# Patient Record
Sex: FEMALE | Race: WHITE | Employment: OTHER | URBAN - NONMETROPOLITAN AREA
[De-identification: names, ages, dates, MRNs, and addresses within clinical notes are randomized per-mention and may not be internally consistent; named-entity substitution may affect disease eponyms.]

---

## 2019-02-11 ENCOUNTER — OFFICE VISIT (OUTPATIENT)
Dept: PSYCHIATRY | Age: 54
End: 2019-02-11
Payer: MEDICARE

## 2019-02-11 VITALS
OXYGEN SATURATION: 97 % | SYSTOLIC BLOOD PRESSURE: 145 MMHG | BODY MASS INDEX: 20.89 KG/M2 | DIASTOLIC BLOOD PRESSURE: 79 MMHG | HEART RATE: 57 BPM | WEIGHT: 130 LBS | HEIGHT: 66 IN

## 2019-02-11 DIAGNOSIS — F10.10 ALCOHOL ABUSE: ICD-10-CM

## 2019-02-11 DIAGNOSIS — F33.3 SEVERE EPISODE OF RECURRENT MAJOR DEPRESSIVE DISORDER, WITH PSYCHOTIC FEATURES (HCC): Primary | ICD-10-CM

## 2019-02-11 DIAGNOSIS — F41.0 GENERALIZED ANXIETY DISORDER WITH PANIC ATTACKS: ICD-10-CM

## 2019-02-11 DIAGNOSIS — F51.05 INSOMNIA DUE TO OTHER MENTAL DISORDER: ICD-10-CM

## 2019-02-11 DIAGNOSIS — F99 INSOMNIA DUE TO OTHER MENTAL DISORDER: ICD-10-CM

## 2019-02-11 DIAGNOSIS — F41.1 GENERALIZED ANXIETY DISORDER WITH PANIC ATTACKS: ICD-10-CM

## 2019-02-11 PROCEDURE — 99205 OFFICE O/P NEW HI 60 MIN: CPT | Performed by: NURSE PRACTITIONER

## 2019-02-11 RX ORDER — MELOXICAM 7.5 MG/1
TABLET ORAL
Refills: 1 | COMMUNITY
Start: 2019-01-06 | End: 2019-03-04

## 2019-02-11 RX ORDER — TRAMADOL HYDROCHLORIDE 50 MG/1
TABLET ORAL
Refills: 0 | COMMUNITY
Start: 2019-01-15 | End: 2019-05-21

## 2019-02-11 RX ORDER — TRAZODONE HYDROCHLORIDE 50 MG/1
50 TABLET ORAL NIGHTLY
Qty: 30 TABLET | Refills: 0 | Status: SHIPPED | OUTPATIENT
Start: 2019-02-11 | End: 2019-03-11 | Stop reason: SDUPTHER

## 2019-02-11 RX ORDER — PAROXETINE HYDROCHLORIDE 20 MG/1
TABLET, FILM COATED ORAL
Refills: 3 | COMMUNITY
Start: 2019-01-22 | End: 2019-02-11 | Stop reason: SDUPTHER

## 2019-02-11 RX ORDER — NICOTINE POLACRILEX 4 MG
GUM BUCCAL
Refills: 3 | COMMUNITY
Start: 2019-01-28 | End: 2019-03-04

## 2019-02-11 RX ORDER — PAROXETINE HYDROCHLORIDE 20 MG/1
30 TABLET, FILM COATED ORAL NIGHTLY
Qty: 45 TABLET | Refills: 0
Start: 2019-02-11 | End: 2019-03-12 | Stop reason: DRUGHIGH

## 2019-02-11 RX ORDER — METOPROLOL SUCCINATE 50 MG/1
TABLET, EXTENDED RELEASE ORAL
Refills: 11 | COMMUNITY
Start: 2019-01-22 | End: 2019-04-25 | Stop reason: ALTCHOICE

## 2019-03-04 ENCOUNTER — HOSPITAL ENCOUNTER (OUTPATIENT)
Dept: PSYCHIATRY | Age: 54
Discharge: HOME | End: 2019-03-04
Payer: MEDICARE

## 2019-03-04 VITALS
SYSTOLIC BLOOD PRESSURE: 133 MMHG | HEIGHT: 66 IN | HEART RATE: 54 BPM | WEIGHT: 132 LBS | DIASTOLIC BLOOD PRESSURE: 80 MMHG | BODY MASS INDEX: 21.21 KG/M2 | TEMPERATURE: 96.9 F | RESPIRATION RATE: 18 BRPM | OXYGEN SATURATION: 99 %

## 2019-03-04 PROCEDURE — 99202 OFFICE O/P NEW SF 15 MIN: CPT | Performed by: NURSE PRACTITIONER

## 2019-03-04 PROCEDURE — 90837 PSYTX W PT 60 MINUTES: CPT | Performed by: SOCIAL WORKER

## 2019-03-04 RX ORDER — MULTIVITAMIN/IRON/FOLIC ACID 18MG-0.4MG
TABLET ORAL DAILY
COMMUNITY

## 2019-03-04 RX ORDER — GABAPENTIN 300 MG/1
300 CAPSULE ORAL 2 TIMES DAILY
COMMUNITY
End: 2019-04-25 | Stop reason: DRUGHIGH

## 2019-03-04 RX ORDER — VARENICLINE TARTRATE 1 MG/1
1 TABLET, FILM COATED ORAL 2 TIMES DAILY
COMMUNITY
End: 2019-11-19 | Stop reason: ALTCHOICE

## 2019-03-04 SDOH — SOCIAL STABILITY: SOCIAL INSECURITY
WITHIN THE LAST YEAR, HAVE YOU BEEN KICKED, HIT, SLAPPED, OR OTHERWISE PHYSICALLY HURT BY YOUR PARTNER OR EX-PARTNER?: NO

## 2019-03-04 SDOH — SOCIAL STABILITY: SOCIAL NETWORK: HOW OFTEN DO YOU ATTENT MEETINGS OF THE CLUB OR ORGANIZATION YOU BELONG TO?: NEVER

## 2019-03-04 SDOH — SOCIAL STABILITY: SOCIAL NETWORK
DO YOU BELONG TO ANY CLUBS OR ORGANIZATIONS SUCH AS CHURCH GROUPS UNIONS, FRATERNAL OR ATHLETIC GROUPS, OR SCHOOL GROUPS?: NO

## 2019-03-04 SDOH — SOCIAL STABILITY: SOCIAL INSECURITY: WITHIN THE LAST YEAR, HAVE YOU BEEN AFRAID OF YOUR PARTNER OR EX-PARTNER?: NO

## 2019-03-04 SDOH — SOCIAL STABILITY: SOCIAL NETWORK: IN A TYPICAL WEEK, HOW MANY TIMES DO YOU TALK ON THE PHONE WITH FAMILY, FRIENDS, OR NEIGHBORS?: ONCE A WEEK

## 2019-03-04 SDOH — SOCIAL STABILITY: SOCIAL INSECURITY
WITHIN THE LAST YEAR, HAVE TO BEEN RAPED OR FORCED TO HAVE ANY KIND OF SEXUAL ACTIVITY BY YOUR PARTNER OR EX-PARTNER?: NO

## 2019-03-04 SDOH — SOCIAL STABILITY: SOCIAL INSECURITY: WITHIN THE LAST YEAR, HAVE YOU BEEN HUMILIATED OR EMOTIONALLY ABUSED IN OTHER WAYS BY YOUR PARTNER OR EX-PARTNER?: NO

## 2019-03-04 SDOH — SOCIAL STABILITY: SOCIAL NETWORK: HOW OFTEN DO YOU GET TOGETHER WITH FRIENDS OR RELATIVES?: THREE TIMES A WEEK

## 2019-03-04 SDOH — SOCIAL STABILITY: SOCIAL NETWORK: ARE YOU MARRIED, WIDOWED, DIVORCED, SEPARATED, NEVER MARRIED, OR LIVING WITH A PARTNER?: SEPARATED

## 2019-03-04 SDOH — SOCIAL STABILITY: SOCIAL NETWORK: HOW OFTEN DO YOU ATTEND CHURCH OR RELIGIOUS SERVICES?: NEVER

## 2019-03-04 ASSESSMENT — PAIN SCALES - GENERAL: PAINLEVEL_OUTOF10: 6

## 2019-03-04 ASSESSMENT — PAIN DESCRIPTION - PROGRESSION: CLINICAL_PROGRESSION: GRADUALLY IMPROVING

## 2019-03-04 ASSESSMENT — SLEEP AND FATIGUE QUESTIONNAIRES
DO YOU HAVE DIFFICULTY SLEEPING: YES
SLEEP PATTERN: DIFFICULTY FALLING ASLEEP;NIGHTMARES/TERRORS;RESTLESSNESS
AVERAGE NUMBER OF SLEEP HOURS: 5
RESTFUL SLEEP: NO
DIFFICULTY STAYING ASLEEP: YES
DO YOU USE A SLEEP AID: YES
DIFFICULTY FALLING ASLEEP: YES
DIFFICULTY ARISING: NO

## 2019-03-04 ASSESSMENT — PAIN DESCRIPTION - LOCATION: LOCATION: BACK

## 2019-03-04 ASSESSMENT — PAIN DESCRIPTION - ONSET: ONSET: ON-GOING

## 2019-03-04 ASSESSMENT — PAIN DESCRIPTION - PAIN TYPE: TYPE: CHRONIC PAIN

## 2019-03-04 ASSESSMENT — PAIN DESCRIPTION - FREQUENCY: FREQUENCY: CONTINUOUS

## 2019-03-04 ASSESSMENT — PAIN DESCRIPTION - ORIENTATION: ORIENTATION: LOWER;MID;UPPER

## 2019-03-04 ASSESSMENT — LIFESTYLE VARIABLES: HISTORY_ALCOHOL_USE: YES

## 2019-03-04 ASSESSMENT — PATIENT HEALTH QUESTIONNAIRE - PHQ9: SUM OF ALL RESPONSES TO PHQ QUESTIONS 1-9: 16

## 2019-03-05 ENCOUNTER — HOSPITAL ENCOUNTER (OUTPATIENT)
Dept: PSYCHIATRY | Age: 54
Discharge: HOME | End: 2019-03-05
Payer: MEDICARE

## 2019-03-05 PROCEDURE — G0410 GRP PSYCH PARTIAL HOSP 45-50: HCPCS | Performed by: SOCIAL WORKER

## 2019-03-06 ENCOUNTER — HOSPITAL ENCOUNTER (OUTPATIENT)
Dept: PSYCHIATRY | Age: 54
Discharge: HOME | End: 2019-03-06
Payer: MEDICARE

## 2019-03-06 PROCEDURE — 90853 GROUP PSYCHOTHERAPY: CPT | Performed by: SOCIAL WORKER

## 2019-03-06 PROCEDURE — G0410 GRP PSYCH PARTIAL HOSP 45-50: HCPCS | Performed by: SOCIAL WORKER

## 2019-03-07 ENCOUNTER — HOSPITAL ENCOUNTER (OUTPATIENT)
Dept: PSYCHIATRY | Age: 54
Discharge: HOME | End: 2019-03-07
Payer: MEDICARE

## 2019-03-07 VITALS
HEART RATE: 56 BPM | RESPIRATION RATE: 18 BRPM | OXYGEN SATURATION: 97 % | TEMPERATURE: 97.8 F | DIASTOLIC BLOOD PRESSURE: 82 MMHG | SYSTOLIC BLOOD PRESSURE: 146 MMHG

## 2019-03-07 PROCEDURE — 90853 GROUP PSYCHOTHERAPY: CPT | Performed by: SOCIAL WORKER

## 2019-03-07 PROCEDURE — G0410 GRP PSYCH PARTIAL HOSP 45-50: HCPCS | Performed by: SOCIAL WORKER

## 2019-03-08 ENCOUNTER — HOSPITAL ENCOUNTER (OUTPATIENT)
Dept: PSYCHIATRY | Age: 54
Discharge: HOME | End: 2019-03-08
Payer: MEDICARE

## 2019-03-08 PROCEDURE — G0410 GRP PSYCH PARTIAL HOSP 45-50: HCPCS | Performed by: SOCIAL WORKER

## 2019-03-08 PROCEDURE — 90853 GROUP PSYCHOTHERAPY: CPT | Performed by: SOCIAL WORKER

## 2019-03-11 ENCOUNTER — HOSPITAL ENCOUNTER (OUTPATIENT)
Dept: PSYCHIATRY | Age: 54
Discharge: HOME | End: 2019-03-11
Payer: MEDICARE

## 2019-03-11 PROCEDURE — G0410 GRP PSYCH PARTIAL HOSP 45-50: HCPCS | Performed by: SOCIAL WORKER

## 2019-03-11 RX ORDER — TRAZODONE HYDROCHLORIDE 50 MG/1
50 TABLET ORAL NIGHTLY PRN
Qty: 30 TABLET | Refills: 0 | Status: SHIPPED | OUTPATIENT
Start: 2019-03-11 | End: 2019-03-19 | Stop reason: DRUGHIGH

## 2019-03-12 ENCOUNTER — HOSPITAL ENCOUNTER (OUTPATIENT)
Dept: PSYCHIATRY | Age: 54
Discharge: HOME | End: 2019-03-12
Payer: MEDICARE

## 2019-03-12 DIAGNOSIS — F41.1 GENERALIZED ANXIETY DISORDER WITH PANIC ATTACKS: ICD-10-CM

## 2019-03-12 DIAGNOSIS — F51.05 INSOMNIA DUE TO OTHER MENTAL DISORDER: ICD-10-CM

## 2019-03-12 DIAGNOSIS — F33.3 SEVERE EPISODE OF RECURRENT MAJOR DEPRESSIVE DISORDER, WITH PSYCHOTIC FEATURES (HCC): Primary | ICD-10-CM

## 2019-03-12 DIAGNOSIS — F99 INSOMNIA DUE TO OTHER MENTAL DISORDER: ICD-10-CM

## 2019-03-12 DIAGNOSIS — F41.0 GENERALIZED ANXIETY DISORDER WITH PANIC ATTACKS: ICD-10-CM

## 2019-03-12 DIAGNOSIS — F43.10 PTSD (POST-TRAUMATIC STRESS DISORDER): ICD-10-CM

## 2019-03-12 PROCEDURE — 90853 GROUP PSYCHOTHERAPY: CPT | Performed by: SOCIAL WORKER

## 2019-03-12 PROCEDURE — 99214 OFFICE O/P EST MOD 30 MIN: CPT | Performed by: NURSE PRACTITIONER

## 2019-03-12 PROCEDURE — G0410 GRP PSYCH PARTIAL HOSP 45-50: HCPCS | Performed by: SOCIAL WORKER

## 2019-03-12 RX ORDER — PAROXETINE HYDROCHLORIDE 40 MG/1
40 TABLET, FILM COATED ORAL DAILY
Qty: 30 TABLET | Refills: 3 | Status: SHIPPED | OUTPATIENT
Start: 2019-03-12 | End: 2019-04-03 | Stop reason: SINTOL

## 2019-03-13 ENCOUNTER — HOSPITAL ENCOUNTER (OUTPATIENT)
Dept: PSYCHIATRY | Age: 54
Discharge: HOME | End: 2019-03-13
Payer: MEDICARE

## 2019-03-13 PROCEDURE — G0410 GRP PSYCH PARTIAL HOSP 45-50: HCPCS | Performed by: SOCIAL WORKER

## 2019-03-14 ENCOUNTER — HOSPITAL ENCOUNTER (OUTPATIENT)
Dept: PSYCHIATRY | Age: 54
Discharge: HOME | End: 2019-03-14
Payer: MEDICARE

## 2019-03-14 PROCEDURE — G0410 GRP PSYCH PARTIAL HOSP 45-50: HCPCS | Performed by: SOCIAL WORKER

## 2019-03-15 ENCOUNTER — HOSPITAL ENCOUNTER (OUTPATIENT)
Dept: PSYCHIATRY | Age: 54
Discharge: HOME | End: 2019-03-15
Payer: MEDICARE

## 2019-03-15 PROCEDURE — G0410 GRP PSYCH PARTIAL HOSP 45-50: HCPCS | Performed by: SOCIAL WORKER

## 2019-03-15 PROCEDURE — 90853 GROUP PSYCHOTHERAPY: CPT | Performed by: SOCIAL WORKER

## 2019-03-18 ENCOUNTER — HOSPITAL ENCOUNTER (OUTPATIENT)
Dept: PSYCHIATRY | Age: 54
Discharge: HOME | End: 2019-03-18
Payer: MEDICARE

## 2019-03-18 PROCEDURE — 90853 GROUP PSYCHOTHERAPY: CPT | Performed by: SOCIAL WORKER

## 2019-03-18 PROCEDURE — G0410 GRP PSYCH PARTIAL HOSP 45-50: HCPCS | Performed by: SOCIAL WORKER

## 2019-03-19 ENCOUNTER — HOSPITAL ENCOUNTER (OUTPATIENT)
Dept: PSYCHIATRY | Age: 54
Discharge: HOME | End: 2019-03-19
Payer: MEDICARE

## 2019-03-19 DIAGNOSIS — F43.10 PTSD (POST-TRAUMATIC STRESS DISORDER): ICD-10-CM

## 2019-03-19 DIAGNOSIS — F41.0 GENERALIZED ANXIETY DISORDER WITH PANIC ATTACKS: ICD-10-CM

## 2019-03-19 DIAGNOSIS — F33.1 MODERATE EPISODE OF RECURRENT MAJOR DEPRESSIVE DISORDER (HCC): Primary | ICD-10-CM

## 2019-03-19 DIAGNOSIS — F51.05 INSOMNIA DUE TO OTHER MENTAL DISORDER: ICD-10-CM

## 2019-03-19 DIAGNOSIS — F99 INSOMNIA DUE TO OTHER MENTAL DISORDER: ICD-10-CM

## 2019-03-19 DIAGNOSIS — F10.11 ALCOHOL USE DISORDER, MILD, IN EARLY REMISSION, ABUSE: ICD-10-CM

## 2019-03-19 DIAGNOSIS — F41.1 GENERALIZED ANXIETY DISORDER WITH PANIC ATTACKS: ICD-10-CM

## 2019-03-19 PROCEDURE — G0410 GRP PSYCH PARTIAL HOSP 45-50: HCPCS | Performed by: SOCIAL WORKER

## 2019-03-19 PROCEDURE — 99213 OFFICE O/P EST LOW 20 MIN: CPT | Performed by: NURSE PRACTITIONER

## 2019-03-19 RX ORDER — TRAZODONE HYDROCHLORIDE 50 MG/1
TABLET ORAL
Qty: 60 TABLET | Refills: 1 | Status: SHIPPED | OUTPATIENT
Start: 2019-03-19 | End: 2019-04-12

## 2019-03-19 RX ORDER — PRAZOSIN HYDROCHLORIDE 1 MG/1
1 CAPSULE ORAL NIGHTLY
Qty: 30 CAPSULE | Refills: 3 | Status: SHIPPED | OUTPATIENT
Start: 2019-03-19 | End: 2019-05-13 | Stop reason: DRUGHIGH

## 2019-03-22 ENCOUNTER — HOSPITAL ENCOUNTER (OUTPATIENT)
Dept: PSYCHIATRY | Age: 54
Setting detail: THERAPIES SERIES
Discharge: HOME | End: 2019-03-22
Payer: MEDICARE

## 2019-03-22 VITALS
OXYGEN SATURATION: 99 % | HEART RATE: 52 BPM | TEMPERATURE: 97.5 F | SYSTOLIC BLOOD PRESSURE: 104 MMHG | DIASTOLIC BLOOD PRESSURE: 68 MMHG | RESPIRATION RATE: 18 BRPM

## 2019-03-22 PROCEDURE — 90853 GROUP PSYCHOTHERAPY: CPT | Performed by: SOCIAL WORKER

## 2019-03-25 ENCOUNTER — HOSPITAL ENCOUNTER (OUTPATIENT)
Dept: PSYCHIATRY | Age: 54
Setting detail: THERAPIES SERIES
Discharge: HOME | End: 2019-03-25
Payer: MEDICARE

## 2019-03-25 PROCEDURE — 90853 GROUP PSYCHOTHERAPY: CPT | Performed by: SOCIAL WORKER

## 2019-03-27 ENCOUNTER — HOSPITAL ENCOUNTER (OUTPATIENT)
Dept: PSYCHIATRY | Age: 54
Setting detail: THERAPIES SERIES
Discharge: HOME | End: 2019-03-27
Payer: MEDICARE

## 2019-03-27 VITALS
DIASTOLIC BLOOD PRESSURE: 74 MMHG | OXYGEN SATURATION: 100 % | HEART RATE: 56 BPM | TEMPERATURE: 97.6 F | SYSTOLIC BLOOD PRESSURE: 124 MMHG | RESPIRATION RATE: 18 BRPM

## 2019-03-27 PROCEDURE — 90853 GROUP PSYCHOTHERAPY: CPT | Performed by: SOCIAL WORKER

## 2019-03-29 ENCOUNTER — HOSPITAL ENCOUNTER (OUTPATIENT)
Dept: PSYCHIATRY | Age: 54
Setting detail: THERAPIES SERIES
Discharge: HOME | End: 2019-03-29
Payer: MEDICARE

## 2019-03-29 PROCEDURE — 90853 GROUP PSYCHOTHERAPY: CPT | Performed by: SOCIAL WORKER

## 2019-04-01 ENCOUNTER — HOSPITAL ENCOUNTER (OUTPATIENT)
Dept: PSYCHIATRY | Age: 54
Setting detail: THERAPIES SERIES
Discharge: HOME | End: 2019-04-01
Payer: MEDICARE

## 2019-04-01 PROCEDURE — 90853 GROUP PSYCHOTHERAPY: CPT | Performed by: SOCIAL WORKER

## 2019-04-01 NOTE — PLAN OF CARE
Group Therapy Note     Date: 4/1/2019  Start Time: 1000  End Time:  1100  Number of Participants: 6     Type of Group: Relapse Prevention     Wellness Binder Information  Module Name:  Relapse Prevention  Session Number:  3     Patient's Goal:  Patient will identify personal strengths and discuss ways in which the patient is using them. Patient will be able to explore new ways to use personal strengths to patient's own advantage. Notes:  Patient attended group today. Patient was able to write own persona strengths and verbalize ways to use them. Patient discussed aftercare appointments and the importance of keeping appointments and continuing medications. Status After Intervention:  Improved     Participation Level:  Active Listener     Participation Quality: Appropriate        Speech:  normal        Thought Process/Content: Logical        Affective Functioning: Congruent        Mood: anxious and depressed        Level of consciousness:  Alert        Response to Learning: Able to verbalize/acknowledge new learning        Endings: None Reported     Modes of Intervention: Education and Support        Discipline Responsible: /Counselor        Signature:  KATARINA Dumas

## 2019-04-01 NOTE — PLAN OF CARE
Problem: Depressive Behavior With or Without Suicide Precautions:  Goal: Able to verbalize acceptance of life and situations over which he or she has no control  Description  Able to verbalize acceptance of life and situations over which he or she has no control  4/1/2019 1452 by KATARINA Chawla  Outcome: Ongoing  Note:   Pt received support and encouragement from peers today. Pt Goal: Pt will attend group as scheduled, identify an issue pt would like to work on regarding bettering relationships with others and/or self, pt will be participate in group discussion. Note: Pt attended group as scheduled. Pt participated by identifying an issue pt would like to work on today regarding interacting/relating with others to better relationships. Pt shared that he had a \"crappy\" weekend and that \"so far today has been just as bad. \" Pt went on to share with group that he is feeling \"low\" and \"not seeing any progress. \" Pt received support and encouragement from group. Group discussed positive and negative thinking, positive self talk, and utilizing coping skills. Pt voiced he will \"continue to try. \" Pt encouraged to catch himself from following any positive thought he has with \"but. Marilee Abbot Marilee Abbot Marilee Abbot \" and a negative thought. Pt voiced his agreement and said, \"Okay I'll give it a try. \" Pt participated in group discussion exploring issues identified by group members and pt. Status After Intervention:  Unchanged    Participation Level:  Active Listener and Interactive    Participation Quality: Appropriate, Attentive and Sharing      Speech:  normal      Thought Process/Content: Logical      Affective Functioning: Congruent      Mood: depressed      Level of consciousness:  Alert, Oriented x4 and Attentive      Response to Learning: Able to verbalize current knowledge/experience, Able to verbalize/acknowledge new learning, Able to retain information and Progressing to goal      Endings: None Reported    Modes of Intervention: Support, Socialization, Exploration and Clarifying      Discipline Responsible: /Counselor      Signature:  KATARINA Naqvi

## 2019-04-01 NOTE — PLAN OF CARE
D:  Pt here for IOP this am.  She reports that she has a moderate level of depression and anxiety. She reports poor concentration, appetite and sleep. She reports fair motivation level. She reports med compliance and feels that meds are partially effective. She denies any thoughts to harm herself or others. She denies any ETOH or drug use. A:  support provided. Pt provided opportunity to ask any med/treatment questions. Roshan SCANLON given update on pt this am.  R:  Pt with dysphoric affect. Pt reports she had several panic attacks over the week end and slept off and on all weekend.

## 2019-04-01 NOTE — PLAN OF CARE
D:  Pt here for IOP this am.  She reports that her pain level is staying around an 8 with 10 being the highest.  She has a pain mgmt doctor. She is aware that CBT may help her in managing her chronic pain issues.

## 2019-04-01 NOTE — PLAN OF CARE
Group Therapy Note     Date: 4/1/2019  Start Time: 238  End Time:  101  Number of Participants: 6     Type of Group: Psychoeducation     Wellness Binder Information  Module Name:  [address] [place] [address]     Session Number:  2     Patient's Goal: Patient will be able to identify personal symptoms of anxiety and panic attacks. Notes:  Patient attended group today. Patient participated in group discussion about symptoms of anxiety and how they affect people both negatively and positively. Status After Intervention:  Improved     Participation Level:  Active Listener     Participation Quality: Appropriate        Speech:  normal        Thought Process/Content: Logical        Affective Functioning: Congruent        Mood: depressed        Level of consciousness:  Oriented x4        Response to Learning: Able to verbalize/acknowledge new learning        Endings: None Reported     Modes of Intervention: Education and Support        Discipline Responsible: /Counselor        Signature:  KATARINA Cárdenas

## 2019-04-01 NOTE — PLAN OF CARE
Problem: Depressive Behavior With or Without Suicide Precautions:  Goal: Able to verbalize acceptance of life and situations over which he or she has no control  Description  Able to verbalize acceptance of life and situations over which he or she has no control  4/1/2019 1504 by KATARINA Leal  Outcome: Ongoing  Note:                                                                       Group Therapy Note    Date: 4/1/2019  Start Time: 1115  End Time:  1215  Number of Participants: 6    Type of Group: Psychotherapy    Pt Goal: Pt will attend group as scheduled, identify an issue pt would like to work on regarding bettering relationships with others and/or self, pt will be participate in group discussion. Note: Pt attended group as scheduled. Pt participated by identifying an issue pt would like to work on today regarding interacting/relating with others to better relationships. Pt voiced that she had a \"good\" weekend and did things she has been putting off such as; laundry, cleaning her fridge out, and doing dishes. Pt reports feeling proud of herself. Pt received positive feedback and support from group. Pt participated in group discussion exploring issues identified by group members and pt. Status After Intervention:  Improved    Participation Level:  Active Listener and Interactive    Participation Quality: Appropriate, Attentive, Sharing and Supportive      Speech:  normal      Thought Process/Content: Logical      Affective Functioning: Congruent      Mood: depressed      Level of consciousness:  Alert, Oriented x4 and Attentive      Response to Learning: Able to verbalize current knowledge/experience, Able to verbalize/acknowledge new learning, Able to retain information, Able to change behavior and Progressing to goal      Endings: None Reported    Modes of Intervention: Support, Socialization, Exploration and Clarifying      Discipline Responsible: /Counselor      Signature: Jerrod Davenport, CSW

## 2019-04-03 ENCOUNTER — HOSPITAL ENCOUNTER (OUTPATIENT)
Dept: PSYCHIATRY | Age: 54
Setting detail: THERAPIES SERIES
Discharge: HOME | End: 2019-04-03
Payer: MEDICARE

## 2019-04-03 VITALS — WEIGHT: 128 LBS | BODY MASS INDEX: 20.66 KG/M2

## 2019-04-03 PROCEDURE — 90853 GROUP PSYCHOTHERAPY: CPT | Performed by: SOCIAL WORKER

## 2019-04-03 RX ORDER — LAMOTRIGINE 25 MG/1
TABLET ORAL
Qty: 60 TABLET | Refills: 1 | Status: SHIPPED | OUTPATIENT
Start: 2019-04-03 | End: 2019-05-07 | Stop reason: DRUGHIGH

## 2019-04-03 NOTE — PLAN OF CARE
Problem: Depressive Behavior With or Without Suicide Precautions:  Goal: Able to verbalize acceptance of life and situations over which he or she has no control  Description  Able to verbalize acceptance of life and situations over which he or she has no control  4/3/2019 1317 by KATARINA Conn  Outcome: Ongoing  Note:                                                                       Group Therapy Note    Date: 4/3/2019  Start Time: 1000  End Time:  1100  Number of Participants: 6    Type of Group: Relapse Prevention      Pt's Goal: Pt to be able to practice self-flection, identify difficult moments that the pt made it through and what may have helped pt to do so. Handout: You Did. Notes: Pt attended group as scheduled. Pt showed improve knowledge regarding self-reflection by being able to identify difficult moments that the pt has endured throughout life as well as, joined in group discussion regarding what the pt felt was beneficial to making it through those difficulties. Pt was provided handout: You Did. Status After Intervention:  Unchanged    Participation Level:  Active Listener and Interactive    Participation Quality: Appropriate, Attentive and Sharing      Speech:  normal      Thought Process/Content: Logical      Affective Functioning: Congruent      Mood: depressed      Level of consciousness:  Alert, Oriented x4 and Attentive      Response to Learning: Able to verbalize current knowledge/experience, Able to verbalize/acknowledge new learning, Capable of insight and Progressing to goal      Endings: None Reported    Modes of Intervention: Education, Support, Socialization, Exploration and Clarifying      Discipline Responsible: /Counselor      Signature:  KATARINA Conn

## 2019-04-03 NOTE — PLAN OF CARE
Problem: Depressive Behavior With or Without Suicide Precautions:  Goal: STG-Knowledge of positive coping patterns  Outcome: Ongoing  Note:                                                                       Group Therapy Note    Date: 4/3/2019  Start Time: 1115  End Time:  1215  Number of Participants: 5    Type of Group: Cognitive Skills       Patient's Goal:  To improve knowledge of coping skills and be able to provide three coping strategies/skills used in daily life. Pt to participate in activity provided. Notes: Pt attended group as scheduled. Pt demonstrated improved knowledge of coping by participating in group discussion regarding importance of coping skills and different coping strategies used by peers. Pt was able to identify three examples of coping skills used such as; deep breathing, progressive muscle relaxation, journaling, and meditation. Pt was given handout of; Coping Skills, Anxiety and Mindfulness Mediation. Pt participated in group activity; Media; Ocean Breathing (deep breathing and relaxation). Status After Intervention:  Unchanged    Participation Level:  Active Listener and Interactive    Participation Quality: Appropriate, Attentive and Sharing      Speech:  normal      Thought Process/Content: Logical      Affective Functioning: Congruent      Mood: depressed      Level of consciousness:  Alert, Oriented x4 and Attentive      Response to Learning: Able to verbalize current knowledge/experience, Able to verbalize/acknowledge new learning, Able to retain information and Progressing to goal      Endings: None Reported    Modes of Intervention: Education, Socialization, Exploration, Activity and Media      Discipline Responsible: /Counselor      Signature:  KATARINA Ortega
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
## 2019-04-05 ENCOUNTER — HOSPITAL ENCOUNTER (OUTPATIENT)
Dept: PSYCHIATRY | Age: 54
Setting detail: THERAPIES SERIES
Discharge: HOME | End: 2019-04-05
Payer: MEDICARE

## 2019-04-05 VITALS — RESPIRATION RATE: 18 BRPM | DIASTOLIC BLOOD PRESSURE: 83 MMHG | SYSTOLIC BLOOD PRESSURE: 125 MMHG | HEART RATE: 57 BPM

## 2019-04-05 PROCEDURE — 90853 GROUP PSYCHOTHERAPY: CPT | Performed by: SOCIAL WORKER

## 2019-04-05 NOTE — BH NOTE
D:  Pt attended IOP this am.  She reported that she has not eaten since Tuesday night. She says she does not have an appetite and till at night and has been falling asleep before she eats. Pt reports that she is drinking fluids. Wt today 128. Pt reports issues with her eating since high school. Pt was encouraged to try to eat 5 small meals a day. Pt reports when she tries to make herself eat it will not go down. Pt reports that she is going to try drinking some Boost liquid supplement since she can drink without problem. Pt was encouraged to try the Boost-she was informed that some of it has extra protein. She was also encouraged to try ice cream as recommended by the APRN when given the above info. Pt agreeable.

## 2019-04-05 NOTE — PLAN OF CARE
Group Therapy Note    Date: 4/5/2019  Start Time: 1000  End Time:  1100  Number of Participants: 4    Type of Group: Psychoeducation    Wellness Binder Information  Module Name:  Stress  Session Number:  3    Patient's Goal: Preventing stress    Notes:  Pt attended group as scheduled. Pt participated in group discussion in learning and improving ways to manage stress/anxiety. Pt participated in reading booklet on anxiety and ways to improve anxiety. Pt participated in group activity of challenging anxiety thoughts. Status After Intervention:  Unchanged    Participation Level:  Active Listener and Interactive    Participation Quality: Appropriate, Attentive and Sharing      Speech:  normal      Thought Process/Content: Logical      Affective Functioning: Congruent      Mood: depressed      Level of consciousness:  Attentive      Response to Learning: Able to verbalize current knowledge/experience and Able to verbalize/acknowledge new learning      Endings: None Reported    Modes of Intervention: Education and Activity      Discipline Responsible: /Counselor      Signature:  Toribio Soni [facility]

## 2019-04-09 ENCOUNTER — HOSPITAL ENCOUNTER (OUTPATIENT)
Dept: PSYCHIATRY | Age: 54
Setting detail: THERAPIES SERIES
Discharge: HOME | End: 2019-04-09
Payer: MEDICARE

## 2019-04-09 PROCEDURE — 90853 GROUP PSYCHOTHERAPY: CPT | Performed by: SOCIAL WORKER

## 2019-04-09 NOTE — PLAN OF CARE
Problem: Depressive Behavior With or Without Suicide Precautions:  Goal: Ability to disclose and discuss suicidal ideas will improve  Description  Ability to disclose and discuss suicidal ideas will improve  Outcome: Completed                                                                       Group Therapy Note    Date: 4/9/2019  Start Time: 0830  End Time:  0945  Number of Participants: 4    Type of Group: Psychotherapy    Patient's Goal:  Process emotions and actions in how to relate to others or their relationship with others. Notes:  Pt attended process group as schedule. Pt participated by identified an issue to work on today regarding how they interact and relate to others. Participated in group discussion. Pt gave support and encouragement to group members. Pt discussed feeling trapped in her home when hiding from neighbors who have poor boundaries. Group discussed pt's independence and ability to assert herself. Status After Intervention:  Unchanged    Participation Level:  Active Listener and Interactive    Participation Quality: Appropriate, Attentive and Sharing      Speech:  normal      Thought Process/Content: Linear      Affective Functioning: Congruent      Mood: depressed      Level of consciousness:  Alert, Oriented x4 and Attentive      Response to Learning: Able to verbalize current knowledge/experience, Able to retain information and Progressing to goal      Endings: None Reported    Modes of Intervention: Education, Support, Socialization, Exploration and Clarifying      Discipline Responsible: /Counselor      Signature:  JESSICA Villareal

## 2019-04-09 NOTE — PROGRESS NOTES
ADMINISTRATIVE NOTE:  Reviewed and approved group notes authored by KATARINA Sen the week of April 1st- April 5th. We discussed progress of this patient and group issues and participation level.

## 2019-04-09 NOTE — PLAN OF CARE
Problem: Depressive Behavior With or Without Suicide Precautions:  Goal: STG-Knowledge of positive coping patterns  Outcome: Ongoing  Note:                                                                       Group Therapy Note    Date: 4/9/2019  Start Time: 1000  End Time:  1100  Number of Participants: 4    Type of Group: Psychoeducation    Wellness Binder Information  Module Name:  Women's Issues  Session Number:  4    Patient's Goal:  To raise awareness of how thoughts influence feelings    Notes:  Pt demonstrated improved awareness of how thoughts influence feelings by actively participating in group discussion.     Status After Intervention:  Unchanged    Participation Level: Interactive    Participation Quality: Attentive      Speech:  normal      Thought Process/Content: Logical      Affective Functioning: Congruent      Mood: anxious and depressed      Level of consciousness:  Alert and Oriented x4      Response to Learning: Able to verbalize current knowledge/experience, Able to verbalize/acknowledge new learning and Progressing to goal      Endings: None Reported    Modes of Intervention: Education      Discipline Responsible: Psychoeducational Specialist      Signature:  Emiliana Swartz

## 2019-04-09 NOTE — PLAN OF CARE
Problem: Depressive Behavior With or Without Suicide Precautions:  Goal: Absence of self-harm  Description  Absence of self-harm  Outcome: Ongoing                                                                       Group Therapy Note    Date: 4/9/2019  Start Time: 1115  End Time:  1215  Number of Participants: 4    Type of Group: Cognitive Skills    Patient's Goal: Handout: Tips for getting better sleep. Discuss what habits are preventing healthy sleep cycle and how to correct them. Notes:  Pt attended process group as schedule. Participated in group discussion. Status After Intervention:  Unchanged    Participation Level:  Active Listener and Interactive    Participation Quality: Appropriate, Attentive, Sharing and Supportive      Speech:  normal      Thought Process/Content: Logical      Affective Functioning: Congruent      Mood: anxious and depressed      Level of consciousness:  Alert, Oriented x4 and Attentive      Response to Learning: Able to verbalize current knowledge/experience, Able to retain information and Progressing to goal      Endings: None Reported    Modes of Intervention: Education, Support, Socialization, Exploration and Clarifying      Discipline Responsible: /Counselor      Signature:  Elihue Lanes, LISW

## 2019-04-10 ENCOUNTER — HOSPITAL ENCOUNTER (OUTPATIENT)
Dept: PSYCHIATRY | Age: 54
Setting detail: THERAPIES SERIES
Discharge: HOME | End: 2019-04-10
Payer: MEDICARE

## 2019-04-10 VITALS
DIASTOLIC BLOOD PRESSURE: 85 MMHG | OXYGEN SATURATION: 100 % | HEART RATE: 58 BPM | SYSTOLIC BLOOD PRESSURE: 130 MMHG | RESPIRATION RATE: 18 BRPM

## 2019-04-10 PROCEDURE — 90853 GROUP PSYCHOTHERAPY: CPT | Performed by: SOCIAL WORKER

## 2019-04-10 NOTE — PLAN OF CARE
Problem: Depressive Behavior With or Without Suicide Precautions:  Goal: Absence of self-harm  Description  Absence of self-harm  Outcome: Ongoing  Note:                                                                       Group Therapy Note    Date: 4/10/2019  Start Time: 1000  End Time:  1100  Number of Participants: 8    Type of Group: Relapse Prevention    Patient's Goal: Discuss lack of large support systems and various community support groups which can help increase our support system. Process emotions and actions in how to relate to others or their relationship with others. Notes:  Pt attended process group as schedule. Pt participated by identified an issue to work on today regarding how they interact and relate to others. Participated in group discussion. Pt gave support and encouragement to group members. Status After Intervention:  Improved    Participation Level:  Active Listener and Interactive    Participation Quality: Appropriate, Attentive, Sharing and Supportive      Speech:  normal      Thought Process/Content: Logical      Affective Functioning: Congruent      Mood: anxious and depressed      Level of consciousness:  Alert, Oriented x4 and Attentive      Response to Learning: Able to verbalize current knowledge/experience, Able to retain information and Progressing to goal      Endings: None Reported    Modes of Intervention: Education, Support, Socialization, Exploration and Clarifying      Discipline Responsible: /Counselor      Signature:  JESSICA Lyle

## 2019-04-10 NOTE — PLAN OF CARE
Problem: Depressive Behavior With or Without Suicide Precautions:  Goal: STG-Knowledge of positive coping patterns  Outcome: Ongoing  Note:                                                                       Group Therapy Note    Date: 4/10/2019  Start Time: 1115  End Time:  1215  Number of Participants: 8    Type of Group: Cognitive Skills    Patient's Goal: Discuss not holding resentments toward people who treat us the way we have taught them to treat us. Process emotions and actions in how to relate to others or their relationship with others. Notes:  Pt attended process group as schedule. Pt participated by identified an issue to work on today regarding how they interact and relate to others. Participated in group discussion. Pt gave support and encouragement to group members. Status After Intervention:  Improved    Participation Level:  Active Listener and Interactive    Participation Quality: Appropriate, Attentive, Sharing and Supportive      Speech:  normal      Thought Process/Content: Logical      Affective Functioning: Congruent      Mood: anxious and depressed      Level of consciousness:  Alert, Oriented x4 and Attentive      Response to Learning: Able to verbalize current knowledge/experience, Able to retain information and Progressing to goal      Endings: None Reported    Modes of Intervention: Education, Support, Socialization, Exploration and Clarifying      Discipline Responsible: /Counselor      Signature:  JESSICA Gale

## 2019-04-12 ENCOUNTER — HOSPITAL ENCOUNTER (OUTPATIENT)
Dept: PSYCHIATRY | Age: 54
Setting detail: THERAPIES SERIES
Discharge: HOME | End: 2019-04-12
Payer: MEDICARE

## 2019-04-12 PROCEDURE — 90853 GROUP PSYCHOTHERAPY: CPT | Performed by: SOCIAL WORKER

## 2019-04-12 RX ORDER — TRAZODONE HYDROCHLORIDE 50 MG/1
50 TABLET ORAL NIGHTLY
Qty: 90 TABLET | Refills: 0 | Status: SHIPPED | OUTPATIENT
Start: 2019-04-12 | End: 2019-05-01 | Stop reason: ALTCHOICE

## 2019-04-12 NOTE — PLAN OF CARE
Problem: Depressive Behavior With or Without Suicide Precautions:  Goal: Able to verbalize acceptance of life and situations over which he or she has no control  Description  Able to verbalize acceptance of life and situations over which he or she has no control  4/12/2019 1252 by KATARINA Rosado  Outcome: Ongoing  Note:                                                                       Group Therapy Note    Date: 4/12/2019  Start Time: 1000  End Time:  1100  Number of Participants: 7    Type of Group: Relapse Prevention    Patient's Goal:  Pt will be able to identify and define a problem, develop multiple solutions, choose one that the pt wishes to implement and discuss with group. Handouts provided: Problem solving packet. Notes:  Pt attended group as scheduled. Pt showed improved knowledge of developing problem solving skills and solutions by actively participating in group discussion. Pt able to identify and define a problem pt would like to work on, develop multiple solutions and choose one that the pt would like to implement. Pt voiced understanding of tracking progress of solution chosen by using various techniques such as; journaling, using an tabby, and/or a log. Status After Intervention:  Unchanged    Participation Level:  Active Listener and Interactive    Participation Quality: Appropriate, Attentive and Sharing      Speech:  normal      Thought Process/Content: Logical    Affective Functioning: Congruent    Mood: depressed    Level of consciousness:  Alert, Oriented x4 and Attentive      Response to Learning: Able to verbalize current knowledge/experience, Able to verbalize/acknowledge new learning and Progressing to goal      Endings: None Reported    Modes of Intervention: Support, Exploration, Clarifying and Problem-solving      Discipline Responsible: /Counselor      Signature:  KATARINA Rosado

## 2019-04-12 NOTE — PLAN OF CARE
Problem: Depressive Behavior With or Without Suicide Precautions:  Goal: STG-Knowledge of positive coping patterns  Outcome: Ongoing  Note:                                                                       Group Therapy Note    Date: 4/12/2019  Start Time: 1115  End Time:  1215  Number of Participants: 6    Type of Group: Cognitive Skills     Patient's Goal:  To improve knowledge of coping skills and be able to provide three coping strategies/skills used in daily life. Notes: Pt attended group as scheduled. Pt demonstrated improved knowledge of coping by participating in group discussion regarding importance of coping skills and different coping strategies used by peers. Pt was able to identify three examples of coping skills used such as; writing in journal, adult coloring, and exercise. Pt participated in activity provided; adult coloring. Status After Intervention:  Unchanged    Participation Level:  Active Listener and Interactive    Participation Quality: Appropriate, Attentive and Sharing      Speech:  normal      Thought Process/Content: Logical      Affective Functioning: Congruent      Mood: depressed      Level of consciousness:  Alert, Oriented x4 and Attentive      Response to Learning: Able to verbalize current knowledge/experience, Able to verbalize/acknowledge new learning and Progressing to goal      Endings: None Reported    Modes of Intervention: Support, Socialization, Exploration and Activity      Discipline Responsible: /Counselor    Signature:  KATARINA Hernandez

## 2019-04-15 ENCOUNTER — HOSPITAL ENCOUNTER (OUTPATIENT)
Dept: PSYCHIATRY | Age: 54
Setting detail: THERAPIES SERIES
Discharge: HOME | End: 2019-04-15
Payer: MEDICARE

## 2019-04-15 VITALS
SYSTOLIC BLOOD PRESSURE: 109 MMHG | RESPIRATION RATE: 18 BRPM | HEART RATE: 59 BPM | OXYGEN SATURATION: 99 % | DIASTOLIC BLOOD PRESSURE: 63 MMHG

## 2019-04-15 PROCEDURE — 90853 GROUP PSYCHOTHERAPY: CPT | Performed by: SOCIAL WORKER

## 2019-04-15 NOTE — PROGRESS NOTES
CARE PLAN UPDATE/ TREATMENT PLAN REVIEW:    LCSW met with pt to review care plan goals, developed by pt and [facility] staff. Pt verbalized understanding of care plan goals, is in agreement with goals, and did not want to modify current goals. LCSW and pt completed the signature sheet.

## 2019-04-15 NOTE — PROGRESS NOTES
ADMINISTRATIVE NOTE:  Reviewed and approved group notes authored by Shilpa barclay. We discussed progress of this patient and group issues and participation level.   Electronically signed by Forest Shipley on 4/15/2019 at 8:01 AM

## 2019-04-15 NOTE — PLAN OF CARE
Problem: Depressive Behavior With or Without Suicide Precautions:  Goal: Able to verbalize support systems  Description  Able to verbalize support systems  Outcome: Ongoing  Note:                                                                       Group Therapy Note    Date: 4/15/2019  Start Time: 1000  End Time:  1100  Number of Participants: 8    Type of Group: Relapse Prevention    Patient's Goal: Handout: Thoughts, Feelings, and Actions. Process emotions and actions in how to relate to others or their relationship with others. Notes:  Pt attended process group as schedule. Pt participated by identified an issue to work on today regarding how they interact and relate to others. Participated in group discussion. Pt gave support and encouragement to group members. Status After Intervention:  Unchanged    Participation Level:  Active Listener and Interactive    Participation Quality: Appropriate, Attentive and Sharing      Speech:  normal      Thought Process/Content: Logical  Linear      Affective Functioning: Congruent      Mood: anxious and depressed      Level of consciousness:  Alert, Oriented x4 and Attentive      Response to Learning: Able to verbalize current knowledge/experience, Able to retain information and Progressing to goal      Endings: None Reported    Modes of Intervention: Education, Support, Socialization, Exploration and Clarifying      Discipline Responsible: /Counselor      Signature:  JESSICA Quigley

## 2019-04-15 NOTE — PLAN OF CARE
Problem: Depressive Behavior With or Without Suicide Precautions:  Goal: Absence of self-harm  Description  Absence of self-harm  Outcome: Completed  Note:                                                                       Group Therapy Note    Date: 4/15/2019  Start Time: 1115  End Time:  1215  Number of Participants: 8    Type of Group: Cognitive Skills    Patient's Goal: Handout: ABC Model. Process emotions and actions in how to relate to others or their relationship with others. Notes:  Pt attended process group as schedule. Pt participated by identified an issue to work on today regarding how they interact and relate to others. Participated in group discussion. Pt gave support and encouragement to group members. Status After Intervention:  Unchanged    Participation Level:  Active Listener and Interactive    Participation Quality: Appropriate, Attentive and Sharing      Speech:  normal      Thought Process/Content: Logical  Linear      Affective Functioning: Congruent      Mood: anxious and depressed      Level of consciousness:  Alert, Oriented x4 and Attentive      Response to Learning: Able to verbalize current knowledge/experience, Able to retain information and Progressing to goal      Endings: None Reported    Modes of Intervention: Education, Support, Socialization, Exploration and Clarifying      Discipline Responsible: /Counselor      Signature:  JESSICA Brush

## 2019-04-15 NOTE — PLAN OF CARE
Problem: Depressive Behavior With or Without Suicide Precautions:  Goal: Able to verbalize acceptance of life and situations over which he or she has no control  Description  Able to verbalize acceptance of life and situations over which he or she has no control  Outcome: Ongoing  Note:                                                                       Group Therapy Note    Date: 4/15/2019  Start Time: 0830  End Time:  0945  Number of Participants: 8    Type of Group: Psychotherapy    Patient's Goal:  Process emotions and actions in how to relate to others or their relationship with others. Notes:  Pt attended process group as schedule. Pt participated by identified an issue to work on today regarding how they interact and relate to others. Participated in group discussion. Pt gave support and encouragement to group members. Pt reported she has increased pain and poor sleep. \"I feel like I have taken two steps back,\" pt was discussing her chronic pain. Status After Intervention:  Unchanged    Participation Level:  Active Listener and Interactive    Participation Quality: Appropriate, Attentive, Sharing and Supportive      Speech:  normal      Thought Process/Content: Linear      Affective Functioning: Congruent      Mood: anxious and depressed      Level of consciousness:  Alert, Oriented x4 and Attentive      Response to Learning: Able to verbalize current knowledge/experience, Able to retain information and Progressing to goal      Endings: None Reported    Modes of Intervention: Education, Support, Socialization, Exploration and Clarifying      Discipline Responsible: /Counselor      Signature:  JESSICA Mattson

## 2019-04-15 NOTE — PLAN OF CARE
Problem: Depressive Behavior With or Without Suicide Precautions:  Goal: STG-Medication therapy compliance  Description  Pt will report any med side effects to staff. Outcome: Ongoing  Note:   D:  Pt attended IOP this am.  She had increased focus on the grooming of her hair. She had cut her bangs. Pt stated that she usually has bangs and her hair colored, but has not felt like focusing on it. Pt reports that she is still spending time in bed but was able to do a little cleaning. Pt says she cannot concentrate to watch and enjoy TV. She reports fair to poor motivation level. She reports poor appetite and sleep. Weight today was 129 with clothes on to include jeans and shoes. Last week she weighed 125 when she had on tank top, shorts and sandals. Pt says she is not eating much. She is trying to drink things such as boost.  Pt does report that she is drinking fluids--discussed possible dangers of dehydration--pt verbalized understanding. Pt reported a moderate level of depression and high level of anger and anxiety. She denied any thoughts to harm herself or others. B/P was 109/63 P= 59--pt stated that her PCP take her taper off of her B/P med due to low B/p and pulse and that she took the last dose today of 1/2 pill. A:  support provided. Pt provided opportunity to ask any med/treatment questions. R:  Pt with sad affect that becomes slightly animated when she interacts with others. Pt reports med compliance. Pt can benefit from continued IOP for further stabilize her mood and anxiety and to learn effective coping skills.

## 2019-04-17 ENCOUNTER — HOSPITAL ENCOUNTER (OUTPATIENT)
Dept: PSYCHIATRY | Age: 54
Setting detail: THERAPIES SERIES
Discharge: HOME | End: 2019-04-17
Payer: MEDICARE

## 2019-04-17 PROCEDURE — 90853 GROUP PSYCHOTHERAPY: CPT | Performed by: SOCIAL WORKER

## 2019-04-17 NOTE — PLAN OF CARE
Problem: Depressive Behavior With or Without Suicide Precautions:  Goal: Able to verbalize support systems  Description  Able to verbalize support systems  Outcome: Ongoing  Note:                                                                       Group Therapy Note    Date: 4/17/2019  Start Time: 1000  End Time:  1100  Number of Participants: 7    Type of Group: Relapse Prevention    Patient's Goal: Our choices are how we take control over ourselves/life and taking responsibility for our consequences base on our decisions. Process emotions and actions in how to relate to others or their relationship with others. Notes:  Pt attended process group as schedule. Pt participated by identified an issue to work on today regarding how they interact and relate to others. Participated in group discussion. Pt gave support and encouragement to group members. Status After Intervention:  Unchanged    Participation Level:  Active Listener and Interactive    Participation Quality: Appropriate, Attentive, Sharing and Supportive      Speech:  normal      Thought Process/Content: Logical  Linear      Affective Functioning: Congruent      Mood: anxious and depressed      Level of consciousness:  Alert, Oriented x4 and Attentive      Response to Learning: Able to verbalize current knowledge/experience, Able to retain information and Progressing to goal      Endings: None Reported    Modes of Intervention: Education, Support, Socialization, Exploration and Clarifying      Discipline Responsible: /Counselor      Signature:  JESSICA Mattson

## 2019-04-17 NOTE — PLAN OF CARE
Problem: Depressive Behavior With or Without Suicide Precautions:  Goal: Able to verbalize acceptance of life and situations over which he or she has no control  Description  Able to verbalize acceptance of life and situations over which he or she has no control  Outcome: Ongoing  Note:                                                                       Group Therapy Note    Date: 4/17/2019  Start Time: 0830  End Time:  0945  Number of Participants: 8    Type of Group: Psychotherapy    Patient's Goal:  Process emotions and actions in how to relate to others or their relationship with others. Notes:  Pt attended process group as schedule. Pt participated by identified an issue to work on today regarding how they interact and relate to others. Participated in group discussion. Pt gave support and encouragement to group members. Pt reported superficial events and drama in [place]. Pt discussed fear of having surgery with no support system. Status After Intervention:  Unchanged    Participation Level:  Active Listener and Interactive    Participation Quality: Appropriate, Attentive, Sharing and Supportive      Speech:  normal      Thought Process/Content: Linear      Affective Functioning: Congruent      Mood: anxious and depressed      Level of consciousness:  Alert, Oriented x4 and Attentive      Response to Learning: Able to verbalize current knowledge/experience, Able to retain information and Progressing to goal      Endings: None Reported    Modes of Intervention: Education, Support, Socialization, Exploration and Clarifying      Discipline Responsible: /Counselor      Signature:  JESSICA Cadet

## 2019-04-18 ENCOUNTER — HOSPITAL ENCOUNTER (OUTPATIENT)
Dept: PSYCHIATRY | Age: 54
Setting detail: THERAPIES SERIES
Discharge: HOME | End: 2019-04-18
Payer: MEDICARE

## 2019-04-18 VITALS
OXYGEN SATURATION: 100 % | RESPIRATION RATE: 18 BRPM | HEART RATE: 56 BPM | DIASTOLIC BLOOD PRESSURE: 75 MMHG | SYSTOLIC BLOOD PRESSURE: 154 MMHG

## 2019-04-18 DIAGNOSIS — F99 INSOMNIA DUE TO OTHER MENTAL DISORDER: ICD-10-CM

## 2019-04-18 DIAGNOSIS — F10.11 ALCOHOL USE DISORDER, MILD, IN EARLY REMISSION, ABUSE: ICD-10-CM

## 2019-04-18 DIAGNOSIS — F33.1 MODERATE EPISODE OF RECURRENT MAJOR DEPRESSIVE DISORDER (HCC): Primary | ICD-10-CM

## 2019-04-18 DIAGNOSIS — F41.1 GENERALIZED ANXIETY DISORDER WITH PANIC ATTACKS: ICD-10-CM

## 2019-04-18 DIAGNOSIS — F41.0 GENERALIZED ANXIETY DISORDER WITH PANIC ATTACKS: ICD-10-CM

## 2019-04-18 DIAGNOSIS — F51.05 INSOMNIA DUE TO OTHER MENTAL DISORDER: ICD-10-CM

## 2019-04-18 DIAGNOSIS — F43.10 PTSD (POST-TRAUMATIC STRESS DISORDER): ICD-10-CM

## 2019-04-18 PROCEDURE — 90853 GROUP PSYCHOTHERAPY: CPT | Performed by: SOCIAL WORKER

## 2019-04-18 PROCEDURE — 99214 OFFICE O/P EST MOD 30 MIN: CPT | Performed by: NURSE PRACTITIONER

## 2019-04-18 NOTE — PROGRESS NOTES
evidence of delusions  Behavior:  Cooperative and Good eye contact  Mood: depressed  Affect: congruent with mood  Thought Content: no evidence of overt psychosis, delusional thought or suicidal /homicidal ideation or plan  Thought Process: linear, goal directed and coherent and associations appropriate  Concentration/ attention span: good  Judgement Insight:  normal and appropriate  Gait and Station:normal gait and station and normal balance   Musculoskeletal: WNL      Assesment:   1. Moderate episode of recurrent major depressive disorder ([facility] [address]    2. Generalized anxiety disorder with panic attacks    3. Insomnia due to other mental disorder    4. PTSD (post-traumatic stress disorder)    5. Alcohol use disorder, mild, in early remission, abuse            Plan:  Continue medications as prescribed. 1. The risks, benefits, side effects, indications, contraindications, and adverse effects of the medications have been discussed. Yes.  2. The pt has verbalized understanding and has capacity to give informed consent. 3. The Mika Youssefippo report has been reviewed according to [facility] regulations. 4. Supportive therapy offered. 5. Follow up: as needed  6. The patient has been advised to call with any problems. 7. Controlled substance Treatment Plan. 8. The above listed medications have been continued, modifications in meds and other orders/labs as follows: No orders of the defined types were placed in this encounter. No orders of the defined types were placed in this encounter. 9. Additional comments:    Over 50% of the total visit time was spent on counseling and/or coordination of care.     TY Bray-BC

## 2019-04-18 NOTE — PLAN OF CARE
Problem: Depressive Behavior With or Without Suicide Precautions:  Goal: Able to verbalize and/or display a decrease in depressive symptoms  Description  Able to verbalize and/or display a decrease in depressive symptoms  Outcome: Ongoing  Note:                                                                       Group Therapy Note     Date: 4/18/2019  Start Time: 1000  End Time:  1100  Number of Participants: 9     Type of Group: Relapse Prevention     Patient's Goal: Discussion: Awareness of projecting our perceptions onto others and accepting others' opinions as just opinions. Process emotions and actions in how to relate to others or their relationship with others. Notes:  Pt attended process group as schedule. Pt participated by identified an issue to work on today regarding how they interact and relate to others. Participated in group discussion. Pt gave support and encouragement to group members. Status After Intervention:  Improved     Participation Level:  Active Listener and Interactive     Participation Quality: Appropriate, Attentive and Sharing        Speech:  normal        Thought Process/Content: Linear        Affective Functioning: Congruent        Mood: anxious and depressed        Level of consciousness:  Alert, Oriented x4 and Attentive        Response to Learning: Able to verbalize current knowledge/experience, Able to retain information and Progressing to goal        Endings: None Reported     Modes of Intervention: Education, Support, Socialization, Exploration and Clarifying        Discipline Responsible: /Counselor        Signature:  JESSICA Lyle

## 2019-04-18 NOTE — PLAN OF CARE
Problem: Depressive Behavior With or Without Suicide Precautions:  Goal: Able to verbalize acceptance of life and situations over which he or she has no control  Description  Able to verbalize acceptance of life and situations over which he or she has no control  Outcome: Ongoing  Note:                                                                       Group Therapy Note    Date: 4/18/2019  Start Time: 0830  End Time:  0945  Number of Participants: 8    Type of Group: Psychotherapy    Patient's Goal:  Process emotions and actions in how to relate to others or their relationship with others. Notes:  Pt attended process group as schedule. Pt participated by identified an issue to work on today regarding how they interact and relate to others. Participated in group discussion. Pt gave support and encouragement to group members. Pt reported she was unable to sleep due to nightmares and waking up exteremly sweaty. Status After Intervention:  Unchanged    Participation Level:  Active Listener and Interactive    Participation Quality: Appropriate, Attentive, Sharing and Supportive      Speech:  normal      Thought Process/Content: Logical      Affective Functioning: Congruent      Mood: anxious and depressed      Level of consciousness:  Alert, Oriented x4 and Attentive      Response to Learning: Able to verbalize current knowledge/experience and Progressing to goal      Endings: None Reported    Modes of Intervention: Education, Support, Socialization, Exploration and Clarifying      Discipline Responsible: /Counselor      Signature:  JESSICA Nelson

## 2019-04-23 ENCOUNTER — HOSPITAL ENCOUNTER (OUTPATIENT)
Dept: PSYCHIATRY | Age: 54
Setting detail: THERAPIES SERIES
Discharge: HOME | End: 2019-04-23
Payer: MEDICARE

## 2019-04-23 VITALS
DIASTOLIC BLOOD PRESSURE: 80 MMHG | SYSTOLIC BLOOD PRESSURE: 127 MMHG | OXYGEN SATURATION: 98 % | RESPIRATION RATE: 18 BRPM | HEART RATE: 80 BPM

## 2019-04-23 PROCEDURE — 90853 GROUP PSYCHOTHERAPY: CPT | Performed by: SOCIAL WORKER

## 2019-04-23 NOTE — PLAN OF CARE
Problem: Depressive Behavior With or Without Suicide Precautions:  Goal: Able to verbalize acceptance of life and situations over which he or she has no control  Description  Able to verbalize acceptance of life and situations over which he or she has no control  Outcome: Ongoing  Note:                                                                       Group Therapy Note    Date: 4/23/2019  Start Time: 0830  End Time:  0945  Number of Participants: 7    Type of Group: Psychotherapy    Patient's Goal:  Process emotions and actions in how to relate to others or their relationship with others. Notes:  Pt attended process group as schedule. Pt participated by identified an issue to work on today regarding how they interact and relate to others. Participated in group discussion. Pt gave support and encouragement to group members. Pt reported she did not think her sleeping or eating will ever improve. Pt reported she was in increased pain today after feeling better yesterday and spring cleaning. Status After Intervention:  Unchanged    Participation Level:  Active Listener and Interactive    Participation Quality: Appropriate, Attentive, Sharing and Supportive      Speech:  normal      Thought Process/Content: Logical  Linear      Affective Functioning: Congruent      Mood: anxious and depressed      Level of consciousness:  Alert, Oriented x4 and Attentive      Response to Learning: Able to verbalize current knowledge/experience, Able to retain information and Progressing to goal      Endings: None Reported    Modes of Intervention: Education, Support, Socialization, Exploration and Clarifying      Discipline Responsible: /Counselor      Signature:  JESSICA Begum

## 2019-04-23 NOTE — PLAN OF CARE
Problem: Pain:  Goal: Control of chronic pain  Description  Control of chronic pain  Outcome: Ongoing  Note:   D:  Pt here for IOP this am.  She rates her back pain as an 8 with 10 being the highest.  \"I think I over did it yesterday cleaning at my house\". Pt plans to rest today.

## 2019-04-23 NOTE — PLAN OF CARE
Problem: Depressive Behavior With or Without Suicide Precautions:  Goal: STG-Knowledge of positive coping patterns  Outcome: Ongoing  Note:                                                                       Group Therapy Note    Date: 4/23/2019  Start Time: 1115  End Time:  1215  Number of Participants: 7    Type of Group: Cognitive Skills     Patient's Goal:  To improve knowledge of coping skills and be able to provide three coping strategies/skills used in daily life. Notes: Pt attended group as scheduled. Pt demonstrated improved knowledge of coping by participating in group discussion regarding importance of coping skills and different coping strategies used by peers. Pt was able to identify three examples of coping skills used such as; writing in journal, adult coloring, and talking with a friend. Pt participated in activity provided; adult coloring. Status After Intervention:  Unchanged    Participation Level:  Active Listener and Interactive    Participation Quality: Appropriate and Attentive      Speech:  normal    Thought Process/Content: Logical      Affective Functioning: Congruent      Mood: depressed    Level of consciousness:  Alert, Oriented x4 and Attentive    Response to Learning: Able to verbalize current knowledge/experience, Able to verbalize/acknowledge new learning and Progressing to goal    Endings: None Reported    Modes of Intervention: Socialization, Exploration and Activity    Discipline Responsible: /Counselor    Signature:  KATARINA Holliday

## 2019-04-23 NOTE — PLAN OF CARE
Problem: Depressive Behavior With or Without Suicide Precautions:  Goal: Able to verbalize acceptance of life and situations over which he or she has no control  Description  Able to verbalize acceptance of life and situations over which he or she has no control  4/23/2019 1247 by KATARINA Prabhakar  Outcome: Ongoing  Note:   Group Therapy Note    Date: 4/23/2019  Start Time: 1000  End Time:  1100  Number of Participants: 7    Type of Group: Relapse Prevention      Patient's Goal:  Pt to be able to identify strengths, ways they are using them, and exploring new ways to use strengths to the pt's advantage. Notes:  Pt attended group as scheduled. Pt showed improved knowledge of ways to use strengths to the pt's advantage, was able to identify a minimal of five strengths, and how those strengths are are helpful in the pt's; personal interests, professional, and relationships. Pt provided example of when a strength was used and how it was helpful. Pt joined group discussion, was sharing and encouraging of peers today. Status After Intervention:  Improved    Participation Level:  Active Listener and Interactive    Participation Quality: Appropriate, Attentive, Sharing and Supportive    Speech:  normal      Thought Process/Content: Logical      Affective Functioning: Congruent      Mood: depressed      Level of consciousness:  Alert, Oriented x4 and Attentive      Response to Learning: Able to verbalize current knowledge/experience, Able to verbalize/acknowledge new learning, Capable of insight and Progressing to goal      Endings: None Reported    Modes of Intervention: Education, Socialization, Exploration and Clarifying      Discipline Responsible: /Counselor  Signature:  KATARINA Prabhakar

## 2019-04-23 NOTE — PLAN OF CARE
Problem: Falls - Risk of:  Goal: Will remain free from falls  Description  Will remain free from falls  Outcome: Ongoing  Note:   D:  Pt here for IOP this am.  Gait is steady. No c/o dizziness.

## 2019-04-23 NOTE — PLAN OF CARE
Problem: Depressive Behavior With or Without Suicide Precautions:  Goal: STG-Medication therapy compliance  Description  Pt will report any med side effects to staff. Outcome: Ongoing  Note:   D:  Pt here for IOP this am.  She reports a low level of depression and moderate level of anxiety. She reports poor concentration level. She reports poor appetite and sleep. Wt 130 this am which is an increase from last week. She denies any thoughts to harm herself or others. She denies any ETOH or drug use. Pt says she sometimes feels she is making progress and has bee able to do some cleaning and laundry at home. Pt has good grooming today. A:  support provided. Pt provided opportunity to ask any med/treatment questions. R:  Pt with sad affect that does become more animated when she interacts with others.

## 2019-04-24 ENCOUNTER — HOSPITAL ENCOUNTER (OUTPATIENT)
Dept: PSYCHIATRY | Age: 54
Setting detail: THERAPIES SERIES
Discharge: HOME | End: 2019-04-24
Payer: MEDICARE

## 2019-04-24 PROCEDURE — 90853 GROUP PSYCHOTHERAPY: CPT | Performed by: SOCIAL WORKER

## 2019-04-24 NOTE — PLAN OF CARE
Problem: Depressive Behavior With or Without Suicide Precautions:  Goal: Able to verbalize support systems  Description  Able to verbalize support systems  Outcome: Ongoing  Note:                                                                       Group Therapy Note    Date: 4/24/2019  Start Time: 1115  End Time:  1215  Number of Participants: 9    Type of Group: Cognitive Skills    Patient's Goal: Placing expectations on others and how this sets up for resentments. Process emotions and actions in how to relate to others or their relationship with others. Notes:  Pt attended process group as schedule. Pt participated by identified an issue to work on today regarding how they interact and relate to others. Participated in group discussion. Pt gave support and encouragement to group members. Status After Intervention:  Unchanged    Participation Level:  Active Listener and Interactive    Participation Quality: Appropriate, Attentive, Sharing and Supportive      Speech:  normal      Thought Process/Content: Logical  Linear      Affective Functioning: Congruent      Mood: anxious and depressed      Level of consciousness:  Alert, Oriented x4 and Attentive      Response to Learning: Able to verbalize current knowledge/experience and Progressing to goal      Endings: None Reported    Modes of Intervention: Education, Support, Socialization, Exploration and Clarifying      Discipline Responsible: /Counselor      Signature:  JESSICA Laguerre

## 2019-04-24 NOTE — PROGRESS NOTES
ADMINISTRATIVE NOTE:  Reviewed and approved group notes authored by Shilpa barclay. We discussed progress of this patient and group issues and participation level.

## 2019-04-24 NOTE — PLAN OF CARE
Problem: Depressive Behavior With or Without Suicide Precautions:  Goal: Able to verbalize support systems  Description  Able to verbalize support systems  Outcome: Ongoing  Note:                                                                       Group Therapy Note    Date: 4/24/2019  Start Time: 1115  End Time:  1215  Number of Participants: 9    Type of Group: Cognitive Skills    Patient's Goal: Placing expectations on others and how this sets up for resentments. Process emotions and actions in how to relate to others or their relationship with others. Notes:  Pt attended process group as schedule. Pt participated by identified an issue to work on today regarding how they interact and relate to others. Participated in group discussion. Pt gave support and encouragement to group members. Status After Intervention:  Unchanged    Participation Level:  Active Listener and Interactive    Participation Quality: Appropriate, Attentive, Sharing and Supportive      Speech:  normal      Thought Process/Content: Logical  Linear      Affective Functioning: Congruent      Mood: anxious and depressed      Level of consciousness:  Alert, Oriented x4 and Attentive      Response to Learning: Able to verbalize current knowledge/experience and Progressing to goal      Endings: None Reported    Modes of Intervention: Education, Support, Socialization, Exploration and Clarifying      Discipline Responsible: /Counselor      Signature:  JESSICA Brush

## 2019-04-25 ENCOUNTER — FOLLOWUP TELEPHONE ENCOUNTER (OUTPATIENT)
Dept: PSYCHIATRY | Age: 54
End: 2019-04-25

## 2019-04-25 ENCOUNTER — HOSPITAL ENCOUNTER (OUTPATIENT)
Dept: PSYCHIATRY | Age: 54
Setting detail: THERAPIES SERIES
Discharge: HOME | End: 2019-04-25
Payer: MEDICARE

## 2019-04-25 PROCEDURE — 99215 OFFICE O/P EST HI 40 MIN: CPT | Performed by: PSYCHIATRY & NEUROLOGY

## 2019-04-25 PROCEDURE — 90853 GROUP PSYCHOTHERAPY: CPT | Performed by: SOCIAL WORKER

## 2019-04-25 RX ORDER — METOPROLOL SUCCINATE 50 MG/1
25 TABLET, EXTENDED RELEASE ORAL DAILY
COMMUNITY

## 2019-04-25 RX ORDER — GABAPENTIN 400 MG/1
400 CAPSULE ORAL 3 TIMES DAILY
COMMUNITY
End: 2019-05-13 | Stop reason: SDUPTHER

## 2019-04-25 NOTE — PLAN OF CARE
Problem: Depressive Behavior With or Without Suicide Precautions:  Goal: STG-Knowledge of positive coping patterns  Outcome: Ongoing  Note:                                                                       Group Therapy Note    Date: 4/25/2019  Start Time: 1115  End Time:  1215  Number of Participants: 8    Type of Group: Cognitive Skills    Patient's Goal: Handout: Healthy vs. Unhealthy Coping Strategies. Process emotions and actions in how to relate to others or their relationship with others. Notes:  Pt attended process group as schedule. Pt participated by identified an issue to work on today regarding how they interact and relate to others. Participated in group discussion. Pt gave support and encouragement to group members. Status After Intervention:  Unchanged    Participation Level:  Active Listener and Interactive    Participation Quality: Appropriate, Attentive, Sharing and Supportive      Speech:  normal      Thought Process/Content: Linear      Affective Functioning: Congruent      Mood: anxious and depressed      Level of consciousness:  Alert, Oriented x4 and Attentive      Response to Learning: Able to verbalize current knowledge/experience and Progressing to goal      Endings: None Reported    Modes of Intervention: Education, Support, Socialization, Exploration and Clarifying      Discipline Responsible: /Counselor      Signature:  Elihue Lanes, LISW
JESSICA Carreon

## 2019-04-25 NOTE — PROGRESS NOTES
4/25/2019 9:55 AM   Progress Note        Dorene Rojas 1965  Psychotherapy Time Spent: 16 min      Psychotherapy Topics: health    Chief complaint : nightmares    Subjective:  Pt is a 47year old white female with a history of panic disorder and well as mood disorder. Today the patient complains of nightmares awakening her and continued pain. She has been on beta blockers for HTN but was taken off due to low bp, and heart rate in the 50's. Blood pressure now creeping upward and heart rate in the 80's. Patient reports side effects as follows: none. Reports no suicidal ideation. Reports compliance with medications as good . Review of Systems - Negative except nightmares, chronic pain, and increasing BP and pulse  History obtained from chart review and the patient  14 point review of systems is negative  Current Meds:    Prior to Admission medications    Medication Sig Start Date End Date Taking? Authorizing Provider   traZODone (DESYREL) 50 MG tablet Take 1 tablet by mouth nightly 4/12/19   CAPO Gonzalez NP   lamoTRIgine (LAMICTAL) 25 MG tablet Take 1 tablet by mouth daily for 2 weeks, then increase to 2 tablets by mouth daily. 4/3/19   CAPO Gonzalez NP   prazosin (MINIPRESS) 1 MG capsule Take 1 capsule by mouth nightly 3/19/19   CAPO Gonzalez NP   Magnesium Oxide 250 MG TABS Take by mouth daily    Historical Provider, MD   varenicline (CHANTIX) 1 MG tablet Take 1 mg by mouth 2 times daily    Historical Provider, MD   gabapentin (NEURONTIN) 300 MG capsule Take 300 mg by mouth 2 times daily. Historical Provider, MD   metoprolol succinate (TOPROL XL) 50 MG extended release tablet TAKE 1 TABLET BY MOUTH EVERY DAY 1/22/19   Historical Provider, MD   traMADol (ULTRAM) 50 MG tablet TAKE 1 TABLET BY MOUTH EVERYDAY ASNEEDED 1/15/19   Historical Provider, [place]       [place]    [facility] [place]  Patient is  A & O x3.   Appearance:  well-appearing and seated in bed  Cognition:  Recent memory intact , remote memory intact , good fund of knowledge average intelligence level. Speech:  normal  Language: Naming: intact; Word Finding: intact  Conversation no evidence of delusions  Behavior:  Cooperative  Mood: anxious  Affect: congruent with mood  Thought Content: goal directed   Thought Process: coherent  Judgement Insight:  normal and appropriate  Gait and Station:normal gait and station   Musculoskeletal:chronic pain    Assesment: Panic disorder/ bipolar type two  Plan:  1. The risks, benefits, side effects, indications, contraindications, and adverse effects of the medications have been discussed. 2. The pt has verbalized understanding and has capacity to give informed consent. 3. The Anila Villar report has been reviewed according to [facility] regulations. 4. Supportive therapy offered. 5. Individual therapy: options discussed  6. Follow up: Continue IOP7. The patient has been advised to call with any problems. 8. Controlled substance Treatment Plan: will increase the gabapentin. 9. The above listed medications have been continued, modifications in meds and other orders/labs as follows: No orders of the defined types were placed in this encounter. No orders of the defined types were placed in this encounter.       10. Additional comments:    ·  Start 25 mg of metoprolol  · Increase gabapentin to 400mg tid              [unfilled]

## 2019-04-30 ENCOUNTER — HOSPITAL ENCOUNTER (OUTPATIENT)
Dept: PSYCHIATRY | Age: 54
Setting detail: THERAPIES SERIES
Discharge: HOME | End: 2019-04-30
Payer: MEDICARE

## 2019-04-30 VITALS
OXYGEN SATURATION: 100 % | TEMPERATURE: 97.7 F | DIASTOLIC BLOOD PRESSURE: 69 MMHG | RESPIRATION RATE: 18 BRPM | HEART RATE: 67 BPM | SYSTOLIC BLOOD PRESSURE: 120 MMHG

## 2019-04-30 PROCEDURE — 90853 GROUP PSYCHOTHERAPY: CPT | Performed by: SOCIAL WORKER

## 2019-04-30 NOTE — BH NOTE
D:  Met with pt prior to leaving IOP today. Pt denied that she would harm herself. She says she would reach out for help if became unable to keep herself safe. She was reminded that if needed she could call 911, call crisis line, go to nearest ER or [address] and/or call [address] [employer] inpt.

## 2019-04-30 NOTE — PLAN OF CARE
Problem: Depressive Behavior With or Without Suicide Precautions:  Goal: Able to verbalize acceptance of life and situations over which he or she has no control  Description  Able to verbalize acceptance of life and situations over which he or she has no control  Outcome: Ongoing  Note:                                                                       Group Therapy Note    Date: 4/30/2019  Start Time: 0830  End Time:  0945  Number of Participants: 6    Type of Group: Psychotherapy    Patient's Goal:  Process emotions and actions in how to relate to others or their relationship with others. Notes:  Pt attended process group as schedule. Pt participated by identified an issue to work on today regarding how they interact and relate to others. Participated in group discussion. Pt gave support and encouragement to group members. Pt reported she did not want to talk about her issues with the government because she becomes overwhelmed. Pt reported her dog was aggressive this morning and she is now considering getting rid of him. Status After Intervention:  Unchanged    Participation Level:  Active Listener and Interactive    Participation Quality: Appropriate, Attentive, Sharing and Supportive      Speech:  normal      Thought Process/Content: Logical  Linear      Affective Functioning: Congruent      Mood: anxious and depressed      Level of consciousness:  Alert, Oriented x4 and Attentive      Response to Learning: Able to verbalize current knowledge/experience and Progressing to goal      Endings: None Reported    Modes of Intervention: Education, Support, Socialization, Exploration and Clarifying      Discipline Responsible: /Counselor      Signature:  JESSICA Gale

## 2019-04-30 NOTE — PLAN OF CARE
Problem: Depressive Behavior With or Without Suicide Precautions:  Goal: Able to verbalize support systems  Description  Able to verbalize support systems  Outcome: Ongoing  Note:                                                                       Group Therapy Note    Date: 4/30/2019  Start Time: 1000  End Time:  1100  Number of Participants: 6    Type of Group: Relapse Prevention    Patient's Goal: Handouts: Life story: Past, Present, and Future. Process emotions and actions in how to relate to others or their relationship with others. Notes:  Pt attended process group as schedule. Pt participated by identified an issue to work on today regarding how they interact and relate to others. Participated in group discussion. Pt gave support and encouragement to group members. Status After Intervention:  Unchanged    Participation Level:  Active Listener and Interactive    Participation Quality: Appropriate, Attentive and Sharing      Speech:  normal      Thought Process/Content: Linear      Affective Functioning: Congruent      Mood: euthymic      Level of consciousness:  Alert, Oriented x4 and Attentive      Response to Learning: Able to verbalize current knowledge/experience and Progressing to goal      Endings: None Reported    Modes of Intervention: Education, Support, Socialization, Exploration and Clarifying      Discipline Responsible: /Counselor      Signature:  JESSICA Stapleton

## 2019-04-30 NOTE — PLAN OF CARE
Problem: Depressive Behavior With or Without Suicide Precautions:  Goal: STG-Knowledge of positive coping patterns  Outcome: Ongoing  Note:                                                                       Group Therapy Note    Date: 4/30/2019  Start Time: 1115  End Time:  1215  Number of Participants: 6    Type of Group: Cognitive Skills    Patient's Goal: Practice relaxation coping skills and when they are helpful to utilize. Process emotions and actions in how to relate to others or their relationship with others. Notes:  Pt attended process group as schedule. Pt participated by identified an issue to work on today regarding how they interact and relate to others. Participated in group discussion. Pt gave support and encouragement to group members. Status After Intervention:  Unchanged    Participation Level:  Active Listener and Interactive    Participation Quality: Appropriate, Attentive and Sharing      Speech:  normal      Thought Process/Content: Linear      Affective Functioning: Congruent      Mood: euthymic      Level of consciousness:  Alert, Oriented x4 and Attentive      Response to Learning: Able to verbalize current knowledge/experience and Progressing to goal      Endings: None Reported    Modes of Intervention: Education, Support, Socialization, Exploration and Clarifying      Discipline Responsible: /Counselor      Signature:  JESSICA Vieyra

## 2019-04-30 NOTE — PLAN OF CARE
Problem: Pain:  Goal: Control of chronic pain  Description  Control of chronic pain  Outcome: Ongoing  Note:   D:  Pt here for IOP this am.  She reports she has been more active and working in the yard which has aggravated her chronic pain. Pt rates her pain level a 10 today with 10 being the highest.  She also admits that she has some new stressors. A:  support provided. Pt aware that increased stress can aggravate chronic pain issues. Pt is trying to use some distraction measures to help with her pain and anxiety. Pt has medical providers to addess her pain issues.

## 2019-04-30 NOTE — PLAN OF CARE
Problem: Falls - Risk of:  Goal: Will remain free from falls  Description  Will remain free from falls  Outcome: Ongoing  Note:   D:  Pt here for IOP this am.  Her gait is steady. No c/o dizziness or drowsiness.

## 2019-04-30 NOTE — PLAN OF CARE
Problem: Depressive Behavior With or Without Suicide Precautions:  Goal: STG-Medication therapy compliance  Description  Pt will report any med side effects to staff. Outcome: Ongoing  Note:   D:  Pt here for IOP this am.  Pt reports a high level of depression, anger and anxiety. She reports poor concentration. She reports fair to poor motivation--she is able to push herself at times to do task such as working in the yard and admits she feels better after being active. She reports poor appetite --wt today 129 1/2. She reports poor sleep and says the Trazodone is not helping. She reports she is barely sleeping now for several days. She reports dreams that are planning out suicide. Pt says that she received info in the mail on Saturday that showed as of May 1, 2019 she is losing her disability income and medical coverage. Pt says she was not aware that this was going to happen. She says she thought it had to go to court first.  Pt reports that she plans to go to the social security office today to ask questions. She also plans to go to [facility] office regarding increased assist for food and explore options for medical care. Pt says she owns her home but has to pay utilities and lot rent. Pt says she knows where to go to explore assist with utilities. She was given info on [facility] to explore assist with medications. She was also given financial assist forms for her IOP treatment with directions. She was given info on other resources such as local churches to see if can get assist with lot rent and other needs until she can explore all her options for self care. \"I feel like I am back to square one\". Pt denies thoughts to harm others. A:  support  and resources provided. [place] Nevilleia CAPO given update on pt to include the above info and that the pt states that she has been having a \"nagging headache daily for the last week. R:  Pt with good grooming and eye contact.   Pt has high anxiety and depression related to the above stressors. Pt says that she does not have anyone in her life that can help her. Pt can benefit continued IOP as she now has multiple increased stressors that has caused exacerbation of her s/s.

## 2019-05-01 ENCOUNTER — HOSPITAL ENCOUNTER (OUTPATIENT)
Dept: PSYCHIATRY | Age: 54
Setting detail: THERAPIES SERIES
Discharge: HOME | End: 2019-05-01
Payer: MEDICARE

## 2019-05-01 PROCEDURE — 90853 GROUP PSYCHOTHERAPY: CPT | Performed by: SOCIAL WORKER

## 2019-05-01 RX ORDER — QUETIAPINE FUMARATE 50 MG/1
50 TABLET, FILM COATED ORAL NIGHTLY PRN
Qty: 30 TABLET | Refills: 1 | Status: SHIPPED | OUTPATIENT
Start: 2019-05-01 | End: 2019-05-21

## 2019-05-01 NOTE — PLAN OF CARE
Problem: Depressive Behavior With or Without Suicide Precautions:  Goal: STG-Medication therapy compliance  Description  Pt will report any med side effects to staff. Outcome: Ongoing  Note:   D:  Pt attended IOP this am.  She says she found out later yesterday that her insurance is good till the end of May. She reported a high level of depression and anxiety. She denied any thoughts to harm herself or others. Pt says she continues to have dreams about suicide and sleep problems. A:  support provided. Letha SCANLON given update on pt this am to include the above info. R:  Pt appears to be trying to explore her options with resources given yesterday.

## 2019-05-01 NOTE — PLAN OF CARE
Problem: Anxiety:  Goal: Level of anxiety will decrease  Description  Level of anxiety will decrease  Outcome: Ongoing  Note:                                                                       Group Therapy Note    Date: 5/1/2019  Start Time: 1115  End Time:  1215  Number of Participants: 10    Type of Group: Cognitive Skills    Patient's Goal: Discussion: Being able to validate our growth without judging ourselves on the slow pace. Process emotions and actions in how to relate to others or their relationship with others. Notes:  Pt attended process group as schedule. Pt participated by identified an issue to work on today regarding how they interact and relate to others. Participated in group discussion. Pt gave support and encouragement to group members. Status After Intervention:  Improved    Participation Level:  Active Listener and Interactive    Participation Quality: Appropriate, Attentive, Sharing and Supportive      Speech:  normal      Thought Process/Content: Logical      Affective Functioning: Congruent      Mood: depressed      Level of consciousness:  Alert, Oriented x4 and Attentive      Response to Learning: Able to verbalize current knowledge/experience, Able to retain information and Progressing to goal      Endings: None Reported    Modes of Intervention: Education, Support, Socialization, Exploration and Clarifying      Discipline Responsible: /Counselor      Signature:  JESSICA Hammer

## 2019-05-01 NOTE — PLAN OF CARE
Problem: Depressive Behavior With or Without Suicide Precautions:  Goal: Able to verbalize acceptance of life and situations over which he or she has no control  Description  Able to verbalize acceptance of life and situations over which he or she has no control  Outcome: Ongoing  Note:                                                                        Group Therapy Note     Date: 5/1/2019  Start Time: 0830  End Time:  0945  Number of Participants: 10     Type of Group: Psychotherapy     Patient's Goal:  Process emotions and actions in how to relate to others or their relationship with others. Notes:  Pt attended process group as schedule. Pt participated by identified an issue to work on today regarding how they interact and relate to others. Participated in group discussion. Pt gave support and encouragement to group members. Pt reported she was working on paperwork for the unemployment office to fight for her income. Pt was tearful when sharing and verbalized this was the only place she is able to cry. Pt reported her dog has not been aggressive and she believes he has accepted her as the Alpha. Pt reported the suicidal nightmares have continued, but she will not act on them due to her animals. Status After Intervention:  Unchanged     Participation Level:  Active Listener and Interactive     Participation Quality: Appropriate, Attentive and Sharing        Speech:  normal        Thought Process/Content: Logical  Linear        Affective Functioning: Congruent        Mood: anxious and depressed        Level of consciousness:  Alert, Oriented x4 and Attentive        Response to Learning: Able to verbalize current knowledge/experience and Progressing to goal        Endings: None Reported     Modes of Intervention: Education, Support, Socialization and Exploration        Discipline Responsible: /Counselor        Signature:  JESSICA Nicholson

## 2019-05-01 NOTE — PLAN OF CARE
Problem: Depressive Behavior With or Without Suicide Precautions:  Goal: Able to verbalize acceptance of life and situations over which he or she has no control  Description  Able to verbalize acceptance of life and situations over which he or she has no control  5/1/2019 1341 by Uriel Zuluaga  Outcome: Ongoing  Note:                                                                       Group Therapy Note    Date: 5/1/2019  Start Time: 1000  End Time:  1100  Number of Participants: 10    Type of Group: Psychoeducation    [facility] Information  Module Name:  Men's Issues  Session Number:  1    Patient's Goal:  To improve knowledge of effective stress management techniques    Notes:  Pt demonstrated improved knowledge of effective stress management techniques by actively participating in group discussion.     Status After Intervention:  Unchanged    Participation Level: Interactive    Participation Quality: Attentive      Speech:  normal      Thought Process/Content: Logical      Affective Functioning: Congruent      Mood: anxious and depressed      Level of consciousness:  Alert and Oriented x4      Response to Learning: Able to verbalize current knowledge/experience, Able to verbalize/acknowledge new learning and Progressing to goal      Endings: None Reported    Modes of Intervention: Education      Discipline Responsible: Psychoeducational Specialist      Signature:  Uriel Zuluaga

## 2019-05-02 ENCOUNTER — HOSPITAL ENCOUNTER (OUTPATIENT)
Dept: PSYCHIATRY | Age: 54
Setting detail: THERAPIES SERIES
Discharge: HOME | End: 2019-05-02
Payer: MEDICARE

## 2019-05-02 PROCEDURE — 90853 GROUP PSYCHOTHERAPY: CPT | Performed by: SOCIAL WORKER

## 2019-05-02 NOTE — PLAN OF CARE
Problem: Depressive Behavior With or Without Suicide Precautions:  Goal: Able to verbalize and/or display a decrease in depressive symptoms  Description  Able to verbalize and/or display a decrease in depressive symptoms  Outcome: Ongoing  Note:                                                                       Group Therapy Note    Date: 5/2/2019  Start Time: 1000  End Time:  1100  Number of Participants: 4    Type of Group: Relapse Prevention    Patient's Goal: Handout: Positive Experiencs. Process emotions and actions in how to relate to others or their relationship with others. Notes:  Pt attended process group as schedule. Pt participated by identified an issue to work on today regarding how they interact and relate to others. Participated in group discussion. Pt gave support and encouragement to group members. Status After Intervention:  Improved    Participation Level:  Active Listener and Interactive    Participation Quality: Appropriate, Attentive, Sharing and Supportive      Speech:  normal      Thought Process/Content: Logical      Affective Functioning: Congruent      Mood: depressed      Level of consciousness:  Alert, Oriented x4 and Attentive      Response to Learning: Able to verbalize current knowledge/experience, Able to retain information and Progressing to goal      Endings: None Reported    Modes of Intervention: Education, Support, Socialization, Exploration and Clarifying      Discipline Responsible: /Counselor      Signature:  JESSICA Vieyra

## 2019-05-02 NOTE — PLAN OF CARE
Problem: Anxiety:  Goal: Level of anxiety will decrease  Description  Level of anxiety will decrease  Outcome: Ongoing  Note:                                                                       Group Therapy Note    Date: 5/2/2019  Start Time: 9309  End Time:  3179  Number of Participants: 4    Type of Group: Cognitive Skills    Patient's Goal: Handout: My Strengths and Qualities. Process emotions and actions in how to relate to others or their relationship with others. Notes:  Pt attended process group as schedule. Pt participated by identified an issue to work on today regarding how they interact and relate to others. Participated in group discussion. Pt gave support and encouragement to group members. Status After Intervention:  Improved    Participation Level:  Active Listener and Interactive    Participation Quality: Appropriate, Attentive, Sharing and Supportive      Speech:  normal      Thought Process/Content: Logical      Affective Functioning: Congruent      Mood: depressed      Level of consciousness:  Alert, Oriented x4 and Attentive      Response to Learning: Able to verbalize current knowledge/experience, Able to retain information and Progressing to goal      Endings: None Reported    Modes of Intervention: Education, Support, Socialization, Exploration and Clarifying      Discipline Responsible: /Counselor      Signature:  JESSICA Mccarthy

## 2019-05-02 NOTE — PLAN OF CARE
Problem: Depressive Behavior With or Without Suicide Precautions:  Goal: Able to verbalize acceptance of life and situations over which he or she has no control  Description  Able to verbalize acceptance of life and situations over which he or she has no control  Outcome: Ongoing  Note:                                                                       Group Therapy Note    Date: 5/2/2019  Start Time: 0830  End Time:  0945  Number of Participants: 4    Type of Group: Psychotherapy    Patient's Goal: Process emotions and actions in how to relate to others or their relationship with others. Notes:  Pt attended process group as schedule. Pt participated by identified an issue to work on today regarding how they interact and relate to others. Participated in group discussion. Pt gave support and encouragement to group members. Pt shared about decrease in the nightmares that were her planning her suicide. Pt reported she has completed all the paperwork for the repeal of her government disability. Pt denies plan/intent and verbalized her safety plan. Pt reported she was getting new medication and she was hopeful for some improvement. Status After Intervention:  Improved    Participation Level:  Active Listener and Interactive    Participation Quality: Appropriate, Attentive, Sharing and Supportive      Speech:  normal      Thought Process/Content: Logical      Affective Functioning: Congruent      Mood: depressed      Level of consciousness:  Alert, Oriented x4 and Attentive      Response to Learning: Able to verbalize current knowledge/experience, Able to retain information and Progressing to goal      Endings: None Reported    Modes of Intervention: Education, Support, Socialization, Exploration and Clarifying      Discipline Responsible: /Counselor      Signature:  JESSICA Vieyra

## 2019-05-07 ENCOUNTER — HOSPITAL ENCOUNTER (OUTPATIENT)
Dept: PSYCHIATRY | Age: 54
Setting detail: THERAPIES SERIES
Discharge: HOME | End: 2019-05-07
Payer: MEDICARE

## 2019-05-07 VITALS
OXYGEN SATURATION: 100 % | SYSTOLIC BLOOD PRESSURE: 136 MMHG | HEART RATE: 57 BPM | DIASTOLIC BLOOD PRESSURE: 71 MMHG | RESPIRATION RATE: 18 BRPM

## 2019-05-07 DIAGNOSIS — F99 INSOMNIA DUE TO OTHER MENTAL DISORDER: ICD-10-CM

## 2019-05-07 DIAGNOSIS — F41.1 GENERALIZED ANXIETY DISORDER WITH PANIC ATTACKS: ICD-10-CM

## 2019-05-07 DIAGNOSIS — F43.10 PTSD (POST-TRAUMATIC STRESS DISORDER): ICD-10-CM

## 2019-05-07 DIAGNOSIS — F31.81 BIPOLAR 2 DISORDER, MAJOR DEPRESSIVE EPISODE (HCC): Primary | ICD-10-CM

## 2019-05-07 DIAGNOSIS — F41.0 GENERALIZED ANXIETY DISORDER WITH PANIC ATTACKS: ICD-10-CM

## 2019-05-07 DIAGNOSIS — F51.05 INSOMNIA DUE TO OTHER MENTAL DISORDER: ICD-10-CM

## 2019-05-07 PROCEDURE — 99215 OFFICE O/P EST HI 40 MIN: CPT | Performed by: NURSE PRACTITIONER

## 2019-05-07 PROCEDURE — 90853 GROUP PSYCHOTHERAPY: CPT | Performed by: SOCIAL WORKER

## 2019-05-07 RX ORDER — LAMOTRIGINE 100 MG/1
100 TABLET ORAL DAILY
Qty: 30 TABLET | Refills: 3 | Status: SHIPPED | OUTPATIENT
Start: 2019-05-07 | End: 2019-08-20 | Stop reason: SDUPTHER

## 2019-05-07 NOTE — PLAN OF CARE
Problem: Anxiety:  Goal: Level of anxiety will decrease  Description  Level of anxiety will decrease  Outcome: Ongoing  Note:                                                                       Group Therapy Note    Date: 5/7/2019  Start Time: 1115  End Time:  1215  Number of Participants: 6    Type of Group: Cognitive Skills    Patient's Goal: Handout:Stress Management Tips and Time Management Tips. Process emotions and actions in how to relate to others or their relationship with others. Notes:  Pt attended process group as schedule. Pt participated by identified an issue to work on today regarding how they interact and relate to others. Participated in group discussion. Pt gave support and encouragement to group members. Status After Intervention:  Unchanged    Participation Level:  Active Listener and Interactive    Participation Quality: Appropriate, Attentive, Sharing and Supportive      Speech:  normal      Thought Process/Content: Logical      Affective Functioning: Congruent      Mood: depressed      Level of consciousness:  Alert, Oriented x4 and Attentive      Response to Learning: Able to verbalize current knowledge/experience, Able to retain information and Progressing to goal      Endings: None Reported    Modes of Intervention: Education, Support, Socialization, Exploration and Clarifying      Discipline Responsible: /Counselor      Signature:  JESSICA Gale

## 2019-05-07 NOTE — PLAN OF CARE
Problem: Falls - Risk of:  Goal: Will remain free from falls  Description  Will remain free from falls  Outcome: Ongoing  Note:   D:  Pt here for IOP. Gait is steady.

## 2019-05-07 NOTE — PROGRESS NOTES
on Prozac)  Remeron (increased her dreams and panic attacks)  Lunesta  Librium (in 2018 for 15 days to stop drinking alcohol)       Current Substance Use:  See history    BP: 136/78  HR: 59  Wt: 128 lb    Review of Systems - 14 point review:  Negative except for: depression, anxiety, panic attacks, suicidal dreams    Constitutional: (fevers, chills, night sweats, wt loss/gain, change in appetite, fatigue, somnolence)    HEENT: (ear pain or discharge, hearing loss, ear ringing, sinus pressure, nosebleed, nasal discharge, sore throat, oral sores, tooth pain, bleeding gums, hoarse voice, neck pain)      Cardiovascular: (HTN, chest pain, elevated cholesterol/lipids, palpitations, leg swelling, leg pain with walking)    Respiratory: (cough, wheezing, snoring, SOB with activity (dyspnea), SOB while lying flat (orthopnea), awakening with severe SOB (paroxysmal nocturnal dyspnea))    Gastrointestinal: (NVD, constipation, abdominal pain, bright red stools, black tarry stools, stool incontinence)     Genitourinary:  (pelvic pain, burning or frequency of urination, urinary urgency, blood in urine incomplete bladder emptying, urinary incontinence, STD; MEN: testicular pain or swelling, erectile dysfunction; WOMEN: LMP, heavy menstrual bleeding (menorrhagia), irregular periods, postmenopausal bleeding, menstrual pain (dymenorrhea, vaginal discharge)    Musculoskeletal: (bone pain/fracture, joint pain or swelling, musle pain)    Integumentary: (rashes, acne, non-healing sores, itching, breast lumps, breast pain, nipple discharge, hair loss)    Neurologic: (HA, muscle weakness, paresthesias (numbness, coldness, crawling or prickling), memory loss, seizure, dizziness)    Psychiatric:  (anxiety, sadness, irritability/anger, insomnia, suicidality)    Endocrine: (heat or cold intolerance, excessive thirst (polydipsia), excessive hunger (polyphagia))    Immune/Allergic: (hives, seasonal or environmental allergies, HIV exposure)    Hematologic/Lymphatic: (lymph node enlargement, easy bleeding or bruising)    History obtained via chart review and patient    PCP is Sona Harrell. Jimmy Gomez DO, DO       Current Meds:    Prior to Admission medications    Medication Sig Start Date End Date Taking? Authorizing Provider   lamoTRIgine (LAMICTAL) 100 MG tablet Take 1 tablet by mouth daily 5/7/19  Yes Socorro Beverage, APRN - NP   QUEtiapine (SEROQUEL) 50 MG tablet Take 1 tablet by mouth nightly as needed (racing thoughts, sleep) 5/1/19   Socorro Beverage, APRN - NP   gabapentin (NEURONTIN) 400 MG capsule Take 400 mg by mouth 3 times daily. 4/25/19 RX called in per direction of Dr Paul Garcia to [facility] at [address] [facility] [address] no refills. Historical Provider, MD   metoprolol succinate (TOPROL XL) 50 MG extended release tablet Take 50 mg by mouth daily 4/25/19 per direction of Dr. Paul Garcia pt to take 1/2 tab daily. Historical Provider, MD   prazosin (MINIPRESS) 1 MG capsule Take 1 capsule by mouth nightly 3/19/19   Socorro Beverage, APRN - NP   Magnesium Oxide 250 MG TABS Take by mouth daily    Historical Provider, MD   varenicline (CHANTIX) 1 MG tablet Take 1 mg by mouth 2 times daily    Historical Provider, MD   traMADol (ULTRAM) 50 MG tablet TAKE 1 TABLET BY MOUTH EVERYDAY ASNEEDED 1/15/19   Historical Provider, MD         MSE:  Patient is  A & O x 4. Appearance:  street clothes appropriately dressed for season and age. Cognition:  Recent memory intact , remote memory intact , good fund of knowledge, average  intelligence level. Speech:  normal  Language: Naming: intact;  Word Finding: intact  Conversation no evidence of delusions  Behavior:  Cooperative and Good eye contact  Mood: euthymic  Affect: congruent with mood  Thought Content: no evidence of overt psychosis, delusional thought or suicidal /homicidal ideation or plan  Thought Process: linear, goal directed and coherent  Associations: logical connections  Attention Span and concentration: Impaired (can't read and comprehend)  Judgement Insight:  normal and appropriate  Gait and Station:normal gait and station   Sleep: avg 5-6 hrs broken up with panic attacks/dreams   Appetite: ok    Cognition: Can spell \"world\" backwards: Yes                    Can do serial 7's: Yes    Assessment:   1. Bipolar 2 disorder, major depressive episode ([facility] [address]    2. Generalized anxiety disorder with panic attacks    3. Insomnia due to other mental disorder    4. PTSD (post-traumatic stress disorder)        Assessments Administered:    PHQ9: 23, severe  HAM-A: 37, severe    Plan:  Increase:  Lamictal, 100mg, daily    Continue:  Gabapentin, 400mg, three times daily (take the last dose at bedtime)  Seroquel, 50mg, nightly  Prazosin, 1mg, nightly    Follow up: Return in about 1 month (around 6/4/2019). 1. The risks, benefits, side effects, indications, contraindications, and adverse effects of the medications have been discussed. Yes.  2. The pt has verbalized understanding and has capacity to give informed consent. 3. The Lisandra Hamper report has been reviewed according to [facility] regulations. 4. Supportive therapy offered. 5. Follow up: Return in about 1 month (around 6/4/2019). 6. The patient has been advised to call with any problems. 7. Controlled substance Treatment Plan: this is a maintenance dose. .  8. The above listed medications have been continued, modifications in meds and other orders/labs as follows:      Orders Placed This Encounter   Medications    lamoTRIgine (LAMICTAL) 100 MG tablet     Sig: Take 1 tablet by mouth daily     Dispense:  30 tablet     Refill:  3      No orders of the defined types were placed in this encounter. 9. Additional comments: She says that Seroquel has helped her to fall asleep faster and that it is easier to go back to sleep after a panic attack. She has only been taking 2 doses of gabapentin.  She will add the 3rd dose at bedtime to help with her panic attacks at night. Increased her Lamictal to help with depression. Will continue to follow while she is in the IOP. 10.Over 50% of the total visit time of   45  minutes was spent on counseling and/or coordination of care of  Bipolar 2 Disorder/STEFFEN with panic attacks/PTSD.                                     Psychotherapy Topics: mood/medication effectiveness       Ezequiel Eid PMHNP-BC

## 2019-05-07 NOTE — PLAN OF CARE
Problem: Depressive Behavior With or Without Suicide Precautions:  Goal: STG-Medication therapy compliance  Description  Pt will report any med side effects to staff. Outcome: Ongoing  Note:   D:  Pt here for IOP this am.  She reports a high level of depression, anxiety and anger. She reports poor level of concentration, motivation, appetite and sleep. Pt says she did find out that her insurance is good till the end of May. Pt is working on options with other resources given to her last week. She reports med compliance and feels meds are partially effective. Pt reports continues sleep problems but fewer suicidal dreams. Pt reports that she is having \"many panic attacks\". Pt reports that she is grinding her teeth. Pt feels overwhelmed by her stressor which include finding out last week that she has no disability income, will lose her insurance (she plans to apply for medicaid and see if food assistance can be increased). A:  support provided. Dahlia SCANLON plans to see pt today. R:  Pt with good grooming and eye contact. Pt with worried affect. Wt 128 lbs today--down 1 1/2 lbs from last week.

## 2019-05-07 NOTE — PROGRESS NOTES
CARE PLAN UPDATE/ TREATMENT PLAN REVIEW:    LCSW met with pt to review care plan goals, developed by pt and [facility] staff. Pt verbalized understanding of care plan goals, is in agreement with goals, and did not want to modify current goals. LCSW and pt completed the signature sheet.

## 2019-05-07 NOTE — PLAN OF CARE
Problem: Depressive Behavior With or Without Suicide Precautions:  Goal: STG-Knowledge of positive coping patterns  Outcome: Ongoing  Note:                                                                       Group Therapy Note    Date: 5/7/2019  Start Time: 1000  End Time:  1100  Number of Participants: 6    Type of Group: Relapse Prevention    Patient's Goal: Handout: Symptoms of Stress and Recognizing Stress. Process emotions and actions in how to relate to others or their relationship with others. Notes:  Pt attended process group as schedule. Pt participated by identified an issue to work on today regarding how they interact and relate to others. Participated in group discussion. Pt gave support and encouragement to group members. Status After Intervention:  Unchanged    Participation Level:  Active Listener and Interactive    Participation Quality: Appropriate, Attentive, Sharing and Supportive      Speech:  normal      Thought Process/Content: Logical      Affective Functioning: Congruent      Mood: depressed      Level of consciousness:  Alert, Oriented x4 and Attentive      Response to Learning: Able to verbalize current knowledge/experience, Able to retain information and Progressing to goal      Endings: None Reported    Modes of Intervention: Education, Support, Socialization, Exploration and Clarifying      Discipline Responsible: /Counselor      Signature:  JESSICA Tejada

## 2019-05-07 NOTE — PLAN OF CARE
Problem: Depressive Behavior With or Without Suicide Precautions:  Goal: STG-Medication therapy compliance  Description  Pt will report any med side effects to staff. 5/7/2019 1227 by Jeannette Shannon RN  Outcome: Ongoing  Note:   D:  Pt attended IOP this am.  She reports that her chronic back pain is staying at about an 8 (with 10 being the highest). Pt states that she does not feel her medical provider at [facility] is listening to her. Pt reports that sitting in group does agitate her med some \"but so does a lot of other things\".

## 2019-05-08 ENCOUNTER — HOSPITAL ENCOUNTER (OUTPATIENT)
Dept: PSYCHIATRY | Age: 54
Setting detail: THERAPIES SERIES
Discharge: HOME | End: 2019-05-08
Payer: MEDICARE

## 2019-05-08 PROCEDURE — 90853 GROUP PSYCHOTHERAPY: CPT | Performed by: SOCIAL WORKER

## 2019-05-08 NOTE — PLAN OF CARE
Problem: Anxiety:  Goal: Level of anxiety will decrease  Description  Level of anxiety will decrease  Outcome: Ongoing  Note:                                                                       Group Therapy Note    Date: 5/8/2019  Start Time: 1000  End Time:  1100  Number of Participants: 8    Type of Group: Relapse Prevention    Patient's Goal: Discussion on helping ourselves by setting boundaries with ourselves and others. Process emotions and actions in how to relate to others or their relationship with others. Notes:  Pt attended process group as schedule. Pt participated by identified an issue to work on today regarding how they interact and relate to others. Participated in group discussion. Pt gave support and encouragement to group members. Status After Intervention:  Unchanged    Participation Level:  Active Listener and Interactive    Participation Quality: Appropriate, Attentive, Sharing and Supportive      Speech:  normal      Thought Process/Content: Logical      Affective Functioning: Congruent      Mood: anxious and depressed      Level of consciousness:  Alert, Oriented x4 and Attentive      Response to Learning: Able to verbalize current knowledge/experience, Able to retain information and Progressing to goal      Endings: None Reported    Modes of Intervention: Education, Support, Socialization, Exploration and Clarifying      Discipline Responsible: /Counselor      Signature:  JESSICA Rodriguez

## 2019-05-08 NOTE — PLAN OF CARE
Problem: Depressive Behavior With or Without Suicide Precautions:  Goal: STG-Knowledge of positive coping patterns  Outcome: Ongoing  Note:   Group Therapy Note    Date: 5/8/2019  Start Time: 1115  End Time:  1215  Number of Participants: 9    Type of Group: Cognitive Skills    Pt Goal: Pt to attend group as scheduled. Pt to learn about distorted thinking such as; all or nothing, over-generalization, and labeling, be able to provide example, and join discussion. Notes: Pt attended group as scheduled. Pt showed improved knowledge in recognizing \"Stinkin' Thinkin\" (distorted thinking) by being able to identify at least 3 types such as; mental filter, all-or-nothing thinking, and jumping to conclusions. Pt able to provide examples and joined in group discussion. Status After Intervention:  Unchanged    Participation Level:  Active Listener and Interactive    Participation Quality: Appropriate, Attentive and Sharing      Speech:  normal      Thought Process/Content: Logical      Affective Functioning: Congruent    Mood: depressed      Level of consciousness:  Alert, Oriented x4 and Attentive      Response to Learning: Able to verbalize current knowledge/experience, Able to verbalize/acknowledge new learning and Progressing to goal      Endings: None Reported    Modes of Intervention: Socialization, Exploration, Clarifying and Problem-solving      Discipline Responsible: /Counselor      Signature:  KATARINA Conn

## 2019-05-09 NOTE — PROGRESS NOTES
ADMINISTRATIVE NOTE:  Reviewed and approved group notes authored by Shilpa barclay. We discussed progress of this patient and group issues and participation level.   Electronically signed by JESSICA Solorzano Se on 5/9/2019 at 8:02 AM

## 2019-05-10 ENCOUNTER — HOSPITAL ENCOUNTER (OUTPATIENT)
Dept: PSYCHIATRY | Age: 54
Setting detail: THERAPIES SERIES
Discharge: HOME | End: 2019-05-10
Payer: MEDICARE

## 2019-05-10 PROCEDURE — 90853 GROUP PSYCHOTHERAPY: CPT | Performed by: SOCIAL WORKER

## 2019-05-10 NOTE — DISCHARGE INSTR - DIET

## 2019-05-10 NOTE — PLAN OF CARE
Problem: Depressive Behavior With or Without Suicide Precautions:  Goal: Able to verbalize and/or display a decrease in depressive symptoms  Description  Able to verbalize and/or display a decrease in depressive symptoms  5/10/2019 1459 by JESSICA Buchanan  Outcome: Ongoing  Note:                                                                       Group Therapy Note    Date: 5/10/2019  Start Time: 1000  End Time:  1100  Number of Participants: 10    Type of Group: Relapse Prevention     Patient's Goal: Discussion: Taking actions to stop others from crossing our boundaries and stop taking offenses. Process emotions and actions in how to relate to others or their relationship with others. Notes:  Pt attended process group as schedule. Pt participated by identified an issue to work on today regarding how they interact and relate to others. Participated in group discussion. Pt gave support and encouragement to group members. Status After Intervention:  Improved    Participation Level:  Active Listener and Interactive    Participation Quality: Appropriate, Attentive, Sharing and Supportive      Speech:  normal      Thought Process/Content: Logical      Affective Functioning: Congruent      Mood: euthymic      Level of consciousness:  Alert, Oriented x4 and Attentive      Response to Learning: Able to verbalize current knowledge/experience, Able to retain information and Progressing to goal      Endings: None Reported    Modes of Intervention: Education, Support, Socialization, Exploration and Clarifying      Discipline Responsible: /Counselor      Signature:  JESSICA Buchanan

## 2019-05-10 NOTE — PLAN OF CARE
Problem: Depressive Behavior With or Without Suicide Precautions:  Goal: Able to verbalize acceptance of life and situations over which he or she has no control  Description  Able to verbalize acceptance of life and situations over which he or she has no control  Outcome: Ongoing  Note:                                                                       Group Therapy Note    Date: 5/10/2019  Start Time: 0830  End Time:  0945  Number of Participants: 9    Type of Group: Psychotherapy    Patient's Goal:  Process emotions and actions in how to relate to others or their relationship with others. Notes:  Pt attended process group as schedule. Pt participated by identified an issue to work on today regarding how they interact and relate to others. Participated in group discussion. Pt gave support and encouragement to group members. Pt reported concerns about discharging and \"crashing. \" LCSW highlighted pt's improvements, community support options, and follow up care options. Status After Intervention:  Unchanged    Participation Level:  Active Listener and Interactive    Participation Quality: Appropriate, Attentive, Sharing and Supportive      Speech:  normal      Thought Process/Content: Logical  Linear      Affective Functioning: Congruent      Mood: anxious and depressed      Level of consciousness:  Alert, Oriented x4 and Attentive      Response to Learning: Able to verbalize current knowledge/experience and Progressing to goal      Endings: None Reported    Modes of Intervention: Education, Support, Socialization, Exploration and Clarifying      Discipline Responsible: /Counselor      Signature:  JESSICA Pastor

## 2019-05-13 ENCOUNTER — HOSPITAL ENCOUNTER (OUTPATIENT)
Dept: PSYCHIATRY | Age: 54
Setting detail: THERAPIES SERIES
Discharge: HOME | End: 2019-05-13
Payer: MEDICARE

## 2019-05-13 VITALS
DIASTOLIC BLOOD PRESSURE: 77 MMHG | RESPIRATION RATE: 18 BRPM | HEART RATE: 69 BPM | OXYGEN SATURATION: 98 % | SYSTOLIC BLOOD PRESSURE: 144 MMHG | TEMPERATURE: 99.3 F

## 2019-05-13 DIAGNOSIS — F41.0 GENERALIZED ANXIETY DISORDER WITH PANIC ATTACKS: ICD-10-CM

## 2019-05-13 DIAGNOSIS — F51.05 INSOMNIA DUE TO OTHER MENTAL DISORDER: ICD-10-CM

## 2019-05-13 DIAGNOSIS — F99 INSOMNIA DUE TO OTHER MENTAL DISORDER: ICD-10-CM

## 2019-05-13 DIAGNOSIS — F43.10 PTSD (POST-TRAUMATIC STRESS DISORDER): ICD-10-CM

## 2019-05-13 DIAGNOSIS — F31.81 BIPOLAR 2 DISORDER, MAJOR DEPRESSIVE EPISODE (HCC): Primary | ICD-10-CM

## 2019-05-13 DIAGNOSIS — F41.1 GENERALIZED ANXIETY DISORDER WITH PANIC ATTACKS: ICD-10-CM

## 2019-05-13 PROCEDURE — 90853 GROUP PSYCHOTHERAPY: CPT | Performed by: SOCIAL WORKER

## 2019-05-13 PROCEDURE — 99213 OFFICE O/P EST LOW 20 MIN: CPT | Performed by: NURSE PRACTITIONER

## 2019-05-13 RX ORDER — PRAZOSIN HYDROCHLORIDE 2 MG/1
2 CAPSULE ORAL NIGHTLY
Qty: 30 CAPSULE | Refills: 3 | Status: SHIPPED | OUTPATIENT
Start: 2019-05-13 | End: 2019-08-05 | Stop reason: SDUPTHER

## 2019-05-13 RX ORDER — GABAPENTIN 300 MG/1
600 CAPSULE ORAL NIGHTLY
Qty: 60 CAPSULE | Refills: 0
Start: 2019-05-13 | End: 2019-07-23 | Stop reason: SDUPTHER

## 2019-05-13 NOTE — DISCHARGE SUMMARY
5/13/2019 9:54 AM   Progress Note    IN:  0945  OUT: [id number]      Dorene Rojas 1965      Chief Complaint   Patient presents with    Insomnia         Subjective:  Patient is a 47 y.o. female diagnosed with Bipolar 2 Disorder and presents today for follow-up. Last seen in clinic on 5/7/19 and prior records were reviewed. Today patient states, \"I didn't sleep well over the weekend. \" She reports that Seroquel is helping her with racing thoughts at night, that she is going to sleep and waking up better. She reports dreams but that the suicidal dreams have decreased. She says that her mood has improved with not having the suicidal dreams. Patient reports side effects as follows: none. No evidence of EPS, no cogwheeling or abnormal motor movements. Absent  suicidal ideation. Reports compliance with medications as good .      Current Substance Use:  See history    BP: 140/80, pulse 64  Wt: 133 lb    Review of Systems - 14 point review:  Negative except for: depression, anxiety, panic attacks, suicidal dreams        Constitutional: (fevers, chills, night sweats, wt loss/gain, change in appetite, fatigue, somnolence)    HEENT: (ear pain or discharge, hearing loss, ear ringing, sinus pressure, nosebleed, nasal discharge, sore throat, oral sores, tooth pain, bleeding gums, hoarse voice, neck pain)      Cardiovascular: (HTN, chest pain, elevated cholesterol/lipids, palpitations, leg swelling, leg pain with walking)    Respiratory: (cough, wheezing, snoring, SOB with activity (dyspnea), SOB while lying flat (orthopnea), awakening with severe SOB (paroxysmal nocturnal dyspnea))    Gastrointestinal: (NVD, constipation, abdominal pain, bright red stools, black tarry stools, stool incontinence)     Genitourinary:  (pelvic pain, burning or frequency of urination, urinary urgency, blood in urine incomplete bladder emptying, urinary incontinence, STD; MEN: testicular pain or swelling, erectile dysfunction; WOMEN: LMP, heavy menstrual bleeding (menorrhagia), irregular periods, postmenopausal bleeding, menstrual pain (dymenorrhea, vaginal discharge)    Musculoskeletal: (bone pain/fracture, joint pain or swelling, musle pain)    Integumentary: (rashes, acne, non-healing sores, itching, breast lumps, breast pain, nipple discharge, hair loss)    Neurologic: (HA, muscle weakness, paresthesias (numbness, coldness, crawling or prickling), memory loss, seizure, dizziness)    Psychiatric:  (anxiety, sadness, irritability/anger, insomnia, suicidality)    Endocrine: (heat or cold intolerance, excessive thirst (polydipsia), excessive hunger (polyphagia))    Immune/Allergic: (hives, seasonal or environmental allergies, HIV exposure)    Hematologic/Lymphatic: (lymph node enlargement, easy bleeding or bruising)    History obtained via chart review and patient    PCP is Lorenzo Leonardo. Juanis Srivastava DO, DO       Current Meds:    Prior to Admission medications    Medication Sig Start Date End Date Taking? Authorizing Provider   lamoTRIgine (LAMICTAL) 100 MG tablet Take 1 tablet by mouth daily 5/7/19   CAPO Cervantes NP   QUEtiapine (SEROQUEL) 50 MG tablet Take 1 tablet by mouth nightly as needed (racing thoughts, sleep) 5/1/19   CAPO Cervantes NP   gabapentin (NEURONTIN) 400 MG capsule Take 400 mg by mouth 3 times daily. 4/25/19 RX called in per direction of Dr Tristan Conway to [facility] at [address] [facility] [address] no refills. Historical Provider, MD   metoprolol succinate (TOPROL XL) 50 MG extended release tablet Take 50 mg by mouth daily 4/25/19 per direction of Dr. Tristan Conway pt to take 1/2 tab daily.     Historical Provider, MD   prazosin (MINIPRESS) 1 MG capsule Take 1 capsule by mouth nightly 3/19/19   CAPO Cervantes NP   Magnesium Oxide 250 MG TABS Take by mouth daily    Historical Provider, MD   varenicline (CHANTIX) 1 MG tablet Take 1 mg by mouth 2 times daily    Historical Provider, MD   traMADol (ULTRAM) 50 MG tablet TAKE 1 TABLET BY MOUTH EVERYDAY ASNEEDED 1/15/19   Historical Provider, MD         MSE:  Patient is  A & O x 4. Appearance:  well-appearing appropriately dressed for season and age. Cognition:  Recent memory intact , remote memory intact , good fund of knowledge, average  intelligence level. Speech:  normal  Language: Naming: intact; Word Finding: intact  Conversation no evidence of delusions  Behavior:  Cooperative and Good eye contact  Mood: euthymic  Affect: congruent with mood  Thought Content: no evidence of overt psychosis, delusional thought or suicidal /homicidal ideation or plan  Thought Process: linear, goal directed and coherent  Associations: logical connections  Attention Span and concentration: Impaired (not able to concentrate on TV)  Judgement Insight:  normal and appropriate  Gait and Station:normal gait and station   Sleep: avg 4-6 hrs of broken sleep   Appetite: up and down (she is trying not to stress over it)    Assessment:   1. [facility] 2 disorder[facility]    2. Generalized anxiety disorder with panic attacks    3. Insomnia due to other mental disorder    4. PTSD (post-traumatic stress disorder)        Assessments Administered:    PHQ9: 17, moderate severe  HAM-A: 27, severe    Plan:  Continue:  Seroquel, 50mg, nightly for anxious thoughts  Lamotrigine, 100mg, daily    Decrease  Gabapentin, 600mg, bedtime only    Increase:  Prazosin, 2mg, at bedtime for dreams/nightmares      Follow up: Return in about 8 days (around 5/21/2019). Follow up with [address] [place] [address] Avenue:  SHEREEN Diaz, 5/21/19, [address] [place] [address] for therapy    1. The risks, benefits, side effects, indications, contraindications, and adverse effects of the medications have been discussed. Yes.  2. The pt has verbalized understanding and has capacity to give informed consent. 3. The Pavan Bray report has been reviewed according to [facility] regulations. 4. Supportive therapy offered.   5. Follow up: Return in about 8 days (around 5/21/2019). 6. The patient has been advised to call with any problems. 7. Controlled substance Treatment Plan: this is a tapering dose. .  8. The above listed medications have been continued, modifications in meds and other orders/labs as follows: No orders of the defined types were placed in this encounter. No orders of the defined types were placed in this encounter. 9. Additional comments: Patient's anxiety and depression scores have improved since 5/7/19 (17 and 27 respectively). Will increase Prazosin to improved her dreams. She says that she doesn't believe that Gabapentin has helped her anxiety during the day. Will stop the daytime doses and increase the bedtime dose to 600mg to help her anxiety/panic attacks at night and to help with restless legs. Will continue her other meds as prescribed and follow up on 5/21/19. She reports that she is going to start going to the [facility] for support and will be seeing [facility], for therapy. She is considering [facility] eventually. 10.Over 50% of the total visit time of   30  minutes was spent on counseling and/or coordination of care of  Bipolar 2 Disorder/STEFFEN with panic attacks/Insomnia.                                     Psychotherapy Topics: mood/medication effectiveness       Sylvia Mills PMHNP-BC

## 2019-05-13 NOTE — PLAN OF CARE
Problem: Pain:  Goal: Control of chronic pain  Description  Control of chronic pain  Outcome: Met This Shift  Note:   Pt discharged from [facility] this am. She continues with chronic pain issues and has a pain mgmt

## 2019-05-13 NOTE — PROGRESS NOTES
Discharge Note     Pt discharging on this date from the [facility] Intensive [facility]patient Group Therapy Program. Pt denies SI, HI, and AVH at this time. Pt reports improvement in behavior and is leaving program in overall good condition. LCSW and pt discussed pt's follow up appointments and importance of attending appointments as scheduled, pt voiced understanding and agreement. Pt and LCSW also discussed pt safety plan and pt able to verbally identify: warning signs, coping strategies, places and people that help make the pt feel better/distract negative thoughts, friends/family/agencies/professionals the pt can reach out to in a crisis, and something that is important to the pt/worth living for. Pt provided the national suicide prevention hotline number (5-534.946.4992) as well as local community behavioral health [facility]) crisis number for emergencies ([phone]).

## 2019-05-13 NOTE — PLAN OF CARE
Problem: Depressive Behavior With or Without Suicide Precautions:  Goal: Able to verbalize acceptance of life and situations over which he or she has no control  Description  Able to verbalize acceptance of life and situations over which he or she has no control  Outcome: Completed  Note:                                                                       Group Therapy Note    Date: 5/13/2019  Start Time: 0830  End Time:  0945  Number of Participants: 10    Type of Group: Psychotherapy    Patient's Goal:  Process emotions and actions in how to relate to others or their relationship with others. Notes:  Pt attended process group as schedule. Pt participated by identified an issue to work on today regarding how they interact and relate to others. Participated in group discussion. Pt gave support and encouragement to group members. Pt reported she continues to be concerned about failing after discharge. Status After Intervention:  Improved    Participation Level:  Active Listener and Interactive    Participation Quality: Appropriate, Attentive and Sharing      Speech:  normal      Thought Process/Content: Logical  Linear      Affective Functioning: Congruent      Mood: anxious      Level of consciousness:  Alert, Oriented x4 and Attentive      Response to Learning: Able to verbalize current knowledge/experience, Able to retain information and Progressing to goal      Endings: None Reported    Modes of Intervention: Education, Support, Socialization, Exploration and Clarifying      Discipline Responsible: /Counselor      Signature:  Elihue Lanes, LISW

## 2019-05-13 NOTE — PLAN OF CARE
Problem: Depressive Behavior With or Without Suicide Precautions:  Goal: STG-Knowledge of positive coping patterns  Outcome: Completed  Note:                                                                       Group Therapy Note    Date: 5/13/2019  Start Time: 1000  End Time:  1100  Number of Participants: 10    Type of Group: Relapse Prevention    Patient's Goal: Discussion: Respecting other people's boundaries they set with us and considering their feelings if we disrespect their boundaries. Process emotions and actions in how to relate to others or their relationship with others. Notes:  Pt attended process group as schedule. Pt participated by identified an issue to work on today regarding how they interact and relate to others. Participated in group discussion. Pt gave support and encouragement to group members. Status After Intervention:  Improved    Participation Level:  Active Listener and Interactive    Participation Quality: Appropriate, Attentive, Sharing and Supportive      Speech:  normal      Thought Process/Content: Logical  Linear      Affective Functioning: Congruent      Mood: anxious and depressed      Level of consciousness:  Alert, Oriented x4 and Attentive      Response to Learning: Able to verbalize current knowledge/experience, Able to retain information and Progressing to goal      Endings: None Reported    Modes of Intervention: Education, Support, Socialization, Exploration and Clarifying      Discipline Responsible: /Counselor      Signature:  JESSICA Vieyra

## 2019-05-14 ENCOUNTER — FOLLOWUP TELEPHONE ENCOUNTER (OUTPATIENT)
Dept: PSYCHIATRY | Age: 54
End: 2019-05-14

## 2019-05-21 ENCOUNTER — OFFICE VISIT (OUTPATIENT)
Dept: PSYCHIATRY | Age: 54
End: 2019-05-21
Payer: MEDICARE

## 2019-05-21 VITALS
BODY MASS INDEX: 21.05 KG/M2 | DIASTOLIC BLOOD PRESSURE: 72 MMHG | OXYGEN SATURATION: 100 % | HEART RATE: 71 BPM | WEIGHT: 131 LBS | SYSTOLIC BLOOD PRESSURE: 118 MMHG | HEIGHT: 66 IN

## 2019-05-21 DIAGNOSIS — F51.05 INSOMNIA DUE TO OTHER MENTAL DISORDER: ICD-10-CM

## 2019-05-21 DIAGNOSIS — F31.81 BIPOLAR 2 DISORDER, MAJOR DEPRESSIVE EPISODE (HCC): Primary | ICD-10-CM

## 2019-05-21 DIAGNOSIS — F43.10 PTSD (POST-TRAUMATIC STRESS DISORDER): ICD-10-CM

## 2019-05-21 DIAGNOSIS — F41.0 GENERALIZED ANXIETY DISORDER WITH PANIC ATTACKS: ICD-10-CM

## 2019-05-21 DIAGNOSIS — F41.1 GENERALIZED ANXIETY DISORDER WITH PANIC ATTACKS: ICD-10-CM

## 2019-05-21 DIAGNOSIS — F99 INSOMNIA DUE TO OTHER MENTAL DISORDER: ICD-10-CM

## 2019-05-21 PROCEDURE — 99214 OFFICE O/P EST MOD 30 MIN: CPT | Performed by: NURSE PRACTITIONER

## 2019-05-21 RX ORDER — QUETIAPINE FUMARATE 50 MG/1
75 TABLET, FILM COATED ORAL NIGHTLY PRN
Qty: 45 TABLET | Refills: 3 | Status: SHIPPED | OUTPATIENT
Start: 2019-05-21 | End: 2019-08-20

## 2019-05-21 RX ORDER — DICLOFENAC SODIUM 75 MG/1
75 TABLET, DELAYED RELEASE ORAL 2 TIMES DAILY
COMMUNITY
End: 2022-05-18

## 2019-05-21 RX ORDER — DULOXETIN HYDROCHLORIDE 30 MG/1
30 CAPSULE, DELAYED RELEASE ORAL DAILY
Qty: 60 CAPSULE | Refills: 3 | Status: SHIPPED | OUTPATIENT
Start: 2019-05-21 | End: 2019-07-25 | Stop reason: SDUPTHER

## 2019-05-21 NOTE — PROGRESS NOTES
nosebleed, nasal discharge, sore throat, oral sores, tooth pain, bleeding gums, hoarse voice, neck pain)      Cardiovascular: (HTN, chest pain, elevated cholesterol/lipids, palpitations, leg swelling, leg pain with walking)    Respiratory: (cough, wheezing, snoring, SOB with activity (dyspnea), SOB while lying flat (orthopnea), awakening with severe SOB (paroxysmal nocturnal dyspnea))    Gastrointestinal: (NVD, constipation, abdominal pain, bright red stools, black tarry stools, stool incontinence)     Genitourinary:  (pelvic pain, burning or frequency of urination, urinary urgency, blood in urine incomplete bladder emptying, urinary incontinence, STD; MEN: testicular pain or swelling, erectile dysfunction; WOMEN: LMP, heavy menstrual bleeding (menorrhagia), irregular periods, postmenopausal bleeding, menstrual pain (dymenorrhea, vaginal discharge)    Musculoskeletal: (bone pain/fracture, joint pain or swelling, musle pain)    Integumentary: (rashes, acne, non-healing sores, itching, breast lumps, breast pain, nipple discharge, hair loss)    Neurologic: (HA, muscle weakness, paresthesias (numbness, coldness, crawling or prickling), memory loss, seizure, dizziness)    Psychiatric:  (anxiety, sadness, irritability/anger, insomnia, suicidality)    Endocrine: (heat or cold intolerance, excessive thirst (polydipsia), excessive hunger (polyphagia))    Immune/Allergic: (hives, seasonal or environmental allergies, HIV exposure)    Hematologic/Lymphatic: (lymph node enlargement, easy bleeding or bruising)    History obtained via chart review and patient    PCP is Andrea Verduzco. Andrew Gardner DO, DO       Current Meds:    Prior to Admission medications    Medication Sig Start Date End Date Taking?  Authorizing Provider   diclofenac (VOLTAREN) 75 MG EC tablet Take 75 mg by mouth 2 times daily   Yes Historical Provider, MD   DULoxetine (CYMBALTA) 30 MG extended release capsule Take 1 capsule by mouth daily For 14 days, then increase to 60mg (2 capsules) 5/21/19  Yes Sharon Orf, APRN - NP   QUEtiapine (SEROQUEL) 50 MG tablet Take 1.5 tablets by mouth nightly as needed (racing thoughts, sleep) 5/21/19  Yes Sharon Orf, APRN - NP   prazosin (MINIPRESS) 2 MG capsule Take 1 capsule by mouth nightly 5/13/19  Yes Sahron Orf, APRN - NP   gabapentin (NEURONTIN) 300 MG capsule Take 2 capsules by mouth nightly for 30 days. 5/13/19 6/12/19 Yes Sharon Orf, APRN - NP   lamoTRIgine (LAMICTAL) 100 MG tablet Take 1 tablet by mouth daily 5/7/19  Yes Sharon Pak, APRN - NP   metoprolol succinate (TOPROL XL) 50 MG extended release tablet Take 50 mg by mouth daily 4/25/19 per direction of Dr. Quoc Wise pt to take 1/2 tab daily. Yes Historical Provider, MD   Magnesium Oxide 250 MG TABS Take by mouth daily   Yes Historical Provider, MD   varenicline (CHANTIX) 1 MG tablet Take 1 mg by mouth 2 times daily   Yes Historical Provider, MD         MSE:  Patient is  A & O x 4. Appearance:  well-appearing appropriately dressed for season and age. Cognition:  Recent memory intact , remote memory intact , good fund of knowledge, average  intelligence level. Speech:  normal  Language: Naming: intact; Word Finding: intact  Conversation no evidence of delusions  Behavior:  Cooperative and Good eye contact  Mood: euthymic  Affect: congruent with mood  Thought Content: no evidence of overt psychosis, delusional thought or suicidal /homicidal ideation or plan  Thought Process: linear, goal directed and coherent  Associations: logical connections  Attention Span and concentration: Normal   Judgement Insight:  normal and appropriate  Gait and Station:normal gait and station   Sleep: avg 4-5 hrs   Appetite: ok      Assessment:   1. Bipolar 2 disorder, major depressive episode ([facility]    2. Generalized anxiety disorder with panic attacks    3. Insomnia due to other mental disorder    4.  PTSD (post-traumatic stress disorder)        Assessments Administered:    PHQ9: 16, mod severe  HAM-A: 22, moderate    Plan:  Increase:  Seroquel, 75mg, 1.5 tabs, nightly     Continue:  Prazosin, 2mg, nightly for nightmares  Lamotrigine, 100mg, daily     Start:  Cymbalta, 30mg (1 capsule) x 14 days, then increase to 60mg (2 capsules)    Follow up: Return in about 3 months (around 8/21/2019). 1. The risks, benefits, side effects, indications, contraindications, and adverse effects of the medications have been discussed. Yes.  2. The pt has verbalized understanding and has capacity to give informed consent. 3. The Morris Medina report has been reviewed according to [facility] regulations. 4. Supportive therapy offered. 5. Follow up: Return in about 3 months (around 8/21/2019). 6. The patient has been advised to call with any problems. 7. Controlled substance Treatment Plan: none. 8. The above listed medications have been continued, modifications in meds and other orders/labs as follows:      Orders Placed This Encounter   Medications    DULoxetine (CYMBALTA) 30 MG extended release capsule     Sig: Take 1 capsule by mouth daily For 14 days, then increase to 60mg (2 capsules)     Dispense:  60 capsule     Refill:  3    QUEtiapine (SEROQUEL) 50 MG tablet     Sig: Take 1.5 tablets by mouth nightly as needed (racing thoughts, sleep)     Dispense:  45 tablet     Refill:  3      No orders of the defined types were placed in this encounter. 9. Additional comments: Will try Cymbalta to see if her anxiety and depression can be improved. Will increase Seroquel to address sleep. Will make no other changes today. Will follow up in 3 months. 10.Over 50% of the total visit time of   30  minutes was spent on counseling and/or coordination of care of  Bipolar 2 Disorder/Insomnia/STEFFEN/PTSD.                                     Psychotherapy Topics: mood/medication effectiveness       Dale Wayne PMP-BC

## 2019-05-21 NOTE — PATIENT INSTRUCTIONS
Increase:  Seroquel, 75mg, 1.5 tabs, nightly     Continue:  Prazosin, 2mg, nightly for nightmares  Lamotrigine, 100mg, daily     Start:  Cymbalta, 30mg (1 capsule) x 14 days, then increase to 60mg (2 capsules)    Follow up: Return in about 3 months (around 8/21/2019).

## 2019-07-23 DIAGNOSIS — F41.0 GENERALIZED ANXIETY DISORDER WITH PANIC ATTACKS: ICD-10-CM

## 2019-07-23 DIAGNOSIS — F41.1 GENERALIZED ANXIETY DISORDER WITH PANIC ATTACKS: ICD-10-CM

## 2019-07-23 RX ORDER — TRAZODONE HYDROCHLORIDE 50 MG/1
50 TABLET ORAL NIGHTLY
Qty: 90 TABLET | Refills: 0 | OUTPATIENT
Start: 2019-07-23

## 2019-07-23 RX ORDER — GABAPENTIN 300 MG/1
600 CAPSULE ORAL NIGHTLY
Qty: 60 CAPSULE | Refills: 0 | Status: SHIPPED | OUTPATIENT
Start: 2019-07-23 | End: 2019-09-09 | Stop reason: SDUPTHER

## 2019-07-25 RX ORDER — DULOXETIN HYDROCHLORIDE 60 MG/1
60 CAPSULE, DELAYED RELEASE ORAL DAILY
Qty: 90 CAPSULE | Refills: 0 | Status: SHIPPED | OUTPATIENT
Start: 2019-07-25 | End: 2019-10-23 | Stop reason: SDUPTHER

## 2019-07-25 NOTE — TELEPHONE ENCOUNTER
The pharmacy requested a refill for 90 day supply   Edyta Fails. RX last filled 5/21/19 #60 of 30 mg with 3 refills. Last office visit : 5/21/2019   Next office visit : 8/20/2019     Requested Prescriptions     Pending Prescriptions Disp Refills    DULoxetine (CYMBALTA) 60 MG extended release capsule 90 capsule 0     Sig: Take 1 capsule by mouth daily            Oneyda Chambers RN      5/21/2019 9:05 AM                             Progress Note     IN:  8905  OUT: 4031        Dorene Rojas 1965                              Chief Complaint   Patient presents with    Anxiety    Depression            Subjective:  Patient is a 47 y.o. female diagnosed with Bipolar 2 Disorder, STEFFEN with panic attacks, Insomnia, PTSD and presents today for follow-up. Last seen in clinic on 5/13/19 at discharge from the [facility] and prior records were reviewed.     Today patient states, \"I'm kind of getting my better sleep in the mornings. \" I don't dream as much in the morning. She states that she overall feels back to the way she was with her depressed state. She states that she still has panic attacks with the dreams. She says that her heart is not pounding out of chest. She says that she is just having trouble wanting to face the day and she doesn't want to go back to sleep after she has one of those dreams because she doesn't want to go back to the dream.     Patient reports side effects as follows: none. No evidence of EPS, no cogwheeling or abnormal motor movements. Present suicidal ideation (one fleeting thought since last session from a dream).   Reports compliance with medications as good .      PRIOR MEDICATION USE  Paxil, 20mg (current)  Wellbutrin XL, (tried it recently to quit smoking)  Prozac (made her numb, added to her eating disorder)  Zoloft (thought she did well on this, took for a couple of years, she stopped it because when she returned to [place] the doctor put her back on Prozac)  Remeron (increased her dreams and panic attacks)  Lunesta  Librium (in 2018 for 15 days to stop drinking alcohol)           Current Substance Use:  See history     BP: 118/72  Wt. : 131 lb     Review of Systems - 14 point review:  Negative except for: depression, anxiety, panic attacks, suicidal dreams        Constitutional: (fevers, chills, night sweats, wt loss/gain, change in appetite, fatigue, somnolence)     HEENT: (ear pain or discharge, hearing loss, ear ringing, sinus pressure, nosebleed, nasal discharge, sore throat, oral sores, tooth pain, bleeding gums, hoarse voice, neck pain)      Cardiovascular: (HTN, chest pain, elevated cholesterol/lipids, palpitations, leg swelling, leg pain with walking)     Respiratory: (cough, wheezing, snoring, SOB with activity (dyspnea), SOB while lying flat (orthopnea), awakening with severe SOB (paroxysmal nocturnal dyspnea))     Gastrointestinal: (NVD, constipation, abdominal pain, bright red stools, black tarry stools, stool incontinence)     Genitourinary:  (pelvic pain, burning or frequency of urination, urinary urgency, blood in urine incomplete bladder emptying, urinary incontinence, STD; MEN: testicular pain or swelling, erectile dysfunction; WOMEN: LMP, heavy menstrual bleeding (menorrhagia), irregular periods, postmenopausal bleeding, menstrual pain (dymenorrhea, vaginal discharge)     Musculoskeletal: (bone pain/fracture, joint pain or swelling, musle pain)     Integumentary: (rashes, acne, non-healing sores, itching, breast lumps, breast pain, nipple discharge, hair loss)     Neurologic: (HA, muscle weakness, paresthesias (numbness, coldness, crawling or prickling), memory loss, seizure, dizziness)     Psychiatric:  (anxiety, sadness, irritability/anger, insomnia, suicidality)     Endocrine: (heat or cold intolerance, excessive thirst (polydipsia), excessive hunger (polyphagia))     Immune/Allergic: (hives, seasonal or environmental allergies, HIV exposure)     Hematologic/Lymphatic: (lymph

## 2019-07-26 ENCOUNTER — TELEPHONE (OUTPATIENT)
Dept: PSYCHIATRY | Age: 54
End: 2019-07-26

## 2019-08-05 RX ORDER — PRAZOSIN HYDROCHLORIDE 2 MG/1
CAPSULE ORAL
Qty: 90 CAPSULE | Refills: 1 | Status: SHIPPED | OUTPATIENT
Start: 2019-08-05 | End: 2019-08-20 | Stop reason: DRUGHIGH

## 2019-08-06 ENCOUNTER — TELEPHONE (OUTPATIENT)
Dept: PSYCHIATRY | Age: 54
End: 2019-08-06

## 2019-08-20 ENCOUNTER — OFFICE VISIT (OUTPATIENT)
Dept: PSYCHIATRY | Age: 54
End: 2019-08-20
Payer: MEDICARE

## 2019-08-20 VITALS
DIASTOLIC BLOOD PRESSURE: 73 MMHG | WEIGHT: 136 LBS | OXYGEN SATURATION: 97 % | SYSTOLIC BLOOD PRESSURE: 115 MMHG | BODY MASS INDEX: 21.95 KG/M2 | HEART RATE: 75 BPM

## 2019-08-20 DIAGNOSIS — F99 INSOMNIA DUE TO OTHER MENTAL DISORDER: ICD-10-CM

## 2019-08-20 DIAGNOSIS — F31.81 BIPOLAR 2 DISORDER, MAJOR DEPRESSIVE EPISODE (HCC): Primary | ICD-10-CM

## 2019-08-20 DIAGNOSIS — F43.10 PTSD (POST-TRAUMATIC STRESS DISORDER): ICD-10-CM

## 2019-08-20 DIAGNOSIS — F41.0 GENERALIZED ANXIETY DISORDER WITH PANIC ATTACKS: ICD-10-CM

## 2019-08-20 DIAGNOSIS — F51.05 INSOMNIA DUE TO OTHER MENTAL DISORDER: ICD-10-CM

## 2019-08-20 DIAGNOSIS — F41.1 GENERALIZED ANXIETY DISORDER WITH PANIC ATTACKS: ICD-10-CM

## 2019-08-20 PROCEDURE — 99214 OFFICE O/P EST MOD 30 MIN: CPT | Performed by: NURSE PRACTITIONER

## 2019-08-20 RX ORDER — DULOXETIN HYDROCHLORIDE 30 MG/1
30 CAPSULE, DELAYED RELEASE ORAL DAILY
Qty: 90 CAPSULE | Refills: 1 | Status: SHIPPED | OUTPATIENT
Start: 2019-08-20 | End: 2020-01-23 | Stop reason: SDUPTHER

## 2019-08-20 RX ORDER — QUETIAPINE FUMARATE 100 MG/1
150 TABLET, FILM COATED ORAL NIGHTLY PRN
Qty: 135 TABLET | Refills: 1 | Status: SHIPPED | OUTPATIENT
Start: 2019-08-20 | End: 2019-11-19

## 2019-08-20 RX ORDER — LAMOTRIGINE 100 MG/1
100 TABLET ORAL DAILY
Qty: 90 TABLET | Refills: 1 | Status: SHIPPED | OUTPATIENT
Start: 2019-08-20 | End: 2019-11-19

## 2019-08-20 RX ORDER — PRAZOSIN HYDROCHLORIDE 1 MG/1
1 CAPSULE ORAL NIGHTLY
Qty: 90 CAPSULE | Refills: 1 | Status: SHIPPED | OUTPATIENT
Start: 2019-08-20 | End: 2019-11-19 | Stop reason: DRUGHIGH

## 2019-08-20 RX ORDER — PRAZOSIN HYDROCHLORIDE 2 MG/1
2 CAPSULE ORAL NIGHTLY
Qty: 90 CAPSULE | Refills: 1 | Status: SHIPPED | OUTPATIENT
Start: 2019-08-20 | End: 2020-04-28

## 2019-08-20 NOTE — PROGRESS NOTES
8/20/2019 3:25 PM   Progress Note    IN:  1345  OUT: 1415      Dorene Rojas 1965      Chief Complaint   Patient presents with    Anxiety         Subjective:  Patient is a 47 y.o. female diagnosed with Bipolar 2 Disorder and presents today for follow-up. Last seen in clinic on 5/21/19 and prior records were reviewed. Today patient states, \"I don't think my nighttime stuff is working as good as it was. \" She reports that she has cut way back on smoking since starting Chantix but that when she stopped it she started having falls and her sleep has declined. She reports that she is stressed over the social security problem she has. She is waiting on a decision which she thinks she will have in 2 weeks. She says she is having panic attacks mostly at night, 3-4 at night. She says that her startle reflex is increased also when she goes to places like [employer]. Patient reports side effects as follows: falls (stopping Chantix), no longer experiencing this. No evidence of EPS, no cogwheeling or abnormal motor movements. Absent  suicidal ideation. Reports compliance with medications as good .      Current Substance Use:  See history    BP: /73   Pulse 75   Wt 136 lb (61.7 kg)   SpO2 97%   BMI 21.95 kg/m²       Review of Systems - 14 point review:  Negative except being treated for: depression, anxiety, panic attacks, suicidal dreams       Constitutional: (fevers, chills, night sweats, wt loss/gain, change in appetite, fatigue, somnolence)    HEENT: (ear pain or discharge, hearing loss, ear ringing, sinus pressure, nosebleed, nasal discharge, sore throat, oral sores, tooth pain, bleeding gums, hoarse voice, neck pain)      Cardiovascular: (HTN, chest pain, elevated cholesterol/lipids, palpitations, leg swelling, leg pain with walking)    Respiratory: (cough, wheezing, snoring, SOB with activity (dyspnea), SOB while lying flat (orthopnea), awakening with severe SOB (paroxysmal nocturnal

## 2019-09-09 DIAGNOSIS — F41.1 GENERALIZED ANXIETY DISORDER WITH PANIC ATTACKS: ICD-10-CM

## 2019-09-09 DIAGNOSIS — F41.0 GENERALIZED ANXIETY DISORDER WITH PANIC ATTACKS: ICD-10-CM

## 2019-09-09 RX ORDER — GABAPENTIN 300 MG/1
600 CAPSULE ORAL NIGHTLY
Qty: 60 CAPSULE | Refills: 0 | Status: SHIPPED | OUTPATIENT
Start: 2019-09-09 | End: 2019-10-04 | Stop reason: SDUPTHER

## 2019-09-09 NOTE — TELEPHONE ENCOUNTER
Trang Dc called to request a refill on her medication. Lucian Poole under media  Last office visit : 8/20/2019   Next office visit : 10/21/2019     Requested Prescriptions     Pending Prescriptions Disp Refills    gabapentin (NEURONTIN) 300 MG capsule 60 capsule 0     Sig: Take 2 capsules by mouth nightly for 30 days. Malvin Claros RN      Progress Notes     Expand All Collapse All    8/20/2019 3:25 PM                             Progress Note     IN:  7709  OUT: 1415        Dorene Bob 1965                              Chief Complaint   Patient presents with    Anxiety            Subjective:  Patient is a 47 y.o. female diagnosed with Bipolar 2 Disorder and presents today for follow-up. Last seen in clinic on 5/21/19 and prior records were reviewed.     Today patient states, \"I don't think my nighttime stuff is working as good as it was. \" She reports that she has cut way back on smoking since starting Chantix but that when she stopped it she started having falls and her sleep has declined. She reports that she is stressed over the social security problem she has. She is waiting on a decision which she thinks she will have in 2 weeks. She says she is having panic attacks mostly at night, 3-4 at night. She says that her startle reflex is increased also when she goes to places like [employer].     Patient reports side effects as follows: falls (stopping Chantix), no longer experiencing this. No evidence of EPS, no cogwheeling or abnormal motor movements. Absent  suicidal ideation.   Reports compliance with medications as good .      Current Substance Use:  See history     BP: /73   Pulse 75   Wt 136 lb (61.7 kg)   SpO2 97%   BMI 21.95 kg/m²         Review of Systems - 14 point review:  Negative except being treated for: depression, anxiety, panic attacks, suicidal dreams         Constitutional: (fevers, chills, night sweats, wt loss/gain, change in appetite, fatigue, 11/10/2015     BUN 8 11/10/2015     CREATININE 0.7 11/10/2015     GLUCOSE 118 (H) 11/10/2015     CALCIUM 9.6 11/10/2015     PROT 8.6 11/10/2015     LABALBU 5.0 11/10/2015     ALKPHOS 127 (H) 11/10/2015     AST 33 (H) 11/10/2015     ALT 22 11/10/2015     LABGLOM 94 11/10/2015            Lab Results   Component Value Date      11/10/2015     K 3.5 11/10/2015     CL 93 11/10/2015     CO2 21 11/10/2015     BUN 8 11/10/2015     CREATININE 0.7 11/10/2015     GLUCOSE 118 11/10/2015     CALCIUM 9.6 11/10/2015      No results found for: CHOL  No results found for: TRIG  No results found for: HDL  No results found for: LDLCHOLESTEROL, LDLCALC  No results found for: LABVLDL, VLDL  No results found for: CHOLHDLRATIO  No results found for: LABA1C  No results found for: EAG        Lab Results   Component Value Date     TSH 2.40 02/03/2015      Lab Results   Component Value Date     VITD25 27.6 (L) 02/03/2015         Assessments Administered:     PHQ9: 21, severe  HAM-A: 32, severe     Assessment:    1. Bipolar 2 disorder, major depressive episode ([facility]    2. PTSD (post-traumatic stress disorder)    3. Insomnia due to other mental disorder    4. Generalized anxiety disorder with panic attacks          Plan:  Increase:  Seroquel, 150mg, nightly  Prazosin, 2mg + 1mg,  Nightly  Cymbalta (Duloxetine), 60mg + 30mg, daily (you can take the 30mg of Cymbalta in the morning or with the evening meal, not too much past 5pm) It can interfere with sleep.     Continue:  Lamictal, 100mg, daily     Follow up: Return in about 2 months (around 10/20/2019).     1. The risks, benefits, side effects, indications, contraindications, and adverse effects of the medications have been discussed. Yes.  2. The pt has verbalized understanding and has capacity to give informed consent. 3. The Mehul Read report has been reviewed according to [facility] regulations. 4. Supportive therapy offered. 5. Follow up:    Return in about 2 months (around 10/20/2019).   6. The patient has been advised to call with any problems. 7. Controlled substance Treatment Plan: none. 8. The above listed medications have been continued, modifications in meds and other orders/labs as follows:                 Encounter Medications    Orders Placed This Encounter   Medications    prazosin (MINIPRESS) 2 MG capsule       Sig: Take 1 capsule by mouth nightly (Add to a 1mg capsule for a 3mg dose)       Dispense:  90 capsule       Refill:  1    prazosin (MINIPRESS) 1 MG capsule       Sig: Take 1 capsule by mouth nightly (Add 1mg to 2mg for a 3mg dose at night.)       Dispense:  90 capsule       Refill:  1    QUEtiapine (SEROQUEL) 100 MG tablet       Sig: Take 1.5 tablets by mouth nightly as needed (racing thoughts, sleep)       Dispense:  135 tablet       Refill:  1    lamoTRIgine (LAMICTAL) 100 MG tablet       Sig: Take 1 tablet by mouth daily       Dispense:  90 tablet       Refill:  1    DULoxetine (CYMBALTA) 30 MG extended release capsule       Sig: Take 1 capsule by mouth daily (Add to 60mg to make a 90mg dose)       Dispense:  90 capsule       Refill:  1                     No orders of the defined types were placed in this encounter.        9. Additional comments: Her anxiety and stress at this time surrounds her Social Security benefits. She has had the hearing and is waiting to hear the outcome. She reports that her anixety and depression are worse and that she is waking up every night again with her suicidal dreams and panic attacks. Will increase Prazosin and Seroquel to address. Will increase Cymbalta to address her depression/anxiety. Will follow up in about 2 months.        10. Over 50% of the total visit time of   30  minutes was spent on counseling and/or coordination of care of:                         1. Bipolar 2 disorder, major depressive episode ([facility]    2. PTSD (post-traumatic stress disorder)    3. Insomnia due to other mental disorder    4.  Generalized anxiety disorder with Take 1.5 tablets by mouth nightly as needed (racing thoughts, sleep) 8/20/19   Yes CAPO Rodríguez NP   lamoTRIgine (LAMICTAL) 100 MG tablet Take 1 tablet by mouth daily 8/20/19   Yes CAPO Rodríguez - NP   DULoxetine (CYMBALTA) 30 MG extended release capsule Take 1 capsule by mouth daily (Add to 60mg to make a 90mg dose) 8/20/19   Yes CAPO Rodríguez - NP   DULoxetine (CYMBALTA) 60 MG extended release capsule Take 1 capsule by mouth daily 7/25/19     CAPO Rodríguez - NP   gabapentin (NEURONTIN) 300 MG capsule Take 2 capsules by mouth nightly for 30 days. 7/23/19 8/22/19   CAPO Rodríguez NP   diclofenac (VOLTAREN) 75 MG EC tablet Take 75 mg by mouth 2 times daily       Historical Provider, MD   metoprolol succinate (TOPROL XL) 50 MG extended release tablet Take 50 mg by mouth daily 4/25/19 per direction of Dr. Federico Becker pt to take 1/2 tab daily.       Historical Provider, MD   Magnesium Oxide 250 MG TABS Take by mouth daily       Historical Provider, MD   varenicline (CHANTIX) 1 MG tablet Take 1 mg by mouth 2 times daily       Historical Provider, MD         Social History   Social History            Socioeconomic History    Marital status: Legally        Spouse name: None    Number of children: 1    Years of education: 12th grade + some college    Highest education level: None   Occupational History    None   Social Needs    Financial resource strain: None    Food insecurity:       Worry: None       Inability: None    Transportation needs:       Medical: None       Non-medical: None   Tobacco Use    Smoking status: Current Every Day Smoker    Smokeless tobacco: Never Used    Tobacco comment: 3/4/19 started Chantix. As of 8/20/19, smoking about 5 cigarettes per day.    Substance and Sexual Activity    Alcohol use: Not Currently       Comment: history of abuse, last drink was early December, 2018    Drug use: Not Currently       Types: Marijuana       Comment: last use was Encounter Medications    Orders Placed This Encounter   Medications    prazosin (MINIPRESS) 2 MG capsule       Sig: Take 1 capsule by mouth nightly (Add to a 1mg capsule for a 3mg dose)       Dispense:  90 capsule       Refill:  1    prazosin (MINIPRESS) 1 MG capsule       Sig: Take 1 capsule by mouth nightly (Add 1mg to 2mg for a 3mg dose at night.)       Dispense:  90 capsule       Refill:  1    QUEtiapine (SEROQUEL) 100 MG tablet       Sig: Take 1.5 tablets by mouth nightly as needed (racing thoughts, sleep)       Dispense:  135 tablet       Refill:  1    lamoTRIgine (LAMICTAL) 100 MG tablet       Sig: Take 1 tablet by mouth daily       Dispense:  90 tablet       Refill:  1    DULoxetine (CYMBALTA) 30 MG extended release capsule       Sig: Take 1 capsule by mouth daily (Add to 60mg to make a 90mg dose)       Dispense:  90 capsule       Refill:  1                     No orders of the defined types were placed in this encounter.        9. Additional comments: Her anxiety and stress at this time surrounds her Social Security benefits. She has had the hearing and is waiting to hear the outcome. She reports that her anixety and depression are worse and that she is waking up every night again with her suicidal dreams and panic attacks. Will increase Prazosin and Seroquel to address. Will increase Cymbalta to address her depression/anxiety. Will follow up in about 2 months.        10. Over 50% of the total visit time of   30  minutes was spent on counseling and/or coordination of care of:                         1. Bipolar 2 disorder, major depressive episode ([facility]    2. PTSD (post-traumatic stress disorder)    3. Insomnia due to other mental disorder    4.  Generalized anxiety disorder with panic attacks                        Psychotherapy Topics: mood/medication effectiveness         Zandra Fong [facility]

## 2019-09-10 ENCOUNTER — TELEPHONE (OUTPATIENT)
Dept: PSYCHIATRY | Age: 54
End: 2019-09-10

## 2019-10-04 ENCOUNTER — TELEPHONE (OUTPATIENT)
Dept: PSYCHIATRY | Age: 54
End: 2019-10-04

## 2019-10-04 DIAGNOSIS — F41.0 GENERALIZED ANXIETY DISORDER WITH PANIC ATTACKS: ICD-10-CM

## 2019-10-04 DIAGNOSIS — F41.1 GENERALIZED ANXIETY DISORDER WITH PANIC ATTACKS: ICD-10-CM

## 2019-10-04 RX ORDER — GABAPENTIN 300 MG/1
600 CAPSULE ORAL NIGHTLY
Qty: 180 CAPSULE | Refills: 2 | Status: SHIPPED | OUTPATIENT
Start: 2019-10-07 | End: 2020-02-14

## 2019-10-23 ENCOUNTER — TELEPHONE (OUTPATIENT)
Dept: PSYCHIATRY | Age: 54
End: 2019-10-23

## 2019-11-19 ENCOUNTER — OFFICE VISIT (OUTPATIENT)
Dept: PSYCHIATRY | Age: 54
End: 2019-11-19
Payer: MEDICARE

## 2019-11-19 VITALS
TEMPERATURE: 96.2 F | SYSTOLIC BLOOD PRESSURE: 90 MMHG | OXYGEN SATURATION: 95 % | WEIGHT: 150 LBS | HEIGHT: 66 IN | HEART RATE: 89 BPM | BODY MASS INDEX: 24.11 KG/M2 | DIASTOLIC BLOOD PRESSURE: 63 MMHG

## 2019-11-19 DIAGNOSIS — F41.1 GENERALIZED ANXIETY DISORDER WITH PANIC ATTACKS: ICD-10-CM

## 2019-11-19 DIAGNOSIS — F31.81 BIPOLAR 2 DISORDER, MAJOR DEPRESSIVE EPISODE (HCC): Primary | ICD-10-CM

## 2019-11-19 DIAGNOSIS — F41.0 GENERALIZED ANXIETY DISORDER WITH PANIC ATTACKS: ICD-10-CM

## 2019-11-19 DIAGNOSIS — F51.05 INSOMNIA DUE TO OTHER MENTAL DISORDER: ICD-10-CM

## 2019-11-19 DIAGNOSIS — F99 INSOMNIA DUE TO OTHER MENTAL DISORDER: ICD-10-CM

## 2019-11-19 PROCEDURE — 99215 OFFICE O/P EST HI 40 MIN: CPT | Performed by: NURSE PRACTITIONER

## 2019-11-19 RX ORDER — QUETIAPINE FUMARATE 100 MG/1
50 TABLET, FILM COATED ORAL NIGHTLY PRN
Qty: 45 TABLET | Refills: 0
Start: 2019-11-19 | End: 2020-01-03 | Stop reason: ALTCHOICE

## 2019-11-19 RX ORDER — LAMOTRIGINE 100 MG/1
150 TABLET ORAL DAILY
Qty: 90 TABLET | Refills: 1 | Status: SHIPPED | OUTPATIENT
Start: 2019-11-19 | End: 2020-04-08

## 2019-12-10 ENCOUNTER — TELEPHONE (OUTPATIENT)
Dept: PSYCHIATRY | Age: 54
End: 2019-12-10

## 2020-01-03 ENCOUNTER — OFFICE VISIT (OUTPATIENT)
Dept: PSYCHIATRY | Age: 55
End: 2020-01-03
Payer: MEDICARE

## 2020-01-03 VITALS
DIASTOLIC BLOOD PRESSURE: 72 MMHG | WEIGHT: 153 LBS | HEART RATE: 87 BPM | BODY MASS INDEX: 24.59 KG/M2 | OXYGEN SATURATION: 94 % | HEIGHT: 66 IN | SYSTOLIC BLOOD PRESSURE: 111 MMHG

## 2020-01-03 PROCEDURE — 99215 OFFICE O/P EST HI 40 MIN: CPT | Performed by: NURSE PRACTITIONER

## 2020-01-03 RX ORDER — RISPERIDONE 1 MG/1
1 TABLET, FILM COATED ORAL NIGHTLY
Qty: 30 TABLET | Refills: 3 | Status: SHIPPED | OUTPATIENT
Start: 2020-01-03 | End: 2020-04-22

## 2020-01-03 RX ORDER — TRAZODONE HYDROCHLORIDE 50 MG/1
TABLET ORAL
Qty: 60 TABLET | Refills: 1 | Status: SHIPPED | OUTPATIENT
Start: 2020-01-03 | End: 2020-01-23 | Stop reason: ALTCHOICE

## 2020-01-03 NOTE — PROGRESS NOTES
Not Currently     Types: Marijuana     Comment: last use was summer, 2017    Sexual activity: Not Currently   Lifestyle    Physical activity:     Days per week: None     Minutes per session: None    Stress: None   Relationships    Social connections:     Talks on phone: Once a week     Gets together:  Three times a week     Attends Church service: Never     Active member of club or organization: No     Attends meetings of clubs or organizations: Never     Relationship status:     Intimate partner violence:     Fear of current or ex partner: No     Emotionally abused: No     Physically abused: No     Forced sexual activity: No   Other Topics Concern    None   Social History Narrative    PRIOR MEDICATION TRIALS    Trazodone (having more suicidal dreams)    Seroquel (wt gain, 14 lb in 3 months)    Duloxetine (has not been effective and no noticeable change even with dose increases to 90mg)    Paxil, 20mg    Wellbutrin XL, (tried it recently to quit smoking)    Prozac (made her numb, added to her eating disorder)    Zoloft (thought she did well on this, took for a couple of years, she stopped it because when she returned to [place] the doctor put her back on Prozac)    Remeron (increased her dreams and panic attacks)    Lunesta    Librium (in 2018 for 15 days to stop drinking alcohol)    Risperdal-current         Psychiatric Review of Systems, 3/19/19         Mood:  Sadness, tearfulness, low appetite, low concentration, low energy, loss of interest, denies suicidality today      (Depression: sadness, tearfulness, sleep, appetite, energy, concentration, sexual function, guilt, psychomotor agitation or slowing, interest, suicidality)         Julia: She says that there have been periods of time when she could go 3 days without sleep, she does say that there have been days in a row when she has done reckless, impulsive things      (impulsivity, grandiosity, recklessness, excessive energy, decreased need for 11/10/2015    GLUCOSE 118 (H) 11/10/2015    CALCIUM 9.6 11/10/2015    PROT 8.6 11/10/2015    LABALBU 5.0 11/10/2015    ALKPHOS 127 (H) 11/10/2015    AST 33 (H) 11/10/2015    ALT 22 11/10/2015    LABGLOM 94 11/10/2015     Lab Results   Component Value Date     11/10/2015    K 3.5 11/10/2015    CL 93 11/10/2015    CO2 21 11/10/2015    BUN 8 11/10/2015    CREATININE 0.7 11/10/2015    GLUCOSE 118 11/10/2015    CALCIUM 9.6 11/10/2015      No results found for: CHOL  No results found for: TRIG  No results found for: HDL  No results found for: LDLCHOLESTEROL, LDLCALC  No results found for: LABVLDL, VLDL  No results found for: CHOLHDLRATIO  No results found for: LABA1C  No results found for: EAG  Lab Results   Component Value Date    TSH 2.40 02/03/2015     Lab Results   Component Value Date    VITD25 27.6 (L) 02/03/2015       Assessments Administered:    PHQ9: 17, moderate severe  HAM-A: 31, severe    Assessment:   1. Bipolar 2 disorder, major depressive episode ([facility]    2. Insomnia due to other mental disorder    3. Generalized anxiety disorder with panic attacks    4. PTSD (post-traumatic stress disorder)    5. Sleep apnea, unspecified type        Plan:  Continue:  Cymbalta (Duloxetine), 60mg + 30mg, daily (you can take the 60mg of Cymbalta in the morning and 30mg with the evening meal, not too much past 5pm) It can interfere with sleep. Lamictal, 100mg, take 1.5 tabs daily  Prazosin, 2mg, nightly    Discontinue:  Seroquel, 100mg, 1/2 tab (50mg), nightly    Start:  Risperdal 1 mg tablet, 1/2 tab for 3 days then increase to 1 tab at bedtime   Trazodone 50 mg, take 1-2 tabs nightly as needed for sleep     Follow up: Return in about 2 months (around 3/3/2020). 1. The risks, benefits, side effects, indications, contraindications, and adverse effects of the medications have been discussed. Yes.  2. The pt has verbalized understanding and has capacity to give informed consent.   3. The Virginia Bender report has been reviewed endorses liking the [employer]. Will work on depression and anxiety more when her sleep is better managed. Will follow up in about 2 months. 10.Over 50% of the total visit time of 40 minutes was spent on counseling and/or coordination of care of:                        1. Bipolar 2 disorder, major depressive episode ([facility]    2. Insomnia due to other mental disorder    3. Generalized anxiety disorder with panic attacks    4. PTSD (post-traumatic stress disorder)    5.  Sleep apnea, unspecified type         Psychotherapy Topics: mood/medication effectiveness health      [facility]

## 2020-01-03 NOTE — PATIENT INSTRUCTIONS
Plan:  Continue:  Cymbalta (Duloxetine), 60mg + 30mg, daily (you can take the 60mg of Cymbalta in the morning and 30mg with the evening meal, not too much past 5pm) It can interfere with sleep. Lamictal, 100mg, take 1.5 tabs daily  Prazosin, 2mg, nightly    Discontinue:  Seroquel, 100mg, 1/2 tab (50mg), nightly    Start:  Risperdal 1 mg tablet, 1/2 tab for 3 days then increase to 1 tab at bedtime   Trazodone 50 mg, take 1-2 tabs nightly as needed for sleep     Follow up: Return in about 2 months (around 3/3/2020).

## 2020-01-06 ENCOUNTER — TELEPHONE (OUTPATIENT)
Dept: PSYCHIATRY | Age: 55
End: 2020-01-06

## 2020-01-16 ENCOUNTER — TELEPHONE (OUTPATIENT)
Dept: PSYCHIATRY | Age: 55
End: 2020-01-16

## 2020-01-16 NOTE — TELEPHONE ENCOUNTER
Pt stopped by [facility] outpt today to request a form to be filled out. While her she stated that she is taking Trazodone at [facility] but has not slept in 48 hrs and has not eaten in 3 days. Pt was wondering if her next appt with the APRN could be moved up. Atul SCANLON given the above info and pt will be contacted by  staff to get next available appt for the pt.

## 2020-01-21 ENCOUNTER — TELEPHONE (OUTPATIENT)
Dept: PSYCHIATRY | Age: 55
End: 2020-01-21

## 2020-01-21 ENCOUNTER — OFFICE VISIT (OUTPATIENT)
Dept: PSYCHIATRY | Age: 55
End: 2020-01-21
Payer: MEDICARE

## 2020-01-21 VITALS
DIASTOLIC BLOOD PRESSURE: 75 MMHG | BODY MASS INDEX: 24.91 KG/M2 | HEART RATE: 90 BPM | HEIGHT: 66 IN | WEIGHT: 155 LBS | OXYGEN SATURATION: 98 % | SYSTOLIC BLOOD PRESSURE: 132 MMHG

## 2020-01-21 PROCEDURE — 99214 OFFICE O/P EST MOD 30 MIN: CPT | Performed by: NURSE PRACTITIONER

## 2020-01-21 RX ORDER — ESZOPICLONE 3 MG/1
3 TABLET, FILM COATED ORAL NIGHTLY PRN
Qty: 30 TABLET | Refills: 0 | Status: SHIPPED | OUTPATIENT
Start: 2020-01-21 | End: 2020-02-20 | Stop reason: SDUPTHER

## 2020-01-21 NOTE — PROGRESS NOTES
1/21/2020 3:34 PM   Progress Note    IN:  1150  OUT: [address] [facility] 1965      Chief Complaint   Patient presents with    Insomnia         Subjective:  Patient is a 54 y.o. female diagnosed with Bipolar 2 Disorder and presents today for follow-up. Last seen in clinic on 1/3/20 and prior records were reviewed. Today patient states, \"I feel better since getting off of Seroquel. \" Her face at the last visit was puffy and moon-shaped. Her face looks less puffy at this visit. She says that Risperdal is working well for her, she just says, \"I wish I could sleep. \" She reports that she is taking Trazodone, 100mg, at night and that it is not helping for sleep. She says that Risperdal has helped her with her mood and she is not getting as frustrated with her animals. She also reports that if she gets up in the night she is able to work and get things done at home. She also thinks that Risperdal has helped with her nightmares. Patient reports side effects as follows: none. No evidence of EPS, no cogwheeling or abnormal motor movements. Absent  suicidal ideation. Reports compliance with medications as good .      Current Substance Use:  See history    BP: /75 (Site: Right Upper Arm, Position: Sitting, Cuff Size: Medium Adult)   Pulse 90   Ht 5' 6\" (1.676 m)   Wt 155 lb (70.3 kg)   SpO2 98%   Breastfeeding No   BMI 25.02 kg/m²       Review of Systems - 14 point review:  Negative except being treated for:  depression, anxiety, panic attacks, suicidal dreams, weight gain, right fractured ankle with walking boot       Constitutional: (fevers, chills, night sweats, wt loss/gain, change in appetite, fatigue, somnolence)    HEENT: (ear pain or discharge, hearing loss, ear ringing, sinus pressure, nosebleed, nasal discharge, sore throat, oral sores, tooth pain, bleeding gums, hoarse voice, neck pain)      Cardiovascular: (HTN, chest pain, elevated cholesterol/lipids, palpitations, leg swelling, leg pain with walking)    Respiratory: (cough, wheezing, snoring, SOB with activity (dyspnea), SOB while lying flat (orthopnea), awakening with severe SOB (paroxysmal nocturnal dyspnea))    Gastrointestinal: (NVD, constipation, abdominal pain, bright red stools, black tarry stools, stool incontinence)     Genitourinary:  (pelvic pain, burning or frequency of urination, urinary urgency, blood in urine incomplete bladder emptying, urinary incontinence, STD; MEN: testicular pain or swelling, erectile dysfunction; WOMEN: LMP, heavy menstrual bleeding (menorrhagia), irregular periods, postmenopausal bleeding, menstrual pain (dymenorrhea, vaginal discharge)    Musculoskeletal: (bone pain/fracture, joint pain or swelling, musle pain)    Integumentary: (rashes, acne, non-healing sores, itching, breast lumps, breast pain, nipple discharge, hair loss)    Neurologic: (HA, muscle weakness, paresthesias (numbness, coldness, crawling or prickling), memory loss, seizure, dizziness)    Psychiatric:  (anxiety, sadness, irritability/anger, insomnia, suicidality)    Endocrine: (heat or cold intolerance, excessive thirst (polydipsia), excessive hunger (polyphagia))    Immune/Allergic: (hives, seasonal or environmental allergies, HIV exposure)    Hematologic/Lymphatic: (lymph node enlargement, easy bleeding or bruising)    History obtained via chart review and patient    PCP is Jonathon Sapp. Nathaly Dodson DO, DO       Current Meds:    Prior to Admission medications    Medication Sig Start Date End Date Taking? Authorizing Provider   eszopiclone (ESZOPICLONE) 3 MG TABS Take 1 tablet by mouth nightly as needed (insomnia) for up to 30 days.  1/21/20 2/20/20 Yes CAPO Lua NP   risperiDONE (RISPERDAL) 1 MG tablet Take 1 tablet by mouth nightly Take 1/2 tab for 3 days then increase to 1 tab at bedtime 1/3/20   CAPO Lua NP   traZODone (DESYREL) 50 MG tablet Take 1-2 tabs at bedtime as needed for sleep 1/3/20   CAPO Lua NP lamoTRIgine (LAMICTAL) 100 MG tablet Take 1.5 tablets by mouth daily 11/19/19   Celia CAPO Zhou NP   DULoxetine (CYMBALTA) 60 MG extended release capsule TAKE 1 CAPSULE BY MOUTH EVERY DAY 10/23/19   Scarlet Sheth MD   gabapentin (NEURONTIN) 300 MG capsule Take 2 capsules by mouth nightly for 90 days. 10/7/19 1/5/20  Scarlet Sheth MD   prazosin (MINIPRESS) 2 MG capsule Take 1 capsule by mouth nightly (Add to a 1mg capsule for a 3mg dose) 8/20/19   CAPO Sarmiento NP   DULoxetine (CYMBALTA) 30 MG extended release capsule Take 1 capsule by mouth daily (Add to 60mg to make a 90mg dose) 8/20/19   Celia Winnie, APRN - NP   diclofenac (VOLTAREN) 75 MG EC tablet Take 75 mg by mouth 2 times daily    Historical Provider, MD   metoprolol succinate (TOPROL XL) 50 MG extended release tablet Take 50 mg by mouth daily 4/25/19 per direction of Dr. Shelli Martins pt to take 1/2 tab daily.     Historical Provider, MD   Magnesium Oxide 250 MG TABS Take by mouth daily    Historical Provider, MD     Social History     Socioeconomic History    Marital status: Legally      Spouse name: None    Number of children: 1    Years of education: 12th grade + some college    Highest education level: None   Occupational History    None   Social Needs    Financial resource strain: None    Food insecurity:     Worry: None     Inability: None    Transportation needs:     Medical: None     Non-medical: None   Tobacco Use    Smoking status: Current Every Day Smoker     Packs/day: 1.00    Smokeless tobacco: Never Used    Tobacco comment: 1/21/20 smoking a full pack again   Substance and Sexual Activity    Alcohol use: Not Currently     Comment: history of abuse, last drink was early December, 2018    Drug use: Not Currently     Types: Marijuana     Comment: last use was summer, 2017    Sexual activity: Not Currently   Lifestyle    Physical activity:     Days per week: None     Minutes per session: None    Stress: None Relationships    Social connections:     Talks on phone: Once a week     Gets together:  Three times a week     Attends [employer] service: Never     Active member of club or organization: No     Attends meetings of clubs or organizations: Never     Relationship status:     Intimate partner violence:     Fear of current or ex partner: No     Emotionally abused: No     Physically abused: No     Forced sexual activity: No   Other Topics Concern    None   Social History Narrative    PRIOR MEDICATION TRIALS    Trazodone (having more suicidal dreams), at 100mg, not working for sleep    Seroquel (wt gain, 14 lb in 3 months, enuresis, indigestion, abnormal blood work, increased blood sugar)    Duloxetine (has not been effective and no noticeable change even with dose increases to 90mg)    Paxil, 20mg    Wellbutrin XL, (tried it recently to quit smoking)    Prozac (made her numb, added to her eating disorder)    Zoloft (thought she did well on this, took for a couple of years, she stopped it because when she returned to [facility] the doctor put her back on Prozac)    Remeron (increased her dreams and panic attacks)    Shanae Fontana (worked)    Librium (in 2018 for 15 days to stop drinking alcohol)    Risperdal-current         Psychiatric Review of Systems, 3/19/19         Mood:  Sadness, tearfulness, low appetite, low concentration, low energy, loss of interest, denies suicidality today      (Depression: sadness, tearfulness, sleep, appetite, energy, concentration, sexual function, guilt, psychomotor agitation or slowing, interest, suicidality)         Julia: She says that there have been periods of time when she could go 3 days without sleep, she does say that there have been days in a row when she has done reckless, impulsive things      (impulsivity, grandiosity, recklessness, excessive energy, decreased need for sleep, increased spending beyond means, hyperverbal, grandiose, racing thoughts, hypersexuality)      other orders/labs as follows:      Orders Placed This Encounter   Medications    eszopiclone (ESZOPICLONE) 3 MG TABS     Sig: Take 1 tablet by mouth nightly as needed (insomnia) for up to 30 days. Dispense:  30 tablet     Refill:  0      No orders of the defined types were placed in this encounter. 9. Additional comments: She is responding better to Risperdal than to Seroquel. Her face is not as puffy this visit. She says that she feels better on Risperdal and this is reflected in her PHQ9 and HAMA scores which are decreased from last visit (17 and 31, respectively). Will discontinue Trazodone and will start Lunesta for sleep. She reports that she has only been getting 2-3 hrs of sleep at night, even with taking 200mg Trazodone. Completed a portion of her disability papers. She will take it to her PCP for completion of the rest. Will follow up in about 2 months. 10.Over 50% of the total visit time of   25  minutes was spent on counseling and/or coordination of care of:                        1. Bipolar 2 disorder, major depressive episode ([facility] [address]    2. Generalized anxiety disorder with panic attacks    3. Insomnia due to other mental disorder    4. PTSD (post-traumatic stress disorder)    5.  Sleep apnea, unspecified type                      Psychotherapy Topics: mood/medication effectiveness       Joselin Berry PMDORIP-BC

## 2020-01-21 NOTE — PATIENT INSTRUCTIONS
Plan:  Continue:  Cymbalta (Duloxetine), 60mg + 30mg, daily (you can take the 60mg of Cymbalta in the morning and 30mg with the evening meal, not too much past 5pm) It can interfere with sleep. Lamictal, 100mg, take 1.5 tabs daily  Prazosin, 2mg, nightly  Risperdal 1 mg tablet, 1/2 tab for 3 days then increase to 1 tab at bedtime     Stop: Trazodone    Start:  Eszopiclone (Lunesta), 3mg, nightly for sleep    Follow up: Return in about 2 months (around 3/21/2020). Patient Education        eszopiclone  Pronunciation:  anna valero  Brand:  Lunesta  What is the most important information I should know about eszopiclone? Some people using this medicine have engaged in activity while not fully awake and later had no memory of it. If this happens to you, stop taking eszopiclone and call your doctor right away. Serious injury or death could occur if you walk or drive while you are not fully awake. What is eszopiclone? Eszopiclone is a sedative that is used to treat insomnia. This medicine causes relaxation to help you fall asleep and stay asleep. Eszopiclone may also be used for purposes not listed in this medication guide. What should I discuss with my healthcare provider before taking eszopiclone? You should not use eszopiclone if you are allergic to it, or if you have ever taken sleep medicine and engaged in activity you later don't remember. Eszopiclone is not approved for use by anyone younger than 25years old. Tell your doctor if you have ever had:  · liver disease;  · a breathing disorder;  · depression, mental illness, or suicidal thoughts; or  · drug or alcohol addiction. Tell your doctor if you are pregnant or breast-feeding. The sedative effects of eszopiclone may be stronger in older adults. Accidental falls are common in elderly patients who take sedatives. Use caution to avoid falling or accidental injury while you are taking eszopiclone. How should I take eszopiclone?   Follow all directions eszopiclone. Avoid drinking alcohol. Dangerous side effects or death could occur. Avoid taking eszopiclone within 1 hour after eating a high-fat or heavy meal. This will make it harder for your body to absorb the medication. Eszopiclone can cause side effects that may impair your thinking or reactions. You may still feel sleepy the morning after taking this medicine. Until you know how this medicine will affect you during waking hours, be careful if you drive, operate machinery,  an airplane, or do anything that requires you to be awake and alert. What are the possible side effects of eszopiclone? Eszopiclone may cause a severe allergic reaction. Stop taking eszopiclone and get emergency medical help if you have signs of an allergic reaction: hives; nausea, vomiting; difficulty breathing; swelling of your face, lips, tongue, or throat. Some people using this medicine have engaged in activity while not fully awake and later had no memory of it. This may include walking, driving, or making phone calls. If this happens to you, stop taking eszopiclone and call your doctor right away. Serious injury or death could occur if you walk or drive while you are not fully awake. Call your doctor at once if you have:  · anxiety, depression, aggression, agitation;  · memory problems, unusual thoughts or behavior;  · thoughts of hurting yourself; or  · confusion, hallucinations (hearing or seeing things). Common side effects may include:  · day-time drowsiness, dizziness, \"hangover\" feeling;  · headache, anxiety;  · dry mouth;  · unusual or unpleasant taste in your mouth;  · rash; or  · cold or flu symptoms such as fever, body aches, sore throat, cough, runny or stuffy nose. This is not a complete list of side effects and others may occur. Call your doctor for medical advice about side effects. You may report side effects to FDA at 0-578-LYO-2038. What other drugs will affect eszopiclone?   Using eszopiclone with other drugs that make you drowsy or slow your breathing can cause dangerous side effects or death. Ask your doctor before using opioid medication, other sleep medicine, a muscle relaxer, or medicine for anxiety or seizures. Other drugs may affect eszopiclone, including prescription and over-the-counter medicines, vitamins, and herbal products. Tell your doctor about all your current medicines and any medicine you start or stop using. Where can I get more information? Your pharmacist can provide more information about eszopiclone. Remember, keep this and all other medicines out of the reach of children, never share your medicines with others, and use this medication only for the indication prescribed. Every effort has been made to ensure that the information provided by [address] [place], [place] [address] is accurate, up-to-date, and complete, but no guarantee is made to that effect. Drug information contained herein may be time sensitive. [facility] information has been compiled for use by healthcare practitioners and consumers in the [place] and therefore [facility] does not warrant that uses outside of the [place] are appropriate, unless specifically indicated otherwise. [facility] drug information does not endorse drugs, diagnose patients or recommend therapy. [facility] drug information is an informational resource designed to assist licensed healthcare practitioners in caring for their patients and/or to serve consumers viewing this service as a supplement to, and not a substitute for, the expertise, skill, knowledge and judgment of healthcare practitioners. The absence of a warning for a given drug or drug combination in no way should be construed to indicate that the drug or drug combination is safe, effective or appropriate for any given patient. [facility] does not assume any responsibility for any aspect of healthcare administered with the aid of information [facility] provides.  The information contained herein is

## 2020-01-22 ENCOUNTER — TELEPHONE (OUTPATIENT)
Dept: PSYCHIATRY | Age: 55
End: 2020-01-22

## 2020-01-22 NOTE — TELEPHONE ENCOUNTER
Malik Jack called to request a refill on her medication. Verified with pharmacy that needs below refill. Last office visit : 1/21/2020 with Javi SCANLON  Next office visit : 3/20/2020 with Javi SCANLON    Requested Prescriptions     Pending Prescriptions Disp Refills    DULoxetine (CYMBALTA) 30 MG extended release capsule 90 capsule 1     Sig: Take 1 capsule by mouth daily (Add to 60mg to make a 90mg dose)            Mere Chambers, RNOffice Visit     1/21/2020  [address] [facility]   Marian Son, APRN - NP   Nurse Practitioner Psychiatric/Mental Health   Bipolar 2 disorder, major depressive episode [facility]) +4 more   Dx   Insomnia   Reason for Visit    Progress Notes              Scan on 1/21/2020 12:26 PM by KATARINA Shah: PHQ-9 and HAM-AScan on 1/21/2020 12:26 PM by KATARINA Shah: PHQ-9 and HAM-A    Progress Notes     Expand All Collapse All    1/21/2020 3:34 PM                             Progress Note     IN:  1150  OUT: 1215        Dorene Rojas 1965                          Chief Complaint   Patient presents with    Insomnia            Subjective:  Patient is a 54 y.o. female diagnosed with Bipolar 2 Disorder and presents today for follow-up. Last seen in clinic on 1/3/20 and prior records were reviewed.     Today patient states, \"I feel better since getting off of Seroquel. \" Her face at the last visit was puffy and moon-shaped. Her face looks less puffy at this visit. She says that Risperdal is working well for her, she just says, \"I wish I could sleep. \" She reports that she is taking Trazodone, 100mg, at night and that it is not helping for sleep. She says that Risperdal has helped her with her mood and she is not getting as frustrated with her animals. She also reports that if she gets up in the night she is able to work and get things done at home. She also thinks that Risperdal has helped with her nightmares.     Patient reports side effects as follows: none. No evidence of EPS, no cogwheeling or abnormal motor movements.     Absent  suicidal ideation.   Reports compliance with medications as good .      Current Substance Use:  See history     BP: /75 (Site: Right Upper Arm, Position: Sitting, Cuff Size: Medium Adult)   Pulse 90   Ht 5' 6\" (1.676 m)   Wt 155 lb (70.3 kg)   SpO2 98%   Breastfeeding No   BMI 25.02 kg/m²         Review of Systems - 14 point review:  Negative except being treated for:  depression, anxiety, panic attacks, suicidal dreams, weight gain, right fractured ankle with walking boot         Constitutional: (fevers, chills, night sweats, wt loss/gain, change in appetite, fatigue, somnolence)     HEENT: (ear pain or discharge, hearing loss, ear ringing, sinus pressure, nosebleed, nasal discharge, sore throat, oral sores, tooth pain, bleeding gums, hoarse voice, neck pain)      Cardiovascular: (HTN, chest pain, elevated cholesterol/lipids, palpitations, leg swelling, leg pain with walking)     Respiratory: (cough, wheezing, snoring, SOB with activity (dyspnea), SOB while lying flat (orthopnea), awakening with severe SOB (paroxysmal nocturnal dyspnea))     Gastrointestinal: (NVD, constipation, abdominal pain, bright red stools, black tarry stools, stool incontinence)     Genitourinary:  (pelvic pain, burning or frequency of urination, urinary urgency, blood in urine incomplete bladder emptying, urinary incontinence, STD; MEN: testicular pain or swelling, erectile dysfunction; WOMEN: LMP, heavy menstrual bleeding (menorrhagia), irregular periods, postmenopausal bleeding, menstrual pain (dymenorrhea, vaginal discharge)     Musculoskeletal: (bone pain/fracture, joint pain or swelling, musle pain)     Integumentary: (rashes, acne, non-healing sores, itching, breast lumps, breast pain, nipple discharge, hair loss)     Neurologic: (HA, muscle weakness, paresthesias (numbness, coldness, crawling or prickling), memory loss, seizure, dizziness)     Psychiatric:  (anxiety, sadness, irritability/anger, insomnia, suicidality)     Endocrine: (heat or cold intolerance, excessive thirst (polydipsia), excessive hunger (polyphagia))     Immune/Allergic: (hives, seasonal or environmental allergies, HIV exposure)     Hematologic/Lymphatic: (lymph node enlargement, easy bleeding or bruising)     History obtained via chart review and patient     PCP is Christiano Crowder. Tiff Diego DO, DO         Current Meds:     Home Medications           Prior to Admission medications    Medication Sig Start Date End Date Taking? Authorizing Provider   eszopiclone (ESZOPICLONE) 3 MG TABS Take 1 tablet by mouth nightly as needed (insomnia) for up to 30 days. 1/21/20 2/20/20 Yes Nader Pack, APRN - NP   risperiDONE (RISPERDAL) 1 MG tablet Take 1 tablet by mouth nightly Take 1/2 tab for 3 days then increase to 1 tab at bedtime 1/3/20     Nader Elliott, APRN - NP   traZODone (DESYREL) 50 MG tablet Take 1-2 tabs at bedtime as needed for sleep 1/3/20     Nader Elliott, APRN - NP   lamoTRIgine (LAMICTAL) 100 MG tablet Take 1.5 tablets by mouth daily 11/19/19     Nader Elliott, APRN - NP   DULoxetine (CYMBALTA) 60 MG extended release capsule TAKE 1 CAPSULE BY MOUTH EVERY DAY 10/23/19     Sheba Rodríguez MD   gabapentin (NEURONTIN) 300 MG capsule Take 2 capsules by mouth nightly for 90 days.  10/7/19 1/5/20   Sheba Rodríguez MD   prazosin (MINIPRESS) 2 MG capsule Take 1 capsule by mouth nightly (Add to a 1mg capsule for a 3mg dose) 8/20/19     Nader Elliott, APRN - NP   DULoxetine (CYMBALTA) 30 MG extended release capsule Take 1 capsule by mouth daily (Add to 60mg to make a 90mg dose) 8/20/19     Nader Elliott, APRN - NP   diclofenac (VOLTAREN) 75 MG EC tablet Take 75 mg by mouth 2 times daily       Historical Provider, MD   metoprolol succinate (TOPROL XL) 50 MG extended release tablet Take 50 mg by mouth daily 4/25/19 per direction of Dr. Rojelio Everett pt to take 1/2 tab daily.       Historical Provider, MD   Magnesium Oxide 250 MG TABS Take by mouth daily       Historical Provider, MD         Social History   Social History            Socioeconomic History    Marital status: Legally        Spouse name: None    Number of children: 1    Years of education: 12th grade + some college    Highest education level: None   Occupational History    None   Social Needs    Financial resource strain: None    Food insecurity:       Worry: None       Inability: None    Transportation needs:       Medical: None       Non-medical: None   Tobacco Use    Smoking status: Current Every Day Smoker       Packs/day: 1.00    Smokeless tobacco: Never Used    Tobacco comment: 1/21/20 smoking a full pack again   Substance and Sexual Activity    Alcohol use: Not Currently       Comment: history of abuse, last drink was early December, 2018    Drug use: Not Currently       Types: Marijuana       Comment: last use was summer, 2017    Sexual activity: Not Currently   Lifestyle    Physical activity:       Days per week: None       Minutes per session: None    Stress: None   Relationships    Social connections:       Talks on phone: Once a week       Gets together: Three times a week       Attends Hinduism service: Never       Active member of club or organization: No       Attends meetings of clubs or organizations: Never       Relationship status:     Intimate partner violence:       Fear of current or ex partner: No       Emotionally abused: No       Physically abused:  No       Forced sexual activity: No   Other Topics Concern    None   Social History Narrative     PRIOR MEDICATION TRIALS     Trazodone (having more suicidal dreams), at 100mg, not working for sleep     Seroquel (wt gain, 14 lb in 3 months, enuresis, indigestion, abnormal blood work, increased blood sugar)     Duloxetine (has not been effective and no noticeable change even with dose increases to 90mg)     Paxil, 20mg     Wellbutrin XL, (tried it recently to quit smoking)     Prozac (made her numb, added to her eating disorder)     Zoloft (thought she did well on this, took for a couple of years, she stopped it because when she returned to [place] the doctor put her back on Prozac)     Remeron (increased her dreams and panic attacks)     Shanae Fontana (worked)     Librium (in 2018 for 15 days to stop drinking alcohol)     Risperdal-current            Psychiatric Review of Systems, 3/19/19           Mood:  Sadness, tearfulness, low appetite, low concentration, low energy, loss of interest, denies suicidality today       (Depression: sadness, tearfulness, sleep, appetite, energy, concentration, sexual function, guilt, psychomotor agitation or slowing, interest, suicidality)           Julia: She says that there have been periods of time when she could go 3 days without sleep, she does say that there have been days in a row when she has done reckless, impulsive things       (impulsivity, grandiosity, recklessness, excessive energy, decreased need for sleep, increased spending beyond means, hyperverbal, grandiose, racing thoughts, hypersexuality)           Other: anger from being tired all the time       (Irritability, lability, anger)           Anxiety:  She says that she worries every night about sleeping and panic attacks. She says that she worries about her neighbors. She says that they always want something from her. She worries about running into them. She says that this causes panic attacks. She says that there is a lot of drug use in her neighborhood and she is fearful of her neighbors.       (Generalized anxiety: where, when, who, how long, how frequent)           Panic Disorder symptoms: She says that she is having panic attacks at night, 1 at night (this has improved from 3 weeks ago before she started Trazodone when she was having 2-3 at night).  She says that she wakes up with dreams of her family and with her anxiety about the trailer intact; Word Finding: intact  Conversation no evidence of delusions  Behavior:  Cooperative and Good eye contact  Mood: euthymic  Affect: congruent with mood  Thought Content: negative delusions, negative hallucinations, negative obsessions,  negativehomicidal and negative suicidal   Thought Process: linear, goal directed and coherent (having trouble keeping thoughts together, probably due to lack of sleep)  Associations: logical connections  Attention Span and concentration: Impaired (probably due to lack of sleep)  Judgement Insight:  normal and appropriate  Gait and Station:normal gait and station   Sleep: avg 2 hrs    Appetite: ok     Cognition: Can spell \"world\" backwards: Yes                    Can do serial 7's: Yes           Lab Results   Component Value Date      (L) 11/10/2015     K 3.5 11/10/2015     CL 93 (L) 11/10/2015     CO2 21 (L) 11/10/2015     BUN 8 11/10/2015     CREATININE 0.7 11/10/2015     GLUCOSE 118 (H) 11/10/2015     CALCIUM 9.6 11/10/2015     PROT 8.6 11/10/2015     LABALBU 5.0 11/10/2015     ALKPHOS 127 (H) 11/10/2015     AST 33 (H) 11/10/2015     ALT 22 11/10/2015     LABGLOM 94 11/10/2015            Lab Results   Component Value Date      11/10/2015     K 3.5 11/10/2015     CL 93 11/10/2015     CO2 21 11/10/2015     BUN 8 11/10/2015     CREATININE 0.7 11/10/2015     GLUCOSE 118 11/10/2015     CALCIUM 9.6 11/10/2015      No results found for: CHOL  No results found for: TRIG  No results found for: HDL  No results found for: LDLCHOLESTEROL, LDLCALC  No results found for: LABVLDL, VLDL  No results found for: CHOLHDLRATIO  No results found for: LABA1C  No results found for: EAG        Lab Results   Component Value Date     TSH 2.40 02/03/2015            Lab Results   Component Value Date     VITD25 27.6 (L) 02/03/2015         Assessments Administered:     PHQ9: 10, moderate  HAM-A: 15, mild     Assessment:    1. Bipolar 2 disorder, major depressive episode ([facility] [address]    2.  Generalized anxiety disorder with panic attacks    3. Insomnia due to other mental disorder    4. PTSD (post-traumatic stress disorder)    5. Sleep apnea, unspecified type          Plan:  Continue:  Cymbalta (Duloxetine), 60mg + 30mg, daily (you can take the 60mg of Cymbalta in the morning and 30mg with the evening meal, not too much past 5pm) It can interfere with sleep. Lamictal, 100mg, take 1.5 tabs daily  Prazosin, 2mg, nightly  Risperdal 1 mg tablet, 1/2 tab for 3 days then increase to 1 tab at bedtime      Stop: Trazodone     Start:  Eszopiclone (Lunesta), 3mg, nightly for sleep     Follow up: Return in about 2 months (around 3/21/2020).     1. The risks, benefits, side effects, indications, contraindications, and adverse effects of the medications have been discussed. Yes.  2. The pt has verbalized understanding and has capacity to give informed consent. 3. The Jason Senior report has been reviewed according to [facility] regulations. 4. Supportive therapy offered. 5. Follow up:    Return in about 2 months (around 3/21/2020). 6. The patient has been advised to call with any problems. 7. Controlled substance Treatment Plan: new medication for sleep (eszopiclone). 8. The above listed medications have been continued, modifications in meds and other orders/labs as follows:                 Encounter Medications         Orders Placed This Encounter   Medications    eszopiclone (ESZOPICLONE) 3 MG TABS       Sig: Take 1 tablet by mouth nightly as needed (insomnia) for up to 30 days.       Dispense:  30 tablet       Refill:  0                     No orders of the defined types were placed in this encounter.        9. Additional comments: She is responding better to Risperdal than to Seroquel. Her face is not as puffy this visit. She says that she feels better on Risperdal and this is reflected in her PHQ9 and HAMA scores which are decreased from last visit (17 and 31, respectively).  Will discontinue Trazodone and will start [facility]

## 2020-01-23 RX ORDER — DULOXETIN HYDROCHLORIDE 30 MG/1
30 CAPSULE, DELAYED RELEASE ORAL DAILY
Qty: 90 CAPSULE | Refills: 1 | Status: SHIPPED | OUTPATIENT
Start: 2020-01-23 | End: 2020-05-04

## 2020-01-24 ENCOUNTER — TELEPHONE (OUTPATIENT)
Dept: PSYCHIATRY | Age: 55
End: 2020-01-24

## 2020-01-27 DIAGNOSIS — Z79.899 ENCOUNTER FOR LONG-TERM (CURRENT) USE OF OTHER MEDICATIONS: ICD-10-CM

## 2020-01-27 LAB
AMPHETAMINE SCREEN, URINE: POSITIVE
BARBITURATE SCREEN URINE: NEGATIVE
BENZODIAZEPINE SCREEN, URINE: NEGATIVE
CANNABINOID SCREEN URINE: NEGATIVE
COCAINE METABOLITE SCREEN URINE: NEGATIVE
Lab: ABNORMAL
OPIATE SCREEN URINE: NEGATIVE

## 2020-01-28 ENCOUNTER — TELEPHONE (OUTPATIENT)
Dept: PSYCHIATRY | Age: 55
End: 2020-01-28

## 2020-02-06 ENCOUNTER — TELEPHONE (OUTPATIENT)
Dept: UROLOGY | Age: 55
End: 2020-02-06

## 2020-02-06 NOTE — TELEPHONE ENCOUNTER
Called pt to schedule referral appt with Joe Torres for Adult Onset bed wetting. Left message. If she calls back please schedule her w/ Urology.

## 2020-02-14 ENCOUNTER — OFFICE VISIT (OUTPATIENT)
Dept: UROLOGY | Age: 55
End: 2020-02-14
Payer: MEDICARE

## 2020-02-14 VITALS — HEIGHT: 66 IN | TEMPERATURE: 97.6 F | WEIGHT: 154 LBS | BODY MASS INDEX: 24.75 KG/M2

## 2020-02-14 LAB
BILIRUBIN, POC: 1
BLOOD URINE, POC: NORMAL
CLARITY, POC: CLEAR
COLOR, POC: YELLOW
GLUCOSE URINE, POC: 0
KETONES, POC: NORMAL
LEUKOCYTE EST, POC: NORMAL
NITRITE, POC: NORMAL
PH, POC: 5
PROTEIN, POC: 30
SPECIFIC GRAVITY, POC: 1.03
UROBILINOGEN, POC: 1

## 2020-02-14 PROCEDURE — 99213 OFFICE O/P EST LOW 20 MIN: CPT | Performed by: NURSE PRACTITIONER

## 2020-02-14 PROCEDURE — 81003 URINALYSIS AUTO W/O SCOPE: CPT | Performed by: NURSE PRACTITIONER

## 2020-02-14 PROCEDURE — 51798 US URINE CAPACITY MEASURE: CPT | Performed by: NURSE PRACTITIONER

## 2020-02-14 RX ORDER — GABAPENTIN 300 MG/1
CAPSULE ORAL
COMMUNITY
Start: 2020-01-27 | End: 2020-05-06 | Stop reason: SDUPTHER

## 2020-02-14 RX ORDER — TRAMADOL HYDROCHLORIDE 50 MG/1
TABLET ORAL
COMMUNITY
Start: 2020-02-04 | End: 2020-07-06

## 2020-02-14 RX ORDER — TRAZODONE HYDROCHLORIDE 50 MG/1
TABLET ORAL
COMMUNITY
Start: 2020-02-03 | End: 2020-03-23 | Stop reason: ALTCHOICE

## 2020-02-14 NOTE — PROGRESS NOTES
Karina Contreras is a 54 y.o. female who presents today   Chief Complaint   Patient presents with    New Patient     I am here today for bed wetting. I am having trouble urinating during the daytime and cannot control my urine at night            HPI:       Karina Contreras presents for evaluation of enuresis. She states he only urinates when she wakes up, before she goes to bed, and at times she will wet the bed around three times per week. She was placed on seroquel before this started but has since stopped. She has been off of seroquel around one month now and she continues to wet the bed around 3 times per week. She does have a weak stream at night. She states that she drinks herbal non caffeine tea, flavored water and regular water and milk during the day.       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 tobacco: Never Used    Tobacco comment: 1/21/20 smoking a full pack again   Substance Use Topics    Alcohol use: Not Currently     Comment: history of abuse, last drink was early December, 2018      Current Outpatient Medications   Medication Sig Dispense Refill    gabapentin (NEURONTIN) 300 MG capsule TAKE 2 CAPSULES BY MOUTH NIGHTLY      traZODone (DESYREL) 50 MG tablet TAKE 1-2 TABS AT BEDTIME AS NEEDED FOR SLEEP      traMADol (ULTRAM) 50 MG tablet TAKE 1 TABLET BY MOUTH TWICE A DAY AS NEEDED      DULoxetine (CYMBALTA) 30 MG extended release capsule Take 1 capsule by mouth daily (Add to 60mg to make a 90mg dose) 90 capsule 1    eszopiclone (ESZOPICLONE) 3 MG TABS Take 1 tablet by mouth nightly as needed (insomnia) for up to 30 days. 30 tablet 0    risperiDONE (RISPERDAL) 1 MG tablet Take 1 tablet by mouth nightly Take 1/2 tab for 3 days then increase to 1 tab at bedtime 30 tablet 3    lamoTRIgine (LAMICTAL) 100 MG tablet Take 1.5 tablets by mouth daily 90 tablet 1    DULoxetine (CYMBALTA) 60 MG extended release capsule TAKE 1 CAPSULE BY MOUTH EVERY DAY 90 capsule 3    prazosin (MINIPRESS) 2 MG capsule Take 1 capsule by mouth nightly (Add to a 1mg capsule for a 3mg dose) 90 capsule 1    diclofenac (VOLTAREN) 75 MG EC tablet Take 75 mg by mouth 2 times daily      metoprolol succinate (TOPROL XL) 50 MG extended release tablet Take 50 mg by mouth daily 4/25/19 per direction of Dr. Ike Willoughby pt to take 1/2 tab daily.  Magnesium Oxide 250 MG TABS Take by mouth daily       No current facility-administered medications for this visit. Allergies   Allergen Reactions    Seroquel [Quetiapine Fumarate]      Pt reports caused her B/P to drop, faint and increase her cholesterol. Subjective:      Review of Systems   Constitutional: Negative for chills and fever. Genitourinary: Positive for enuresis. Negative for difficulty urinating, frequency and urgency. All other systems reviewed and are negative. Objective:     Physical Exam  Vitals signs reviewed. Constitutional:       General: She is not in acute distress. Appearance: She is well-developed. HENT:      Head: Normocephalic and atraumatic. Eyes:      Conjunctiva/sclera: Conjunctivae normal.      Pupils: Pupils are equal, round, and reactive to light. Neck:      Musculoskeletal: Normal range of motion and neck supple.    Cardiovascular:      Rate and dragon/transcription disclaimer: Much of this encounter note is electronic transcription/translation of spoken language to printed tach. Electronic translation of spoken language may be erroneous, or at times, nonsensical words or phrases may be inadvertently transcribed.  Although, I have reviewed the note for such errors, some may still exist.

## 2020-02-17 NOTE — TELEPHONE ENCOUNTER
Baron Gonzalez called to request a refill on her medication. Darby Thomas under media-on other controlled meds  PLEASE NOTE ABNORMAL UDS OBTAINED ON 1/27/2020. Last office visit : 1/21/2020 with Emma SCANLON  Next office visit : 3/20/2020 with Emma SCANLON    Requested Prescriptions     Pending Prescriptions Disp Refills    eszopiclone (ESZOPICLONE) 3 MG TABS 30 tablet 0     Sig: Take 1 tablet by mouth nightly as needed (insomnia) for up to 30 days. Ryan Jamil RN      Office Visit     1/21/2020  [address] [facility]   CAPO Falcon - NP   Nurse Practitioner Psychiatric/Mental Health   Bipolar 2 disorder, major depressive episode [facility]) +4 more   Dx   Insomnia   Reason for Visit    Progress Notes              Scan on 1/21/2020 12:26 PM by LINDEN WallW: PHQ-9 and HAM-AScan on 1/21/2020 12:26 PM by Rajeev Saucedo LCSW: PHQ-9 and HAM-A    Progress Notes     Expand All Collapse All    1/21/2020 3:34 PM                             Progress Note     IN:  1150  OUT: 1215        Dorene Rojas 1965                              Chief Complaint   Patient presents with    Insomnia            Subjective:  Patient is a 54 y.o. female diagnosed with Bipolar 2 Disorder and presents today for follow-up. Last seen in clinic on 1/3/20 and prior records were reviewed.     Today patient states, \"I feel better since getting off of Seroquel. \" Her face at the last visit was puffy and moon-shaped. Her face looks less puffy at this visit. She says that Risperdal is working well for her, she just says, \"I wish I could sleep. \" She reports that she is taking Trazodone, 100mg, at night and that it is not helping for sleep. She says that Risperdal has helped her with her mood and she is not getting as frustrated with her animals. She also reports that if she gets up in the night she is able to work and get things done at home.  She also thinks that Risperdal has helped with her nightmares.     Patient reports side effects as follows: none. No evidence of EPS, no cogwheeling or abnormal motor movements.     Absent  suicidal ideation.   Reports compliance with medications as good .      Current Substance Use:  See history     BP: /75 (Site: Right Upper Arm, Position: Sitting, Cuff Size: Medium Adult)   Pulse 90   Ht 5' 6\" (1.676 m)   Wt 155 lb (70.3 kg)   SpO2 98%   Breastfeeding No   BMI 25.02 kg/m²         Review of Systems - 14 point review:  Negative except being treated for:  depression, anxiety, panic attacks, suicidal dreams, weight gain, right fractured ankle with walking boot         Constitutional: (fevers, chills, night sweats, wt loss/gain, change in appetite, fatigue, somnolence)     HEENT: (ear pain or discharge, hearing loss, ear ringing, sinus pressure, nosebleed, nasal discharge, sore throat, oral sores, tooth pain, bleeding gums, hoarse voice, neck pain)      Cardiovascular: (HTN, chest pain, elevated cholesterol/lipids, palpitations, leg swelling, leg pain with walking)     Respiratory: (cough, wheezing, snoring, SOB with activity (dyspnea), SOB while lying flat (orthopnea), awakening with severe SOB (paroxysmal nocturnal dyspnea))     Gastrointestinal: (NVD, constipation, abdominal pain, bright red stools, black tarry stools, stool incontinence)     Genitourinary:  (pelvic pain, burning or frequency of urination, urinary urgency, blood in urine incomplete bladder emptying, urinary incontinence, STD; MEN: testicular pain or swelling, erectile dysfunction; WOMEN: LMP, heavy menstrual bleeding (menorrhagia), irregular periods, postmenopausal bleeding, menstrual pain (dymenorrhea, vaginal discharge)     Musculoskeletal: (bone pain/fracture, joint pain or swelling, musle pain)     Integumentary: (rashes, acne, non-healing sores, itching, breast lumps, breast pain, nipple discharge, hair loss)     Neurologic: (HA, muscle weakness, paresthesias (numbness, coldness, crawling or prickling), memory loss, seizure, dizziness)     Psychiatric:  (anxiety, sadness, irritability/anger, insomnia, suicidality)     Endocrine: (heat or cold intolerance, excessive thirst (polydipsia), excessive hunger (polyphagia))     Immune/Allergic: (hives, seasonal or environmental allergies, HIV exposure)     Hematologic/Lymphatic: (lymph node enlargement, easy bleeding or bruising)     History obtained via chart review and patient     PCP is Reese Glass. Coleman Decker, , DO         Current Meds:     Home Medications           Prior to Admission medications    Medication Sig Start Date End Date Taking? Authorizing Provider   eszopiclone (ESZOPICLONE) 3 MG TABS Take 1 tablet by mouth nightly as needed (insomnia) for up to 30 days. 1/21/20 2/20/20 Yes CAPO Farrar NP   risperiDONE (RISPERDAL) 1 MG tablet Take 1 tablet by mouth nightly Take 1/2 tab for 3 days then increase to 1 tab at bedtime 1/3/20     CAPO Farrar NP   traZODone (DESYREL) 50 MG tablet Take 1-2 tabs at bedtime as needed for sleep 1/3/20     CAPO Farrar NP   lamoTRIgine (LAMICTAL) 100 MG tablet Take 1.5 tablets by mouth daily 11/19/19     CAPO Farrar NP   DULoxetine (CYMBALTA) 60 MG extended release capsule TAKE 1 CAPSULE BY MOUTH EVERY DAY 10/23/19     Sary Gauthier MD   gabapentin (NEURONTIN) 300 MG capsule Take 2 capsules by mouth nightly for 90 days.  10/7/19 1/5/20   Sary Gauthier MD   prazosin (MINIPRESS) 2 MG capsule Take 1 capsule by mouth nightly (Add to a 1mg capsule for a 3mg dose) 8/20/19     CAPO Farrar NP   DULoxetine (CYMBALTA) 30 MG extended release capsule Take 1 capsule by mouth daily (Add to 60mg to make a 90mg dose) 8/20/19     [place] NP   diclofenac (VOLTAREN) 75 MG EC tablet Take 75 mg by mouth 2 times daily       Historical Provider, MD   metoprolol succinate (TOPROL XL) 50 MG extended release tablet Take 50 mg by mouth daily 4/25/19 per direction of Dr. Nataliia Martines pt to take 1/2 tab daily.       Historical Provider, MD   Magnesium Oxide 250 MG TABS Take by mouth daily       Historical Provider, MD         Social History   Social History            Socioeconomic History    Marital status: Legally        Spouse name: None    Number of children: 1    Years of education: 12th grade + some college    Highest education level: None   Occupational History    None   Social Needs    Financial resource strain: None    Food insecurity:       Worry: None       Inability: None    Transportation needs:       Medical: None       Non-medical: None   Tobacco Use    Smoking status: Current Every Day Smoker       Packs/day: 1.00    Smokeless tobacco: Never Used    Tobacco comment: 1/21/20 smoking a full pack again   Substance and Sexual Activity    Alcohol use: Not Currently       Comment: history of abuse, last drink was early December, 2018    Drug use: Not Currently       Types: Marijuana       Comment: last use was summer, 2017    Sexual activity: Not Currently   Lifestyle    Physical activity:       Days per week: None       Minutes per session: None    Stress: None   Relationships    Social connections:       Talks on phone: Once a week       Gets together: Three times a week       Attends Christianity service: Never       Active member of club or organization: No       Attends meetings of clubs or organizations: Never       Relationship status:     Intimate partner violence:       Fear of current or ex partner: No       Emotionally abused: No       Physically abused:  No       Forced sexual activity: No   Other Topics Concern    None   Social History Narrative     PRIOR MEDICATION TRIALS     Trazodone (having more suicidal dreams), at 100mg, not working for sleep     Seroquel (wt gain, 14 lb in 3 months, enuresis, indigestion, abnormal blood work, increased blood sugar)     Duloxetine (has not been effective and no noticeable change even with dose increases to 90mg)     Paxil, 20mg     Wellbutrin XL, (tried it recently to quit smoking)     Prozac (made her numb, added to her eating disorder)     Zoloft (thought she did well on this, took for a couple of years, she stopped it because when she returned to [place] the doctor put her back on Prozac)     Remeron (increased her dreams and panic attacks)     Lunesta (worked)     Librium (in 2018 for 15 days to stop drinking alcohol)     Risperdal-current            Psychiatric Review of Systems, 3/19/19           Mood:  Sadness, tearfulness, low appetite, low concentration, low energy, loss of interest, denies suicidality today       (Depression: sadness, tearfulness, sleep, appetite, energy, concentration, sexual function, guilt, psychomotor agitation or slowing, interest, suicidality)           Julia: She says that there have been periods of time when she could go 3 days without sleep, she does say that there have been days in a row when she has done reckless, impulsive things       (impulsivity, grandiosity, recklessness, excessive energy, decreased need for sleep, increased spending beyond means, hyperverbal, grandiose, racing thoughts, hypersexuality)           Other: anger from being tired all the time       (Irritability, lability, anger)           Anxiety:  She says that she worries every night about sleeping and panic attacks. She says that she worries about her neighbors. She says that they always want something from her. She worries about running into them. She says that this causes panic attacks. She says that there is a lot of drug use in her neighborhood and she is fearful of her neighbors.       (Generalized anxiety: where, when, who, how long, how frequent)           Panic Disorder symptoms: She says that she is having panic attacks at night, 1 at night (this has improved from 3 weeks ago before she started Trazodone when she was having 2-3 at night).  She says that she wakes up with dreams of her family and with her anxiety episode ([address]    2. Generalized anxiety disorder with panic attacks    3. Insomnia due to other mental disorder    4. PTSD (post-traumatic stress disorder)    5. Sleep apnea, unspecified type          Plan:  Continue:  Cymbalta (Duloxetine), 60mg + 30mg, daily (you can take the 60mg of Cymbalta in the morning and 30mg with the evening meal, not too much past 5pm) It can interfere with sleep. Lamictal, 100mg, take 1.5 tabs daily  Prazosin, 2mg, nightly  Risperdal 1 mg tablet, 1/2 tab for 3 days then increase to 1 tab at bedtime      Stop: Trazodone     Start:  Eszopiclone (Lunesta), 3mg, nightly for sleep     Follow up: Return in about 2 months (around 3/21/2020).     1. The risks, benefits, side effects, indications, contraindications, and adverse effects of the medications have been discussed. Yes.  2. The pt has verbalized understanding and has capacity to give informed consent. 3. The Philip Sparks report has been reviewed according to [facility] regulations. 4. Supportive therapy offered. 5. Follow up:    Return in about 2 months (around 3/21/2020). 6. The patient has been advised to call with any problems. 7. Controlled substance Treatment Plan: new medication for sleep (eszopiclone). 8. The above listed medications have been continued, modifications in meds and other orders/labs as follows:                 Encounter Medications         Orders Placed This Encounter   Medications    eszopiclone (ESZOPICLONE) 3 MG TABS       Sig: Take 1 tablet by mouth nightly as needed (insomnia) for up to 30 days.       Dispense:  30 tablet       Refill:  0                     No orders of the defined types were placed in this encounter.        9. Additional comments: She is responding better to Risperdal than to Seroquel. Her face is not as puffy this visit. She says that she feels better on Risperdal and this is reflected in her PHQ9 and HAMA scores which are decreased from last visit (17 and 31, respectively).  Will discontinue Trazodone and will start Lunesta for sleep. She reports that she has only been getting 2-3 hrs of sleep at night, even with taking 200mg Trazodone. Completed a portion of her disability papers. She will take it to her PCP for completion of the rest. Will follow up in about 2 months.     10. Over 50% of the total visit time of   25  minutes was spent on counseling and/or coordination of care of:                         1. Bipolar 2 disorder, major depressive episode ([facility] [address]    2. Generalized anxiety disorder with panic attacks    3. Insomnia due to other mental disorder    4. PTSD (post-traumatic stress disorder)    5.  Sleep apnea, unspecified type                        Psychotherapy Topics: mood/medication effectiveness         Celia Zhou PMHNP-BC

## 2020-02-19 DIAGNOSIS — Z79.899 ENCOUNTER FOR LONG-TERM (CURRENT) USE OF OTHER MEDICATIONS: ICD-10-CM

## 2020-02-20 ENCOUNTER — TELEPHONE (OUTPATIENT)
Dept: PSYCHIATRY | Age: 55
End: 2020-02-20

## 2020-02-20 RX ORDER — ESZOPICLONE 3 MG/1
3 TABLET, FILM COATED ORAL NIGHTLY PRN
Qty: 30 TABLET | Refills: 0 | Status: SHIPPED | OUTPATIENT
Start: 2020-02-20 | End: 2020-03-21

## 2020-02-20 NOTE — TELEPHONE ENCOUNTER
RX called as directed by Milady SCANLON to  at [facility] at [phone]. Eszopiclone (Lunesta) 3 mg tab--take 1 tab by mouth nightly as needed for insomnia for up to 30 days. #30 no refills. Pt made aware.

## 2020-02-25 DIAGNOSIS — R32 URINARY INCONTINENCE, UNSPECIFIED TYPE: ICD-10-CM

## 2020-02-25 LAB
ANION GAP SERPL CALCULATED.3IONS-SCNC: 17 MMOL/L (ref 7–19)
BUN BLDV-MCNC: 26 MG/DL (ref 6–20)
CALCIUM SERPL-MCNC: 9.8 MG/DL (ref 8.6–10)
CHLORIDE BLD-SCNC: 98 MMOL/L (ref 98–111)
CO2: 20 MMOL/L (ref 22–29)
CREAT SERPL-MCNC: 1 MG/DL (ref 0.5–0.9)
GFR NON-AFRICAN AMERICAN: 58
GLUCOSE BLD-MCNC: 94 MG/DL (ref 74–109)
POTASSIUM SERPL-SCNC: 4.5 MMOL/L (ref 3.5–5)
SODIUM BLD-SCNC: 135 MMOL/L (ref 136–145)

## 2020-03-03 ENCOUNTER — OFFICE VISIT (OUTPATIENT)
Dept: UROLOGY | Age: 55
End: 2020-03-03
Payer: MEDICARE

## 2020-03-03 VITALS
HEART RATE: 92 BPM | SYSTOLIC BLOOD PRESSURE: 118 MMHG | WEIGHT: 153 LBS | TEMPERATURE: 97.3 F | HEIGHT: 66 IN | BODY MASS INDEX: 24.59 KG/M2 | DIASTOLIC BLOOD PRESSURE: 69 MMHG

## 2020-03-03 LAB
BILIRUBIN, POC: NORMAL
BLOOD URINE, POC: NORMAL
CLARITY, POC: CLEAR
COLOR, POC: YELLOW
GLUCOSE URINE, POC: NORMAL
KETONES, POC: 15
LEUKOCYTE EST, POC: NORMAL
NITRITE, POC: NORMAL
PH, POC: 5
PROTEIN, POC: NORMAL
SPECIFIC GRAVITY, POC: 1.03
UROBILINOGEN, POC: 1

## 2020-03-03 PROCEDURE — 99213 OFFICE O/P EST LOW 20 MIN: CPT | Performed by: NURSE PRACTITIONER

## 2020-03-03 PROCEDURE — 81003 URINALYSIS AUTO W/O SCOPE: CPT | Performed by: NURSE PRACTITIONER

## 2020-03-03 NOTE — PROGRESS NOTES
Alejandrina Muñoz is a 54 y.o. female who presents today   Chief Complaint   Patient presents with    Follow-up     I am here today for my 2 week follow up with labs            HPI:       Alejandrina Muñoz presents for follow-up of enuresis. Patient explained that she only urinates when she wakes up and before she goes to bed at last appointment. She will then with the bed multiple times at night to least 3 times per week. Patient states she does have a weak stream at night. At last visit she also admitted to drinking herbal noncaffeine tea flavored water and regular water and milk during the day. Due to her lack of urination I did have her complete a basic metabolic panel prior to appointment today. Was also asked to increase water intake to around 1-1/2 to 2 L a day and avoid drinking 2 to 3 hours before bedtime. She was asked to complete a log of when she urinates at night and when she has incontinence. Metabolic panel does show creatinine of 1 with a GFR 58 and BUN of 26. She states that she has both eating and drinking disorders.   Sodium today 135, one-point below this labs standard normal.  States her urine is highly concentrated during the day and she puts out over a liter at night    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

## 2020-03-10 DIAGNOSIS — N39.44 NOCTURNAL ENURESIS: ICD-10-CM

## 2020-03-10 LAB
ANION GAP SERPL CALCULATED.3IONS-SCNC: 21 MMOL/L (ref 7–19)
BUN BLDV-MCNC: 19 MG/DL (ref 6–20)
CALCIUM SERPL-MCNC: 9.1 MG/DL (ref 8.6–10)
CHLORIDE BLD-SCNC: 89 MMOL/L (ref 98–111)
CO2: 18 MMOL/L (ref 22–29)
CREAT SERPL-MCNC: 0.9 MG/DL (ref 0.5–0.9)
GFR NON-AFRICAN AMERICAN: >60
GLUCOSE BLD-MCNC: 107 MG/DL (ref 74–109)
POTASSIUM SERPL-SCNC: 4.7 MMOL/L (ref 3.5–5)
SODIUM BLD-SCNC: 128 MMOL/L (ref 136–145)

## 2020-03-11 ENCOUNTER — OFFICE VISIT (OUTPATIENT)
Dept: UROLOGY | Age: 55
End: 2020-03-11
Payer: MEDICARE

## 2020-03-11 LAB
APPEARANCE FLUID: CLEAR
BILIRUBIN, POC: NORMAL
BLOOD URINE, POC: NORMAL
CLARITY, POC: CLEAR
COLOR, POC: YELLOW
GLUCOSE URINE, POC: NORMAL
KETONES, POC: NORMAL
LEUKOCYTE EST, POC: NORMAL
NITRITE, POC: NORMAL
PH, POC: 6
PROTEIN, POC: NORMAL
SPECIFIC GRAVITY, POC: 1.02
UROBILINOGEN, POC: 0.2

## 2020-03-11 PROCEDURE — 81002 URINALYSIS NONAUTO W/O SCOPE: CPT | Performed by: NURSE PRACTITIONER

## 2020-03-11 PROCEDURE — 99213 OFFICE O/P EST LOW 20 MIN: CPT | Performed by: NURSE PRACTITIONER

## 2020-03-11 RX ORDER — SPIRONOLACTONE 25 MG/1
TABLET ORAL DAILY
COMMUNITY
Start: 2020-03-05

## 2020-03-11 RX ORDER — OXYBUTYNIN CHLORIDE 5 MG/1
5 TABLET ORAL NIGHTLY
Qty: 30 TABLET | Refills: 3 | Status: SHIPPED | OUTPATIENT
Start: 2020-03-11 | End: 2020-06-08

## 2020-03-11 RX ORDER — METRONIDAZOLE 500 MG/1
TABLET ORAL
COMMUNITY
Start: 2020-03-05 | End: 2020-07-06

## 2020-03-11 NOTE — PROGRESS NOTES
Beba Woodard is a 54 y.o. female who presents today   Chief Complaint   Patient presents with    Follow-up     1 week follow up  nocturnal enuresis labs done            HPI:       Beba Woodard presents for follow-up of nocturnal enuresis. Previously patient has explained that she only urinates upon waking up before she goes to bed and she will with the bed multiple times at night at least 3 nights a week. Patient states she does have a weak stream.  She also explained that she drinks herbal non-caffeinated tea, flavored water, regular water, and milk throughout the day. At her last appointment she was placed on Nocdurna for nocturnal enuresis, she states this medication has worked very well for her. Labs were completed prior to patient beginning this medication and showed a sodium level of 134, today sodium level is 128.     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 tobacco: Never Used    Tobacco comment: 1/21/20 smoking a full pack again   Substance Use Topics    Alcohol use: Not Currently     Comment: history of abuse, last drink was early December, 2018      Current Outpatient Medications   Medication Sig Dispense Refill    spironolactone (ALDACTONE) 25 MG tablet       metroNIDAZOLE (FLAGYL) 500 MG tablet       oxybutynin (DITROPAN) 5 MG tablet Take 1 tablet by mouth nightly 30 tablet 3    eszopiclone (ESZOPICLONE) 3 MG TABS Take 1 tablet by mouth nightly as needed (insomnia) for up to 30 days. 30 tablet 0    gabapentin (NEURONTIN) 300 MG capsule TAKE 2 CAPSULES BY MOUTH NIGHTLY      traZODone (DESYREL) 50 MG tablet TAKE 1-2 TABS AT BEDTIME AS NEEDED FOR SLEEP      traMADol (ULTRAM) 50 MG tablet TAKE 1 TABLET BY MOUTH TWICE A DAY AS NEEDED      DULoxetine (CYMBALTA) 30 MG extended release capsule Take 1 capsule by mouth daily (Add to 60mg to make a 90mg dose) 90 capsule 1    risperiDONE (RISPERDAL) 1 MG tablet Take 1 tablet by mouth nightly Take 1/2 tab for 3 days then increase to 1 tab at bedtime 30 tablet 3    lamoTRIgine (LAMICTAL) 100 MG tablet Take 1.5 tablets by mouth daily 90 tablet 1    DULoxetine (CYMBALTA) 60 MG extended release capsule TAKE 1 CAPSULE BY MOUTH EVERY DAY 90 capsule 3    prazosin (MINIPRESS) 2 MG capsule Take 1 capsule by mouth nightly (Add to a 1mg capsule for a 3mg dose) 90 capsule 1    diclofenac (VOLTAREN) 75 MG EC tablet Take 75 mg by mouth 2 times daily      metoprolol succinate (TOPROL XL) 50 MG extended release tablet Take 50 mg by mouth daily 4/25/19 per direction of Dr. Irasema Hermosillo pt to take 1/2 tab daily.  Magnesium Oxide 250 MG TABS Take by mouth daily       No current facility-administered medications for this visit. Allergies   Allergen Reactions    Seroquel [Quetiapine Fumarate]      Pt reports caused her B/P to drop, faint and increase her cholesterol. Subjective:      Review of Systems   Constitutional: Negative for chills and fever. Genitourinary: Positive for frequency (nighttime). Negative for difficulty urinating, dysuria, hematuria, pelvic pain and urgency. All other systems reviewed and are negative. Objective:     Physical Exam  Vitals signs reviewed. Constitutional:       General: She is not in acute distress.      Appearance: She is 3/11/2020 at 3:52 PM     EMR dragon/transcription disclaimer: Much of this encounter note is electronic transcription/translation of spoken language to printed tach. Electronic translation of spoken language may be erroneous, or at times, nonsensical words or phrases may be inadvertently transcribed.  Although, I have reviewed the note for such errors, some may still exist.

## 2020-03-23 RX ORDER — ESZOPICLONE 3 MG/1
3 TABLET, FILM COATED ORAL NIGHTLY
COMMUNITY
End: 2020-03-23 | Stop reason: SDUPTHER

## 2020-03-23 RX ORDER — ESZOPICLONE 3 MG/1
3 TABLET, FILM COATED ORAL NIGHTLY
Qty: 30 TABLET | Refills: 0 | Status: SHIPPED | OUTPATIENT
Start: 2020-03-23 | End: 2020-04-22 | Stop reason: SDUPTHER

## 2020-03-23 NOTE — TELEPHONE ENCOUNTER
thinks that Risperdal has helped with her nightmares.     Patient reports side effects as follows: none. No evidence of EPS, no cogwheeling or abnormal motor movements.     Absent  suicidal ideation.   Reports compliance with medications as good .      Current Substance Use:  See history     BP: /75 (Site: Right Upper Arm, Position: Sitting, Cuff Size: Medium Adult)   Pulse 90   Ht 5' 6\" (1.676 m)   Wt 155 lb (70.3 kg)   SpO2 98%   Breastfeeding No   BMI 25.02 kg/m²         Review of Systems - 14 point review:  Negative except being treated for:  depression, anxiety, panic attacks, suicidal dreams, weight gain, right fractured ankle with walking boot         Constitutional: (fevers, chills, night sweats, wt loss/gain, change in appetite, fatigue, somnolence)     HEENT: (ear pain or discharge, hearing loss, ear ringing, sinus pressure, nosebleed, nasal discharge, sore throat, oral sores, tooth pain, bleeding gums, hoarse voice, neck pain)      Cardiovascular: (HTN, chest pain, elevated cholesterol/lipids, palpitations, leg swelling, leg pain with walking)     Respiratory: (cough, wheezing, snoring, SOB with activity (dyspnea), SOB while lying flat (orthopnea), awakening with severe SOB (paroxysmal nocturnal dyspnea))     Gastrointestinal: (NVD, constipation, abdominal pain, bright red stools, black tarry stools, stool incontinence)     Genitourinary:  (pelvic pain, burning or frequency of urination, urinary urgency, blood in urine incomplete bladder emptying, urinary incontinence, STD; MEN: testicular pain or swelling, erectile dysfunction; WOMEN: LMP, heavy menstrual bleeding (menorrhagia), irregular periods, postmenopausal bleeding, menstrual pain (dymenorrhea, vaginal discharge)     Musculoskeletal: (bone pain/fracture, joint pain or swelling, musle pain)     Integumentary: (rashes, acne, non-healing sores, itching, breast lumps, breast pain, nipple discharge, hair loss)     Neurologic: (HA, muscle by mouth daily 4/25/19 per direction of Dr. Cuba Louise pt to take 1/2 tab daily.       Historical Provider, MD   Magnesium Oxide 250 MG TABS Take by mouth daily       Historical Provider, MD         Social History   Social History            Socioeconomic History    Marital status: Legally        Spouse name: None    Number of children: 1    Years of education: 12th grade + some college    Highest education level: None   Occupational History    None   Social Needs    Financial resource strain: None    Food insecurity:       Worry: None       Inability: None    Transportation needs:       Medical: None       Non-medical: None   Tobacco Use    Smoking status: Current Every Day Smoker       Packs/day: 1.00    Smokeless tobacco: Never Used    Tobacco comment: 1/21/20 smoking a full pack again   Substance and Sexual Activity    Alcohol use: Not Currently       Comment: history of abuse, last drink was early December, 2018    Drug use: Not Currently       Types: Marijuana       Comment: last use was summer, 2017    Sexual activity: Not Currently   Lifestyle    Physical activity:       Days per week: None       Minutes per session: None    Stress: None   Relationships    Social connections:       Talks on phone: Once a week       Gets together: Three times a week       Attends Christianity service: Never       Active member of club or organization: No       Attends meetings of clubs or organizations: Never       Relationship status:     Intimate partner violence:       Fear of current or ex partner: No       Emotionally abused: No       Physically abused:  No       Forced sexual activity: No   Other Topics Concern    None   Social History Narrative     PRIOR MEDICATION TRIALS     Trazodone (having more suicidal dreams), at 100mg, not working for sleep     Seroquel (wt gain, 14 lb in 3 months, enuresis, indigestion, abnormal blood work, increased blood sugar)     Duloxetine (has not been effective and no noticeable change even with dose increases to 90mg)     Paxil, 20mg     Wellbutrin XL, (tried it recently to quit smoking)     Prozac (made her numb, added to her eating disorder)     Zoloft (thought she did well on this, took for a couple of years, she stopped it because when she returned to [place] the doctor put her back on Prozac)     Remeron (increased her dreams and panic attacks)     Lunesta (worked)     Librium (in 2018 for 15 days to stop drinking alcohol)     Risperdal-current            Psychiatric Review of Systems, 3/19/19           Mood:  Sadness, tearfulness, low appetite, low concentration, low energy, loss of interest, denies suicidality today       (Depression: sadness, tearfulness, sleep, appetite, energy, concentration, sexual function, guilt, psychomotor agitation or slowing, interest, suicidality)           Julia: She says that there have been periods of time when she could go 3 days without sleep, she does say that there have been days in a row when she has done reckless, impulsive things       (impulsivity, grandiosity, recklessness, excessive energy, decreased need for sleep, increased spending beyond means, hyperverbal, grandiose, racing thoughts, hypersexuality)           Other: anger from being tired all the time       (Irritability, lability, anger)           Anxiety:  She says that she worries every night about sleeping and panic attacks. She says that she worries about her neighbors. She says that they always want something from her. She worries about running into them. She says that this causes panic attacks. She says that there is a lot of drug use in her neighborhood and she is fearful of her neighbors.       (Generalized anxiety: where, when, who, how long, how frequent)           Panic Disorder symptoms: She says that she is having panic attacks at night, 1 at night (this has improved from 3 weeks ago before she started Trazodone when she was having 2-3 at night).  She says that she wakes up with dreams of her family and with her anxiety about [place] trailer park.       (Palpitations, racing heart beat, sweating, sense of impending doom, fear of recurrence, shortness of breath)           OCD symptoms:  She gets flustered if things are not in a routine, is ritualized about the way she dresses, and the way she laces her shoes       (checking, cleaning, organizing, rituals, hang-ups, obsessive thoughts, counting, rational vs. Irrational beliefs)           PTSD symptoms:  Increased startle response, wakes up with dreams at night, she also says she has flashbacks during the day.       (nightmares, flashbacks, startle respoinse, avoidance)           Social anxiety symptoms:  Negative           Simple phobias:   Heights, claustrophobia, snakes       (heights, planes, spiders, etc.)           Psychosis: She reports hearing and seeing things that aren't there, sees animals out her window that really aren't there. She says this happens almost every day. She says that she has never seen things when she wasn't depressed.       (hallucinations, auditory, visual, tactile, olfactory)           Paranoia: Feels that people are watching her most of the time. She thinks this feeling is both irrational and rational.           Delusions:  Negative       (TV, radio, thought broadcasting, mind control, referential thinking)           Patient's perception: Negative       (Spiritual or cultural context of symptoms, reality testing)           ADHD symptoms: Negative           Eating Disorder symptoms:  Positive, lives almost entirely on whole milk, sometimes drinking ensure, she says that in the last month she has only eaten 3 meals, has occasionally eaten a bagel with a plain piece of bread.       (binging, purging, excessive exercising)            MSE:  Patient is  A & O x 4. Appearance:  street clothes appropriately dressed for season and age.   Cognition:  Recent memory intact , remote memory intact , good fund of knowledge, average  intelligence level. Speech:  normal  Language: Naming: intact; Word Finding: intact  Conversation no evidence of delusions  Behavior:  Cooperative and Good eye contact  Mood: euthymic  Affect: congruent with mood  Thought Content: negative delusions, negative hallucinations, negative obsessions,  negativehomicidal and negative suicidal   Thought Process: linear, goal directed and coherent (having trouble keeping thoughts together, probably due to lack of sleep)  Associations: logical connections  Attention Span and concentration: Impaired (probably due to lack of sleep)  Judgement Insight:  normal and appropriate  Gait and Station:normal gait and station   Sleep: avg 2 hrs    Appetite: ok     Cognition: Can spell \"world\" backwards: Yes                    Can do serial 7's:  Yes           Lab Results   Component Value Date      (L) 11/10/2015     K 3.5 11/10/2015     CL 93 (L) 11/10/2015     CO2 21 (L) 11/10/2015     BUN 8 11/10/2015     CREATININE 0.7 11/10/2015     GLUCOSE 118 (H) 11/10/2015     CALCIUM 9.6 11/10/2015     PROT 8.6 11/10/2015     LABALBU 5.0 11/10/2015     ALKPHOS 127 (H) 11/10/2015     AST 33 (H) 11/10/2015     ALT 22 11/10/2015     LABGLOM 94 11/10/2015      Lab Results   Component Value Date      11/10/2015     K 3.5 11/10/2015     CL 93 11/10/2015     CO2 21 11/10/2015     BUN 8 11/10/2015     CREATININE 0.7 11/10/2015     GLUCOSE 118 11/10/2015     CALCIUM 9.6 11/10/2015      No results found for: CHOL  No results found for: TRIG  No results found for: HDL  No results found for: LDLCHOLESTEROL, LDLCALC  No results found for: LABVLDL, VLDL  No results found for: CHOLHDLRATIO  No results found for: LABA1C  No results found for: EAG        Lab Results   Component Value Date     TSH 2.40 02/03/2015            Lab Results   Component Value Date     VITD25 27.6 (L) 02/03/2015         Assessments Administered:     PHQ9: 10, moderate  HAM-A: 15, mild     Assessment:    1. Bipolar 2 disorder, major depressive episode ([address]    2. Generalized anxiety disorder with panic attacks    3. Insomnia due to other mental disorder    4. PTSD (post-traumatic stress disorder)    5. Sleep apnea, unspecified type          Plan:  Continue:  Cymbalta (Duloxetine), 60mg + 30mg, daily (you can take the 60mg of Cymbalta in the morning and 30mg with the evening meal, not too much past 5pm) It can interfere with sleep. Lamictal, 100mg, take 1.5 tabs daily  Prazosin, 2mg, nightly  Risperdal 1 mg tablet, 1/2 tab for 3 days then increase to 1 tab at bedtime      Stop: Trazodone     Start:  Eszopiclone (Lunesta), 3mg, nightly for sleep     Follow up: Return in about 2 months (around 3/21/2020).     1. The risks, benefits, side effects, indications, contraindications, and adverse effects of the medications have been discussed. Yes.  2. The pt has verbalized understanding and has capacity to give informed consent. 3. The Devin Siddiqui report has been reviewed according to [facility] regulations. 4. Supportive therapy offered. 5. Follow up:    Return in about 2 months (around 3/21/2020). 6. The patient has been advised to call with any problems. 7. Controlled substance Treatment Plan: new medication for sleep (eszopiclone). 8. The above listed medications have been continued, modifications in meds and other orders/labs as follows:                 Encounter Medications         Orders Placed This Encounter   Medications    eszopiclone (ESZOPICLONE) 3 MG TABS       Sig: Take 1 tablet by mouth nightly as needed (insomnia) for up to 30 days.       Dispense:  30 tablet       Refill:  0                     No orders of the defined types were placed in this encounter.        9. Additional comments: She is responding better to Risperdal than to Seroquel. Her face is not as puffy this visit.  She says that she feels better on Risperdal and this is reflected in her PHQ9 and HAMA scores which are decreased

## 2020-04-07 NOTE — TELEPHONE ENCOUNTER
doom, fear of recurrence, shortness of breath)           OCD symptoms:  She gets flustered if things are not in a routine, is ritualized about the way she dresses, and the way she laces her shoes       (checking, cleaning, organizing, rituals, hang-ups, obsessive thoughts, counting, rational vs. Irrational beliefs)           PTSD symptoms:  Increased startle response, wakes up with dreams at night, she also says she has flashbacks during the day.       (nightmares, flashbacks, startle respoinse, avoidance)           Social anxiety symptoms:  Negative           Simple phobias:   Heights, claustrophobia, snakes       (heights, planes, spiders, etc.)           Psychosis: She reports hearing and seeing things that aren't there, sees animals out her window that really aren't there. She says this happens almost every day. She says that she has never seen things when she wasn't depressed.       (hallucinations, auditory, visual, tactile, olfactory)           Paranoia: Feels that people are watching her most of the time. She thinks this feeling is both irrational and rational.           Delusions:  Negative       (TV, radio, thought broadcasting, mind control, referential thinking)           Patient's perception: Negative       (Spiritual or cultural context of symptoms, reality testing)           ADHD symptoms: Negative           Eating Disorder symptoms:  Positive, lives almost entirely on whole milk, sometimes drinking ensure, she says that in the last month she has only eaten 3 meals, has occasionally eaten a bagel with a plain piece of bread.       (binging, purging, excessive exercising)            MSE:  Patient is  A & O x 4. Appearance:  street clothes appropriately dressed for season and age. Cognition:  Recent memory intact , remote memory intact , good fund of knowledge, average  intelligence level. Speech:  normal  Language: Naming: intact;  Word Finding: intact  Conversation no evidence of

## 2020-04-08 RX ORDER — LAMOTRIGINE 100 MG/1
TABLET ORAL
Qty: 90 TABLET | Refills: 1 | Status: SHIPPED | OUTPATIENT
Start: 2020-04-08 | End: 2020-04-10

## 2020-04-10 ENCOUNTER — VIRTUAL VISIT (OUTPATIENT)
Dept: PSYCHIATRY | Age: 55
End: 2020-04-10
Payer: MEDICARE

## 2020-04-10 PROCEDURE — 99442 PR PHYS/QHP TELEPHONE EVALUATION 11-20 MIN: CPT | Performed by: NURSE PRACTITIONER

## 2020-04-10 RX ORDER — TRAZODONE HYDROCHLORIDE 50 MG/1
50 TABLET ORAL NIGHTLY
Qty: 90 TABLET | Refills: 1
Start: 2020-04-10 | End: 2020-07-28 | Stop reason: SDUPTHER

## 2020-04-10 RX ORDER — LAMOTRIGINE 150 MG/1
150 TABLET ORAL DAILY
Qty: 90 TABLET | Refills: 1 | Status: SHIPPED | OUTPATIENT
Start: 2020-04-10 | End: 2020-09-22 | Stop reason: SDUPTHER

## 2020-04-10 NOTE — PROGRESS NOTES
Magnesium Oxide 250 MG TABS Take by mouth daily    Historical Provider, MD     Social History     Socioeconomic History    Marital status: Legally      Spouse name: None    Number of children: 1    Years of education: 12th grade + some college    Highest education level: None   Occupational History    None   Social Needs    Financial resource strain: None    Food insecurity     Worry: None     Inability: None    Transportation needs     Medical: None     Non-medical: None   Tobacco Use    Smoking status: Current Every Day Smoker     Packs/day: 1.00    Smokeless tobacco: Never Used    Tobacco comment: 1/21/20 smoking a full pack again   Substance and Sexual Activity    Alcohol use: Not Currently     Comment: history of abuse, last drink was early December, 2018    Drug use: Not Currently     Types: Marijuana     Comment: last use was summer, 2017    Sexual activity: Not Currently   Lifestyle    Physical activity     Days per week: None     Minutes per session: None    Stress: None   Relationships    Social connections     Talks on phone: Once a week     Gets together:  Three times a week     Attends Latter day service: Never     Active member of club or organization: No     Attends meetings of clubs or organizations: Never     Relationship status:     Intimate partner violence     Fear of current or ex partner: No     Emotionally abused: No     Physically abused: No     Forced sexual activity: No   Other Topics Concern    None   Social History Narrative    PRIOR MEDICATION TRIALS    Trazodone (having more suicidal dreams), at 100mg, not working for sleep    Seroquel (wt gain, 14 lb in 3 months, enuresis, indigestion, abnormal blood work, increased blood sugar)    Duloxetine (has not been effective and no noticeable change even with dose increases to 90mg)    Paxil, 20mg    Wellbutrin XL, (tried it recently to quit smoking)    Prozac (made her numb, added to her eating disorder) Zoloft (thought she did well on this, took for a couple of years, she stopped it because when she returned to [place] the doctor put her back on Prozac)    Remeron (increased her dreams and panic attacks)    Kasandra Sheppard (worked)    Librium (in 2018 for 15 days to stop drinking alcohol)    Risperdal-current         Psychiatric Review of Systems, 3/19/19         Mood:  Sadness, tearfulness, low appetite, low concentration, low energy, loss of interest, denies suicidality today      (Depression: sadness, tearfulness, sleep, appetite, energy, concentration, sexual function, guilt, psychomotor agitation or slowing, interest, suicidality)         Julia: She says that there have been periods of time when she could go 3 days without sleep, she does say that there have been days in a row when she has done reckless, impulsive things      (impulsivity, grandiosity, recklessness, excessive energy, decreased need for sleep, increased spending beyond means, hyperverbal, grandiose, racing thoughts, hypersexuality)         Other: anger from being tired all the time      (Irritability, lability, anger)         Anxiety:  She says that she worries every night about sleeping and panic attacks. She says that she worries about her neighbors. She says that they always want something from her. She worries about running into them. She says that this causes panic attacks. She says that there is a lot of drug use in her neighborhood and she is fearful of her neighbors.      (Generalized anxiety: where, when, who, how long, how frequent)         Panic Disorder symptoms: She says that she is having panic attacks at night, 1 at night (this has improved from 3 weeks ago before she started Trazodone when she was having 2-3 at night).  She says that she wakes up with dreams of her family and with her anxiety about [place] trailer park.      (Palpitations, racing heart beat, sweating, sense of impending doom, fear of recurrence, shortness of breath)         OCD symptoms:  She gets flustered if things are not in a routine, is ritualized about the way she dresses, and the way she laces her shoes      (checking, cleaning, organizing, rituals, hang-ups, obsessive thoughts, counting, rational vs. Irrational beliefs)         PTSD symptoms:  Increased startle response, wakes up with dreams at night, she also says she has flashbacks during the day.      (nightmares, flashbacks, startle respoinse, avoidance)         Social anxiety symptoms:  Negative         Simple phobias:   Heights, claustrophobia, snakes      (heights, planes, spiders, etc.)         Psychosis: She reports hearing and seeing things that aren't there, sees animals out her window that really aren't there. She says this happens almost every day. She says that she has never seen things when she wasn't depressed.      (hallucinations, auditory, visual, tactile, olfactory)         Paranoia: Feels that people are watching her most of the time. She thinks this feeling is both irrational and rational.         Delusions:  Negative      (TV, radio, thought broadcasting, mind control, referential thinking)         Patient's perception: Negative      (Spiritual or cultural context of symptoms, reality testing)         ADHD symptoms: Negative         Eating Disorder symptoms:  Positive, lives almost entirely on whole milk, sometimes drinking ensure, she says that in the last month she has only eaten 3 meals, has occasionally eaten a bagel with a plain piece of bread.      (binging, purging, excessive exercising)       MSE:  Patient is  A & O x 4. Appearance:  SAVANAH. Cognition:  Recent memory intact , remote memory intact , good fund of knowledge, average  intelligence level. Speech:  normal  Language: Naming: intact;  Word Finding: intact  Conversation no evidence of delusions  Behavior:  Cooperative  Mood:  euthymic  Affect: congruent with mood  Thought Content: negative delusions, negative hallucinations, negative

## 2020-04-22 RX ORDER — RISPERIDONE 1 MG/1
1 TABLET, FILM COATED ORAL NIGHTLY
Qty: 30 TABLET | Refills: 3 | Status: SHIPPED | OUTPATIENT
Start: 2020-04-22 | End: 2020-07-06 | Stop reason: ALTCHOICE

## 2020-04-22 RX ORDER — ESZOPICLONE 3 MG/1
3 TABLET, FILM COATED ORAL NIGHTLY
Qty: 30 TABLET | Refills: 0 | Status: SHIPPED | OUTPATIENT
Start: 2020-04-22 | End: 2020-05-22

## 2020-04-22 NOTE — TELEPHONE ENCOUNTER
hypersexuality)           Other: anger from being tired all the time       (Irritability, lability, anger)           Anxiety:  She says that she worries every night about sleeping and panic attacks. She says that she worries about her neighbors. She says that they always want something from her. She worries about running into them. She says that this causes panic attacks. She says that there is a lot of drug use in her neighborhood and she is fearful of her neighbors.       (Generalized anxiety: where, when, who, how long, how frequent)           Panic Disorder symptoms: She says that she is having panic attacks at night, 1 at night (this has improved from 3 weeks ago before she started Trazodone when she was having 2-3 at night). She says that she wakes up with dreams of her family and with her anxiety about [place].       (Palpitations, racing heart beat, sweating, sense of impending doom, fear of recurrence, shortness of breath)           OCD symptoms:  She gets flustered if things are not in a routine, is ritualized about the way she dresses, and the way she laces her shoes       (checking, cleaning, organizing, rituals, hang-ups, obsessive thoughts, counting, rational vs. Irrational beliefs)           PTSD symptoms:  Increased startle response, wakes up with dreams at night, she also says she has flashbacks during the day.       (nightmares, flashbacks, startle respoinse, avoidance)           Social anxiety symptoms:  Negative           Simple phobias:   Heights, claustrophobia, snakes       (heights, planes, spiders, etc.)           Psychosis: She reports hearing and seeing things that aren't there, sees animals out her window that really aren't there. She says this happens almost every day. She says that she has never seen things when she wasn't depressed.       (hallucinations, auditory, visual, tactile, olfactory)           Paranoia: Feels that people are watching her most of the time.  She thinks this feeling is both irrational and rational.           Delusions:  Negative       (TV, radio, thought broadcasting, mind control, referential thinking)           Patient's perception: Negative       (Spiritual or cultural context of symptoms, reality testing)           ADHD symptoms: Negative           Eating Disorder symptoms:  Positive, lives almost entirely on whole milk, sometimes drinking ensure, she says that in the last month she has only eaten 3 meals, has occasionally eaten a bagel with a plain piece of bread.       (binging, purging, excessive exercising)            MSE:  Patient is  A & O x 4. Appearance:  SAVANAH. Cognition:  Recent memory intact , remote memory intact , good fund of knowledge, average  intelligence level. Speech:  normal  Language: Naming: intact;  Word Finding: intact  Conversation no evidence of delusions  Behavior:  Cooperative  Mood:  euthymic  Affect: congruent with mood  Thought Content: negative delusions, negative hallucinations, negative obsessions,  negativehomicidal and negative suicidal   Thought Process: linear, goal directed and coherent (having trouble keeping thoughts together, probably due to lack of sleep)  Associations: logical connections  Attention Span and concentration: Impaired (probably due to lack of sleep)  Judgement Insight:  normal and appropriate  Gait and Station: SAVANAH   Sleep: avg 7-8 hrs    Appetite: ok              Lab Results   Component Value Date      (L) 03/10/2020     K 4.7 03/10/2020     CL 89 (L) 03/10/2020     CO2 18 (L) 03/10/2020     BUN 19 03/10/2020     CREATININE 0.9 03/10/2020     GLUCOSE 107 03/10/2020     CALCIUM 9.1 03/10/2020     PROT 8.6 11/10/2015     LABALBU 5.0 11/10/2015     ALKPHOS 127 (H) 11/10/2015     AST 33 (H) 11/10/2015     ALT 22 11/10/2015     LABGLOM >60 03/10/2020            Lab Results   Component Value Date      03/10/2020     K 4.7 03/10/2020     CL 89 03/10/2020     CO2 18 03/10/2020     BUN 19 03/10/2020   CREATININE 0.9 03/10/2020     GLUCOSE 107 03/10/2020     CALCIUM 9.1 03/10/2020      No results found for: CHOL  No results found for: TRIG  No results found for: HDL  No results found for: LDLCHOLESTEROL, LDLCALC  No results found for: LABVLDL, VLDL  No results found for: CHOLHDLRATIO  No results found for: LABA1C  No results found for: EAG        Lab Results   Component Value Date     TSH 2.40 02/03/2015            Lab Results   Component Value Date     VITD25 27.6 (L) 02/03/2015         Assessments Administered: none        Assessment:    1. Bipolar 2 disorder ([facility] [address]    2. Generalized anxiety disorder with panic attacks    3. Insomnia due to other mental disorder          Plan:  Continue:  Cymbalta (Duloxetine), 60mg + 30mg, daily (you can take the 60mg of Cymbalta in the morning and 30mg with the evening meal, not too much past 5pm) It can interfere with sleep. Lamictal, 100mg, take 1.5 tabs daily  Prazosin, 2mg, nightly  Risperdal 1 mg tablet, 1 tab at bedtime   Eszopiclone (Lunesta), 3mg, nightly for sleep     Start:  Trazodone, 50mg, nightly     Continue therapy with Jm Villafuerte for therapy     Follow up: Return in about 3 months (around 7/10/2020).     1. The risks, benefits, side effects, indications, contraindications, and adverse effects of the medications have been discussed. Yes.  2. The pt has verbalized understanding and has capacity to give informed consent. 3. The [place] report has been reviewed according to [facility] regulations. 4. Supportive therapy offered. 5. Follow up:    Return in about 3 months (around 7/10/2020). 6. The patient has been advised to call with any problems. 7. Controlled substance Treatment Plan: new medication for sleep (eszopiclone).   8. The above listed medications have been continued, modifications in meds and other orders/labs as follows:                 Encounter Medications         Orders Placed This Encounter   Medications    lamoTRIgine (LAMICTAL) 150 MG tablet     Sig: Take 1 tablet by mouth daily       Dispense:  90 tablet       Refill:  1       Fill as her insurance allows.  traZODone (DESYREL) 50 MG tablet       Sig: Take 1 tablet by mouth nightly       Dispense:  90 tablet       Refill:  1                     No orders of the defined types were placed in this encounter.        9. Additional comments: She reports that she has lost about 10 lb. She is stable on her current medications. She likes this medication regimen and doesn't want to change anything. She is very happy that she is getting 7-8 hrs of sleep. Will follow up in about 3 months.     10. Over 50% of the total visit time of   20  minutes was spent on counseling and/or coordination of care of:                         1. Bipolar 2 disorder ([facility] [address]    2. Generalized anxiety disorder with panic attacks    3.  Insomnia due to other mental disorder                        Psychotherapy Topics: mood/medication effectiveness         Delia Caldera PMHNP-BC

## 2020-04-22 NOTE — TELEPHONE ENCOUNTER
patient states, \"Good, actually. I think we finally got my medications right. \" She says that it is nice to have her medications right. She reports that she is getting 8 hrs of sleep at night and this is such an improvement. She reports that she has had 2 anxiety attacks since last visit and 1 fall in her home at night when she got up to go to the bathroom. She reports that her PCP has added Ditropan to help her with enuresis and that this has also helped with her sleep, not having to worry about being enuretic.     Patient reports side effects as follows: none. No evidence of EPS, no cogwheeling or abnormal motor movements.     Absent  suicidal ideation.   Reports compliance with medications as good .      Current Substance Use:  See history     BP: There were no vitals taken for this visit.        Review of Systems - 14 point review:  Negative except being treated for:  depression, anxiety, panic attacks, suicidal dreams, weight gain, right fractured ankle with walking boot, enuresis, increased liver enzymes (being treated with spirolactone)        Constitutional: (fevers, chills, night sweats, wt loss/gain, change in appetite, fatigue, somnolence)     HEENT: (ear pain or discharge, hearing loss, ear ringing, sinus pressure, nosebleed, nasal discharge, sore throat, oral sores, tooth pain, bleeding gums, hoarse voice, neck pain)      Cardiovascular: (HTN, chest pain, elevated cholesterol/lipids, palpitations, leg swelling, leg pain with walking)     Respiratory: (cough, wheezing, snoring, SOB with activity (dyspnea), SOB while lying flat (orthopnea), awakening with severe SOB (paroxysmal nocturnal dyspnea))     Gastrointestinal: (NVD, constipation, abdominal pain, bright red stools, black tarry stools, stool incontinence)     Genitourinary:  (pelvic pain, burning or frequency of urination, urinary urgency, blood in urine incomplete bladder emptying, urinary incontinence, STD; MEN: testicular pain or swelling, erectile dysfunction; WOMEN: LMP, heavy menstrual bleeding (menorrhagia), irregular periods, postmenopausal bleeding, menstrual pain (dymenorrhea, vaginal discharge)     Musculoskeletal: (bone pain/fracture, joint pain or swelling, musle pain)     Integumentary: (rashes, acne, non-healing sores, itching, breast lumps, breast pain, nipple discharge, hair loss)     Neurologic: (HA, muscle weakness, paresthesias (numbness, coldness, crawling or prickling), memory loss, seizure, dizziness)     Psychiatric:  (anxiety, sadness, irritability/anger, insomnia, suicidality)     Endocrine: (heat or cold intolerance, excessive thirst (polydipsia), excessive hunger (polyphagia))     Immune/Allergic: (hives, seasonal or environmental allergies, HIV exposure)     Hematologic/Lymphatic: (lymph node enlargement, easy bleeding or bruising)     History obtained via chart review and patient     PCP is Sheyla Mckay. Sissy Boast, DO, DO         Current Meds:     Home Medications           Prior to Admission medications    Medication Sig Start Date End Date Taking? Authorizing Provider   lamoTRIgine (LAMICTAL) 150 MG tablet Take 1 tablet by mouth daily 4/10/20   Yes CAPO Estrada NP   traZODone (DESYREL) 50 MG tablet Take 1 tablet by mouth nightly 4/10/20   Yes CAPO Estrada NP   eszopiclone (LUNESTA) 3 MG TABS Take 1 tablet by mouth nightly for 30 days.  3/23/20 4/22/20   CAPO Estrada NP   spironolactone (ALDACTONE) 25 MG tablet   3/5/20     Historical Provider, MD   metroNIDAZOLE (FLAGYL) 500 MG tablet   3/5/20     Historical Provider, MD   oxybutynin (DITROPAN) 5 MG tablet Take 1 tablet by mouth nightly 3/11/20     CAPO Helton   gabapentin (NEURONTIN) 300 MG capsule TAKE 2 CAPSULES BY MOUTH NIGHTLY 1/27/20     Historical Provider, MD   traMADol (ULTRAM) 50 MG tablet TAKE 1 TABLET BY MOUTH TWICE A DAY AS NEEDED 2/4/20     Historical Provider, MD   DULoxetine (CYMBALTA) 30 MG extended release capsule Take 1 a week       Gets together: Three times a week       Attends [employer] service: Never       Active member of club or organization: No       Attends meetings of clubs or organizations: Never       Relationship status:     Intimate partner violence       Fear of current or ex partner: No       Emotionally abused: No       Physically abused:  No       Forced sexual activity: No   Other Topics Concern    None   Social History Narrative     PRIOR MEDICATION TRIALS     Trazodone (having more suicidal dreams), at 100mg, not working for sleep     Seroquel (wt gain, 14 lb in 3 months, enuresis, indigestion, abnormal blood work, increased blood sugar)     Duloxetine (has not been effective and no noticeable change even with dose increases to 90mg)     Paxil, 20mg     Wellbutrin XL, (tried it recently to quit smoking)     Prozac (made her numb, added to her eating disorder)     Zoloft (thought she did well on this, took for a couple of years, she stopped it because when she returned to [place] the doctor put her back on Prozac)     Remeron (increased her dreams and panic attacks)     Lunesta (worked)     Librium (in 2018 for 15 days to stop drinking alcohol)     Risperdal-current            Psychiatric Review of Systems, 3/19/19           Mood:  Sadness, tearfulness, low appetite, low concentration, low energy, loss of interest, denies suicidality today       (Depression: sadness, tearfulness, sleep, appetite, energy, concentration, sexual function, guilt, psychomotor agitation or slowing, interest, suicidality)           Julia: She says that there have been periods of time when she could go 3 days without sleep, she does say that there have been days in a row when she has done reckless, impulsive things       (impulsivity, grandiosity, recklessness, excessive energy, decreased need for sleep, increased spending beyond means, hyperverbal, grandiose, racing thoughts, hypersexuality)           Other: anger from being tired Dispense:  90 tablet       Refill:  1       Fill as her insurance allows.  traZODone (DESYREL) 50 MG tablet       Sig: Take 1 tablet by mouth nightly       Dispense:  90 tablet       Refill:  1                     No orders of the defined types were placed in this encounter.        9. Additional comments: She reports that she has lost about 10 lb. She is stable on her current medications. She likes this medication regimen and doesn't want to change anything. She is very happy that she is getting 7-8 hrs of sleep. Will follow up in about 3 months.     10. Over 50% of the total visit time of   20  minutes was spent on counseling and/or coordination of care of:                         1. Bipolar 2 disorder ([facility] [address]    2. Generalized anxiety disorder with panic attacks    3.  Insomnia due to other mental disorder                        Psychotherapy Topics: mood/medication effectiveness         Kristy Diaz PMHNP-BC

## 2020-04-27 NOTE — TELEPHONE ENCOUNTER
Pharmacy requesting refill for patient  Radha Fuentes     Last office visit : 1/21/2020   Next office visit : 7/10/2020     Requested Prescriptions     Pending Prescriptions Disp Refills    prazosin (MINIPRESS) 2 MG capsule [Pharmacy Med Name: PRAZOSIN 2 MG CAPSULE] 90 capsule 1     Sig: TAKE 1 CAPSULE BY MOUTH NIGHTLY (ADD TO A 1MG CAPSULE FOR A 3MG DOSE)            Valentino Spears  Virtual Visit     4/10/2020  [address] [facility]   Emigdio Green, CAPO - NP   Nurse Practitioner Psychiatric/Mental Health   Bipolar 2 disorder [facility]) +2 more   Dx   Follow-up , Anxiety , Depression , Insomnia   Reason for Visit    Progress Notes     Expand All Collapse All  []Expand All by Damon Juwan is a 54 y.o. female evaluated via telephone on 4/10/2020.       Consent:  She and/or health care decision maker is aware that that she may receive a bill for this telephone service, depending on her insurance coverage, and has provided verbal consent to proceed: Yes        Documentation:  I communicated with the patient and/or health care decision maker about depression/anxiety. Details of this discussion including any medical advice provided: see below        I affirm this is a Patient Initiated Episode with an Established Patient who has not had a related appointment within my department in the past 7 days or scheduled within the next 24 hours.     Total Time: minutes: 11-20 minutes     Note: not billable if this call serves to triage the patient into an appointment for the relevant concern        Emigdio Green      4/10/2020 2:25 PM                             Progress Note     IN:  [address] [facility]  OUT: [address] [facility] 1965                              Chief Complaint   Patient presents with    Follow-up    Anxiety    Depression    Insomnia            Subjective:  Patient is a 54 y.o. female diagnosed with Bipolar 2 Disorder and presents today for follow-up.   Last seen in clinic on 1/21/20 and prior testicular pain or swelling, erectile dysfunction; WOMEN: LMP, heavy menstrual bleeding (menorrhagia), irregular periods, postmenopausal bleeding, menstrual pain (dymenorrhea, vaginal discharge)     Musculoskeletal: (bone pain/fracture, joint pain or swelling, musle pain)     Integumentary: (rashes, acne, non-healing sores, itching, breast lumps, breast pain, nipple discharge, hair loss)     Neurologic: (HA, muscle weakness, paresthesias (numbness, coldness, crawling or prickling), memory loss, seizure, dizziness)     Psychiatric:  (anxiety, sadness, irritability/anger, insomnia, suicidality)     Endocrine: (heat or cold intolerance, excessive thirst (polydipsia), excessive hunger (polyphagia))     Immune/Allergic: (hives, seasonal or environmental allergies, HIV exposure)     Hematologic/Lymphatic: (lymph node enlargement, easy bleeding or bruising)     History obtained via chart review and patient     PCP is Meli Sorensen. Jv Hurley DO, DO         Current Meds:     Home Medications           Prior to Admission medications    Medication Sig Start Date End Date Taking? Authorizing Provider   lamoTRIgine (LAMICTAL) 150 MG tablet Take 1 tablet by mouth daily 4/10/20   Yes CAPO Lacey NP   traZODone (DESYREL) 50 MG tablet Take 1 tablet by mouth nightly 4/10/20   Yes CAPO Lacey NP   eszopiclone (LUNESTA) 3 MG TABS Take 1 tablet by mouth nightly for 30 days.  3/23/20 4/22/20   CAPO Lacey NP   spironolactone (ALDACTONE) 25 MG tablet   3/5/20     Historical Provider, MD   metroNIDAZOLE (FLAGYL) 500 MG tablet   3/5/20     Historical Provider, MD   oxybutynin (DITROPAN) 5 MG tablet Take 1 tablet by mouth nightly 3/11/20     CAPO Bender   gabapentin (NEURONTIN) 300 MG capsule TAKE 2 CAPSULES BY MOUTH NIGHTLY 1/27/20     Historical Provider, MD   traMADol (ULTRAM) 50 MG tablet TAKE 1 TABLET BY MOUTH TWICE A DAY AS NEEDED 2/4/20     Historical Provider, MD   DULoxetine (CYMBALTA) 30 MG extended release capsule Take 1 capsule by mouth daily (Add to 60mg to make a 90mg dose) 1/23/20     Gabe Shoulders, APRN - NP   risperiDONE (RISPERDAL) 1 MG tablet Take 1 tablet by mouth nightly Take 1/2 tab for 3 days then increase to 1 tab at bedtime 1/3/20     Gabe Shoulders, APRN - NP   DULoxetine (CYMBALTA) 60 MG extended release capsule TAKE 1 CAPSULE BY MOUTH EVERY DAY 10/23/19     Benjamin David MD   prazosin (MINIPRESS) 2 MG capsule Take 1 capsule by mouth nightly (Add to a 1mg capsule for a 3mg dose) 8/20/19     Gabe Shoulders, APRN - NP   diclofenac (VOLTAREN) 75 MG EC tablet Take 75 mg by mouth 2 times daily       Historical Provider, MD   metoprolol succinate (TOPROL XL) 50 MG extended release tablet Take 50 mg by mouth daily 4/25/19 per direction of Dr. Corinna Pedroza pt to take 1/2 tab daily.       Historical Provider, MD   Magnesium Oxide 250 MG TABS Take by mouth daily       Historical Provider, MD         Social History   Social History            Socioeconomic History    Marital status: Legally        Spouse name: None    Number of children: 1    Years of education: 12th grade + some college    Highest education level: None   Occupational History    None   Social Needs    Financial resource strain: None    Food insecurity       Worry: None       Inability: None    Transportation needs       Medical: None       Non-medical: None   Tobacco Use    Smoking status: Current Every Day Smoker       Packs/day: 1.00    Smokeless tobacco: Never Used    Tobacco comment: 1/21/20 smoking a full pack again   Substance and Sexual Activity    Alcohol use: Not Currently       Comment: history of abuse, last drink was early December, 2018    Drug use: Not Currently       Types: Marijuana       Comment: last use was summer, 2017    Sexual activity: Not Currently   Lifestyle    Physical activity       Days per week: None       Minutes per session: None    Stress: None   Relationships    Social 0.9 03/10/2020     GLUCOSE 107 03/10/2020     CALCIUM 9.1 03/10/2020      No results found for: CHOL  No results found for: TRIG  No results found for: HDL  No results found for: LDLCHOLESTEROL, LDLCALC  No results found for: LABVLDL, VLDL  No results found for: CHOLHDLRATIO  No results found for: LABA1C  No results found for: EAG        Lab Results   Component Value Date     TSH 2.40 02/03/2015            Lab Results   Component Value Date     VITD25 27.6 (L) 02/03/2015         Assessments Administered: none        Assessment:    1. Bipolar 2 disorder ([facility] [address]    2. Generalized anxiety disorder with panic attacks    3. Insomnia due to other mental disorder          Plan:  Continue:  Cymbalta (Duloxetine), 60mg + 30mg, daily (you can take the 60mg of Cymbalta in the morning and 30mg with the evening meal, not too much past 5pm) It can interfere with sleep. Lamictal, 100mg, take 1.5 tabs daily  Prazosin, 2mg, nightly  Risperdal 1 mg tablet, 1 tab at bedtime   Eszopiclone (Lunesta), 3mg, nightly for sleep     Start:  Trazodone, 50mg, nightly     Continue therapy with Anita Exon for therapy     Follow up: Return in about 3 months (around 7/10/2020).     1. The risks, benefits, side effects, indications, contraindications, and adverse effects of the medications have been discussed. Yes.  2. The pt has verbalized understanding and has capacity to give informed consent. 3. The [place] report has been reviewed according to [facility] regulations. 4. Supportive therapy offered. 5. Follow up:    Return in about 3 months (around 7/10/2020). 6. The patient has been advised to call with any problems. 7. Controlled substance Treatment Plan: new medication for sleep (eszopiclone).   8. The above listed medications have been continued, modifications in meds and other orders/labs as follows:                 Encounter Medications         Orders Placed This Encounter   Medications    lamoTRIgine (LAMICTAL) 150 MG tablet       Sig: Take 1 tablet by mouth daily       Dispense:  90 tablet       Refill:  1       Fill as her insurance allows.  traZODone (DESYREL) 50 MG tablet       Sig: Take 1 tablet by mouth nightly       Dispense:  90 tablet       Refill:  1                     No orders of the defined types were placed in this encounter.        9. Additional comments: She reports that she has lost about 10 lb. She is stable on her current medications. She likes this medication regimen and doesn't want to change anything. She is very happy that she is getting 7-8 hrs of sleep. Will follow up in about 3 months.     10. Over 50% of the total visit time of   20  minutes was spent on counseling and/or coordination of care of:                         1. Bipolar 2 disorder ([facility] [address]    2. Generalized anxiety disorder with panic attacks    3.  Insomnia due to other mental disorder                        Psychotherapy Topics: mood/medication effectiveness         Radha Mishra PMHNP-BC

## 2020-04-28 RX ORDER — PRAZOSIN HYDROCHLORIDE 2 MG/1
2 CAPSULE ORAL NIGHTLY
Qty: 90 CAPSULE | Refills: 1 | Status: SHIPPED | OUTPATIENT
Start: 2020-04-28 | End: 2020-07-06 | Stop reason: ALTCHOICE

## 2020-05-04 RX ORDER — DULOXETIN HYDROCHLORIDE 30 MG/1
30 CAPSULE, DELAYED RELEASE ORAL
Qty: 90 CAPSULE | Refills: 1 | Status: SHIPPED | OUTPATIENT
Start: 2020-05-04 | End: 2020-09-22 | Stop reason: SDUPTHER

## 2020-05-04 RX ORDER — DULOXETIN HYDROCHLORIDE 60 MG/1
60 CAPSULE, DELAYED RELEASE ORAL DAILY
Qty: 90 CAPSULE | Refills: 1 | Status: SHIPPED | OUTPATIENT
Start: 2020-05-04 | End: 2020-09-22 | Stop reason: SDUPTHER

## 2020-05-06 RX ORDER — GABAPENTIN 300 MG/1
CAPSULE ORAL
Qty: 60 CAPSULE | Refills: 0 | Status: SHIPPED | OUTPATIENT
Start: 2020-05-06 | End: 2020-06-02 | Stop reason: SDUPTHER

## 2020-05-06 NOTE — TELEPHONE ENCOUNTER
boot, enuresis, increased liver enzymes (being treated with spirolactone)        Constitutional: (fevers, chills, night sweats, wt loss/gain, change in appetite, fatigue, somnolence)     HEENT: (ear pain or discharge, hearing loss, ear ringing, sinus pressure, nosebleed, nasal discharge, sore throat, oral sores, tooth pain, bleeding gums, hoarse voice, neck pain)      Cardiovascular: (HTN, chest pain, elevated cholesterol/lipids, palpitations, leg swelling, leg pain with walking)     Respiratory: (cough, wheezing, snoring, SOB with activity (dyspnea), SOB while lying flat (orthopnea), awakening with severe SOB (paroxysmal nocturnal dyspnea))     Gastrointestinal: (NVD, constipation, abdominal pain, bright red stools, black tarry stools, stool incontinence)     Genitourinary:  (pelvic pain, burning or frequency of urination, urinary urgency, blood in urine incomplete bladder emptying, urinary incontinence, STD; MEN: testicular pain or swelling, erectile dysfunction; WOMEN: LMP, heavy menstrual bleeding (menorrhagia), irregular periods, postmenopausal bleeding, menstrual pain (dymenorrhea, vaginal discharge)     Musculoskeletal: (bone pain/fracture, joint pain or swelling, musle pain)     Integumentary: (rashes, acne, non-healing sores, itching, breast lumps, breast pain, nipple discharge, hair loss)     Neurologic: (HA, muscle weakness, paresthesias (numbness, coldness, crawling or prickling), memory loss, seizure, dizziness)     Psychiatric:  (anxiety, sadness, irritability/anger, insomnia, suicidality)     Endocrine: (heat or cold intolerance, excessive thirst (polydipsia), excessive hunger (polyphagia))     Immune/Allergic: (hives, seasonal or environmental allergies, HIV exposure)     Hematologic/Lymphatic: (lymph node enlargement, easy bleeding or bruising)     History obtained via chart review and patient     PCP is Demetrio Corey.  Tenisha Woodall DO,          Current Meds:     Home Medications           Prior to Legally        Spouse name: None    Number of children: 1    Years of education: 12th grade + some college    Highest education level: None   Occupational History    None   Social Needs    Financial resource strain: None    Food insecurity       Worry: None       Inability: None    Transportation needs       Medical: None       Non-medical: None   Tobacco Use    Smoking status: Current Every Day Smoker       Packs/day: 1.00    Smokeless tobacco: Never Used    Tobacco comment: 1/21/20 smoking a full pack again   Substance and Sexual Activity    Alcohol use: Not Currently       Comment: history of abuse, last drink was early December, 2018    Drug use: Not Currently       Types: Marijuana       Comment: last use was summer, 2017    Sexual activity: Not Currently   Lifestyle    Physical activity       Days per week: None       Minutes per session: None    Stress: None   Relationships    Social connections       Talks on phone: Once a week       Gets together: Three times a week       Attends Pentecostalism service: Never       Active member of club or organization: No       Attends meetings of clubs or organizations: Never       Relationship status:     Intimate partner violence       Fear of current or ex partner: No       Emotionally abused: No       Physically abused:  No       Forced sexual activity: No   Other Topics Concern    None   Social History Narrative     PRIOR MEDICATION TRIALS     Trazodone (having more suicidal dreams), at 100mg, not working for sleep     Seroquel (wt gain, 14 lb in 3 months, enuresis, indigestion, abnormal blood work, increased blood sugar)     Duloxetine (has not been effective and no noticeable change even with dose increases to 90mg)     Paxil, 20mg     Wellbutrin XL, (tried it recently to quit smoking)     Prozac (made her numb, added to her eating disorder)     Zoloft (thought she did well on this, took for a couple of years, she stopped it because sleep     Start:  Trazodone, 50mg, nightly     Continue therapy with Raydell Constable for therapy     Follow up: Return in about 3 months (around 7/10/2020).     1. The risks, benefits, side effects, indications, contraindications, and adverse effects of the medications have been discussed. Yes.  2. The pt has verbalized understanding and has capacity to give informed consent. 3. The [facility] report has been reviewed according to [facility] regulations. 4. Supportive therapy offered. 5. Follow up:    Return in about 3 months (around 7/10/2020). 6. The patient has been advised to call with any problems. 7. Controlled substance Treatment Plan: new medication for sleep (eszopiclone). 8. The above listed medications have been continued, modifications in meds and other orders/labs as follows:                 Encounter Medications         Orders Placed This Encounter   Medications    lamoTRIgine (LAMICTAL) 150 MG tablet       Sig: Take 1 tablet by mouth daily       Dispense:  90 tablet       Refill:  1       Fill as her insurance allows.  traZODone (DESYREL) 50 MG tablet       Sig: Take 1 tablet by mouth nightly       Dispense:  90 tablet       Refill:  1                     No orders of the defined types were placed in this encounter.        9. Additional comments: She reports that she has lost about 10 lb. She is stable on her current medications. She likes this medication regimen and doesn't want to change anything. She is very happy that she is getting 7-8 hrs of sleep. Will follow up in about 3 months.     10. Over 50% of the total visit time of   20  minutes was spent on counseling and/or coordination of care of:                         1. Bipolar 2 disorder ([facility] [address]    2. Generalized anxiety disorder with panic attacks    3.  Insomnia due to other mental disorder                        Psychotherapy Topics: mood/medication effectiveness         David Ruff PMHNP-BC

## 2020-05-07 ENCOUNTER — TELEPHONE (OUTPATIENT)
Dept: PSYCHIATRY | Age: 55
End: 2020-05-07

## 2020-05-26 RX ORDER — ESZOPICLONE 3 MG/1
3 TABLET, FILM COATED ORAL NIGHTLY
Qty: 30 TABLET | Refills: 0 | Status: SHIPPED | OUTPATIENT
Start: 2020-05-26 | End: 2020-06-29 | Stop reason: SDUPTHER

## 2020-05-26 RX ORDER — ESZOPICLONE 3 MG/1
3 TABLET, FILM COATED ORAL NIGHTLY
COMMUNITY
End: 2020-05-26 | Stop reason: SDUPTHER

## 2020-05-26 NOTE — TELEPHONE ENCOUNTER
Admission medications    Medication Sig Start Date End Date Taking? Authorizing Provider   lamoTRIgine (LAMICTAL) 150 MG tablet Take 1 tablet by mouth daily 4/10/20   Yes CAPO Goldman NP   traZODone (DESYREL) 50 MG tablet Take 1 tablet by mouth nightly 4/10/20   Yes CAPO Goldman NP   eszopiclone (LUNESTA) 3 MG TABS Take 1 tablet by mouth nightly for 30 days.  3/23/20 4/22/20   CAPO Goldman NP   spironolactone (ALDACTONE) 25 MG tablet   3/5/20     Historical Provider, MD   metroNIDAZOLE (FLAGYL) 500 MG tablet   3/5/20     Historical Provider, MD   oxybutynin (DITROPAN) 5 MG tablet Take 1 tablet by mouth nightly 3/11/20     CAPO Bowling   gabapentin (NEURONTIN) 300 MG capsule TAKE 2 CAPSULES BY MOUTH NIGHTLY 1/27/20     Historical Provider, MD   traMADol (ULTRAM) 50 MG tablet TAKE 1 TABLET BY MOUTH TWICE A DAY AS NEEDED 2/4/20     Historical Provider, MD   DULoxetine (CYMBALTA) 30 MG extended release capsule Take 1 capsule by mouth daily (Add to 60mg to make a 90mg dose) 1/23/20     CAPO Goldman NP   risperiDONE (RISPERDAL) 1 MG tablet Take 1 tablet by mouth nightly Take 1/2 tab for 3 days then increase to 1 tab at bedtime 1/3/20     CAPO Goldman NP   DULoxetine (CYMBALTA) 60 MG extended release capsule TAKE 1 CAPSULE BY MOUTH EVERY DAY 10/23/19     Monica Smith MD   prazosin (MINIPRESS) 2 MG capsule Take 1 capsule by mouth nightly (Add to a 1mg capsule for a 3mg dose) 8/20/19     CAPO Goldman NP   diclofenac (VOLTAREN) 75 MG EC tablet Take 75 mg by mouth 2 times daily       Historical Provider, MD   metoprolol succinate (TOPROL XL) 50 MG extended release tablet Take 50 mg by mouth daily 4/25/19 per direction of Dr. Fransisca Richards pt to take 1/2 tab daily.       Historical Provider, MD   Magnesium Oxide 250 MG TABS Take by mouth daily       Historical Provider, MD         Social History   Social History            Socioeconomic History    Marital status: Legally        Spouse name: None    Number of children: 1    Years of education: 12th grade + some college    Highest education level: None   Occupational History    None   Social Needs    Financial resource strain: None    Food insecurity       Worry: None       Inability: None    Transportation needs       Medical: None       Non-medical: None   Tobacco Use    Smoking status: Current Every Day Smoker       Packs/day: 1.00    Smokeless tobacco: Never Used    Tobacco comment: 1/21/20 smoking a full pack again   Substance and Sexual Activity    Alcohol use: Not Currently       Comment: history of abuse, last drink was early December, 2018    Drug use: Not Currently       Types: Marijuana       Comment: last use was summer, 2017    Sexual activity: Not Currently   Lifestyle    Physical activity       Days per week: None       Minutes per session: None    Stress: None   Relationships    Social connections       Talks on phone: Once a week       Gets together: Three times a week       Attends Faith service: Never       Active member of club or organization: No       Attends meetings of clubs or organizations: Never       Relationship status:     Intimate partner violence       Fear of current or ex partner: No       Emotionally abused: No       Physically abused:  No       Forced sexual activity: No   Other Topics Concern    None   Social History Narrative     PRIOR MEDICATION TRIALS     Trazodone (having more suicidal dreams), at 100mg, not working for sleep     Seroquel (wt gain, 14 lb in 3 months, enuresis, indigestion, abnormal blood work, increased blood sugar)     Duloxetine (has not been effective and no noticeable change even with dose increases to 90mg)     Paxil, 20mg     Wellbutrin XL, (tried it recently to quit smoking)     Prozac (made her numb, added to her eating disorder)     Zoloft (thought she did well on this, took for a couple of years, she stopped it because when she returned to [place] the doctor put her back on Prozac)     Remeron (increased her dreams and panic attacks)     Lunesta (worked)     Librium (in 2018 for 15 days to stop drinking alcohol)     Risperdal-current            Psychiatric Review of Systems, 3/19/19           Mood:  Sadness, tearfulness, low appetite, low concentration, low energy, loss of interest, denies suicidality today       (Depression: sadness, tearfulness, sleep, appetite, energy, concentration, sexual function, guilt, psychomotor agitation or slowing, interest, suicidality)           Julia: She says that there have been periods of time when she could go 3 days without sleep, she does say that there have been days in a row when she has done reckless, impulsive things       (impulsivity, grandiosity, recklessness, excessive energy, decreased need for sleep, increased spending beyond means, hyperverbal, grandiose, racing thoughts, hypersexuality)           Other: anger from being tired all the time       (Irritability, lability, anger)           Anxiety:  She says that she worries every night about sleeping and panic attacks. She says that she worries about her neighbors. She says that they always want something from her. She worries about running into them. She says that this causes panic attacks. She says that there is a lot of drug use in her neighborhood and she is fearful of her neighbors.       (Generalized anxiety: where, when, who, how long, how frequent)           Panic Disorder symptoms: She says that she is having panic attacks at night, 1 at night (this has improved from 3 weeks ago before she started Trazodone when she was having 2-3 at night).  She says that she wakes up with dreams of her family and with her anxiety about [place] park.       (Palpitations, racing heart beat, sweating, sense of impending doom, fear of recurrence, shortness of breath)           OCD symptoms:  She gets flustered if things are not in a routine, is ritualized about the way she dresses, and the way she laces her shoes       (checking, cleaning, organizing, rituals, hang-ups, obsessive thoughts, counting, rational vs. Irrational beliefs)           PTSD symptoms:  Increased startle response, wakes up with dreams at night, she also says she has flashbacks during the day.       (nightmares, flashbacks, startle respoinse, avoidance)           Social anxiety symptoms:  Negative           Simple phobias:   Heights, claustrophobia, snakes       (heights, planes, spiders, etc.)           Psychosis: She reports hearing and seeing things that aren't there, sees animals out her window that really aren't there. She says this happens almost every day. She says that she has never seen things when she wasn't depressed.       (hallucinations, auditory, visual, tactile, olfactory)           Paranoia: Feels that people are watching her most of the time. She thinks this feeling is both irrational and rational.           Delusions:  Negative       (TV, radio, thought broadcasting, mind control, referential thinking)           Patient's perception: Negative       (Spiritual or cultural context of symptoms, reality testing)           ADHD symptoms: Negative           Eating Disorder symptoms:  Positive, lives almost entirely on whole milk, sometimes drinking ensure, she says that in the last month she has only eaten 3 meals, has occasionally eaten a bagel with a plain piece of bread.       (binging, purging, excessive exercising)            MSE:  Patient is  A & O x 4. Appearance:  SAVANAH. Cognition:  Recent memory intact , remote memory intact , good fund of knowledge, average  intelligence level. Speech:  normal  Language: Naming: intact;  Word Finding: intact  Conversation no evidence of delusions  Behavior:  Cooperative  Mood:  euthymic  Affect: congruent with mood  Thought Content: negative delusions, negative hallucinations, negative obsessions,  negativehomicidal and negative suicidal   Thought Process: linear, goal directed and coherent (having trouble keeping thoughts together, probably due to lack of sleep)  Associations: logical connections  Attention Span and concentration: Impaired (probably due to lack of sleep)  Judgement Insight:  normal and appropriate  Gait and Station: SAVANAH   Sleep: avg 7-8 hrs    Appetite: ok              Lab Results   Component Value Date      (L) 03/10/2020     K 4.7 03/10/2020     CL 89 (L) 03/10/2020     CO2 18 (L) 03/10/2020     BUN 19 03/10/2020     CREATININE 0.9 03/10/2020     GLUCOSE 107 03/10/2020     CALCIUM 9.1 03/10/2020     PROT 8.6 11/10/2015     LABALBU 5.0 11/10/2015     ALKPHOS 127 (H) 11/10/2015     AST 33 (H) 11/10/2015     ALT 22 11/10/2015     LABGLOM >60 03/10/2020            Lab Results   Component Value Date      03/10/2020     K 4.7 03/10/2020     CL 89 03/10/2020     CO2 18 03/10/2020     BUN 19 03/10/2020     CREATININE 0.9 03/10/2020     GLUCOSE 107 03/10/2020     CALCIUM 9.1 03/10/2020      No results found for: CHOL  No results found for: TRIG  No results found for: HDL  No results found for: LDLCHOLESTEROL, LDLCALC  No results found for: LABVLDL, VLDL  No results found for: CHOLHDLRATIO  No results found for: LABA1C  No results found for: EAG        Lab Results   Component Value Date     TSH 2.40 02/03/2015            Lab Results   Component Value Date     VITD25 27.6 (L) 02/03/2015         Assessments Administered: none        Assessment:    1. [facility] ([facility]    2. Generalized anxiety disorder with panic attacks    3. Insomnia due to other mental disorder          Plan:  Continue:  Cymbalta (Duloxetine), 60mg + 30mg, daily (you can take the 60mg of Cymbalta in the morning and 30mg with the evening meal, not too much past 5pm) It can interfere with sleep.   Lamictal, 100mg, take 1.5 tabs daily  Prazosin, 2mg, nightly  Risperdal 1 mg tablet, 1 tab at bedtime   Eszopiclone (Lunesta), 3mg, nightly for

## 2020-05-27 ENCOUNTER — TELEPHONE (OUTPATIENT)
Dept: PSYCHIATRY | Age: 55
End: 2020-05-27

## 2020-06-02 RX ORDER — GABAPENTIN 300 MG/1
CAPSULE ORAL
Qty: 60 CAPSULE | Refills: 0 | Status: SHIPPED | OUTPATIENT
Start: 2020-06-02 | End: 2020-07-06 | Stop reason: SDUPTHER

## 2020-06-02 NOTE — TELEPHONE ENCOUNTER
Start Date End Date Taking? Authorizing Provider   lamoTRIgine (LAMICTAL) 150 MG tablet Take 1 tablet by mouth daily 4/10/20   Yes CAPO Silver NP   traZODone (DESYREL) 50 MG tablet Take 1 tablet by mouth nightly 4/10/20   Yes CAPO Silver NP   eszopiclone (LUNESTA) 3 MG TABS Take 1 tablet by mouth nightly for 30 days.  3/23/20 4/22/20   CAPO Silver NP   spironolactone (ALDACTONE) 25 MG tablet   3/5/20     Historical Provider, MD   metroNIDAZOLE (FLAGYL) 500 MG tablet   3/5/20     Historical Provider, MD   oxybutynin (DITROPAN) 5 MG tablet Take 1 tablet by mouth nightly 3/11/20     CAPO Lozano   gabapentin (NEURONTIN) 300 MG capsule TAKE 2 CAPSULES BY MOUTH NIGHTLY 1/27/20     Historical Provider, MD   traMADol (ULTRAM) 50 MG tablet TAKE 1 TABLET BY MOUTH TWICE A DAY AS NEEDED 2/4/20     Historical Provider, MD   DULoxetine (CYMBALTA) 30 MG extended release capsule Take 1 capsule by mouth daily (Add to 60mg to make a 90mg dose) 1/23/20     CAPO Silver NP   risperiDONE (RISPERDAL) 1 MG tablet Take 1 tablet by mouth nightly Take 1/2 tab for 3 days then increase to 1 tab at bedtime 1/3/20     CAPO Silver NP   DULoxetine (CYMBALTA) 60 MG extended release capsule TAKE 1 CAPSULE BY MOUTH EVERY DAY 10/23/19     Angela Russ MD   prazosin (MINIPRESS) 2 MG capsule Take 1 capsule by mouth nightly (Add to a 1mg capsule for a 3mg dose) 8/20/19     CAPO Silver NP   diclofenac (VOLTAREN) 75 MG EC tablet Take 75 mg by mouth 2 times daily       Historical Provider, MD   metoprolol succinate (TOPROL XL) 50 MG extended release tablet Take 50 mg by mouth daily 4/25/19 per direction of Dr. Jasmeet Friend pt to take 1/2 tab daily.       Historical Provider, MD   Magnesium Oxide 250 MG TABS Take by mouth daily       Historical Provider, MD         Social History   Social History            Socioeconomic History    Marital status: Legally        Spouse name: None    Number of children: 1    Years of education: 12th grade + some college    Highest education level: None   Occupational History    None   Social Needs    Financial resource strain: None    Food insecurity       Worry: None       Inability: None    Transportation needs       Medical: None       Non-medical: None   Tobacco Use    Smoking status: Current Every Day Smoker       Packs/day: 1.00    Smokeless tobacco: Never Used    Tobacco comment: 1/21/20 smoking a full pack again   Substance and Sexual Activity    Alcohol use: Not Currently       Comment: history of abuse, last drink was early December, 2018    Drug use: Not Currently       Types: Marijuana       Comment: last use was summer, 2017    Sexual activity: Not Currently   Lifestyle    Physical activity       Days per week: None       Minutes per session: None    Stress: None   Relationships    Social connections       Talks on phone: Once a week       Gets together: Three times a week       Attends Mandaen service: Never       Active member of club or organization: No       Attends meetings of clubs or organizations: Never       Relationship status:     Intimate partner violence       Fear of current or ex partner: No       Emotionally abused: No       Physically abused:  No       Forced sexual activity: No   Other Topics Concern    None   Social History Narrative     PRIOR MEDICATION TRIALS     Trazodone (having more suicidal dreams), at 100mg, not working for sleep     Seroquel (wt gain, 14 lb in 3 months, enuresis, indigestion, abnormal blood work, increased blood sugar)     Duloxetine (has not been effective and no noticeable change even with dose increases to 90mg)     Paxil, 20mg     Wellbutrin XL, (tried it recently to quit smoking)     Prozac (made her numb, added to her eating disorder)     Zoloft (thought she did well on this, took for a couple of years, she stopped it because when she returned to [place] the doctor put her back she laces her shoes       (checking, cleaning, organizing, rituals, hang-ups, obsessive thoughts, counting, rational vs. Irrational beliefs)           PTSD symptoms:  Increased startle response, wakes up with dreams at night, she also says she has flashbacks during the day.       (nightmares, flashbacks, startle respoinse, avoidance)           Social anxiety symptoms:  Negative           Simple phobias:   Heights, claustrophobia, snakes       (heights, planes, spiders, etc.)           Psychosis: She reports hearing and seeing things that aren't there, sees animals out her window that really aren't there. She says this happens almost every day. She says that she has never seen things when she wasn't depressed.       (hallucinations, auditory, visual, tactile, olfactory)           Paranoia: Feels that people are watching her most of the time. She thinks this feeling is both irrational and rational.           Delusions:  Negative       (TV, radio, thought broadcasting, mind control, referential thinking)           Patient's perception: Negative       (Spiritual or cultural context of symptoms, reality testing)           ADHD symptoms: Negative           Eating Disorder symptoms:  Positive, lives almost entirely on whole milk, sometimes drinking ensure, she says that in the last month she has only eaten 3 meals, has occasionally eaten a bagel with a plain piece of bread.       (binging, purging, excessive exercising)            MSE:  Patient is  A & O x 4. Appearance:  SAVANAH. Cognition:  Recent memory intact , remote memory intact , good fund of knowledge, average  intelligence level. Speech:  normal  Language: Naming: intact;  Word Finding: intact  Conversation no evidence of delusions  Behavior:  Cooperative  Mood:  euthymic  Affect: congruent with mood  Thought Content: negative delusions, negative hallucinations, negative obsessions,  negativehomicidal and negative suicidal   Thought Process: linear, goal directed therapy with Fer Blake for therapy     Follow up: Return in about 3 months (around 7/10/2020).     1. The risks, benefits, side effects, indications, contraindications, and adverse effects of the medications have been discussed. Yes.  2. The pt has verbalized understanding and has capacity to give informed consent. 3. The [facility] report has been reviewed according to [facility] regulations. 4. Supportive therapy offered. 5. Follow up:    Return in about 3 months (around 7/10/2020). 6. The patient has been advised to call with any problems. 7. Controlled substance Treatment Plan: new medication for sleep (eszopiclone). 8. The above listed medications have been continued, modifications in meds and other orders/labs as follows:                 Encounter Medications         Orders Placed This Encounter   Medications    lamoTRIgine (LAMICTAL) 150 MG tablet       Sig: Take 1 tablet by mouth daily       Dispense:  90 tablet       Refill:  1       Fill as her insurance allows.  traZODone (DESYREL) 50 MG tablet       Sig: Take 1 tablet by mouth nightly       Dispense:  90 tablet       Refill:  1                     No orders of the defined types were placed in this encounter.        9. Additional comments: She reports that she has lost about 10 lb. She is stable on her current medications. She likes this medication regimen and doesn't want to change anything. She is very happy that she is getting 7-8 hrs of sleep. Will follow up in about 3 months.     10. Over 50% of the total visit time of   20  minutes was spent on counseling and/or coordination of care of:                         1. Bipolar 2 disorder ([facility] [address]    2. Generalized anxiety disorder with panic attacks    3.  Insomnia due to other mental disorder                        Psychotherapy Topics: mood/medication effectiveness         Roberto Hardin PMHNP-BC

## 2020-06-03 ENCOUNTER — TELEPHONE (OUTPATIENT)
Dept: PSYCHIATRY | Age: 55
End: 2020-06-03

## 2020-06-03 NOTE — TELEPHONE ENCOUNTER
VM left to inform pt that a RX refill (Gabapentin) has been sent to her pharmacy per [address]. She was encouraged to call back if has any questions.

## 2020-06-05 ENCOUNTER — TELEPHONE (OUTPATIENT)
Dept: UROLOGY | Age: 55
End: 2020-06-05

## 2020-06-05 NOTE — TELEPHONE ENCOUNTER
Called pt to see if they were having any problems for their upcoming appointment on 6/10, if not having any problems will r/s appt for a couple of months out, if pt prefers to keep appt, we will see the patient on 6/10.

## 2020-06-10 ENCOUNTER — TELEPHONE (OUTPATIENT)
Dept: PSYCHIATRY | Age: 55
End: 2020-06-10

## 2020-06-29 RX ORDER — ESZOPICLONE 3 MG/1
3 TABLET, FILM COATED ORAL NIGHTLY
Qty: 30 TABLET | Refills: 0 | Status: SHIPPED | OUTPATIENT
Start: 2020-06-29 | End: 2020-07-28 | Stop reason: SDUPTHER

## 2020-06-29 NOTE — TELEPHONE ENCOUNTER
patient states, \"Good, actually. I think we finally got my medications right. \" She says that it is nice to have her medications right. She reports that she is getting 8 hrs of sleep at night and this is such an improvement. She reports that she has had 2 anxiety attacks since last visit and 1 fall in her home at night when she got up to go to the bathroom. She reports that her PCP has added Ditropan to help her with enuresis and that this has also helped with her sleep, not having to worry about being enuretic.     Patient reports side effects as follows: none. No evidence of EPS, no cogwheeling or abnormal motor movements.     Absent  suicidal ideation.   Reports compliance with medications as good .      Current Substance Use:  See history     BP: There were no vitals taken for this visit.        Review of Systems - 14 point review:  Negative except being treated for:  depression, anxiety, panic attacks, suicidal dreams, weight gain, right fractured ankle with walking boot, enuresis, increased liver enzymes (being treated with spirolactone)        Constitutional: (fevers, chills, night sweats, wt loss/gain, change in appetite, fatigue, somnolence)     HEENT: (ear pain or discharge, hearing loss, ear ringing, sinus pressure, nosebleed, nasal discharge, sore throat, oral sores, tooth pain, bleeding gums, hoarse voice, neck pain)      Cardiovascular: (HTN, chest pain, elevated cholesterol/lipids, palpitations, leg swelling, leg pain with walking)     Respiratory: (cough, wheezing, snoring, SOB with activity (dyspnea), SOB while lying flat (orthopnea), awakening with severe SOB (paroxysmal nocturnal dyspnea))     Gastrointestinal: (NVD, constipation, abdominal pain, bright red stools, black tarry stools, stool incontinence)     Genitourinary:  (pelvic pain, burning or frequency of urination, urinary urgency, blood in urine incomplete bladder emptying, urinary incontinence, STD; MEN: testicular pain or swelling, a week       Gets together: Three times a week       Attends Sikhism service: Never       Active member of club or organization: No       Attends meetings of clubs or organizations: Never       Relationship status:     Intimate partner violence       Fear of current or ex partner: No       Emotionally abused: No       Physically abused:  No       Forced sexual activity: No   Other Topics Concern    None   Social History Narrative     PRIOR MEDICATION TRIALS     Trazodone (having more suicidal dreams), at 100mg, not working for sleep     Seroquel (wt gain, 14 lb in 3 months, enuresis, indigestion, abnormal blood work, increased blood sugar)     Duloxetine (has not been effective and no noticeable change even with dose increases to 90mg)     Paxil, 20mg     Wellbutrin XL, (tried it recently to quit smoking)     Prozac (made her numb, added to her eating disorder)     Zoloft (thought she did well on this, took for a couple of years, she stopped it because when she returned to [place] the doctor put her back on Prozac)     Remeron (increased her dreams and panic attacks)     Lunesta (worked)     Librium (in 2018 for 15 days to stop drinking alcohol)     Risperdal-current            Psychiatric Review of Systems, 3/19/19           Mood:  Sadness, tearfulness, low appetite, low concentration, low energy, loss of interest, denies suicidality today       (Depression: sadness, tearfulness, sleep, appetite, energy, concentration, sexual function, guilt, psychomotor agitation or slowing, interest, suicidality)           Julia: She says that there have been periods of time when she could go 3 days without sleep, she does say that there have been days in a row when she has done reckless, impulsive things       (impulsivity, grandiosity, recklessness, excessive energy, decreased need for sleep, increased spending beyond means, hyperverbal, grandiose, racing thoughts, hypersexuality)           Other: anger from being tired all the time       (Irritability, lability, anger)           Anxiety:  She says that she worries every night about sleeping and panic attacks. She says that she worries about her neighbors. She says that they always want something from her. She worries about running into them. She says that this causes panic attacks. She says that there is a lot of drug use in her neighborhood and she is fearful of her neighbors.       (Generalized anxiety: where, when, who, how long, how frequent)           Panic Disorder symptoms: She says that she is having panic attacks at night, 1 at night (this has improved from 3 weeks ago before she started Trazodone when she was having 2-3 at night). She says that she wakes up with dreams of her family and with her anxiety about [place] trailer park.       (Palpitations, racing heart beat, sweating, sense of impending doom, fear of recurrence, shortness of breath)           OCD symptoms:  She gets flustered if things are not in a routine, is ritualized about the way she dresses, and the way she laces her shoes       (checking, cleaning, organizing, rituals, hang-ups, obsessive thoughts, counting, rational vs. Irrational beliefs)           PTSD symptoms:  Increased startle response, wakes up with dreams at night, she also says she has flashbacks during the day.       (nightmares, flashbacks, startle respoinse, avoidance)           Social anxiety symptoms:  Negative           Simple phobias:   Heights, claustrophobia, snakes       (heights, planes, spiders, etc.)           Psychosis: She reports hearing and seeing things that aren't there, sees animals out her window that really aren't there. She says this happens almost every day. She says that she has never seen things when she wasn't depressed.       (hallucinations, auditory, visual, tactile, olfactory)           Paranoia: Feels that people are watching her most of the time.  She thinks this feeling is both irrational and rational.           Delusions:  Negative       (TV, radio, thought broadcasting, mind control, referential thinking)           Patient's perception: Negative       (Spiritual or cultural context of symptoms, reality testing)           ADHD symptoms: Negative           Eating Disorder symptoms:  Positive, lives almost entirely on whole milk, sometimes drinking ensure, she says that in the last month she has only eaten 3 meals, has occasionally eaten a bagel with a plain piece of bread.       (binging, purging, excessive exercising)            MSE:  Patient is  A & O x 4. Appearance:  SAVANAH. Cognition:  Recent memory intact , remote memory intact , good fund of knowledge, average  intelligence level. Speech:  normal  Language: Naming: intact;  Word Finding: intact  Conversation no evidence of delusions  Behavior:  Cooperative  Mood:  euthymic  Affect: congruent with mood  Thought Content: negative delusions, negative hallucinations, negative obsessions,  negativehomicidal and negative suicidal   Thought Process: linear, goal directed and coherent (having trouble keeping thoughts together, probably due to lack of sleep)  Associations: logical connections  Attention Span and concentration: Impaired (probably due to lack of sleep)  Judgement Insight:  normal and appropriate  Gait and Station: [facility]   Sleep: avg 7-8 hrs    Appetite: ok        Lab Results   Component Value Date      (L) 03/10/2020     K 4.7 03/10/2020     CL 89 (L) 03/10/2020     CO2 18 (L) 03/10/2020     BUN 19 03/10/2020     CREATININE 0.9 03/10/2020     GLUCOSE 107 03/10/2020     CALCIUM 9.1 03/10/2020     PROT 8.6 11/10/2015     LABALBU 5.0 11/10/2015     ALKPHOS 127 (H) 11/10/2015     AST 33 (H) 11/10/2015     ALT 22 11/10/2015     LABGLOM >60 03/10/2020            Lab Results   Component Value Date      03/10/2020     K 4.7 03/10/2020     CL 89 03/10/2020     CO2 18 03/10/2020     BUN 19 03/10/2020     CREATININE 0.9 03/10/2020     GLUCOSE 107 03/10/2020     tablet by mouth daily       Dispense:  90 tablet       Refill:  1       Fill as her insurance allows.  traZODone (DESYREL) 50 MG tablet       Sig: Take 1 tablet by mouth nightly       Dispense:  90 tablet       Refill:  1                     No orders of the defined types were placed in this encounter.        9. Additional comments: She reports that she has lost about 10 lb. She is stable on her current medications. She likes this medication regimen and doesn't want to change anything. She is very happy that she is getting 7-8 hrs of sleep. Will follow up in about 3 months.     10. Over 50% of the total visit time of   20  minutes was spent on counseling and/or coordination of care of:                         1. Bipolar 2 disorder ([facility] [address]    2. Generalized anxiety disorder with panic attacks    3. Insomnia due to other mental disorder                        Psychotherapy Topics: mood/medication effectiveness         Pretty Beckman PMHNP-BC      Instructions         Return in about 3 months (around 7/10/2020). Plan:  Continue:  Cymbalta (Duloxetine), 60mg + 30mg, daily (you can take the 60mg of Cymbalta in the morning and 30mg with the evening meal, not too much past 5pm) It can interfere with sleep. Lamictal, 100mg, take 1.5 tabs daily  Prazosin, 2mg, nightly  Risperdal 1 mg tablet, 1 tab at bedtime   Eszopiclone (Lunesta), 3mg, nightly for sleep     Start:  Trazodone, 50mg, nightly     Continue therapy with Rashad Presley for therapy     Follow up: Return in about 3 months (around 7/10/2020). After Visit Summary (Printed 4/13/2020)   Additional Documentation     Encounter Info:    Billing Info,    Allergies,    Detailed Report,    History,    Medications,    Questionnaires       Chart Review Routing History     No routing history on file.    Encounter Status     Closed by CAPO Jim NP on 4/10/20 at 14:26   Orders Placed      None   Medication Changes         traZODone HCl 50 mg Oral NIGHTLY

## 2020-07-06 ENCOUNTER — VIRTUAL VISIT (OUTPATIENT)
Dept: PSYCHIATRY | Age: 55
End: 2020-07-06
Payer: MEDICARE

## 2020-07-06 PROCEDURE — 99442 PR PHYS/QHP TELEPHONE EVALUATION 11-20 MIN: CPT | Performed by: NURSE PRACTITIONER

## 2020-07-06 RX ORDER — GABAPENTIN 300 MG/1
CAPSULE ORAL
Qty: 60 CAPSULE | Refills: 0 | Status: SHIPPED | OUTPATIENT
Start: 2020-07-06 | End: 2020-08-20 | Stop reason: SDUPTHER

## 2020-07-06 NOTE — PROGRESS NOTES
chills, night sweats, wt loss/gain, change in appetite, fatigue, somnolence)    HEENT: (ear pain or discharge, hearing loss, ear ringing, sinus pressure, nosebleed, nasal discharge, sore throat, oral sores, tooth pain, bleeding gums, hoarse voice, neck pain)      Cardiovascular: (HTN, chest pain, elevated cholesterol/lipids, palpitations, leg swelling, leg pain with walking)    Respiratory: (cough, wheezing, snoring, SOB with activity (dyspnea), SOB while lying flat (orthopnea), awakening with severe SOB (paroxysmal nocturnal dyspnea))    Gastrointestinal: (NVD, constipation, abdominal pain, bright red stools, black tarry stools, stool incontinence)     Genitourinary:  (pelvic pain, burning or frequency of urination, urinary urgency, blood in urine incomplete bladder emptying, urinary incontinence, STD; MEN: testicular pain or swelling, erectile dysfunction; WOMEN: LMP, heavy menstrual bleeding (menorrhagia), irregular periods, postmenopausal bleeding, menstrual pain (dymenorrhea, vaginal discharge)    Musculoskeletal: (bone pain/fracture, joint pain or swelling, musle pain)    Integumentary: (rashes, acne, non-healing sores, itching, breast lumps, breast pain, nipple discharge, hair loss)    Neurologic: (HA, muscle weakness, paresthesias (numbness, coldness, crawling or prickling), memory loss, seizure, dizziness)    Psychiatric:  (anxiety, sadness, irritability/anger, insomnia, suicidality)    Endocrine: (heat or cold intolerance, excessive thirst (polydipsia), excessive hunger (polyphagia))    Immune/Allergic: (hives, seasonal or environmental allergies, HIV exposure)    Hematologic/Lymphatic: (lymph node enlargement, easy bleeding or bruising)    History obtained via chart review and patient    PCP is Chichi Ruby Fly, DO, DO       Current Meds:    Prior to Admission medications    Medication Sig Start Date End Date Taking?  Authorizing Provider   gabapentin (NEURONTIN) 300 MG capsule TAKE 2 CAPSULES BY MOUTH NIGHTLY 7/6/20 8/5/20 Yes CAPO Basurto NP   eszopiclone (LUNESTA) 3 MG TABS Take 1 tablet by mouth nightly for 30 days. (for sleep) 6/29/20 7/29/20  CAPO Basurto NP   oxybutynin (DITROPAN) 5 MG tablet TAKE 1 TABLET BY MOUTH EVERY DAY AT NIGHT 6/8/20   Dawn LilianeCAPO   DULoxetine (CYMBALTA) 30 MG extended release capsule Take 1 capsule by mouth Daily with supper 5/4/20   CAPO Basurto - NP   DULoxetine (CYMBALTA) 60 MG extended release capsule Take 1 capsule by mouth daily 5/4/20   CAPO Basurto - NP   lamoTRIgine (LAMICTAL) 150 MG tablet Take 1 tablet by mouth daily 4/10/20   CAPO Basurto NP   traZODone (DESYREL) 50 MG tablet Take 1 tablet by mouth nightly 4/10/20   CAPO Basurto NP   spironolactone (ALDACTONE) 25 MG tablet  3/5/20   Historical Provider, MD   diclofenac (VOLTAREN) 75 MG EC tablet Take 75 mg by mouth 2 times daily    Historical Provider, MD   metoprolol succinate (TOPROL XL) 50 MG extended release tablet Take 50 mg by mouth daily 4/25/19 per direction of Dr. Christofer Beck pt to take 1/2 tab daily.     Historical Provider, MD   Magnesium Oxide 250 MG TABS Take by mouth daily    Historical Provider, MD     Social History     Socioeconomic History    Marital status: Legally      Spouse name: None    Number of children: 1    Years of education: 12th grade + some college    Highest education level: None   Occupational History    None   Social Needs    Financial resource strain: None    Food insecurity     Worry: None     Inability: None    Transportation needs     Medical: None     Non-medical: None   Tobacco Use    Smoking status: Current Every Day Smoker     Packs/day: 0.75    Smokeless tobacco: Never Used   Substance and Sexual Activity    Alcohol use: Not Currently     Comment: history of abuse, last drink was early December, 2018    Drug use: Not Currently     Types: Marijuana     Comment: last use was summer, 2017    Sexual decreased need for sleep, increased spending beyond means, hyperverbal, grandiose, racing thoughts, hypersexuality)         Other: anger from being tired all the time      (Irritability, lability, anger)         Anxiety:  She says that she worries every night about sleeping and panic attacks. She says that she worries about her neighbors. She says that they always want something from her. She worries about running into them. She says that this causes panic attacks. She says that there is a lot of drug use in her neighborhood and she is fearful of her neighbors.      (Generalized anxiety: where, when, who, how long, how frequent)         Panic Disorder symptoms: She says that she is having panic attacks at night, 1 at night (this has improved from 3 weeks ago before she started Trazodone when she was having 2-3 at night). She says that she wakes up with dreams of her family and with her anxiety about [place] trailer park.      (Palpitations, racing heart beat, sweating, sense of impending doom, fear of recurrence, shortness of breath)         OCD symptoms:  She gets flustered if things are not in a routine, is ritualized about the way she dresses, and the way she laces her shoes      (checking, cleaning, organizing, rituals, hang-ups, obsessive thoughts, counting, rational vs. Irrational beliefs)         PTSD symptoms:  Increased startle response, wakes up with dreams at night, she also says she has flashbacks during the day.      (nightmares, flashbacks, startle respoinse, avoidance)         Social anxiety symptoms:  Negative         Simple phobias:   Heights, claustrophobia, snakes      (heights, planes, spiders, etc.)         Psychosis: She reports hearing and seeing things that aren't there, sees animals out her window that really aren't there. She says this happens almost every day.  She says that she has never seen things when she wasn't depressed.      (hallucinations, auditory, visual, tactile, olfactory)      Paranoia: Feels that people are watching her most of the time. She thinks this feeling is both irrational and rational.         Delusions:  Negative      (TV, radio, thought broadcasting, mind control, referential thinking)         Patient's perception: Negative      (Spiritual or cultural context of symptoms, reality testing)         ADHD symptoms: Negative         Eating Disorder symptoms:  Positive, lives almost entirely on whole milk, sometimes drinking ensure, she says that in the last month she has only eaten 3 meals, has occasionally eaten a bagel with a plain piece of bread.      (binging, purging, excessive exercising)       MSE:  Patient is  A & O x 4. Appearance:  SAVANAH. Cognition:  Recent memory intact , remote memory intact , good fund of knowledge, average  intelligence level. Speech:  normal  Language: Naming: intact; Word Finding: intact  Conversation no evidence of delusions  Behavior:  Cooperative  Mood:  \"good\"  Affect: SAVANAH  Thought Content: negative delusions, negative hallucinations, negative obsessions,  negativehomicidal and negative suicidal   Thought Process: linear, goal directed and coherent (having trouble keeping thoughts together, probably due to lack of sleep)  Associations: logical connections  Attention Span and concentration: Impaired (probably due to lack of sleep)  Judgement Insight:  normal and appropriate  Gait and Station: SAVANAH   Sleep: avg 7-8 hrs    Appetite: ok    COGNITION SCREEN:    Can spell the word, \"world,\" backwards: Yes  Can do serial 7's:  Yes      Lab Results   Component Value Date     (L) 03/10/2020    K 4.7 03/10/2020    CL 89 (L) 03/10/2020    CO2 18 (L) 03/10/2020    BUN 19 03/10/2020    CREATININE 0.9 03/10/2020    GLUCOSE 107 03/10/2020    CALCIUM 9.1 03/10/2020    PROT 8.6 11/10/2015    LABALBU 5.0 11/10/2015    ALKPHOS 127 (H) 11/10/2015    AST 33 (H) 11/10/2015    ALT 22 11/10/2015    LABGLOM >60 03/10/2020     Lab Results   Component Value Date  03/10/2020    K 4.7 03/10/2020    CL 89 03/10/2020    CO2 18 03/10/2020    BUN 19 03/10/2020    CREATININE 0.9 03/10/2020    GLUCOSE 107 03/10/2020    CALCIUM 9.1 03/10/2020      No results found for: CHOL  No results found for: TRIG  No results found for: HDL  No results found for: LDLCHOLESTEROL, LDLCALC  No results found for: LABVLDL, VLDL  No results found for: CHOLHDLRATIO  No results found for: LABA1C  No results found for: EAG  Lab Results   Component Value Date    TSH 2.40 02/03/2015     Lab Results   Component Value Date    VITD25 27.6 (L) 02/03/2015       Assessments Administered:  PHQ9: 2, normal  GAD7: 4, normal    Assessment:   1. Bipolar 2 disorder ([facility] [address]    2. Generalized anxiety disorder    3. Insomnia due to other mental disorder    4. PTSD (post-traumatic stress disorder)        Plan:  Continue:  Cymbalta (Duloxetine), 60mg + 30mg, daily (you can take the 60mg of Cymbalta in the morning and 30mg with the evening meal, not too much past 5pm) It can interfere with sleep. Lamictal, 150mg,  daily  Eszopiclone (Lunesta), 3mg, nightly for sleep  Gabapentin, 300mg, take 2 capsules nightly (restless legs)  Trazodone, 50mg, nightly    Stop: Prazosin    Taper and stop:  Risperdal, 1mg, take 1/2 tab for 4 nights, then stop    Continue therapy with Mark Hodges for therapy    Follow up: Return in about 3 months (around 10/6/2020). 1. The risks, benefits, side effects, indications, contraindications, and adverse effects of the medications have been discussed. Yes.  2. The pt has verbalized understanding and has capacity to give informed consent. 3. The Jama Pierson report has been reviewed according to [facility] regulations. 4. Supportive therapy offered. 5. Follow up: Return in about 3 months (around 10/6/2020). 6. The patient has been advised to call with any problems. 7. Controlled substance Treatment Plan: new medication for sleep (eszopiclone).   8. The above listed medications have been continued, modifications in meds and other orders/labs as follows:      Orders Placed This Encounter   Medications    gabapentin (NEURONTIN) 300 MG capsule     Sig: TAKE 2 CAPSULES BY MOUTH NIGHTLY     Dispense:  60 capsule     Refill:  0      No orders of the defined types were placed in this encounter. 9. Additional comments: She is stable on her current medications. She reports that when she doesn't take Alean Miser that her dreams return. She doesn't think Prazosin helps. Will stop Prazosin. She also reports that she has been having problems losing weight. This may be due to Risperdal. Will taper and stop. If she has an increase in anxiety may consider restarting it. She was educated to this. She reports that she needs both Trazodone and Eszopiclone to sleep, but she is very appreciative of being able to sleep reporting that this has been a lifelong issue. She continues to see Troy Chacon for therapy. Will not make any other medication changes today and will follow up in about 3 months. 10.Over 50% of the total visit time of  20  minutes was spent on counseling and/or coordination of care of:                        1. Bipolar 2 disorder ([facility] [address]    2. Generalized anxiety disorder    3. Insomnia due to other mental disorder    4.  PTSD (post-traumatic stress disorder)                      Psychotherapy Topics: mood/medication effectiveness       Luane Libman [facility]

## 2020-07-06 NOTE — PATIENT INSTRUCTIONS
Plan:  Continue:  Cymbalta (Duloxetine), 60mg + 30mg, daily (you can take the 60mg of Cymbalta in the morning and 30mg with the evening meal, not too much past 5pm) It can interfere with sleep. Lamictal, 100mg, take 1.5 tabs daily  Eszopiclone (Lunesta), 3mg, nightly for sleep  Gabapentin, 300mg, take 2 capsules nightly  Trazodone, 50mg, nightly    Stop: Prazosin    Taper and stop:  Risperdal, 1mg, take 1/2 tab for 4 nights, then stop    Continue therapy with Norval Olszewski for therapy    Follow up: Return in about 3 months (around 10/6/2020). Call the office if you need to be seen sooner.

## 2020-07-28 ENCOUNTER — OFFICE VISIT (OUTPATIENT)
Dept: UROLOGY | Age: 55
End: 2020-07-28
Payer: MEDICARE

## 2020-07-28 VITALS — BODY MASS INDEX: 23.14 KG/M2 | TEMPERATURE: 96.4 F | HEIGHT: 66 IN | WEIGHT: 144 LBS

## 2020-07-28 LAB
BACTERIA URINE, POC: 0
BILIRUBIN URINE: 0 MG/DL
BLOOD, URINE: NEGATIVE
CASTS URINE, POC: 0
CLARITY: CLEAR
COLOR: YELLOW
CRYSTALS URINE, POC: 0
EPI CELLS URINE, POC: 0
GLUCOSE URINE: ABNORMAL
KETONES, URINE: POSITIVE
LEUKOCYTE EST, POC: ABNORMAL
NITRITE, URINE: NEGATIVE
PH UA: 5.5 (ref 4.5–8)
PROTEIN UA: NEGATIVE
RBC URINE, POC: 0
SPECIFIC GRAVITY UA: 1.01 (ref 1–1.03)
UROBILINOGEN, URINE: NORMAL
WBC URINE, POC: 0
YEAST URINE, POC: 0

## 2020-07-28 PROCEDURE — 51798 US URINE CAPACITY MEASURE: CPT | Performed by: NURSE PRACTITIONER

## 2020-07-28 PROCEDURE — 81001 URINALYSIS AUTO W/SCOPE: CPT | Performed by: NURSE PRACTITIONER

## 2020-07-28 PROCEDURE — 99213 OFFICE O/P EST LOW 20 MIN: CPT | Performed by: NURSE PRACTITIONER

## 2020-07-28 RX ORDER — TRAZODONE HYDROCHLORIDE 50 MG/1
50 TABLET ORAL NIGHTLY
Qty: 90 TABLET | Refills: 0 | Status: SHIPPED | OUTPATIENT
Start: 2020-07-28 | End: 2020-09-22 | Stop reason: ALTCHOICE

## 2020-07-28 RX ORDER — ESZOPICLONE 3 MG/1
3 TABLET, FILM COATED ORAL NIGHTLY
Qty: 30 TABLET | Refills: 0 | Status: SHIPPED | OUTPATIENT
Start: 2020-07-28 | End: 2020-08-27 | Stop reason: SDUPTHER

## 2020-07-28 RX ORDER — OXYBUTYNIN CHLORIDE 5 MG/1
TABLET ORAL
Qty: 90 TABLET | Refills: 3 | Status: SHIPPED | OUTPATIENT
Start: 2020-07-28 | End: 2021-09-07 | Stop reason: SDUPTHER

## 2020-07-28 NOTE — TELEPHONE ENCOUNTER
Zahra Owen called to request a refill on her medication. Brock Coy under media-pt on other controlled med. Last office visit : 7/6/2020 with Frances SCANLON  Next office visit : 9/22/2020 with Frances Anaya CAPO    Requested Prescriptions     Pending Prescriptions Disp Refills    eszopiclone (LUNESTA) 3 MG TABS 30 tablet 0     Sig: Take 1 tablet by mouth nightly for 30 days. (for sleep)    traZODone (DESYREL) 50 MG tablet 90 tablet 0     Sig: Take 1 tablet by mouth nightly            Barron Jiang RN      7/6/2020 2:44 PM                               Progress Note     IN:  1415  OUT: 1435        Dorene Rojas 1965                           Chief Complaint   Patient presents with    Follow-up    Anxiety    Depression    Insomnia           Subjective:  Patient is a 54 y.o. female diagnosed with Bipolar 2 Disorder and presents today for follow-up. Last seen in clinic on 4/10/20 and prior records were reviewed.     Today patient states, \"I've been doing well. \" She reports that they have diagnosed her with a fractured L ankle.     Patient reports side effects as follows: not being able to lose weight (Risperdal?). No evidence of EPS, no cogwheeling or abnormal motor movements.     Absent  suicidal ideation.   Reports compliance with medications as good .      Current Substance Use:  See history     BP: There were no vitals taken for this visit.        Review of Systems - 14 point review:  Negative except being treated for:  depression, anxiety, panic attacks, suicidal dreams, weight gain, right fractured ankle with walking boot, enuresis, increased liver enzymes (being treated with spirolactone), fractured L ankle        Constitutional: (fevers, chills, night sweats, wt loss/gain, change in appetite, fatigue, somnolence)     HEENT: (ear pain or discharge, hearing loss, ear ringing, sinus pressure, nosebleed, nasal discharge, sore throat, oral sores, tooth pain, bleeding gums, hoarse voice, neck pain) Cardiovascular: (HTN, chest pain, elevated cholesterol/lipids, palpitations, leg swelling, leg pain with walking)     Respiratory: (cough, wheezing, snoring, SOB with activity (dyspnea), SOB while lying flat (orthopnea), awakening with severe SOB (paroxysmal nocturnal dyspnea))     Gastrointestinal: (NVD, constipation, abdominal pain, bright red stools, black tarry stools, stool incontinence)     Genitourinary:  (pelvic pain, burning or frequency of urination, urinary urgency, blood in urine incomplete bladder emptying, urinary incontinence, STD; MEN: testicular pain or swelling, erectile dysfunction; WOMEN: LMP, heavy menstrual bleeding (menorrhagia), irregular periods, postmenopausal bleeding, menstrual pain (dymenorrhea, vaginal discharge)     Musculoskeletal: (bone pain/fracture, joint pain or swelling, musle pain)     Integumentary: (rashes, acne, non-healing sores, itching, breast lumps, breast pain, nipple discharge, hair loss)     Neurologic: (HA, muscle weakness, paresthesias (numbness, coldness, crawling or prickling), memory loss, seizure, dizziness)     Psychiatric:  (anxiety, sadness, irritability/anger, insomnia, suicidality)     Endocrine: (heat or cold intolerance, excessive thirst (polydipsia), excessive hunger (polyphagia))     Immune/Allergic: (hives, seasonal or environmental allergies, HIV exposure)     Hematologic/Lymphatic: (lymph node enlargement, easy bleeding or bruising)     History obtained via chart review and patient     PCP is Francis Jane. Gloria Rich DO, DO         Current Meds:     Home Medications           Prior to Admission medications    Medication Sig Start Date End Date Taking?  Authorizing Provider   gabapentin (NEURONTIN) 300 MG capsule TAKE 2 CAPSULES BY MOUTH NIGHTLY 7/6/20 8/5/20 Yes CAPO Olmedo NP   eszopiclone (LUNESTA) 3 MG TABS Take 1 tablet by mouth nightly for 30 days. (for sleep) 6/29/20 7/29/20   CAPO Olmedo NP   oxybutynin (DITROPAN) 5 MG tablet TAKE 1 TABLET BY MOUTH EVERY DAY AT NIGHT 6/8/20     Leeanne Riderbles, APRN   DULoxetine (CYMBALTA) 30 MG extended release capsule Take 1 capsule by mouth Daily with supper 5/4/20     Roselind Orn, APRN - NP   DULoxetine (CYMBALTA) 60 MG extended release capsule Take 1 capsule by mouth daily 5/4/20     Roselind Orn, APRN - NP   lamoTRIgine (LAMICTAL) 150 MG tablet Take 1 tablet by mouth daily 4/10/20     Roselind Orn, APRN - NP   traZODone (DESYREL) 50 MG tablet Take 1 tablet by mouth nightly 4/10/20     Roselind Orn, APRN - NP   spironolactone (ALDACTONE) 25 MG tablet   3/5/20     Historical Provider, MD   diclofenac (VOLTAREN) 75 MG EC tablet Take 75 mg by mouth 2 times daily       Historical Provider, MD   metoprolol succinate (TOPROL XL) 50 MG extended release tablet Take 50 mg by mouth daily 4/25/19 per direction of Dr. Mary Pitt pt to take 1/2 tab daily.       Historical Provider, MD   Magnesium Oxide 250 MG TABS Take by mouth daily       Historical Provider, MD        Social History   Social History            Socioeconomic History    Marital status: Legally        Spouse name: None    Number of children: 1    Years of education: 12th grade + some college    Highest education level: None   Occupational History    None   Social Needs    Financial resource strain: None    Food insecurity       Worry: None       Inability: None    Transportation needs       Medical: None       Non-medical: None   Tobacco Use    Smoking status: Current Every Day Smoker       Packs/day: 0.75    Smokeless tobacco: Never Used   Substance and Sexual Activity    Alcohol use: Not Currently       Comment: history of abuse, last drink was early December, 2018    Drug use: Not Currently       Types: Marijuana       Comment: last use was summer, 2017    Sexual activity: Not Currently   Lifestyle    Physical activity       Days per week: None       Minutes per session: None    Stress: None   Relationships    Social connections       Talks on phone: Once a week       Gets together: Three times a week       Attends Yazdanism service: Never       Active member of club or organization: No       Attends meetings of clubs or organizations: Never       Relationship status:     Intimate partner violence       Fear of current or ex partner: No       Emotionally abused: No       Physically abused:  No       Forced sexual activity: No   Other Topics Concern    None   Social History Narrative     PRIOR MEDICATION TRIALS     Trazodone (having more suicidal dreams), at 100mg, not working for sleep     Seroquel (wt gain, 14 lb in 3 months, enuresis, indigestion, abnormal blood work, increased blood sugar)     Duloxetine (has not been effective and no noticeable change even with dose increases to 90mg)     Paxil, 20mg     Wellbutrin XL, (tried it recently to quit smoking)     Prozac (made her numb, added to her eating disorder)     Zoloft (thought she did well on this, took for a couple of years, she stopped it because when she returned to [place] the doctor put her back on Prozac)     Remeron (increased her dreams and panic attacks)     Lunesta (worked)     Librium (in 2018 for 15 days to stop drinking alcohol)     Risperdal-current            Psychiatric Review of Systems, 3/19/19           Mood:  Sadness, tearfulness, low appetite, low concentration, low energy, loss of interest, denies suicidality today       (Depression: sadness, tearfulness, sleep, appetite, energy, concentration, sexual function, guilt, psychomotor agitation or slowing, interest, suicidality)           Julia: She says that there have been periods of time when she could go 3 days without sleep, she does say that there have been days in a row when she has done reckless, impulsive things       (impulsivity, grandiosity, recklessness, excessive energy, decreased need for sleep, increased spending beyond means, hyperverbal, grandiose, racing thoughts, hypersexuality)         Other: anger from being tired all the time       (Irritability, lability, anger)           Anxiety:  She says that she worries every night about sleeping and panic attacks. She says that she worries about her neighbors. She says that they always want something from her. She worries about running into them. She says that this causes panic attacks. She says that there is a lot of drug use in her neighborhood and she is fearful of her neighbors.       (Generalized anxiety: where, when, who, how long, how frequent)           Panic Disorder symptoms: She says that she is having panic attacks at night, 1 at night (this has improved from 3 weeks ago before she started Trazodone when she was having 2-3 at night). She says that she wakes up with dreams of her family and with her anxiety about [place] trailer park.       (Palpitations, racing heart beat, sweating, sense of impending doom, fear of recurrence, shortness of breath)           OCD symptoms:  She gets flustered if things are not in a routine, is ritualized about the way she dresses, and the way she laces her shoes       (checking, cleaning, organizing, rituals, hang-ups, obsessive thoughts, counting, rational vs. Irrational beliefs)           PTSD symptoms:  Increased startle response, wakes up with dreams at night, she also says she has flashbacks during the day.       (nightmares, flashbacks, startle respoinse, avoidance)           Social anxiety symptoms:  Negative           Simple phobias:   Heights, claustrophobia, snakes       (heights, planes, spiders, etc.)           Psychosis: She reports hearing and seeing things that aren't there, sees animals out her window that really aren't there. She says this happens almost every day. She says that she has never seen things when she wasn't depressed.       (hallucinations, auditory, visual, tactile, olfactory)           Paranoia: Feels that people are watching her most of the time.  She thinks this feeling is both irrational and rational.           Delusions:  Negative       (TV, radio, thought broadcasting, mind control, referential thinking)           Patient's perception: Negative       (Spiritual or cultural context of symptoms, reality testing)           ADHD symptoms: Negative           Eating Disorder symptoms:  Positive, lives almost entirely on whole milk, sometimes drinking ensure, she says that in the last month she has only eaten 3 meals, has occasionally eaten a bagel with a plain piece of bread.       (binging, purging, excessive exercising)           MSE:  Patient is  A & O x 4. Appearance:  SAVANAH. Cognition:  Recent memory intact , remote memory intact , good fund of knowledge, average  intelligence level. Speech:  normal  Language: Naming: intact; Word Finding: intact  Conversation no evidence of delusions  Behavior:  Cooperative  Mood:  \"good\"  Affect: SAVANAH  Thought Content: negative delusions, negative hallucinations, negative obsessions,  negativehomicidal and negative suicidal   Thought Process: linear, goal directed and coherent (having trouble keeping thoughts together, probably due to lack of sleep)  Associations: logical connections  Attention Span and concentration: Impaired (probably due to lack of sleep)  Judgement Insight:  normal and appropriate  Gait and Station: SAVANAH   Sleep: avg 7-8 hrs    Appetite: ok     COGNITION SCREEN:     Can spell the word, \"world,\" backwards: Yes  Can do serial 7's:  Yes              Lab Results   Component Value Date      (L) 03/10/2020     K 4.7 03/10/2020     CL 89 (L) 03/10/2020     CO2 18 (L) 03/10/2020     BUN 19 03/10/2020     CREATININE 0.9 03/10/2020     GLUCOSE 107 03/10/2020     CALCIUM 9.1 03/10/2020     PROT 8.6 11/10/2015     LABALBU 5.0 11/10/2015     ALKPHOS 127 (H) 11/10/2015     AST 33 (H) 11/10/2015     ALT 22 11/10/2015     LABGLOM >60 03/10/2020           Lab Results   Component Value Date      03/10/2020     K 4.7 03/10/2020     CL

## 2020-07-28 NOTE — PROGRESS NOTES
DULoxetine (CYMBALTA) 30 MG extended release capsule Take 1 capsule by mouth Daily with supper 90 capsule 1    DULoxetine (CYMBALTA) 60 MG extended release capsule Take 1 capsule by mouth daily 90 capsule 1    lamoTRIgine (LAMICTAL) 150 MG tablet Take 1 tablet by mouth daily 90 tablet 1    spironolactone (ALDACTONE) 25 MG tablet       diclofenac (VOLTAREN) 75 MG EC tablet Take 75 mg by mouth 2 times daily      metoprolol succinate (TOPROL XL) 50 MG extended release tablet Take 50 mg by mouth daily 4/25/19 per direction of Dr. Christofer Beck pt to take 1/2 tab daily.  Magnesium Oxide 250 MG TABS Take by mouth daily      eszopiclone (LUNESTA) 3 MG TABS Take 1 tablet by mouth nightly for 30 days. (for sleep) 30 tablet 0    traZODone (DESYREL) 50 MG tablet Take 1 tablet by mouth nightly 90 tablet 0     No current facility-administered medications for this visit. Allergies   Allergen Reactions    Seroquel [Quetiapine Fumarate]      Pt reports caused her B/P to drop, faint and increase her cholesterol. Subjective:      Review of Systems   Constitutional: Negative for chills and fever. Genitourinary: Negative for difficulty urinating, dysuria, frequency, hematuria and urgency. All other systems reviewed and are negative. Objective:     Physical Exam  Vitals signs reviewed. Constitutional:       General: She is not in acute distress. Appearance: She is well-developed. HENT:      Head: Normocephalic and atraumatic. Eyes:      Conjunctiva/sclera: Conjunctivae normal.      Pupils: Pupils are equal, round, and reactive to light. Neck:      Musculoskeletal: Normal range of motion and neck supple. Cardiovascular:      Rate and Rhythm: Normal rate. Pulmonary:      Effort: Pulmonary effort is normal. No respiratory distress. Abdominal:      Palpations: Abdomen is soft. Musculoskeletal: Normal range of motion. Skin:     General: Skin is warm and dry.    Neurological:      Mental Status: She is alert and oriented to person, place, and time. Psychiatric:         Behavior: Behavior normal.         Thought Content: Thought content normal.         Judgment: Judgment normal.       Temp 96.4 °F (35.8 °C) (Temporal)   Ht 5' 6\" (1.676 m)   Wt 144 lb (65.3 kg)   BMI 23.24 kg/m²       DATA:  Results for orders placed or performed in visit on 07/28/20   POCT Urinalysis Dipstick w/ Micro (Auto)   Result Value Ref Range    Color, UA Yellow     Clarity, UA Clear Clear    Glucose, Ur neg     Bilirubin Urine 0 mg/dL    Ketones, Urine Positive     Specific Gravity, UA 1.015 1.005 - 1.030    Blood, Urine Negative     pH, UA 5.5 4.5 - 8.0    Protein, UA Negative Negative    Nitrite, Urine Negative     Leukocytes, UA small     Urobilinogen, Urine Normal     rbc urine, poc 0     wbc urine, poc 0     bacteria urine, poc 0     yeast urine, poc 0     casts urine, poc 0     epi cells urine, poc 0     crystals urine, poc 0          Assessment/ Plan       Diagnosis Orders   1. Nocturnal enuresis  POCT Urinalysis Dipstick w/ Micro (Auto)    AZ Measure, post-void residual, US, non-imaging     Patient is doing well with short acting Ditropan 5 mg at night. She will continue this and follow-up in 1 year. Discussed use, benefit, and side effects of prescribed medications. All patient questions answered. Pt voiced understanding. Patient agreed with treatment plan. Electronically signed by CAPO Sanchez on 7/31/2020 at 6:29 PM     EMR dragon/transcription disclaimer: Much of this encounter note is electronic transcription/translation of spoken language to printed tach. Electronic translation of spoken language may be erroneous, or at times, nonsensical words or phrases may be inadvertently transcribed.  Although, I have reviewed the note for such errors, some may still exist.

## 2020-07-29 ENCOUNTER — TELEPHONE (OUTPATIENT)
Dept: PSYCHIATRY | Age: 55
End: 2020-07-29

## 2020-07-29 NOTE — TELEPHONE ENCOUNTER
Vm left to inform pt that the 2 RX refills she requested yesterday (Lunesta and Trazodone) have been sent to her pharmacy per [facility].

## 2020-08-20 RX ORDER — GABAPENTIN 300 MG/1
CAPSULE ORAL
Qty: 60 CAPSULE | Refills: 0 | Status: SHIPPED | OUTPATIENT
Start: 2020-08-20 | End: 2020-09-17 | Stop reason: SDUPTHER

## 2020-08-20 NOTE — TELEPHONE ENCOUNTER
diagnosed with Bipolar 2 Disorder and presents today for follow-up. Last seen in clinic on 4/10/20 and prior records were reviewed.     Today patient states, \"I've been doing well. \" She reports that they have diagnosed her with a fractured L ankle.     Patient reports side effects as follows: not being able to lose weight (Risperdal?). No evidence of EPS, no cogwheeling or abnormal motor movements.     Absent  suicidal ideation.   Reports compliance with medications as good .      Current Substance Use:  See history     BP: There were no vitals taken for this visit.        Review of Systems - 14 point review:  Negative except being treated for:  depression, anxiety, panic attacks, suicidal dreams, weight gain, right fractured ankle with walking boot, enuresis, increased liver enzymes (being treated with spirolactone), fractured L ankle        Constitutional: (fevers, chills, night sweats, wt loss/gain, change in appetite, fatigue, somnolence)     HEENT: (ear pain or discharge, hearing loss, ear ringing, sinus pressure, nosebleed, nasal discharge, sore throat, oral sores, tooth pain, bleeding gums, hoarse voice, neck pain)      Cardiovascular: (HTN, chest pain, elevated cholesterol/lipids, palpitations, leg swelling, leg pain with walking)     Respiratory: (cough, wheezing, snoring, SOB with activity (dyspnea), SOB while lying flat (orthopnea), awakening with severe SOB (paroxysmal nocturnal dyspnea))     Gastrointestinal: (NVD, constipation, abdominal pain, bright red stools, black tarry stools, stool incontinence)     Genitourinary:  (pelvic pain, burning or frequency of urination, urinary urgency, blood in urine incomplete bladder emptying, urinary incontinence, STD; MEN: testicular pain or swelling, erectile dysfunction; WOMEN: LMP, heavy menstrual bleeding (menorrhagia), irregular periods, postmenopausal bleeding, menstrual pain (dymenorrhea, vaginal discharge)     Musculoskeletal: (bone pain/fracture, joint pain or swelling, musle pain)     Integumentary: (rashes, acne, non-healing sores, itching, breast lumps, breast pain, nipple discharge, hair loss)     Neurologic: (HA, muscle weakness, paresthesias (numbness, coldness, crawling or prickling), memory loss, seizure, dizziness)     Psychiatric:  (anxiety, sadness, irritability/anger, insomnia, suicidality)     Endocrine: (heat or cold intolerance, excessive thirst (polydipsia), excessive hunger (polyphagia))     Immune/Allergic: (hives, seasonal or environmental allergies, HIV exposure)     Hematologic/Lymphatic: (lymph node enlargement, easy bleeding or bruising)     History obtained via chart review and patient     PCP is Dorene Fuentes. Kinga Walters DO, DO         Current Meds:     Home Medications           Prior to Admission medications    Medication Sig Start Date End Date Taking?  Authorizing Provider   gabapentin (NEURONTIN) 300 MG capsule TAKE 2 CAPSULES BY MOUTH NIGHTLY 7/6/20 8/5/20 Yes Maria Camacho, APRN - NP   eszopiclone (LUNESTA) 3 MG TABS Take 1 tablet by mouth nightly for 30 days. (for sleep) 6/29/20 7/29/20   Maria Camacho, APRN - NP   oxybutynin (DITROPAN) 5 MG tablet TAKE 1 TABLET BY MOUTH EVERY DAY AT NIGHT 6/8/20     CAPO Ko   DULoxetine (CYMBALTA) 30 MG extended release capsule Take 1 capsule by mouth Daily with supper 5/4/20     Maria Camacho, APRN - NP   DULoxetine (CYMBALTA) 60 MG extended release capsule Take 1 capsule by mouth daily 5/4/20     Maria Camacho, APRN - NP   lamoTRIgine (LAMICTAL) 150 MG tablet Take 1 tablet by mouth daily 4/10/20     Maria Camacho, APRN - NP   traZODone (DESYREL) 50 MG tablet Take 1 tablet by mouth nightly 4/10/20     Maria Camacho, APRN - NP   spironolactone (ALDACTONE) 25 MG tablet   3/5/20     Historical Provider, MD   diclofenac (VOLTAREN) 75 MG EC tablet Take 75 mg by mouth 2 times daily       Historical Provider, MD   metoprolol succinate (TOPROL XL) 50 MG extended release tablet Take 50 mg by mouth daily 4/25/19 per direction of Dr. Kristan Antonio pt to take 1/2 tab daily.       Historical Provider, MD   Magnesium Oxide 250 MG TABS Take by mouth daily       Historical Provider, MD        Social History   Social History            Socioeconomic History    Marital status: Legally        Spouse name: None    Number of children: 1    Years of education: 12th grade + some college    Highest education level: None   Occupational History    None   Social Needs    Financial resource strain: None    Food insecurity       Worry: None       Inability: None    Transportation needs       Medical: None       Non-medical: None   Tobacco Use    Smoking status: Current Every Day Smoker       Packs/day: 0.75    Smokeless tobacco: Never Used   Substance and Sexual Activity    Alcohol use: Not Currently       Comment: history of abuse, last drink was early December, 2018    Drug use: Not Currently       Types: Marijuana       Comment: last use was summer, 2017    Sexual activity: Not Currently   Lifestyle    Physical activity       Days per week: None       Minutes per session: None    Stress: None   Relationships    Social connections       Talks on phone: Once a week       Gets together: Three times a week       Attends Bahai service: Never       Active member of club or organization: No       Attends meetings of clubs or organizations: Never       Relationship status:     Intimate partner violence       Fear of current or ex partner: No       Emotionally abused: No       Physically abused:  No       Forced sexual activity: No   Other Topics Concern    None   Social History Narrative     PRIOR MEDICATION TRIALS     Trazodone (having more suicidal dreams), at 100mg, not working for sleep     Seroquel (wt gain, 14 lb in 3 months, enuresis, indigestion, abnormal blood work, increased blood sugar)     Duloxetine (has not been effective and no noticeable change even with dose increases to 90mg)     Paxil, 20mg     Wellbutrin XL, (tried it recently to quit smoking)     Prozac (made her numb, added to her eating disorder)     Zoloft (thought she did well on this, took for a couple of years, she stopped it because when she returned to [place] the doctor put her back on Prozac)     Remeron (increased her dreams and panic attacks)     Lunesta (worked)     Librium (in 2018 for 15 days to stop drinking alcohol)     Risperdal-current            Psychiatric Review of Systems, 3/19/19           Mood:  Sadness, tearfulness, low appetite, low concentration, low energy, loss of interest, denies suicidality today       (Depression: sadness, tearfulness, sleep, appetite, energy, concentration, sexual function, guilt, psychomotor agitation or slowing, interest, suicidality)           Julia: She says that there have been periods of time when she could go 3 days without sleep, she does say that there have been days in a row when she has done reckless, impulsive things       (impulsivity, grandiosity, recklessness, excessive energy, decreased need for sleep, increased spending beyond means, hyperverbal, grandiose, racing thoughts, hypersexuality)           Other: anger from being tired all the time       (Irritability, lability, anger)           Anxiety:  She says that she worries every night about sleeping and panic attacks. She says that she worries about her neighbors. She says that they always want something from her. She worries about running into them. She says that this causes panic attacks. She says that there is a lot of drug use in her neighborhood and she is fearful of her neighbors.       (Generalized anxiety: where, when, who, how long, how frequent)           Panic Disorder symptoms: She says that she is having panic attacks at night, 1 at night (this has improved from 3 weeks ago before she started Trazodone when she was having 2-3 at night).  She says that she wakes up with dreams of her family and with her anxiety about [place].       (Palpitations, racing heart beat, sweating, sense of impending doom, fear of recurrence, shortness of breath)           OCD symptoms:  She gets flustered if things are not in a routine, is ritualized about the way she dresses, and the way she laces her shoes       (checking, cleaning, organizing, rituals, hang-ups, obsessive thoughts, counting, rational vs. Irrational beliefs)           PTSD symptoms:  Increased startle response, wakes up with dreams at night, she also says she has flashbacks during the day.       (nightmares, flashbacks, startle respoinse, avoidance)           Social anxiety symptoms:  Negative           Simple phobias:   Heights, claustrophobia, snakes       (heights, planes, spiders, etc.)           Psychosis: She reports hearing and seeing things that aren't there, sees animals out her window that really aren't there. She says this happens almost every day. She says that she has never seen things when she wasn't depressed.       (hallucinations, auditory, visual, tactile, olfactory)           Paranoia: Feels that people are watching her most of the time. She thinks this feeling is both irrational and rational.           Delusions:  Negative       (TV, radio, thought broadcasting, mind control, referential thinking)           Patient's perception: Negative       (Spiritual or cultural context of symptoms, reality testing)           ADHD symptoms: Negative           Eating Disorder symptoms:  Positive, lives almost entirely on whole milk, sometimes drinking ensure, she says that in the last month she has only eaten 3 meals, has occasionally eaten a bagel with a plain piece of bread.       (binging, purging, excessive exercising)           MSE:  Patient is  A & O x 4. Appearance:  SAVANAH. Cognition:  Recent memory intact , remote memory intact , good fund of knowledge, average  intelligence level. Speech:  normal  Language: Naming: intact;  Word Finding: intact Conversation no evidence of delusions  Behavior:  Cooperative  Mood:  \"good\"  Affect: SAVANAH  Thought Content: negative delusions, negative hallucinations, negative obsessions,  negativehomicidal and negative suicidal   Thought Process: linear, goal directed and coherent (having trouble keeping thoughts together, probably due to lack of sleep)  Associations: logical connections  Attention Span and concentration: Impaired (probably due to lack of sleep)  Judgement Insight:  normal and appropriate  Gait and Station: SAVANAH   Sleep: avg 7-8 hrs    Appetite: ok     COGNITION SCREEN:     Can spell the word, \"world,\" backwards: Yes  Can do serial 7's: Yes              Lab Results   Component Value Date      (L) 03/10/2020     K 4.7 03/10/2020     CL 89 (L) 03/10/2020     CO2 18 (L) 03/10/2020     BUN 19 03/10/2020     CREATININE 0.9 03/10/2020     GLUCOSE 107 03/10/2020     CALCIUM 9.1 03/10/2020     PROT 8.6 11/10/2015     LABALBU 5.0 11/10/2015     ALKPHOS 127 (H) 11/10/2015     AST 33 (H) 11/10/2015     ALT 22 11/10/2015     LABGLOM >60 03/10/2020           Lab Results   Component Value Date      03/10/2020     K 4.7 03/10/2020     CL 89 03/10/2020     CO2 18 03/10/2020     BUN 19 03/10/2020     CREATININE 0.9 03/10/2020     GLUCOSE 107 03/10/2020     CALCIUM 9.1 03/10/2020      No results found for: CHOL  No results found for: TRIG  No results found for: HDL  No results found for: LDLCHOLESTEROL, LDLCALC  No results found for: LABVLDL, VLDL  No results found for: CHOLHDLRATIO  No results found for: LABA1C  No results found for: EAG        Lab Results   Component Value Date     TSH 2.40 02/03/2015           Lab Results   Component Value Date     VITD25 27.6 (L) 02/03/2015        Assessments Administered:  PHQ9: 2, normal  GAD7: 4, normal     Assessment:    1. Bipolar 2 disorder ([facility] [address]    2. Generalized anxiety disorder    3. Insomnia due to other mental disorder    4.  PTSD (post-traumatic stress disorder)    Plan:  Continue:  Cymbalta (Duloxetine), 60mg + 30mg, daily (you can take the 60mg of Cymbalta in the morning and 30mg with the evening meal, not too much past 5pm) It can interfere with sleep. Lamictal, 150mg,  daily  Eszopiclone (Lunesta), 3mg, nightly for sleep  Gabapentin, 300mg, take 2 capsules nightly  Trazodone, 50mg, nightly     Stop: Prazosin     Taper and stop:  Risperdal, 1mg, take 1/2 tab for 4 nights, then stop     Continue therapy with [facility] for therapy     Follow up: Return in about 3 months (around 10/6/2020).     1. The risks, benefits, side effects, indications, contraindications, and adverse effects of the medications have been discussed. Yes.  2. The pt has verbalized understanding and has capacity to give informed consent. 3. The Luba Landis report has been reviewed according to [facility] regulations. 4. Supportive therapy offered. 5. Follow up:    Return in about 3 months (around 10/6/2020). 6. The patient has been advised to call with any problems. 7. Controlled substance Treatment Plan: new medication for sleep (eszopiclone). 8. The above listed medications have been continued, modifications in meds and other orders/labs as follows:                 Encounter Medications         Orders Placed This Encounter   Medications    gabapentin (NEURONTIN) 300 MG capsule       Sig: TAKE 2 CAPSULES BY MOUTH NIGHTLY       Dispense:  60 capsule       Refill:  0                    No orders of the defined types were placed in this encounter.       9. Additional comments: She is stable on her current medications. She reports that when she doesn't take Tona Vaishali that her dreams return. She doesn't think Prazosin helps. Will stop Prazosin. She also reports that she has been having problems losing weight. This may be due to Risperdal. Will taper and stop. If she has an increase in anxiety may consider restarting it. She was educated to this.  She reports that she needs both Trazodone and Eszopiclone to sleep, but she is very appreciative of being able to sleep reporting that this has been a lifelong issue. She continues to see Mickie Marshall for therapy. Will not make any other medication changes today and will follow up in about 3 months.     10. Over 50% of the total visit time of  20  minutes was spent on counseling and/or coordination of care of:                         1. Bipolar 2 disorder ([facility]    2. Generalized anxiety disorder    3. Insomnia due to other mental disorder    4. PTSD (post-traumatic stress disorder)                       Psychotherapy Topics: mood/medication effectiveness         Mamie Montenegro PMHNP-BC      Instructions         Return in about 3 months (around 10/6/2020).    Plan:  Continue:  Cymbalta (Duloxetine), 60mg + 30mg, daily (you can take the 60mg of Cymbalta in the morning and 30mg with the evening meal, not too much past 5pm) It can interfere with sleep. Lamictal, 100mg, take 1.5 tabs daily  Eszopiclone (Lunesta), 3mg, nightly for sleep  Gabapentin, 300mg, take 2 capsules nightly  Trazodone, 50mg, nightly     Stop: Prazosin     Taper and stop:  Risperdal, 1mg, take 1/2 tab for 4 nights, then stop     Continue therapy with Jada Ricks for therapy     Follow up: Return in about 3 months (around 10/6/2020).    Call the office if you need to be seen sooner. After Visit Summary (Printed 7/9/2020)   Additional Documentation     Encounter Info:     Billing Info,    Allergies,    Detailed Report,    History,    Medications,    Questionnaires       Media     Scan on 7/6/2020 3:11 PM by Lincoln Hogan: [id number] [id number] Scan on 7/6/2020 3:11 PM by Lincoln [place]: [id number] [id number]     Chart Review Routing History     No routing history on file.    Encounter Status     Closed by CAPO Doss NP on 7/6/20 at 14:45    Orders Placed      None   Medication Changes           (Therapy completed)         (Therapy completed)      Medication List     Visit Diagnoses         Bipolar 2 disorder ([facility] Generalized anxiety disorder      Insomnia due to other mental disorder      PTSD (post-traumatic stress disorder)      Problem List

## 2020-08-27 ENCOUNTER — TELEPHONE (OUTPATIENT)
Dept: PSYCHIATRY | Age: 55
End: 2020-08-27

## 2020-08-27 RX ORDER — ESZOPICLONE 3 MG/1
3 TABLET, FILM COATED ORAL NIGHTLY
Qty: 30 TABLET | Refills: 0 | Status: SHIPPED | OUTPATIENT
Start: 2020-08-27 | End: 2020-09-22 | Stop reason: SDUPTHER

## 2020-08-27 NOTE — TELEPHONE ENCOUNTER
Left VM informing pt that the RX refill  she requested Dysartlita Downs) had been sent to her pharmacy per [facility].

## 2020-08-27 NOTE — TELEPHONE ENCOUNTER
swelling, leg pain with walking)     Respiratory: (cough, wheezing, snoring, SOB with activity (dyspnea), SOB while lying flat (orthopnea), awakening with severe SOB (paroxysmal nocturnal dyspnea))     Gastrointestinal: (NVD, constipation, abdominal pain, bright red stools, black tarry stools, stool incontinence)     Genitourinary:  (pelvic pain, burning or frequency of urination, urinary urgency, blood in urine incomplete bladder emptying, urinary incontinence, STD; MEN: testicular pain or swelling, erectile dysfunction; WOMEN: LMP, heavy menstrual bleeding (menorrhagia), irregular periods, postmenopausal bleeding, menstrual pain (dymenorrhea, vaginal discharge)     Musculoskeletal: (bone pain/fracture, joint pain or swelling, musle pain)     Integumentary: (rashes, acne, non-healing sores, itching, breast lumps, breast pain, nipple discharge, hair loss)     Neurologic: (HA, muscle weakness, paresthesias (numbness, coldness, crawling or prickling), memory loss, seizure, dizziness)     Psychiatric:  (anxiety, sadness, irritability/anger, insomnia, suicidality)     Endocrine: (heat or cold intolerance, excessive thirst (polydipsia), excessive hunger (polyphagia))     Immune/Allergic: (hives, seasonal or environmental allergies, HIV exposure)     Hematologic/Lymphatic: (lymph node enlargement, easy bleeding or bruising)     History obtained via chart review and patient     PCP is Kanwal Sellers. Darby Callejas DO, DO         Current Meds:     Home Medications           Prior to Admission medications    Medication Sig Start Date End Date Taking?  Authorizing Provider   gabapentin (NEURONTIN) 300 MG capsule TAKE 2 CAPSULES BY MOUTH NIGHTLY 7/6/20 8/5/20 Yes Art Large, APRN - NP   eszopiclone (LUNESTA) 3 MG TABS Take 1 tablet by mouth nightly for 30 days. (for sleep) 6/29/20 7/29/20   Art Large, APRN - NP   oxybutynin (DITROPAN) 5 MG tablet TAKE 1 TABLET BY MOUTH EVERY DAY AT NIGHT 6/8/20     CAPO Curran DULoxetine (CYMBALTA) 30 MG extended release capsule Take 1 capsule by mouth Daily with supper 5/4/20 Onetha Cardinal, APRN - NP   DULoxetine (CYMBALTA) 60 MG extended release capsule Take 1 capsule by mouth daily 5/4/20 Onetha Cardinal, APRN - NP   lamoTRIgine (LAMICTAL) 150 MG tablet Take 1 tablet by mouth daily 4/10/20     Onetha Cardinal, APRN - NP   traZODone (DESYREL) 50 MG tablet Take 1 tablet by mouth nightly 4/10/20     Onetha Cardinal, APRN - NP   spironolactone (ALDACTONE) 25 MG tablet   3/5/20     Historical Provider, MD   diclofenac (VOLTAREN) 75 MG EC tablet Take 75 mg by mouth 2 times daily       Historical Provider, MD   metoprolol succinate (TOPROL XL) 50 MG extended release tablet Take 50 mg by mouth daily 4/25/19 per direction of Dr. Mikayla Blackman pt to take 1/2 tab daily.       Historical Provider, MD   Magnesium Oxide 250 MG TABS Take by mouth daily       Historical Provider, MD        Social History   Social History            Socioeconomic History    Marital status: Legally        Spouse name: None    Number of children: 1    Years of education: 12th grade + some college    Highest education level: None   Occupational History    None   Social Needs    Financial resource strain: None    Food insecurity       Worry: None       Inability: None    Transportation needs       Medical: None       Non-medical: None   Tobacco Use    Smoking status: Current Every Day Smoker       Packs/day: 0.75    Smokeless tobacco: Never Used   Substance and Sexual Activity    Alcohol use: Not Currently       Comment: history of abuse, last drink was early December, 2018    Drug use: Not Currently       Types: Marijuana       Comment: last use was summer, 2017    Sexual activity: Not Currently   Lifestyle    Physical activity       Days per week: None       Minutes per session: None    Stress: None   Relationships    Social connections       Talks on phone: Once a week       Gets together:  Three times a week       Attends Rastafari service: Never       Active member of club or organization: No       Attends meetings of clubs or organizations: Never       Relationship status:     Intimate partner violence       Fear of current or ex partner: No       Emotionally abused: No       Physically abused:  No       Forced sexual activity: No   Other Topics Concern    None   Social History Narrative     PRIOR MEDICATION TRIALS     Trazodone (having more suicidal dreams), at 100mg, not working for sleep     Seroquel (wt gain, 14 lb in 3 months, enuresis, indigestion, abnormal blood work, increased blood sugar)     Duloxetine (has not been effective and no noticeable change even with dose increases to 90mg)     Paxil, 20mg     Wellbutrin XL, (tried it recently to quit smoking)     Prozac (made her numb, added to her eating disorder)     Zoloft (thought she did well on this, took for a couple of years, she stopped it because when she returned to [place] the doctor put her back on Prozac)     Remeron (increased her dreams and panic attacks)     Lunesta (worked)     Librium (in 2018 for 15 days to stop drinking alcohol)     Risperdal-current            Psychiatric Review of Systems, 3/19/19           Mood:  Sadness, tearfulness, low appetite, low concentration, low energy, loss of interest, denies suicidality today       (Depression: sadness, tearfulness, sleep, appetite, energy, concentration, sexual function, guilt, psychomotor agitation or slowing, interest, suicidality)           Julia: She says that there have been periods of time when she could go 3 days without sleep, she does say that there have been days in a row when she has done reckless, impulsive things       (impulsivity, grandiosity, recklessness, excessive energy, decreased need for sleep, increased spending beyond means, hyperverbal, grandiose, racing thoughts, hypersexuality)           Other: anger from being tired all the time       (Irritability, lability, anger)           Anxiety:  She says that she worries every night about sleeping and panic attacks. She says that she worries about her neighbors. She says that they always want something from her. She worries about running into them. She says that this causes panic attacks. She says that there is a lot of drug use in her neighborhood and she is fearful of her neighbors.       (Generalized anxiety: where, when, who, how long, how frequent)           Panic Disorder symptoms: She says that she is having panic attacks at night, 1 at night (this has improved from 3 weeks ago before she started Trazodone when she was having 2-3 at night). She says that she wakes up with dreams of her family and with her anxiety about [place] trailer park.       (Palpitations, racing heart beat, sweating, sense of impending doom, fear of recurrence, shortness of breath)           OCD symptoms:  She gets flustered if things are not in a routine, is ritualized about the way she dresses, and the way she laces her shoes       (checking, cleaning, organizing, rituals, hang-ups, obsessive thoughts, counting, rational vs. Irrational beliefs)           PTSD symptoms:  Increased startle response, wakes up with dreams at night, she also says she has flashbacks during the day.       (nightmares, flashbacks, startle respoinse, avoidance)           Social anxiety symptoms:  Negative           Simple phobias:   Heights, claustrophobia, snakes       (heights, planes, spiders, etc.)           Psychosis: She reports hearing and seeing things that aren't there, sees animals out her window that really aren't there. She says this happens almost every day. She says that she has never seen things when she wasn't depressed.       (hallucinations, auditory, visual, tactile, olfactory)           Paranoia: Feels that people are watching her most of the time.  She thinks this feeling is both irrational and rational.           Delusions:  Negative       (TV, radio, thought broadcasting, mind control, referential thinking)           Patient's perception: Negative       (Spiritual or cultural context of symptoms, reality testing)           ADHD symptoms: Negative           Eating Disorder symptoms:  Positive, lives almost entirely on whole milk, sometimes drinking ensure, she says that in the last month she has only eaten 3 meals, has occasionally eaten a bagel with a plain piece of bread.       (binging, purging, excessive exercising)           MSE:  Patient is  A & O x 4. Appearance:  SAVANAH. Cognition:  Recent memory intact , remote memory intact , good fund of knowledge, average  intelligence level. Speech:  normal  Language: Naming: intact; Word Finding: intact  Conversation no evidence of delusions  Behavior:  Cooperative  Mood:  \"good\"  Affect: SAVANAH  Thought Content: negative delusions, negative hallucinations, negative obsessions,  negativehomicidal and negative suicidal   Thought Process: linear, goal directed and coherent (having trouble keeping thoughts together, probably due to lack of sleep)  Associations: logical connections  Attention Span and concentration: Impaired (probably due to lack of sleep)  Judgement Insight:  normal and appropriate  Gait and Station: SAVANAH   Sleep: avg 7-8 hrs    Appetite: ok     COGNITION SCREEN:     Can spell the word, \"world,\" backwards: Yes  Can do serial 7's:  Yes              Lab Results   Component Value Date      (L) 03/10/2020     K 4.7 03/10/2020     CL 89 (L) 03/10/2020     CO2 18 (L) 03/10/2020     BUN 19 03/10/2020     CREATININE 0.9 03/10/2020     GLUCOSE 107 03/10/2020     CALCIUM 9.1 03/10/2020     PROT 8.6 11/10/2015     LABALBU 5.0 11/10/2015     ALKPHOS 127 (H) 11/10/2015     AST 33 (H) 11/10/2015     ALT 22 11/10/2015     LABGLOM >60 03/10/2020           Lab Results   Component Value Date      03/10/2020     K 4.7 03/10/2020     CL 89 03/10/2020     CO2 18 03/10/2020     BUN 19 03/10/2020     CREATININE 0.9 03/10/2020     GLUCOSE 107 03/10/2020     CALCIUM 9.1 03/10/2020      No results found for: CHOL  No results found for: TRIG  No results found for: HDL  No results found for: LDLCHOLESTEROL, LDLCALC  No results found for: LABVLDL, VLDL  No results found for: CHOLHDLRATIO  No results found for: LABA1C  No results found for: EAG        Lab Results   Component Value Date     TSH 2.40 02/03/2015           Lab Results   Component Value Date     VITD25 27.6 (L) 02/03/2015        Assessments Administered:  PHQ9: 2, normal  GAD7: 4, normal     Assessment:    1. Bipolar 2 disorder ([address]    2. Generalized anxiety disorder    3. Insomnia due to other mental disorder    4. PTSD (post-traumatic stress disorder)         Plan:  Continue:  Cymbalta (Duloxetine), 60mg + 30mg, daily (you can take the 60mg of Cymbalta in the morning and 30mg with the evening meal, not too much past 5pm) It can interfere with sleep. Lamictal, 150mg,  daily  Eszopiclone (Lunesta), 3mg, nightly for sleep  Gabapentin, 300mg, take 2 capsules nightly  Trazodone, 50mg, nightly     Stop: Prazosin     Taper and stop:  Risperdal, 1mg, take 1/2 tab for 4 nights, then stop     Continue therapy with Dolphus Lower for therapy     Follow up: Return in about 3 months (around 10/6/2020).     1. The risks, benefits, side effects, indications, contraindications, and adverse effects of the medications have been discussed. Yes.  2. The pt has verbalized understanding and has capacity to give informed consent. 3. The Nolan Klinefelter report has been reviewed according to [facility] regulations. 4. Supportive therapy offered. 5. Follow up:    Return in about 3 months (around 10/6/2020). 6. The patient has been advised to call with any problems. 7. Controlled substance Treatment Plan: new medication for sleep (eszopiclone).   8. The above listed medications have been continued, modifications in meds and other orders/labs as follows:                 Encounter Medications

## 2020-09-16 NOTE — TELEPHONE ENCOUNTER
Melquiades Villafuerte called to request a refill on her medication. Luba Landis under media-pt on other controlled med. Last office visit : 7/6/2020 with Morro SCANLON  Next office visit : 9/22/2020 with Morro SCANLON    Requested Prescriptions     Pending Prescriptions Disp Refills    gabapentin (NEURONTIN) 300 MG capsule 60 capsule 0     Sig: TAKE 2 CAPSULES BY MOUTH NIGHTLY            Skip Linares RN      7/6/2020 2:44 PM                               Progress Note     IN:  2547  OUT: 1435        Dorene Rojas 1965                           Chief Complaint   Patient presents with    Follow-up    Anxiety    Depression    Insomnia           Subjective:  Patient is a 54 y.o. female diagnosed with Bipolar 2 Disorder and presents today for follow-up. Last seen in clinic on 4/10/20 and prior records were reviewed.     Today patient states, \"I've been doing well. \" She reports that they have diagnosed her with a fractured L ankle.     Patient reports side effects as follows: not being able to lose weight (Risperdal?). No evidence of EPS, no cogwheeling or abnormal motor movements.     Absent  suicidal ideation.   Reports compliance with medications as good .      Current Substance Use:  See history     BP: There were no vitals taken for this visit.        Review of Systems - 14 point review:  Negative except being treated for:  depression, anxiety, panic attacks, suicidal dreams, weight gain, right fractured ankle with walking boot, enuresis, increased liver enzymes (being treated with spirolactone), fractured L ankle        Constitutional: (fevers, chills, night sweats, wt loss/gain, change in appetite, fatigue, somnolence)     HEENT: (ear pain or discharge, hearing loss, ear ringing, sinus pressure, nosebleed, nasal discharge, sore throat, oral sores, tooth pain, bleeding gums, hoarse voice, neck pain)      Cardiovascular: (HTN, chest pain, elevated cholesterol/lipids, palpitations, leg swelling, leg pain with walking)     Respiratory: (cough, wheezing, snoring, SOB with activity (dyspnea), SOB while lying flat (orthopnea), awakening with severe SOB (paroxysmal nocturnal dyspnea))     Gastrointestinal: (NVD, constipation, abdominal pain, bright red stools, black tarry stools, stool incontinence)     Genitourinary:  (pelvic pain, burning or frequency of urination, urinary urgency, blood in urine incomplete bladder emptying, urinary incontinence, STD; MEN: testicular pain or swelling, erectile dysfunction; WOMEN: LMP, heavy menstrual bleeding (menorrhagia), irregular periods, postmenopausal bleeding, menstrual pain (dymenorrhea, vaginal discharge)     Musculoskeletal: (bone pain/fracture, joint pain or swelling, musle pain)     Integumentary: (rashes, acne, non-healing sores, itching, breast lumps, breast pain, nipple discharge, hair loss)     Neurologic: (HA, muscle weakness, paresthesias (numbness, coldness, crawling or prickling), memory loss, seizure, dizziness)     Psychiatric:  (anxiety, sadness, irritability/anger, insomnia, suicidality)     Endocrine: (heat or cold intolerance, excessive thirst (polydipsia), excessive hunger (polyphagia))     Immune/Allergic: (hives, seasonal or environmental allergies, HIV exposure)     Hematologic/Lymphatic: (lymph node enlargement, easy bleeding or bruising)     History obtained via chart review and patient     PCP is José Luis Morel. Darrius Garrison DO, DO         Current Meds:     Home Medications           Prior to Admission medications    Medication Sig Start Date End Date Taking?  Authorizing Provider   gabapentin (NEURONTIN) 300 MG capsule TAKE 2 CAPSULES BY MOUTH NIGHTLY 7/6/20 8/5/20 Yes CAPO Aguila NP   eszopiclone (LUNESTA) 3 MG TABS Take 1 tablet by mouth nightly for 30 days. (for sleep) 6/29/20 7/29/20   CAPO Aguila NP   oxybutynin (DITROPAN) 5 MG tablet TAKE 1 TABLET BY MOUTH EVERY DAY AT NIGHT 6/8/20     CAPO Flynn   DULoxetine (CYMBALTA) 30 MG extended release capsule Take 1 capsule by mouth Daily with supper 5/4/20     Felecia Barefoot, APRN - NP   DULoxetine (CYMBALTA) 60 MG extended release capsule Take 1 capsule by mouth daily 5/4/20     Felecia Barefoot, APRN - NP   lamoTRIgine (LAMICTAL) 150 MG tablet Take 1 tablet by mouth daily 4/10/20     Felecia Barefoot, APRN - NP   traZODone (DESYREL) 50 MG tablet Take 1 tablet by mouth nightly 4/10/20     Felecia Barefoot, APRN - NP   spironolactone (ALDACTONE) 25 MG tablet   3/5/20     Historical Provider, MD   diclofenac (VOLTAREN) 75 MG EC tablet Take 75 mg by mouth 2 times daily       Historical Provider, MD   metoprolol succinate (TOPROL XL) 50 MG extended release tablet Take 50 mg by mouth daily 4/25/19 per direction of Dr. Piña Minus pt to take 1/2 tab daily.       Historical Provider, MD   Magnesium Oxide 250 MG TABS Take by mouth daily       Historical Provider, MD        Social History   Social History            Socioeconomic History    Marital status: Legally        Spouse name: None    Number of children: 1    Years of education: 12th grade + some college    Highest education level: None   Occupational History    None   Social Needs    Financial resource strain: None    Food insecurity       Worry: None       Inability: None    Transportation needs       Medical: None       Non-medical: None   Tobacco Use    Smoking status: Current Every Day Smoker       Packs/day: 0.75    Smokeless tobacco: Never Used   Substance and Sexual Activity    Alcohol use: Not Currently       Comment: history of abuse, last drink was early December, 2018    Drug use: Not Currently       Types: Marijuana       Comment: last use was summer, 2017    Sexual activity: Not Currently   Lifestyle    Physical activity       Days per week: None       Minutes per session: None    Stress: None   Relationships    Social connections       Talks on phone: Once a week       Gets together:  Three times a week       Attends Anxiety:  She says that she worries every night about sleeping and panic attacks. She says that she worries about her neighbors. She says that they always want something from her. She worries about running into them. She says that this causes panic attacks. She says that there is a lot of drug use in her neighborhood and she is fearful of her neighbors.       (Generalized anxiety: where, when, who, how long, how frequent)           Panic Disorder symptoms: She says that she is having panic attacks at night, 1 at night (this has improved from 3 weeks ago before she started Trazodone when she was having 2-3 at night). She says that she wakes up with dreams of her family and with her anxiety about [place] trailer park.       (Palpitations, racing heart beat, sweating, sense of impending doom, fear of recurrence, shortness of breath)           OCD symptoms:  She gets flustered if things are not in a routine, is ritualized about the way she dresses, and the way she laces her shoes       (checking, cleaning, organizing, rituals, hang-ups, obsessive thoughts, counting, rational vs. Irrational beliefs)           PTSD symptoms:  Increased startle response, wakes up with dreams at night, she also says she has flashbacks during the day.       (nightmares, flashbacks, startle respoinse, avoidance)           Social anxiety symptoms:  Negative           Simple phobias:   Heights, claustrophobia, snakes       (heights, planes, spiders, etc.)           Psychosis: She reports hearing and seeing things that aren't there, sees animals out her window that really aren't there. She says this happens almost every day. She says that she has never seen things when she wasn't depressed.       (hallucinations, auditory, visual, tactile, olfactory)           Paranoia: Feels that people are watching her most of the time.  She thinks this feeling is both irrational and rational.           Delusions:  Negative       (TV, radio, thought broadcasting, mind control, referential thinking)           Patient's perception: Negative       (Spiritual or cultural context of symptoms, reality testing)           ADHD symptoms: Negative           Eating Disorder symptoms:  Positive, lives almost entirely on whole milk, sometimes drinking ensure, she says that in the last month she has only eaten 3 meals, has occasionally eaten a bagel with a plain piece of bread.       (binging, purging, excessive exercising)           MSE:  Patient is  A & O x 4. Appearance:  SAVANAH. Cognition:  Recent memory intact , remote memory intact , good fund of knowledge, average  intelligence level. Speech:  normal  Language: Naming: intact; Word Finding: intact  Conversation no evidence of delusions  Behavior:  Cooperative  Mood:  \"good\"  Affect: SAVANAH  Thought Content: negative delusions, negative hallucinations, negative obsessions,  negativehomicidal and negative suicidal   Thought Process: linear, goal directed and coherent (having trouble keeping thoughts together, probably due to lack of sleep)  Associations: logical connections  Attention Span and concentration: Impaired (probably due to lack of sleep)  Judgement Insight:  normal and appropriate  Gait and Station: [facility]   Sleep: avg 7-8 hrs    Appetite: ok     COGNITION SCREEN:     Can spell the word, \"world,\" backwards: Yes  Can do serial 7's:  Yes              Lab Results   Component Value Date      (L) 03/10/2020     K 4.7 03/10/2020     CL 89 (L) 03/10/2020     CO2 18 (L) 03/10/2020     BUN 19 03/10/2020     CREATININE 0.9 03/10/2020     GLUCOSE 107 03/10/2020     CALCIUM 9.1 03/10/2020     PROT 8.6 11/10/2015     LABALBU 5.0 11/10/2015     ALKPHOS 127 (H) 11/10/2015     AST 33 (H) 11/10/2015     ALT 22 11/10/2015     LABGLOM >60 03/10/2020           Lab Results   Component Value Date      03/10/2020     K 4.7 03/10/2020     CL 89 03/10/2020     CO2 18 03/10/2020     BUN 19 03/10/2020     CREATININE 0.9 03/10/2020     GLUCOSE 107 03/10/2020     CALCIUM 9.1 03/10/2020      No results found for: CHOL  No results found for: TRIG  No results found for: HDL  No results found for: LDLCHOLESTEROL, LDLCALC  No results found for: LABVLDL, VLDL  No results found for: CHOLHDLRATIO  No results found for: LABA1C  No results found for: EAG        Lab Results   Component Value Date     TSH 2.40 02/03/2015           Lab Results   Component Value Date     VITD25 27.6 (L) 02/03/2015        Assessments Administered:  PHQ9: 2, normal  GAD7: 4, normal     Assessment:    1. Bipolar 2 disorder ([facility] [address]    2. Generalized anxiety disorder    3. Insomnia due to other mental disorder    4. PTSD (post-traumatic stress disorder)         Plan:  Continue:  Cymbalta (Duloxetine), 60mg + 30mg, daily (you can take the 60mg of Cymbalta in the morning and 30mg with the evening meal, not too much past 5pm) It can interfere with sleep. Lamictal, 150mg,  daily  Eszopiclone (Lunesta), 3mg, nightly for sleep  Gabapentin, 300mg, take 2 capsules nightly  Trazodone, 50mg, nightly     Stop: Prazosin     Taper and stop:  Risperdal, 1mg, take 1/2 tab for 4 nights, then stop     Continue therapy with Aren Plascencia for therapy     Follow up: Return in about 3 months (around 10/6/2020).     1. The risks, benefits, side effects, indications, contraindications, and adverse effects of the medications have been discussed. Yes.  2. The pt has verbalized understanding and has capacity to give informed consent. 3. The G[facility]rez Denisaman report has been reviewed according to [facility] regulations. 4. Supportive therapy offered. 5. Follow up:    Return in about 3 months (around 10/6/2020). 6. The patient has been advised to call with any problems. 7. Controlled substance Treatment Plan: new medication for sleep (eszopiclone).   8. The above listed medications have been continued, modifications in meds and other orders/labs as follows:                 Encounter Medications         Orders Placed This Encounter Medications    gabapentin (NEURONTIN) 300 MG capsule       Sig: TAKE 2 CAPSULES BY MOUTH NIGHTLY       Dispense:  60 capsule       Refill:  0                    No orders of the defined types were placed in this encounter.       9. Additional comments: She is stable on her current medications. She reports that when she doesn't take Radha Roxy that her dreams return. She doesn't think Prazosin helps. Will stop Prazosin. She also reports that she has been having problems losing weight. This may be due to Risperdal. Will taper and stop. If she has an increase in anxiety may consider restarting it. She was educated to this. She reports that she needs both Trazodone and Eszopiclone to sleep, but she is very appreciative of being able to sleep reporting that this has been a lifelong issue. She continues to see Alysha Brooks for therapy. Will not make any other medication changes today and will follow up in about 3 months.     10. Over 50% of the total visit time of  20  minutes was spent on counseling and/or coordination of care of:                         1. Bipolar 2 disorder ([facility] [address]    2. Generalized anxiety disorder    3. Insomnia due to other mental disorder    4.  PTSD (post-traumatic stress disorder)                       Psychotherapy Topics: mood/medication effectiveness         Blayne Bermeo PMHNP-BC

## 2020-09-16 NOTE — TELEPHONE ENCOUNTER
9/16/20 [address] the patient to inquire about the indication for Gabapentin. This provider took the prescribing over for this medication in January, 2020. The dose is appropriate for restless legs. LVM.     SHEREEN Noe

## 2020-09-17 RX ORDER — GABAPENTIN 300 MG/1
CAPSULE ORAL
Qty: 60 CAPSULE | Refills: 0 | Status: SHIPPED | OUTPATIENT
Start: 2020-09-17 | End: 2020-10-20 | Stop reason: SDUPTHER

## 2020-09-18 ENCOUNTER — TELEPHONE (OUTPATIENT)
Dept: PSYCHIATRY | Age: 55
End: 2020-09-18

## 2020-09-18 NOTE — TELEPHONE ENCOUNTER
VM left to inform pt that RX she requested (Neurotin) has been sent to her pharmacy per [address].

## 2020-09-22 ENCOUNTER — VIRTUAL VISIT (OUTPATIENT)
Dept: PSYCHIATRY | Age: 55
End: 2020-09-22
Payer: MEDICARE

## 2020-09-22 PROCEDURE — 99442 PR PHYS/QHP TELEPHONE EVALUATION 11-20 MIN: CPT | Performed by: NURSE PRACTITIONER

## 2020-09-22 RX ORDER — ESZOPICLONE 3 MG/1
3 TABLET, FILM COATED ORAL NIGHTLY
Qty: 30 TABLET | Refills: 0 | Status: SHIPPED | OUTPATIENT
Start: 2020-09-22 | End: 2020-10-22

## 2020-09-22 RX ORDER — LAMOTRIGINE 150 MG/1
150 TABLET ORAL DAILY
Qty: 90 TABLET | Refills: 1 | Status: SHIPPED | OUTPATIENT
Start: 2020-09-22 | End: 2021-01-27 | Stop reason: SDUPTHER

## 2020-09-22 RX ORDER — DULOXETIN HYDROCHLORIDE 60 MG/1
60 CAPSULE, DELAYED RELEASE ORAL DAILY
Qty: 90 CAPSULE | Refills: 1 | Status: SHIPPED | OUTPATIENT
Start: 2020-09-22 | End: 2021-01-27 | Stop reason: SDUPTHER

## 2020-09-22 RX ORDER — DOXEPIN HYDROCHLORIDE 10 MG/1
CAPSULE ORAL
Qty: 60 CAPSULE | Refills: 3 | Status: SHIPPED | OUTPATIENT
Start: 2020-09-22 | End: 2021-01-13

## 2020-09-22 RX ORDER — DULOXETIN HYDROCHLORIDE 30 MG/1
30 CAPSULE, DELAYED RELEASE ORAL
Qty: 90 CAPSULE | Refills: 1 | Status: SHIPPED | OUTPATIENT
Start: 2020-09-22 | End: 2021-01-27 | Stop reason: DRUGHIGH

## 2020-09-22 NOTE — PROGRESS NOTES
Anaya Sanon is a 54 y.o. female evaluated via telephone on 9/22/2020. Consent:  She and/or health care decision maker is aware that that she may receive a bill for this telephone service, depending on her insurance coverage, and has provided verbal consent to proceed: Yes      Documentation:  I communicated with the patient and/or health care decision maker about depression/anxiety/insomnia. Details of this discussion including any medical advice provided: see below      I affirm this is a Patient Initiated Episode with an Established Patient who has not had a related appointment within my department in the past 7 days or scheduled within the next 24 hours. Total Time: minutes: 11-20 minutes    Note: not billable if this call serves to triage the patient into an appointment for the relevant concern      Cesario Melendez     9/22/2020 7:31 PM   Progress Note    IN:  0945  OUT: [address] 1965      Chief Complaint   Patient presents with    Follow-up    Anxiety    Depression    Insomnia         Subjective:  Patient is a 54 y.o. female diagnosed with Bipolar 2 Disorder and presents today for follow-up. Last seen in clinic on 7/6/20 and prior records were reviewed. Today patient states, \"Oh, good. \" She reports that since she stopped Risperidone she is not sleeping as well. She doesn't want to restart it because she has lost weight and feels better physically. She reports that she has lost 10 lbs. Patient reports side effects as follows: none. No evidence of EPS, no cogwheeling or abnormal motor movements. Absent  suicidal ideation. Reports compliance with medications as good . Current Substance Use:  See history    BP: There were no vitals taken for this visit.       Review of Systems - 14 point review:  Negative except being treated for:  depression, anxiety, panic attacks, suicidal dreams, weight gain, right fractured ankle with walking boot, enuresis, increased liver enzymes (being treated with spirolactone)      Constitutional: (fevers, chills, night sweats, wt loss/gain, change in appetite, fatigue, somnolence)    HEENT: (ear pain or discharge, hearing loss, ear ringing, sinus pressure, nosebleed, nasal discharge, sore throat, oral sores, tooth pain, bleeding gums, hoarse voice, neck pain)      Cardiovascular: (HTN, chest pain, elevated cholesterol/lipids, palpitations, leg swelling, leg pain with walking)    Respiratory: (cough, wheezing, snoring, SOB with activity (dyspnea), SOB while lying flat (orthopnea), awakening with severe SOB (paroxysmal nocturnal dyspnea))    Gastrointestinal: (NVD, constipation, abdominal pain, bright red stools, black tarry stools, stool incontinence)     Genitourinary:  (pelvic pain, burning or frequency of urination, urinary urgency, blood in urine incomplete bladder emptying, urinary incontinence, STD; MEN: testicular pain or swelling, erectile dysfunction; WOMEN: LMP, heavy menstrual bleeding (menorrhagia), irregular periods, postmenopausal bleeding, menstrual pain (dymenorrhea, vaginal discharge)    Musculoskeletal: (bone pain/fracture, joint pain or swelling, musle pain)    Integumentary: (rashes, acne, non-healing sores, itching, breast lumps, breast pain, nipple discharge, hair loss)    Neurologic: (HA, muscle weakness, paresthesias (numbness, coldness, crawling or prickling), memory loss, seizure, dizziness)    Psychiatric:  (anxiety, sadness, irritability/anger, insomnia, suicidality)    Endocrine: (heat or cold intolerance, excessive thirst (polydipsia), excessive hunger (polyphagia))    Immune/Allergic: (hives, seasonal or environmental allergies, HIV exposure)    Hematologic/Lymphatic: (lymph node enlargement, easy bleeding or bruising)    History obtained via chart review and patient    PCP is Tito Giles, DO, DO       Current Meds:    Prior to Admission medications    Medication Sig Start Date End Date Taking?  Authorizing December, 2018    Drug use: Not Currently     Types: Marijuana     Comment: last use was summer, 2017    Sexual activity: Not Currently   Lifestyle    Physical activity     Days per week: None     Minutes per session: None    Stress: None   Relationships    Social connections     Talks on phone: Once a week     Gets together:  Three times a week     Attends Taoist service: Never     Active member of club or organization: No     Attends meetings of clubs or organizations: Never     Relationship status:     Intimate partner violence     Fear of current or ex partner: No     Emotionally abused: No     Physically abused: No     Forced sexual activity: No   Other Topics Concern    None   Social History Narrative    PRIOR MEDICATION TRIALS    Trazodone (having more suicidal dreams), at 100mg, not working for sleep    Seroquel (wt gain, 14 lb in 3 months, enuresis, indigestion, abnormal blood work, increased blood sugar)    Duloxetine (has not been effective and no noticeable change even with dose increases to 90mg)    Paxil, 20mg    Wellbutrin XL, (tried it recently to quit smoking)    Prozac (made her numb, added to her eating disorder)    Zoloft (thought she did well on this, took for a couple of years, she stopped it because when she returned to [facility] the doctor put her back on Prozac)    Remeron (increased her dreams and panic attacks)    Marcus Mckenzie (worked)    Librium (in 2018 for 15 days to stop drinking alcohol)    Risperdal-stopped on 7/6/20 due to weight gain    Doxepin, started on 9/22/20 for sleep    Prazosin, ineffective for dreams/nightmares        Psychiatric Review of Systems, 3/19/19         Mood:  Sadness, tearfulness, low appetite, low concentration, low energy, loss of interest, denies suicidality today      (Depression: sadness, tearfulness, sleep, appetite, energy, concentration, sexual function, guilt, psychomotor agitation or slowing, interest, suicidality)         Julia: She says that there have been periods of time when she could go 3 days without sleep, she does say that there have been days in a row when she has done reckless, impulsive things      (impulsivity, grandiosity, recklessness, excessive energy, decreased need for sleep, increased spending beyond means, hyperverbal, grandiose, racing thoughts, hypersexuality)         Other: anger from being tired all the time      (Irritability, lability, anger)         Anxiety:  She says that she worries every night about sleeping and panic attacks. She says that she worries about her neighbors. She says that they always want something from her. She worries about running into them. She says that this causes panic attacks. She says that there is a lot of drug use in her neighborhood and she is fearful of her neighbors.      (Generalized anxiety: where, when, who, how long, how frequent)         Panic Disorder symptoms: She says that she is having panic attacks at night, 1 at night (this has improved from 3 weeks ago before she started Trazodone when she was having 2-3 at night).  She says that she wakes up with dreams of her family and with her anxiety about [place].      (Palpitations, racing heart beat, sweating, sense of impending doom, fear of recurrence, shortness of breath)         OCD symptoms:  She gets flustered if things are not in a routine, is ritualized about the way she dresses, and the way she laces her shoes      (checking, cleaning, organizing, rituals, hang-ups, obsessive thoughts, counting, rational vs. Irrational beliefs)         PTSD symptoms:  Increased startle response, wakes up with dreams at night, she also says she has flashbacks during the day.      (nightmares, flashbacks, startle respoinse, avoidance)         Social anxiety symptoms:  Negative         Simple phobias:   Heights, claustrophobia, snakes      (heights, planes, spiders, etc.)         Psychosis: She reports hearing and seeing things that aren't there, sees animals out her window that really aren't there. She says this happens almost every day. She says that she has never seen things when she wasn't depressed.      (hallucinations, auditory, visual, tactile, olfactory)         Paranoia: Feels that people are watching her most of the time. She thinks this feeling is both irrational and rational.         Delusions:  Negative      (TV, radio, thought broadcasting, mind control, referential thinking)         Patient's perception: Negative      (Spiritual or cultural context of symptoms, reality testing)         ADHD symptoms: Negative         Eating Disorder symptoms:  Positive, lives almost entirely on whole milk, sometimes drinking ensure, she says that in the last month she has only eaten 3 meals, has occasionally eaten a bagel with a plain piece of bread.      (binging, purging, excessive exercising)       MSE:  Patient is  A & O x 4. Appearance:  SAVANAH. Cognition:  Recent memory intact , remote memory intact , good fund of knowledge, average  intelligence level. Speech:  normal  Language: Naming: intact; Word Finding: intact  Conversation no evidence of delusions  Behavior:  Cooperative  Mood: \"melancholy\" (she thinks this is from not sleeping well)  Affect: SAVANAH  Thought Content: negative delusions, negative hallucinations, negative obsessions,  negativehomicidal and negative suicidal   Thought Process: linear, goal directed and coherent (having trouble keeping thoughts together, probably due to lack of sleep)  Associations: logical connections  Attention Span and concentration: Impaired (probably due to lack of sleep)  Judgement Insight:  normal and appropriate  Gait and Station: SAVANAH   Sleep: avg 2-3 hrs, wakes up, then up for an hour, then back to sleep, but the rest of the sleep is restless, maybe only 2 additional hrs    Appetite: ok    COGNITION SCREEN:    Can spell the word, \"world,\" backwards: Yes  Can do serial 7's:  Yes      Lab Results   Component Value Date     (L) 03/10/2020    K 4.7 03/10/2020    CL 89 (L) 03/10/2020    CO2 18 (L) 03/10/2020    BUN 19 03/10/2020    CREATININE 0.9 03/10/2020    GLUCOSE 107 03/10/2020    CALCIUM 9.1 03/10/2020    PROT 8.6 11/10/2015    LABALBU 5.0 11/10/2015    ALKPHOS 127 (H) 11/10/2015    AST 33 (H) 11/10/2015    ALT 22 11/10/2015    LABGLOM >60 03/10/2020     Lab Results   Component Value Date     03/10/2020    K 4.7 03/10/2020    CL 89 03/10/2020    CO2 18 03/10/2020    BUN 19 03/10/2020    CREATININE 0.9 03/10/2020    GLUCOSE 107 03/10/2020    CALCIUM 9.1 03/10/2020      No results found for: CHOL  No results found for: TRIG  No results found for: HDL  No results found for: LDLCHOLESTEROL, LDLCALC  No results found for: LABVLDL, VLDL  No results found for: CHOLHDLRATIO  No results found for: LABA1C  No results found for: EAG  Lab Results   Component Value Date    TSH 2.40 02/03/2015     Lab Results   Component Value Date    VITD25 27.6 (L) 02/03/2015       Assessments Administered:  PHQ9: 7, mild  GAD7: 6, mild    Assessment:   1. Bipolar 2 disorder ([facility] [address]    2. Generalized anxiety disorder    3. Insomnia due to other mental disorder    4. PTSD (post-traumatic stress disorder)        Plan:  Continue:  Cymbalta (Duloxetine), 60mg + 30mg, daily (you can take the 60mg of Cymbalta in the morning and 30mg with the evening meal, not too much past 5pm) It can interfere with sleep. Lamictal, 150mg,  daily  Eszopiclone (Lunesta), 3mg, nightly for sleep  Gabapentin, 300mg, take 2 capsules nightly (restless legs)    Start: Doxepin, 10mg, take 1-2 capsules as needed for sleep    Stop: Trazodone (due to having more dreams)    Continue therapy with Alexis Monique for therapy    Follow up: Return in about 3 months (around 12/22/2020). 1. The risks, benefits, side effects, indications, contraindications, and adverse effects of the medications have been discussed.  Yes.  2. The pt has verbalized understanding and has capacity to give informed consent. 3. The Elvis Aragon report has been reviewed according to [facility] regulations. 4. Supportive therapy offered. 5. Follow up: Return in about 3 months (around 12/22/2020). 6. The patient has been advised to call with any problems. 7. Controlled substance Treatment Plan: new medication for sleep (eszopiclone). 8. The above listed medications have been continued, modifications in meds and other orders/labs as follows:      Orders Placed This Encounter   Medications    DULoxetine (CYMBALTA) 30 MG extended release capsule     Sig: Take 1 capsule by mouth Daily with supper     Dispense:  90 capsule     Refill:  1    DULoxetine (CYMBALTA) 60 MG extended release capsule     Sig: Take 1 capsule by mouth daily     Dispense:  90 capsule     Refill:  1    eszopiclone (LUNESTA) 3 MG TABS     Sig: Take 1 tablet by mouth nightly for 30 days. (for sleep)     Dispense:  30 tablet     Refill:  0    lamoTRIgine (LAMICTAL) 150 MG tablet     Sig: Take 1 tablet by mouth daily     Dispense:  90 tablet     Refill:  1     Fill as her insurance allows.  doxepin (SINEQUAN) 10 MG capsule     Sig: Take 1-2 capsules by mouth nightly as needed for sleep. Dispense:  60 capsule     Refill:  3      No orders of the defined types were placed in this encounter. 9. Additional comments: She reports that her dreams have worsened. She also reports that since stopping Risperdal she is not sleeping as well, but doesn't want to restart it because she has lost 10 lbs since stopping it. Will try Doxepin for sleep. Otherwise she is doing well on her medications. Will make no other changes. She continues to see Josef Sanchez for therapy. Will follow up in about 3 months. Patient was informed that this provider will be out of the office during the month of October and the first couple of weeks of November but that there will be coverage in the case of issues that need to be addressed.  The patient verbalized understanding. 10.Over 50% of the total visit time of  15  minutes was spent on counseling and/or coordination of care of:                        1. Bipolar 2 disorder ([facility] [address]    2. Generalized anxiety disorder    3. Insomnia due to other mental disorder    4.  PTSD (post-traumatic stress disorder)                      Psychotherapy Topics: mood/medication effectiveness       Madelin Cabrales PMHNP-BC

## 2020-09-23 NOTE — PATIENT INSTRUCTIONS
Plan:  Continue:  Cymbalta (Duloxetine), 60mg + 30mg, daily (you can take the 60mg of Cymbalta in the morning and 30mg with the evening meal, not too much past 5pm) It can interfere with sleep. Lamictal, 150mg,  daily  Eszopiclone (Lunesta), 3mg, nightly for sleep  Gabapentin, 300mg, take 2 capsules nightly (restless legs)    Start: Doxepin, 10mg, take 1-2 capsules as needed for sleep    Stop: Trazodone (due to having more dreams)    Continue therapy with [facility] Jerald for therapy    Follow up: Return in about 3 months (around 12/22/2020). Patient Education        doxepin (Sinequan)  Pronunciation:  DOX e pin  What is the most important information I should know about doxepin (Sinequan)? You should not take doxepin if you have glaucoma or problems with urination. Do not use if you are allergic to doxepin or to similar antidepressants. Do not use this medicine if you have used an MAO inhibitor in the past 14 days, such as isocarboxazid, linezolid, methylene blue injection, phenelzine, rasagiline, selegiline, or tranylcypromine. Some young people have thoughts about suicide when first taking an antidepressant. Stay alert to changes in your mood or symptoms. Report any new or worsening symptoms to your doctor. Do not give this medicine to anyone under 25years old without the advice of a doctor. Doxepin is not approved for use in children. What is doxepin (Sinequan)? This medication guide provides information about the use of doxepin (Sinequan or other generic names) to treat depression or anxiety. Starlene [place] is another brand of doxepin that is not covered in this medication guide. Doxepin is a tricyclic antidepressant that affects chemicals in the brain that may be unbalanced. Doxepin (Sinequan or other generic name) is used to treat symptoms of depression and/or anxiety associated with alcoholism, psychiatric conditions, or manic-depressive conditions.   Doxepin may also be used for purposes not listed in this medication guide. What should I discuss with my healthcare provider before taking doxepin (Sinequan)? You should not use doxepin if you are allergic to it, or if you have:  · glaucoma;  · urination problems; or  · an allergy to similar antidepressants such as amitriptyline, amoxapine, clomipramine, desipramine, imipramine, nortriptyline, protriptyline, or trimipramine. Do not use doxepin if you have used an MAO inhibitor in the past 14 days. A dangerous drug interaction could occur. MAO inhibitors include isocarboxazid, linezolid, methylene blue injection, phenelzine, rasagiline, selegiline, tranylcypromine, and others. To make sure doxepin is safe for you, tell your doctor if you have:  · sleep apnea (breathing stops during sleep);  · diabetes (doxepin may raise or lower blood sugar); or  · bipolar disorder (manic-depression). Some young people have thoughts about suicide when first taking an antidepressant. Your doctor will need to check your progress at regular visits while you are using doxepin. Your family or other caregivers should also be alert to changes in your mood or symptoms. It is not known whether this medicine will harm an unborn baby. Tell your doctor if you are pregnant or plan to become pregnant. It is not known whether doxepin passes into breast milk or if it could harm a nursing baby. Tell your doctor if you are breast-feeding a baby. Do not give this medicine to anyone under 25years old without the advice of a doctor. Doxepin is not approved for use in children. How should I take doxepin (Sinequan)? Follow all directions on your prescription label. Your doctor may occasionally change your dose to make sure you get the best results. Do not take this medicine in larger or smaller amounts or for longer than recommended. Measure doxepin oral concentrate (liquid)  with the special dose-measuring dropper provided. Do not use a regular table spoon.  If you do not have a dose-measuring dropper, ask your pharmacist for one. Empty the measured dose from the medicine dropper into a small glass (4 ounces) of water, milk, orange juice, grapefruit juice, tomato juice, prune juice, or pineapple juice. Do not use grape juice or a carbonated soft drink to mix doxepin oral concentrate. Stir the mixture and drink all of it right away. Do not save it for later use. Do not stop using doxepin suddenly, or you could have unpleasant withdrawal symptoms. Ask your doctor how to avoid withdrawal symptoms when you stop using doxepin. It may take up to 3 weeks before your symptoms improve. Keep using the medication as directed and tell your doctor if your symptoms do not improve. Store at room temperature away from moisture, heat, and light. What happens if I miss a dose? Take the missed dose as soon as you remember. Skip the missed dose if it is almost time for your next scheduled dose. Do not  take extra medicine to make up the missed dose. What happens if I overdose? Seek emergency medical attention or call the Poison Help line at 1-876.623.2545. An overdose of doxepin can be fatal.  What should I avoid while taking doxepin (Sinequan)? Do not drink alcohol. Doxepin can increase the effects of alcohol, which could be dangerous. Doxepin may impair your thinking or reactions. Be careful if you drive or do anything that requires you to be alert. What are the possible side effects of doxepin (Sinequan)? Get emergency medical help if you have signs of an allergic reaction: hives; difficulty breathing; swelling of your face, lips, tongue, or throat. Report any new or worsening symptoms to your doctor, such as: mood or behavior changes, anxiety, panic attacks, trouble sleeping, or if you feel impulsive, irritable, agitated, hostile, aggressive, restless, hyperactive (mentally or physically), more depressed, or have thoughts about suicide or hurting yourself.   Call your doctor at once if you have:  · blurred vision, tunnel vision, eye pain or swelling, seeing halos around lights;  · a light-headed feeling, like you might pass out;  · skin rash, bruising, severe tingling, numbness, pain, muscle weakness;  · tremors, restless muscle movements in your eyes, tongue, jaw, or neck;  · confusion, hallucinations, unusual thoughts, seizure (convulsions); or  · painful or difficult urination, urinating less than usual.  Common side effects may include:  · drowsiness;  · vision changes;  · nausea, vomiting, diarrhea, indigestion, loss of appetite;  · dry mouth, mouth sores, taste problems;  · breast swelling (in men or women); or  · decreased or increased sex drive. This is not a complete list of side effects and others may occur. Call your doctor for medical advice about side effects. You may report side effects to FDA at 2-146-FDA-2543. What other drugs will affect doxepin (Sinequan)? Taking doxepin with other drugs that make you sleepy or slow your breathing can increase these effects. Ask your doctor before taking doxepin with a sleeping pill, narcotic pain medicine, muscle relaxer, or medicine for anxiety, depression, or seizures. Before taking doxepin, tell your doctor if you have used an \"SSRI\" antidepressant in the past 5 weeks, such as citalopram, escitalopram, fluoxetine, fluvoxamine, paroxetine, or sertraline. You must wait at least 5 weeks after stopping fluoxetine (Prozac) before you can take doxepin. Other drugs may interact with doxepin, including prescription and over-the-counter medicines, vitamins, and herbal products. Not all possible interactions are listed in this medication guide. Tell each of your health care providers about all medicines you use now and any medicine you start or stop using. Where can I get more information? Your pharmacist can provide more information about doxepin (Sinequan).   Remember, keep this and all other medicines out of the reach of children, never share your medicines with others, and use this medication only for the indication prescribed. Every effort has been made to ensure that the information provided by [address] is accurate, up-to-date, and complete, but no guarantee is made to that effect. Drug information contained herein may be time sensitive. [facility] information has been compiled for use by healthcare practitioners and consumers in the [place] and therefore [facility] does not warrant that uses outside of the [place] are appropriate, unless specifically indicated otherwise. [facility]'s drug information does not endorse drugs, diagnose patients or recommend therapy. [facility]'s drug information is an informational resource designed to assist licensed healthcare practitioners in caring for their patients and/or to serve consumers viewing this service as a supplement to, and not a substitute for, the expertise, skill, knowledge and judgment of healthcare practitioners. The absence of a warning for a given drug or drug combination in no way should be construed to indicate that the drug or drug combination is safe, effective or appropriate for any given patient. [facility] does not assume any responsibility for any aspect of healthcare administered with the aid of information [facility] provides. The information contained herein is not intended to cover all possible uses, directions, precautions, warnings, drug interactions, allergic reactions, or adverse effects. If you have questions about the drugs you are taking, check with your doctor, nurse or pharmacist.  Copyright 9312-5638 [address]: 12.01. Revision date: 7/28/2016. Care instructions adapted under license by [facility] ([facility]). If you have questions about a medical condition or this instruction, always ask your healthcare professional. [place] [address] any warranty or liability for your use of this information.

## 2020-10-15 RX ORDER — GABAPENTIN 300 MG/1
CAPSULE ORAL
Qty: 60 CAPSULE | Refills: 0 | Status: CANCELLED | OUTPATIENT
Start: 2020-10-15 | End: 2020-11-13

## 2020-10-19 NOTE — TELEPHONE ENCOUNTER
George Hinkle called to request refill on her medication        Carmine Ford scanned into media    Last office visit: 9/22/2020  Next office visit: 1/8/2021    Requested Prescriptions     Pending Prescriptions Disp Refills    gabapentin (NEURONTIN) 300 MG capsule 60 capsule 0     Sig: TAKE 2 CAPSULES BY MOUTH NIGHTLY          Virtual Visit     9/22/2020  [address] [facility]   Wali Gutierrez, APRN - NP   Nurse Practitioner Psychiatric/Mental Health   Bipolar 2 disorder [facility]) +3 more   Dx   Follow-up , Anxiety , Depression , Insomnia   Reason for Visit    Progress Notes     Expand All Collapse All  []Expand All by Bubba Mathewhussain is a 54 y.o. female evaluated via telephone on 9/22/2020.       Consent:  She and/or health care decision maker is aware that that she may receive a bill for this telephone service, depending on her insurance coverage, and has provided verbal consent to proceed: Yes        Documentation:  I communicated with the patient and/or health care decision maker about depression/anxiety/insomnia. Details of this discussion including any medical advice provided: see below        I affirm this is a Patient Initiated Episode with an Established Patient who has not had a related appointment within my department in the past 7 days or scheduled within the next 24 hours.     Total Time: minutes: 11-20 minutes     Note: not billable if this call serves to triage the patient into an appointment for the relevant concern        Wali Gutierrze      9/22/2020 7:31 PM                             Progress Note     IN:  0945  OUT: 1000        Dorene Rojas 1965                           Chief Complaint   Patient presents with    Follow-up    Anxiety    Depression    Insomnia            Subjective:  Patient is a 54 y.o. female diagnosed with Bipolar 2 Disorder and presents today for follow-up.   Last seen in clinic on 7/6/20 and prior records were reviewed.     Today patient states, \"Oh, good.\" She reports that since she stopped Risperidone she is not sleeping as well. She doesn't want to restart it because she has lost weight and feels better physically. She reports that she has lost 10 lbs.     Patient reports side effects as follows: none. No evidence of EPS, no cogwheeling or abnormal motor movements.     Absent  suicidal ideation.   Reports compliance with medications as good .      Current Substance Use:  See history     BP: There were no vitals taken for this visit.        Review of Systems - 14 point review:  Negative except being treated for:  depression, anxiety, panic attacks, suicidal dreams, weight gain, right fractured ankle with walking boot, enuresis, increased liver enzymes (being treated with spirolactone)        Constitutional: (fevers, chills, night sweats, wt loss/gain, change in appetite, fatigue, somnolence)     HEENT: (ear pain or discharge, hearing loss, ear ringing, sinus pressure, nosebleed, nasal discharge, sore throat, oral sores, tooth pain, bleeding gums, hoarse voice, neck pain)      Cardiovascular: (HTN, chest pain, elevated cholesterol/lipids, palpitations, leg swelling, leg pain with walking)     Respiratory: (cough, wheezing, snoring, SOB with activity (dyspnea), SOB while lying flat (orthopnea), awakening with severe SOB (paroxysmal nocturnal dyspnea))     Gastrointestinal: (NVD, constipation, abdominal pain, bright red stools, black tarry stools, stool incontinence)     Genitourinary:  (pelvic pain, burning or frequency of urination, urinary urgency, blood in urine incomplete bladder emptying, urinary incontinence, STD; MEN: testicular pain or swelling, erectile dysfunction; WOMEN: LMP, heavy menstrual bleeding (menorrhagia), irregular periods, postmenopausal bleeding, menstrual pain (dymenorrhea, vaginal discharge)     Musculoskeletal: (bone pain/fracture, joint pain or swelling, musle pain)     Integumentary: (rashes, acne, non-healing sores, itching, breast tab daily.       Historical Provider, MD   Magnesium Oxide 250 MG TABS Take by mouth daily       Historical Provider, MD        Social History   Social History            Socioeconomic History    Marital status: Legally        Spouse name: None    Number of children: 1    Years of education: 12th grade + some college    Highest education level: None   Occupational History    None   Social Needs    Financial resource strain: None    Food insecurity       Worry: None       Inability: None    Transportation needs       Medical: None       Non-medical: None   Tobacco Use    Smoking status: Current Every Day Smoker       Packs/day: 0.75    Smokeless tobacco: Never Used   Substance and Sexual Activity    Alcohol use: Not Currently       Comment: history of abuse, last drink was early December, 2018    Drug use: Not Currently       Types: Marijuana       Comment: last use was summer, 2017    Sexual activity: Not Currently   Lifestyle    Physical activity       Days per week: None       Minutes per session: None    Stress: None   Relationships    Social connections       Talks on phone: Once a week       Gets together: Three times a week       Attends Muslim service: Never       Active member of club or organization: No       Attends meetings of clubs or organizations: Never       Relationship status:     Intimate partner violence       Fear of current or ex partner: No       Emotionally abused: No       Physically abused:  No       Forced sexual activity: No   Other Topics Concern    None   Social History Narrative     PRIOR MEDICATION TRIALS     Trazodone (having more suicidal dreams), at 100mg, not working for sleep     Seroquel (wt gain, 14 lb in 3 months, enuresis, indigestion, abnormal blood work, increased blood sugar)     Duloxetine (has not been effective and no noticeable change even with dose increases to 90mg)     Paxil, 20mg     Wellbutrin XL, (tried it recently to quit wakes up with dreams of her family and with her anxiety about [place].       (Palpitations, racing heart beat, sweating, sense of impending doom, fear of recurrence, shortness of breath)           OCD symptoms:  She gets flustered if things are not in a routine, is ritualized about the way she dresses, and the way she laces her shoes       (checking, cleaning, organizing, rituals, hang-ups, obsessive thoughts, counting, rational vs. Irrational beliefs)           PTSD symptoms:  Increased startle response, wakes up with dreams at night, she also says she has flashbacks during the day.       (nightmares, flashbacks, startle respoinse, avoidance)           Social anxiety symptoms:  Negative           Simple phobias:   Heights, claustrophobia, snakes       (heights, planes, spiders, etc.)           Psychosis: She reports hearing and seeing things that aren't there, sees animals out her window that really aren't there. She says this happens almost every day. She says that she has never seen things when she wasn't depressed.       (hallucinations, auditory, visual, tactile, olfactory)           Paranoia: Feels that people are watching her most of the time. She thinks this feeling is both irrational and rational.           Delusions:  Negative       (TV, radio, thought broadcasting, mind control, referential thinking)           Patient's perception: Negative       (Spiritual or cultural context of symptoms, reality testing)           ADHD symptoms: Negative           Eating Disorder symptoms:  Positive, lives almost entirely on whole milk, sometimes drinking ensure, she says that in the last month she has only eaten 3 meals, has occasionally eaten a bagel with a plain piece of bread.       (binging, purging, excessive exercising)           MSE:  Patient is  A & O x 4. Appearance:  SAVANAH. Cognition:  Recent memory intact , remote memory intact , good fund of knowledge, average  intelligence level.    Speech: normal  Language: Naming: intact; Word Finding: intact  Conversation no evidence of delusions  Behavior:  Cooperative  Mood: \"melancholy\" (she thinks this is from not sleeping well)  Affect: SAVANAH  Thought Content: negative delusions, negative hallucinations, negative obsessions,  negativehomicidal and negative suicidal   Thought Process: linear, goal directed and coherent (having trouble keeping thoughts together, probably due to lack of sleep)  Associations: logical connections  Attention Span and concentration: Impaired (probably due to lack of sleep)  Judgement Insight:  normal and appropriate  Gait and Station: SAVANAH   Sleep: avg 2-3 hrs, wakes up, then up for an hour, then back to sleep, but the rest of the sleep is restless, maybe only 2 additional hrs    Appetite: ok     COGNITION SCREEN:     Can spell the word, \"world,\" backwards: Yes  Can do serial 7's:  Yes              Lab Results   Component Value Date      (L) 03/10/2020     K 4.7 03/10/2020     CL 89 (L) 03/10/2020     CO2 18 (L) 03/10/2020     BUN 19 03/10/2020     CREATININE 0.9 03/10/2020     GLUCOSE 107 03/10/2020     CALCIUM 9.1 03/10/2020     PROT 8.6 11/10/2015     LABALBU 5.0 11/10/2015     ALKPHOS 127 (H) 11/10/2015     AST 33 (H) 11/10/2015     ALT 22 11/10/2015     LABGLOM >60 03/10/2020            Lab Results   Component Value Date      03/10/2020     K 4.7 03/10/2020     CL 89 03/10/2020     CO2 18 03/10/2020     BUN 19 03/10/2020     CREATININE 0.9 03/10/2020     GLUCOSE 107 03/10/2020     CALCIUM 9.1 03/10/2020      No results found for: CHOL  No results found for: TRIG  No results found for: HDL  No results found for: LDLCHOLESTEROL, LDLCALC  No results found for: LABVLDL, VLDL  No results found for: CHOLHDLRATIO  No results found for: LABA1C  No results found for: EAG        Lab Results   Component Value Date     TSH 2.40 02/03/2015            Lab Results   Component Value Date     VITD25 27.6 (L) 02/03/2015         Assessments Administered:  PHQ9: 7, mild  GAD7: 6, mild     Assessment:    1. Bipolar 2 disorder ([facility] [address]    2. Generalized anxiety disorder    3. Insomnia due to other mental disorder    4. PTSD (post-traumatic stress disorder)          Plan:  Continue:  Cymbalta (Duloxetine), 60mg + 30mg, daily (you can take the 60mg of Cymbalta in the morning and 30mg with the evening meal, not too much past 5pm) It can interfere with sleep. Lamictal, 150mg,  daily  Eszopiclone (Lunesta), 3mg, nightly for sleep  Gabapentin, 300mg, take 2 capsules nightly (restless legs)     Start: Doxepin, 10mg, take 1-2 capsules as needed for sleep     Stop: Trazodone (due to having more dreams)     Continue therapy with Concepción Cortez for therapy     Follow up: Return in about 3 months (around 12/22/2020).     1. The risks, benefits, side effects, indications, contraindications, and adverse effects of the medications have been discussed. Yes.  2. The pt has verbalized understanding and has capacity to give informed consent. 3. The Diony Hernandez report has been reviewed according to [facility] regulations. 4. Supportive therapy offered. 5. Follow up:    Return in about 3 months (around 12/22/2020). 6. The patient has been advised to call with any problems. 7. Controlled substance Treatment Plan: new medication for sleep (eszopiclone).   8. The above listed medications have been continued, modifications in meds and other orders/labs as follows:                 Encounter Medications         Orders Placed This Encounter   Medications    DULoxetine (CYMBALTA) 30 MG extended release capsule       Sig: Take 1 capsule by mouth Daily with supper       Dispense:  90 capsule       Refill:  1    DULoxetine (CYMBALTA) 60 MG extended release capsule       Sig: Take 1 capsule by mouth daily       Dispense:  90 capsule       Refill:  1    eszopiclone (LUNESTA) 3 MG TABS       Sig: Take 1 tablet by mouth nightly for 30 days. (for sleep)       Dispense:  30 tablet       Refill:  0    lamoTRIgine (LAMICTAL) 150 MG tablet       Sig: Take 1 tablet by mouth daily       Dispense:  90 tablet       Refill:  1       Fill as her insurance allows.  doxepin (SINEQUAN) 10 MG capsule       Sig: Take 1-2 capsules by mouth nightly as needed for sleep.       Dispense:  60 capsule       Refill:  3                    No orders of the defined types were placed in this encounter.        9. Additional comments: She reports that her dreams have worsened. She also reports that since stopping Risperdal she is not sleeping as well, but doesn't want to restart it because she has lost 10 lbs since stopping it. Will try Doxepin for sleep. Otherwise she is doing well on her medications. Will make no other changes. She continues to see Ray Appiah for therapy. Will follow up in about 3 months. Patient was informed that this provider will be out of the office during the month of October and the first couple of weeks of November but that there will be coverage in the case of issues that need to be addressed. The patient verbalized understanding.     10. Over 50% of the total visit time of  15  minutes was spent on counseling and/or coordination of care of:                         1. Bipolar 2 disorder ([facility] [address]    2. Generalized anxiety disorder    3. Insomnia due to other mental disorder    4. PTSD (post-traumatic stress disorder)                        Psychotherapy Topics: mood/medication effectiveness         Benjamin Columba [facility]      Instructions         Return in about 3 months (around 12/22/2020). Plan:  Continue:  Cymbalta (Duloxetine), 60mg + 30mg, daily (you can take the 60mg of Cymbalta in the morning and 30mg with the evening meal, not too much past 5pm) It can interfere with sleep.   Lamictal, 150mg,  daily  Eszopiclone (Lunesta), 3mg, nightly for sleep  Gabapentin, 300mg, take 2 capsules nightly (restless legs)     Start: Doxepin, 10mg, take 1-2 capsules as needed for sleep     Stop: Trazodone (due to having more dreams)     Continue therapy with Irmashefali Correia for therapy     Follow up: Return in about 3 months (around 12/22/2020).    Patient Education        doxepin (Sinequan)  Pronunciation:  CHIN jones  What is the most important information I should know about doxepin (Sinequan)? You should not take doxepin if you have glaucoma or problems with urination. Do not use if you are allergic to doxepin or to similar antidepressants. Do not use this medicine if you have used an MAO inhibitor in the past 14 days, such as isocarboxazid, linezolid, methylene blue injection, phenelzine, rasagiline, selegiline, or tranylcypromine. Some young people have thoughts about suicide when first taking an antidepressant. Stay alert to changes in your mood or symptoms. Report any new or worsening symptoms to your doctor. Do not give this medicine to anyone under 25years old without the advice of a doctor. Doxepin is not approved for use in children. What is doxepin (Sinequan)? This medication guide provides information about the use of doxepin (Sinequan or other generic names) to treat depression or anxiety. Kendall Cocks is another brand of doxepin that is not covered in this medication guide. Doxepin is a tricyclic antidepressant that affects chemicals in the brain that may be unbalanced. Doxepin (Sinequan or other generic name) is used to treat symptoms of depression and/or anxiety associated with alcoholism, psychiatric conditions, or manic-depressive conditions. Doxepin may also be used for purposes not listed in this medication guide. What should I discuss with my healthcare provider before taking doxepin (Sinequan)? You should not use doxepin if you are allergic to it, or if you have:  · glaucoma;  · urination problems; or  · an allergy to similar antidepressants such as amitriptyline, amoxapine, clomipramine, desipramine, imipramine, nortriptyline, protriptyline, or trimipramine.   Do not use doxepin if you have used an MAO inhibitor in the past 14 days. A dangerous drug interaction could occur. MAO inhibitors include isocarboxazid, linezolid, methylene blue injection, phenelzine, rasagiline, selegiline, tranylcypromine, and others. To make sure doxepin is safe for you, tell your doctor if you have:  · sleep apnea (breathing stops during sleep);  · diabetes (doxepin may raise or lower blood sugar); or  · bipolar disorder (manic-depression). Some young people have thoughts about suicide when first taking an antidepressant. Your doctor will need to check your progress at regular visits while you are using doxepin. Your family or other caregivers should also be alert to changes in your mood or symptoms. It is not known whether this medicine will harm an unborn baby. Tell your doctor if you are pregnant or plan to become pregnant. It is not known whether doxepin passes into breast milk or if it could harm a nursing baby. Tell your doctor if you are breast-feeding a baby. Do not give this medicine to anyone under 25years old without the advice of a doctor. Doxepin is not approved for use in children. How should I take doxepin (Sinequan)? Follow all directions on your prescription label. Your doctor may occasionally change your dose to make sure you get the best results. Do not take this medicine in larger or smaller amounts or for longer than recommended. Measure doxepin oral concentrate (liquid)  with the special dose-measuring dropper provided. Do not use a regular table spoon. If you do not have a dose-measuring dropper, ask your pharmacist for one. Empty the measured dose from the medicine dropper into a small glass (4 ounces) of water, milk, orange juice, grapefruit juice, tomato juice, prune juice, or pineapple juice. Do not use grape juice or a carbonated soft drink to mix doxepin oral concentrate. Stir the mixture and drink all of it right away. Do not save it for later use.   Do not stop using doxepin suddenly, or you could have unpleasant withdrawal symptoms. Ask your doctor how to avoid withdrawal symptoms when you stop using doxepin. It may take up to 3 weeks before your symptoms improve. Keep using the medication as directed and tell your doctor if your symptoms do not improve. Store at room temperature away from moisture, heat, and light. What happens if I miss a dose? Take the missed dose as soon as you remember. Skip the missed dose if it is almost time for your next scheduled dose. Do not  take extra medicine to make up the missed dose. What happens if I overdose? Seek emergency medical attention or call the Poison Help line at 1-933.275.9654. An overdose of doxepin can be fatal.  What should I avoid while taking doxepin (Sinequan)? Do not drink alcohol. Doxepin can increase the effects of alcohol, which could be dangerous. Doxepin may impair your thinking or reactions. Be careful if you drive or do anything that requires you to be alert. What are the possible side effects of doxepin (Sinequan)? Get emergency medical help if you have signs of an allergic reaction: hives; difficulty breathing; swelling of your face, lips, tongue, or throat. Report any new or worsening symptoms to your doctor, such as: mood or behavior changes, anxiety, panic attacks, trouble sleeping, or if you feel impulsive, irritable, agitated, hostile, aggressive, restless, hyperactive (mentally or physically), more depressed, or have thoughts about suicide or hurting yourself.   Call your doctor at once if you have:  · blurred vision, tunnel vision, eye pain or swelling, seeing halos around lights;  · a light-headed feeling, like you might pass out;  · skin rash, bruising, severe tingling, numbness, pain, muscle weakness;  · tremors, restless muscle movements in your eyes, tongue, jaw, or neck;  · confusion, hallucinations, unusual thoughts, seizure (convulsions); or  · painful or difficult urination, urinating less than usual.  Common side effects may include:  · drowsiness;  · vision changes;  · nausea, vomiting, diarrhea, indigestion, loss of appetite;  · dry mouth, mouth sores, taste problems;  · breast swelling (in men or women); or  · decreased or increased sex drive. This is not a complete list of side effects and others may occur. Call your doctor for medical advice about side effects. You may report side effects to FDA at 6-551-MAX-4063. What other drugs will affect doxepin (Sinequan)? Taking doxepin with other drugs that make you sleepy or slow your breathing can increase these effects. Ask your doctor before taking doxepin with a sleeping pill, narcotic pain medicine, muscle relaxer, or medicine for anxiety, depression, or seizures. Before taking doxepin, tell your doctor if you have used an \"SSRI\" antidepressant in the past 5 weeks, such as citalopram, escitalopram, fluoxetine, fluvoxamine, paroxetine, or sertraline. You must wait at least 5 weeks after stopping fluoxetine (Prozac) before you can take doxepin. Other drugs may interact with doxepin, including prescription and over-the-counter medicines, vitamins, and herbal products. Not all possible interactions are listed in this medication guide. Tell each of your health care providers about all medicines you use now and any medicine you start or stop using. Where can I get more information? Your pharmacist can provide more information about doxepin (Sinequan). Remember, keep this and all other medicines out of the reach of children, never share your medicines with others, and use this medication only for the indication prescribed. Every effort has been made to ensure that the information provided by [address] is accurate, up-to-date, and complete, but no guarantee is made to that effect. Drug information contained herein may be time sensitive.  [employer] information has been compiled for use by healthcare practitioners and consumers in the [place] and therefore [employer] does not warrant that uses outside of the [facility] are appropriate, unless specifically indicated otherwise. [facility]'s drug information does not endorse drugs, diagnose patients or recommend therapy. [facility] drug information is an informational resource designed to assist licensed healthcare practitioners in caring for their patients and/or to serve consumers viewing this service as a supplement to, and not a substitute for, the expertise, skill, knowledge and judgment of healthcare practitioners. The absence of a warning for a given drug or drug combination in no way should be construed to indicate that the drug or drug combination is safe, effective or appropriate for any given patient. [facility] does not assume any responsibility for any aspect of healthcare administered with the aid of information [facility] provides. The information contained herein is not intended to cover all possible uses, directions, precautions, warnings, drug interactions, allergic reactions, or adverse effects. If you have questions about the drugs you are taking, check with your doctor, nurse or pharmacist.  Copyright 3950-3441 [address]: 12.01. Revision date: 7/28/2016. Care instructions adapted under license by [facility] ([facility]). If you have questions about a medical condition or this instruction, always ask your healthcare professional. [place] [address] any warranty or liability for your use of this information.              After Visit Summary (Printed 9/23/2020)   Additional Documentation     Encounter Info:     Billing Info,    Allergies,    Detailed Report,    History,    Medications,    Questionnaires       Chart Review Routing History     No routing history on file. Encounter Status     Closed by CAPO Cason NP on 9/22/20 at 19:32    Orders Placed      None   Medication Changes         Doxepin HCl 10 MG Take 1-2 capsules by mouth nightly as needed for sleep. (Therapy completed)      Medication List     Visit Diagnoses         Bipolar 2 disorder ([facility] [address]      Generalized anxiety disorder      Insomnia due to other mental disorder      PTSD (post-traumatic stress disorder)      Problem List

## 2020-10-20 RX ORDER — GABAPENTIN 300 MG/1
CAPSULE ORAL
Qty: 60 CAPSULE | Refills: 0 | Status: SHIPPED | OUTPATIENT
Start: 2020-10-20 | End: 2020-11-23

## 2020-10-26 RX ORDER — ESZOPICLONE 3 MG/1
3 TABLET, FILM COATED ORAL NIGHTLY
COMMUNITY
End: 2020-10-26 | Stop reason: SDUPTHER

## 2020-10-26 RX ORDER — ESZOPICLONE 3 MG/1
3 TABLET, FILM COATED ORAL NIGHTLY PRN
Qty: 30 TABLET | Refills: 0 | Status: SHIPPED | OUTPATIENT
Start: 2020-10-26 | End: 2020-11-24 | Stop reason: SDUPTHER

## 2020-10-26 NOTE — TELEPHONE ENCOUNTER
Jey Mendez called to request a refill on her medication. Tra under [facility] [place]. Med had fallen off current med list--added back on as listed in [facility] last office not. Last office visit : 9/22/2020 with Arelis SCANLON  Next office visit : 1/8/2021 with Arelis SCANLON    Requested Prescriptions     Pending Prescriptions Disp Refills    eszopiclone (ESZOPICLONE) 3 MG TABS 30 tablet 1     Sig: Take 1 tablet by mouth nightly as needed (for sleep) for up to 30 days. Sara Garcia RN        9/22/2020 7:31 PM                             Progress Note     IN:  0945  OUT: 1000        Dorene Rojas 1965                           Chief Complaint   Patient presents with    Follow-up    Anxiety    Depression    Insomnia            Subjective:  Patient is a 54 y.o. female diagnosed with Bipolar 2 Disorder and presents today for follow-up. Last seen in clinic on 7/6/20 and prior records were reviewed.     Today patient states, \"Oh, good. \" She reports that since she stopped Risperidone she is not sleeping as well. She doesn't want to restart it because she has lost weight and feels better physically. She reports that she has lost 10 lbs.     Patient reports side effects as follows: none. No evidence of EPS, no cogwheeling or abnormal motor movements.     Absent  suicidal ideation.   Reports compliance with medications as good .      Current Substance Use:  See history     BP: There were no vitals taken for this visit.        Review of Systems - 14 point review:  Negative except being treated for:  depression, anxiety, panic attacks, suicidal dreams, weight gain, right fractured ankle with walking boot, enuresis, increased liver enzymes (being treated with spirolactone)        Constitutional: (fevers, chills, night sweats, wt loss/gain, change in appetite, fatigue, somnolence)     HEENT: (ear pain or discharge, hearing loss, ear ringing, sinus pressure, nosebleed, nasal discharge, sore throat, oral sores, tooth pain, bleeding gums, hoarse voice, neck pain)      Cardiovascular: (HTN, chest pain, elevated cholesterol/lipids, palpitations, leg swelling, leg pain with walking)     Respiratory: (cough, wheezing, snoring, SOB with activity (dyspnea), SOB while lying flat (orthopnea), awakening with severe SOB (paroxysmal nocturnal dyspnea))     Gastrointestinal: (NVD, constipation, abdominal pain, bright red stools, black tarry stools, stool incontinence)     Genitourinary:  (pelvic pain, burning or frequency of urination, urinary urgency, blood in urine incomplete bladder emptying, urinary incontinence, STD; MEN: testicular pain or swelling, erectile dysfunction; WOMEN: LMP, heavy menstrual bleeding (menorrhagia), irregular periods, postmenopausal bleeding, menstrual pain (dymenorrhea, vaginal discharge)     Musculoskeletal: (bone pain/fracture, joint pain or swelling, musle pain)     Integumentary: (rashes, acne, non-healing sores, itching, breast lumps, breast pain, nipple discharge, hair loss)     Neurologic: (HA, muscle weakness, paresthesias (numbness, coldness, crawling or prickling), memory loss, seizure, dizziness)     Psychiatric:  (anxiety, sadness, irritability/anger, insomnia, suicidality)     Endocrine: (heat or cold intolerance, excessive thirst (polydipsia), excessive hunger (polyphagia))     Immune/Allergic: (hives, seasonal or environmental allergies, HIV exposure)     Hematologic/Lymphatic: (lymph node enlargement, easy bleeding or bruising)     History obtained via chart review and patient     PCP is Yulisa Hartmann. Edgardo Hairston DO, DO         Current Meds:     Home Medications           Prior to Admission medications    Medication Sig Start Date End Date Taking?  Authorizing Provider   DULoxetine (CYMBALTA) 30 MG extended release capsule Take 1 capsule by mouth Daily with supper 9/22/20   Yes CAPO Ovalle NP   DULoxetine (CYMBALTA) 60 MG extended release capsule Take 1 capsule by mouth daily 9/22/20   Yes CAPO Reyes NP   eszopiclone (LUNESTA) 3 MG TABS Take 1 tablet by mouth nightly for 30 days. (for sleep) 9/22/20 10/22/20 Yes CAPO Reyes NP   lamoTRIgine (LAMICTAL) 150 MG tablet Take 1 tablet by mouth daily 9/22/20   Yes CAPO Reyes NP   doxepin (SINEQUAN) 10 MG capsule Take 1-2 capsules by mouth nightly as needed for sleep. 9/22/20   Yes CAPO eRyes NP   gabapentin (NEURONTIN) 300 MG capsule TAKE 2 CAPSULES BY MOUTH NIGHTLY 9/17/20 10/16/20   CAPO Reyes NP   oxybutynin (DITROPAN) 5 MG tablet TAKE 1 TABLET BY MOUTH EVERY DAY AT NIGHT 7/28/20     CAPO Rush   spironolactone (ALDACTONE) 25 MG tablet   3/5/20     Historical Provider, MD   diclofenac (VOLTAREN) 75 MG EC tablet Take 75 mg by mouth 2 times daily       Historical Provider, MD   metoprolol succinate (TOPROL XL) 50 MG extended release tablet Take 50 mg by mouth daily 4/25/19 per direction of Dr. Clarita Tran pt to take 1/2 tab daily.       Historical Provider, MD   Magnesium Oxide 250 MG TABS Take by mouth daily       Historical Provider, MD        Social History   Social History            Socioeconomic History    Marital status: Legally        Spouse name: None    Number of children: 1    Years of education: 12th grade + some college    Highest education level: None   Occupational History    None   Social Needs    Financial resource strain: None    Food insecurity       Worry: None       Inability: None    Transportation needs       Medical: None       Non-medical: None   Tobacco Use    Smoking status: Current Every Day Smoker       Packs/day: 0.75    Smokeless tobacco: Never Used   Substance and Sexual Activity    Alcohol use: Not Currently       Comment: history of abuse, last drink was early December, 2018    Drug use: Not Currently       Types: Marijuana       Comment: last use was summer, 2017    Sexual activity: Not Currently   Lifestyle    Physical activity       Days per week: None       Minutes per session: None    Stress: None   Relationships    Social connections       Talks on phone: Once a week       Gets together: Three times a week       Attends Scientologist service: Never       Active member of club or organization: No       Attends meetings of clubs or organizations: Never       Relationship status:     Intimate partner violence       Fear of current or ex partner: No       Emotionally abused: No       Physically abused:  No       Forced sexual activity: No   Other Topics Concern    None   Social History Narrative     PRIOR MEDICATION TRIALS     Trazodone (having more suicidal dreams), at 100mg, not working for sleep     Seroquel (wt gain, 14 lb in 3 months, enuresis, indigestion, abnormal blood work, increased blood sugar)     Duloxetine (has not been effective and no noticeable change even with dose increases to 90mg)     Paxil, 20mg     Wellbutrin XL, (tried it recently to quit smoking)     Prozac (made her numb, added to her eating disorder)     Zoloft (thought she did well on this, took for a couple of years, she stopped it because when she returned to [facility] the doctor put her back on Prozac)     Remeron (increased her dreams and panic attacks)     Lunesta (worked)     Librium (in 2018 for 15 days to stop drinking alcohol)     Risperdal-stopped on 7/6/20 due to weight gain     Doxepin, started on 9/22/20 for sleep     Prazosin, ineffective for dreams/nightmares           Psychiatric Review of Systems, 3/19/19           Mood:  Sadness, tearfulness, low appetite, low concentration, low energy, loss of interest, denies suicidality today       (Depression: sadness, tearfulness, sleep, appetite, energy, concentration, sexual function, guilt, psychomotor agitation or slowing, interest, suicidality)           Julia: She says that there have been periods of time when she could go 3 days without sleep, she does say that there have been days in a row when she has done reckless, impulsive things       (impulsivity, grandiosity, recklessness, excessive energy, decreased need for sleep, increased spending beyond means, hyperverbal, grandiose, racing thoughts, hypersexuality)           Other: anger from being tired all the time       (Irritability, lability, anger)           Anxiety:  She says that she worries every night about sleeping and panic attacks. She says that she worries about her neighbors. She says that they always want something from her. She worries about running into them. She says that this causes panic attacks. She says that there is a lot of drug use in her neighborhood and she is fearful of her neighbors.       (Generalized anxiety: where, when, who, how long, how frequent)           Panic Disorder symptoms: She says that she is having panic attacks at night, 1 at night (this has improved from 3 weeks ago before she started Trazodone when she was having 2-3 at night). She says that she wakes up with dreams of her family and with her anxiety about [place].       (Palpitations, racing heart beat, sweating, sense of impending doom, fear of recurrence, shortness of breath)           OCD symptoms:  She gets flustered if things are not in a routine, is ritualized about the way she dresses, and the way she laces her shoes       (checking, cleaning, organizing, rituals, hang-ups, obsessive thoughts, counting, rational vs. Irrational beliefs)           PTSD symptoms:  Increased startle response, wakes up with dreams at night, she also says she has flashbacks during the day.       (nightmares, flashbacks, startle respoinse, avoidance)           Social anxiety symptoms:  Negative           Simple phobias:   Heights, claustrophobia, snakes       (heights, planes, spiders, etc.)           Psychosis: She reports hearing and seeing things that aren't there, sees animals out her window that really aren't there. She says this happens almost every day. She says that she has never seen things when she wasn't depressed.       (hallucinations, auditory, visual, tactile, olfactory)           Paranoia: Feels that people are watching her most of the time. She thinks this feeling is both irrational and rational.           Delusions:  Negative       (TV, radio, thought broadcasting, mind control, referential thinking)           Patient's perception: Negative       (Spiritual or cultural context of symptoms, reality testing)           ADHD symptoms: Negative           Eating Disorder symptoms:  Positive, lives almost entirely on whole milk, sometimes drinking ensure, she says that in the last month she has only eaten 3 meals, has occasionally eaten a bagel with a plain piece of bread.       (binging, purging, excessive exercising)           MSE:  Patient is  A & O x 4. Appearance:  SAVANAH. Cognition:  Recent memory intact , remote memory intact , good fund of knowledge, average  intelligence level. Speech:  normal  Language: Naming: intact; Word Finding: intact  Conversation no evidence of delusions  Behavior:  Cooperative  Mood: \"melancholy\" (she thinks this is from not sleeping well)  Affect: SAVANAH  Thought Content: negative delusions, negative hallucinations, negative obsessions,  negativehomicidal and negative suicidal   Thought Process: linear, goal directed and coherent (having trouble keeping thoughts together, probably due to lack of sleep)  Associations: logical connections  Attention Span and concentration: Impaired (probably due to lack of sleep)  Judgement Insight:  normal and appropriate  Gait and Station: SAVANAH   Sleep: avg 2-3 hrs, wakes up, then up for an hour, then back to sleep, but the rest of the sleep is restless, maybe only 2 additional hrs    Appetite: ok     COGNITION SCREEN:     Can spell the word, \"world,\" backwards: Yes  Can do serial 7's:  Yes              Lab Results   Component Value Date      (L) 03/10/2020     K 4.7 03/10/2020     CL 89 (L) 03/10/2020     CO2 18 (L) 03/10/2020     BUN 19 03/10/2020     CREATININE 0.9 03/10/2020     GLUCOSE 107 03/10/2020     CALCIUM 9.1 03/10/2020     PROT 8.6 11/10/2015     LABALBU 5.0 11/10/2015     ALKPHOS 127 (H) 11/10/2015     AST 33 (H) 11/10/2015     ALT 22 11/10/2015     LABGLOM >60 03/10/2020            Lab Results   Component Value Date      03/10/2020     K 4.7 03/10/2020     CL 89 03/10/2020     CO2 18 03/10/2020     BUN 19 03/10/2020     CREATININE 0.9 03/10/2020     GLUCOSE 107 03/10/2020     CALCIUM 9.1 03/10/2020      No results found for: CHOL  No results found for: TRIG  No results found for: HDL  No results found for: LDLCHOLESTEROL, LDLCALC  No results found for: LABVLDL, VLDL  No results found for: CHOLHDLRATIO  No results found for: LABA1C  No results found for: EAG        Lab Results   Component Value Date     TSH 2.40 02/03/2015            Lab Results   Component Value Date     VITD25 27.6 (L) 02/03/2015         Assessments Administered:  PHQ9: 7, mild  GAD7: 6, mild     Assessment:    1. Bipolar 2 disorder ([facility] [address]    2. Generalized anxiety disorder    3. Insomnia due to other mental disorder    4. PTSD (post-traumatic stress disorder)          Plan:  Continue:  Cymbalta (Duloxetine), 60mg + 30mg, daily (you can take the 60mg of Cymbalta in the morning and 30mg with the evening meal, not too much past 5pm) It can interfere with sleep. Lamictal, 150mg,  daily  Eszopiclone (Lunesta), 3mg, nightly for sleep  Gabapentin, 300mg, take 2 capsules nightly (restless legs)     Start: Doxepin, 10mg, take 1-2 capsules as needed for sleep     Stop: Trazodone (due to having more dreams)     Continue therapy with Hai Hobbs for therapy     Follow up: Return in about 3 months (around 12/22/2020).     1. The risks, benefits, side effects, indications, contraindications, and adverse effects of the medications have been discussed.  Yes.  2. The pt has verbalized understanding and has capacity to give informed consent. 3. The [facility] report has been reviewed according to [facility] regulations. 4. Supportive therapy offered. 5. Follow up:    Return in about 3 months (around 12/22/2020). 6. The patient has been advised to call with any problems. 7. Controlled substance Treatment Plan: new medication for sleep (eszopiclone). 8. The above listed medications have been continued, modifications in meds and other orders/labs as follows:                 Encounter Medications         Orders Placed This Encounter   Medications    DULoxetine (CYMBALTA) 30 MG extended release capsule       Sig: Take 1 capsule by mouth Daily with supper       Dispense:  90 capsule       Refill:  1    DULoxetine (CYMBALTA) 60 MG extended release capsule       Sig: Take 1 capsule by mouth daily       Dispense:  90 capsule       Refill:  1    eszopiclone (LUNESTA) 3 MG TABS       Sig: Take 1 tablet by mouth nightly for 30 days. (for sleep)       Dispense:  30 tablet       Refill:  0    lamoTRIgine (LAMICTAL) 150 MG tablet       Sig: Take 1 tablet by mouth daily       Dispense:  90 tablet       Refill:  1       Fill as her insurance allows.  doxepin (SINEQUAN) 10 MG capsule       Sig: Take 1-2 capsules by mouth nightly as needed for sleep.       Dispense:  60 capsule       Refill:  3                    No orders of the defined types were placed in this encounter.        9. Additional comments: She reports that her dreams have worsened. She also reports that since stopping Risperdal she is not sleeping as well, but doesn't want to restart it because she has lost 10 lbs since stopping it. Will try Doxepin for sleep. Otherwise she is doing well on her medications. Will make no other changes. She continues to see Francisco Connor for therapy. Will follow up in about 3 months.  Patient was informed that this provider will be out of the office during the month of October and the first couple of weeks of November but that there will be coverage in the case of issues that need to be addressed. The patient verbalized understanding.     10. Over 50% of the total visit time of  15  minutes was spent on counseling and/or coordination of care of:                         1. Bipolar 2 disorder ([facility] [address]    2. Generalized anxiety disorder    3. Insomnia due to other mental disorder    4.  PTSD (post-traumatic stress disorder)                        Psychotherapy Topics: mood/medication effectiveness         Radha Thompson [facility]

## 2020-10-27 ENCOUNTER — TELEPHONE (OUTPATIENT)
Dept: PSYCHIATRY | Age: 55
End: 2020-10-27

## 2020-11-20 NOTE — TELEPHONE ENCOUNTER
Pharmacy sent med refill request below. Xiomy Joy under media-on other controlled med. Last office visit : 9/22/2020 with Jerome SCANLON  Next office visit : 1/8/2021 with Jerome SCANLON    Requested Prescriptions     Pending Prescriptions Disp Refills    gabapentin (NEURONTIN) 300 MG capsule [Pharmacy Med Name: GABAPENTIN 300 MG CAPSULE] 60 capsule 0     Sig: TAKE 2 CAPSULES BY MOUTH EVERY DAY AT Marsha Shelton RN        9/22/2020 7:31 PM                             Progress Note     IN:  0945  OUT: 1000        Dorene Rojas 1965                           Chief Complaint   Patient presents with    Follow-up    Anxiety    Depression    Insomnia            Subjective:  Patient is a 54 y.o. female diagnosed with Bipolar 2 Disorder and presents today for follow-up. Last seen in clinic on 7/6/20 and prior records were reviewed.     Today patient states, \"Oh, good. \" She reports that since she stopped Risperidone she is not sleeping as well. She doesn't want to restart it because she has lost weight and feels better physically. She reports that she has lost 10 lbs.     Patient reports side effects as follows: none. No evidence of EPS, no cogwheeling or abnormal motor movements.     Absent  suicidal ideation.   Reports compliance with medications as good .      Current Substance Use:  See history     BP: There were no vitals taken for this visit.        Review of Systems - 14 point review:  Negative except being treated for:  depression, anxiety, panic attacks, suicidal dreams, weight gain, right fractured ankle with walking boot, enuresis, increased liver enzymes (being treated with spirolactone)        Constitutional: (fevers, chills, night sweats, wt loss/gain, change in appetite, fatigue, somnolence)     HEENT: (ear pain or discharge, hearing loss, ear ringing, sinus pressure, nosebleed, nasal discharge, sore throat, oral sores, tooth pain, bleeding gums, hoarse voice, neck pain) Cardiovascular: (HTN, chest pain, elevated cholesterol/lipids, palpitations, leg swelling, leg pain with walking)     Respiratory: (cough, wheezing, snoring, SOB with activity (dyspnea), SOB while lying flat (orthopnea), awakening with severe SOB (paroxysmal nocturnal dyspnea))     Gastrointestinal: (NVD, constipation, abdominal pain, bright red stools, black tarry stools, stool incontinence)     Genitourinary:  (pelvic pain, burning or frequency of urination, urinary urgency, blood in urine incomplete bladder emptying, urinary incontinence, STD; MEN: testicular pain or swelling, erectile dysfunction; WOMEN: LMP, heavy menstrual bleeding (menorrhagia), irregular periods, postmenopausal bleeding, menstrual pain (dymenorrhea, vaginal discharge)     Musculoskeletal: (bone pain/fracture, joint pain or swelling, musle pain)     Integumentary: (rashes, acne, non-healing sores, itching, breast lumps, breast pain, nipple discharge, hair loss)     Neurologic: (HA, muscle weakness, paresthesias (numbness, coldness, crawling or prickling), memory loss, seizure, dizziness)     Psychiatric:  (anxiety, sadness, irritability/anger, insomnia, suicidality)     Endocrine: (heat or cold intolerance, excessive thirst (polydipsia), excessive hunger (polyphagia))     Immune/Allergic: (hives, seasonal or environmental allergies, HIV exposure)     Hematologic/Lymphatic: (lymph node enlargement, easy bleeding or bruising)     History obtained via chart review and patient     PCP is Jose Luis Mathew. Terri Pham DO, DO         Current Meds:     Home Medications           Prior to Admission medications    Medication Sig Start Date End Date Taking?  Authorizing Provider   DULoxetine (CYMBALTA) 30 MG extended release capsule Take 1 capsule by mouth Daily with supper 9/22/20   Yes CAPO Levi NP   DULoxetine (CYMBALTA) 60 MG extended release capsule Take 1 capsule by mouth daily 9/22/20   Yes CAPO Levi NP   eszopiclone Vandana Benedr) 3 MG TABS Take 1 tablet by mouth nightly for 30 days. (for sleep) 9/22/20 10/22/20 Yes CAPO Abreu NP   lamoTRIgine (LAMICTAL) 150 MG tablet Take 1 tablet by mouth daily 9/22/20   Yes CAPO Abreu - NP   doxepin (SINEQUAN) 10 MG capsule Take 1-2 capsules by mouth nightly as needed for sleep. 9/22/20   Yes CAPO Abreu - NP   gabapentin (NEURONTIN) 300 MG capsule TAKE 2 CAPSULES BY MOUTH NIGHTLY 9/17/20 10/16/20   CAPO Abreu - NP   oxybutynin (DITROPAN) 5 MG tablet TAKE 1 TABLET BY MOUTH EVERY DAY AT NIGHT 7/28/20     CAPO Cordero   spironolactone (ALDACTONE) 25 MG tablet   3/5/20     Historical Provider, MD   diclofenac (VOLTAREN) 75 MG EC tablet Take 75 mg by mouth 2 times daily       Historical Provider, MD   metoprolol succinate (TOPROL XL) 50 MG extended release tablet Take 50 mg by mouth daily 4/25/19 per direction of Dr. Wiggins Martin pt to take 1/2 tab daily.       Historical Provider, MD   Magnesium Oxide 250 MG TABS Take by mouth daily       Historical Provider, MD        Social History   Social History            Socioeconomic History    Marital status: Legally        Spouse name: None    Number of children: 1    Years of education: 12th grade + some college    Highest education level: None   Occupational History    None   Social Needs    Financial resource strain: None    Food insecurity       Worry: None       Inability: None    Transportation needs       Medical: None       Non-medical: None   Tobacco Use    Smoking status: Current Every Day Smoker       Packs/day: 0.75    Smokeless tobacco: Never Used   Substance and Sexual Activity    Alcohol use: Not Currently       Comment: history of abuse, last drink was early December, 2018    Drug use: Not Currently       Types: Marijuana       Comment: last use was summer, 2017    Sexual activity: Not Currently   Lifestyle    Physical activity       Days per week: None       Minutes per session: None    Stress: None   Relationships    Social connections       Talks on phone: Once a week       Gets together: Three times a week       Attends [employer] service: Never       Active member of club or organization: No       Attends meetings of clubs or organizations: Never       Relationship status:     Intimate partner violence       Fear of current or ex partner: No       Emotionally abused: No       Physically abused:  No       Forced sexual activity: No   Other Topics Concern    None   Social History Narrative     PRIOR MEDICATION TRIALS     Trazodone (having more suicidal dreams), at 100mg, not working for sleep     Seroquel (wt gain, 14 lb in 3 months, enuresis, indigestion, abnormal blood work, increased blood sugar)     Duloxetine (has not been effective and no noticeable change even with dose increases to 90mg)     Paxil, 20mg     Wellbutrin XL, (tried it recently to quit smoking)     Prozac (made her numb, added to her eating disorder)     Zoloft (thought she did well on this, took for a couple of years, she stopped it because when she returned to [facility] the doctor put her back on Prozac)     Remeron (increased her dreams and panic attacks)     Lunesta (worked)     Librium (in 2018 for 15 days to stop drinking alcohol)     Risperdal-stopped on 7/6/20 due to weight gain     Doxepin, started on 9/22/20 for sleep     Prazosin, ineffective for dreams/nightmares           Psychiatric Review of Systems, 3/19/19           Mood:  Sadness, tearfulness, low appetite, low concentration, low energy, loss of interest, denies suicidality today       (Depression: sadness, tearfulness, sleep, appetite, energy, concentration, sexual function, guilt, psychomotor agitation or slowing, interest, suicidality)           Julia: She says that there have been periods of time when she could go 3 days without sleep, she does say that there have been days in a row when she has done reckless, impulsive things       (impulsivity, grandiosity, recklessness, excessive energy, decreased need for sleep, increased spending beyond means, hyperverbal, grandiose, racing thoughts, hypersexuality)           Other: anger from being tired all the time       (Irritability, lability, anger)           Anxiety:  She says that she worries every night about sleeping and panic attacks. She says that she worries about her neighbors. She says that they always want something from her. She worries about running into them. She says that this causes panic attacks. She says that there is a lot of drug use in her neighborhood and she is fearful of her neighbors.       (Generalized anxiety: where, when, who, how long, how frequent)           Panic Disorder symptoms: She says that she is having panic attacks at night, 1 at night (this has improved from 3 weeks ago before she started Trazodone when she was having 2-3 at night). She says that she wakes up with dreams of her family and with her anxiety about [place] park.       (Palpitations, racing heart beat, sweating, sense of impending doom, fear of recurrence, shortness of breath)           OCD symptoms:  She gets flustered if things are not in a routine, is ritualized about the way she dresses, and the way she laces her shoes       (checking, cleaning, organizing, rituals, hang-ups, obsessive thoughts, counting, rational vs. Irrational beliefs)           PTSD symptoms:  Increased startle response, wakes up with dreams at night, she also says she has flashbacks during the day.       (nightmares, flashbacks, startle respoinse, avoidance)           Social anxiety symptoms:  Negative           Simple phobias:   Heights, claustrophobia, snakes       (heights, planes, spiders, etc.)           Psychosis: She reports hearing and seeing things that aren't there, sees animals out her window that really aren't there. She says this happens almost every day. She says that she has never seen things when she wasn't depressed.     (hallucinations, auditory, visual, tactile, olfactory)           Paranoia: Feels that people are watching her most of the time. She thinks this feeling is both irrational and rational.           Delusions:  Negative       (TV, radio, thought broadcasting, mind control, referential thinking)           Patient's perception: Negative       (Spiritual or cultural context of symptoms, reality testing)           ADHD symptoms: Negative           Eating Disorder symptoms:  Positive, lives almost entirely on whole milk, sometimes drinking ensure, she says that in the last month she has only eaten 3 meals, has occasionally eaten a bagel with a plain piece of bread.       (binging, purging, excessive exercising)           MSE:  Patient is  A & O x 4. Appearance:  SAVANAH. Cognition:  Recent memory intact , remote memory intact , good fund of knowledge, average  intelligence level. Speech:  normal  Language: Naming: intact; Word Finding: intact  Conversation no evidence of delusions  Behavior:  Cooperative  Mood: \"melancholy\" (she thinks this is from not sleeping well)  Affect: SAVANAH  Thought Content: negative delusions, negative hallucinations, negative obsessions,  negativehomicidal and negative suicidal   Thought Process: linear, goal directed and coherent (having trouble keeping thoughts together, probably due to lack of sleep)  Associations: logical connections  Attention Span and concentration: Impaired (probably due to lack of sleep)  Judgement Insight:  normal and appropriate  Gait and Station: SAVANAH   Sleep: avg 2-3 hrs, wakes up, then up for an hour, then back to sleep, but the rest of the sleep is restless, maybe only 2 additional hrs    Appetite: ok     COGNITION SCREEN:     Can spell the word, \"world,\" backwards: Yes  Can do serial 7's:  Yes              Lab Results   Component Value Date      (L) 03/10/2020     K 4.7 03/10/2020     CL 89 (L) 03/10/2020     CO2 18 (L) 03/10/2020     BUN 19 03/10/2020     CREATININE 0.9 03/10/2020     GLUCOSE 107 03/10/2020     CALCIUM 9.1 03/10/2020     PROT 8.6 11/10/2015     LABALBU 5.0 11/10/2015     ALKPHOS 127 (H) 11/10/2015     AST 33 (H) 11/10/2015     ALT 22 11/10/2015     LABGLOM >60 03/10/2020            Lab Results   Component Value Date      03/10/2020     K 4.7 03/10/2020     CL 89 03/10/2020     CO2 18 03/10/2020     BUN 19 03/10/2020     CREATININE 0.9 03/10/2020     GLUCOSE 107 03/10/2020     CALCIUM 9.1 03/10/2020      No results found for: CHOL  No results found for: TRIG  No results found for: HDL  No results found for: LDLCHOLESTEROL, LDLCALC  No results found for: LABVLDL, VLDL  No results found for: CHOLHDLRATIO  No results found for: LABA1C  No results found for: EAG        Lab Results   Component Value Date     TSH 2.40 02/03/2015            Lab Results   Component Value Date     VITD25 27.6 (L) 02/03/2015         Assessments Administered:  PHQ9: 7, mild  GAD7: 6, mild     Assessment:    1. Bipolar 2 disorder ([facility] [address]    2. Generalized anxiety disorder    3. Insomnia due to other mental disorder    4. PTSD (post-traumatic stress disorder)          Plan:  Continue:  Cymbalta (Duloxetine), 60mg + 30mg, daily (you can take the 60mg of Cymbalta in the morning and 30mg with the evening meal, not too much past 5pm) It can interfere with sleep. Lamictal, 150mg,  daily  Eszopiclone (Lunesta), 3mg, nightly for sleep  Gabapentin, 300mg, take 2 capsules nightly (restless legs)     Start: Doxepin, 10mg, take 1-2 capsules as needed for sleep     Stop: Trazodone (due to having more dreams)     Continue therapy with Anahi Ayers for therapy     Follow up: Return in about 3 months (around 12/22/2020).     1. The risks, benefits, side effects, indications, contraindications, and adverse effects of the medications have been discussed. Yes.  2. The pt has verbalized understanding and has capacity to give informed consent.   3. The Margarito Frankel report has been reviewed according to HB1 regulations. 4. Supportive therapy offered. 5. Follow up:    Return in about 3 months (around 12/22/2020). 6. The patient has been advised to call with any problems. 7. Controlled substance Treatment Plan: new medication for sleep (eszopiclone). 8. The above listed medications have been continued, modifications in meds and other orders/labs as follows:                 Encounter Medications         Orders Placed This Encounter   Medications    DULoxetine (CYMBALTA) 30 MG extended release capsule       Sig: Take 1 capsule by mouth Daily with supper       Dispense:  90 capsule       Refill:  1    DULoxetine (CYMBALTA) 60 MG extended release capsule       Sig: Take 1 capsule by mouth daily       Dispense:  90 capsule       Refill:  1    eszopiclone (LUNESTA) 3 MG TABS       Sig: Take 1 tablet by mouth nightly for 30 days. (for sleep)       Dispense:  30 tablet       Refill:  0    lamoTRIgine (LAMICTAL) 150 MG tablet       Sig: Take 1 tablet by mouth daily       Dispense:  90 tablet       Refill:  1       Fill as her insurance allows.  doxepin (SINEQUAN) 10 MG capsule       Sig: Take 1-2 capsules by mouth nightly as needed for sleep.       Dispense:  60 capsule       Refill:  3                    No orders of the defined types were placed in this encounter.        9. Additional comments: She reports that her dreams have worsened. She also reports that since stopping Risperdal she is not sleeping as well, but doesn't want to restart it because she has lost 10 lbs since stopping it. Will try Doxepin for sleep. Otherwise she is doing well on her medications. Will make no other changes. She continues to see Chris Gardner for therapy. Will follow up in about 3 months. Patient was informed that this provider will be out of the office during the month of October and the first couple of weeks of November but that there will be coverage in the case of issues that need to be addressed.  The patient verbalized understanding.     10. Over 50% of the total visit time of  15  minutes was spent on counseling and/or coordination of care of:                         1. Bipolar 2 disorder ([facility] [address]    2. Generalized anxiety disorder    3. Insomnia due to other mental disorder    4.  PTSD (post-traumatic stress disorder)                        Psychotherapy Topics: mood/medication effectiveness         Selwyn Paiz PMHNP-BC

## 2020-11-23 RX ORDER — GABAPENTIN 300 MG/1
CAPSULE ORAL
Qty: 60 CAPSULE | Refills: 0 | Status: SHIPPED | OUTPATIENT
Start: 2020-11-23 | End: 2020-12-23 | Stop reason: SDUPTHER

## 2020-11-24 ENCOUNTER — TELEPHONE (OUTPATIENT)
Dept: PSYCHIATRY | Age: 55
End: 2020-11-24

## 2020-11-24 RX ORDER — ESZOPICLONE 3 MG/1
3 TABLET, FILM COATED ORAL NIGHTLY PRN
Qty: 30 TABLET | Refills: 0 | Status: SHIPPED | OUTPATIENT
Start: 2020-11-24 | End: 2020-12-23 | Stop reason: SDUPTHER

## 2020-11-24 NOTE — TELEPHONE ENCOUNTER
Gabriela Shant called to request a refill on her medication. Tra under [place]. Last office visit : 9/22/2020 with Anitha SCANLON  Next office visit : 1/8/2021 with Anitha SCANLON    Requested Prescriptions     Pending Prescriptions Disp Refills    eszopiclone (ESZOPICLONE) 3 MG TABS 30 tablet 0     Sig: Take 1 tablet by mouth nightly as needed (for sleep) for up to 30 days. Lizeth Narayan RN          9/22/2020 7:31 PM                             Progress Note     IN:  0945  OUT: 1000        Dorene DEJON Rojas 1965                           Chief Complaint   Patient presents with    Follow-up    Anxiety    Depression    Insomnia            Subjective:  Patient is a 54 y.o. female diagnosed with Bipolar 2 Disorder and presents today for follow-up. Last seen in clinic on 7/6/20 and prior records were reviewed.     Today patient states, \"Oh, good. \" She reports that since she stopped Risperidone she is not sleeping as well. She doesn't want to restart it because she has lost weight and feels better physically. She reports that she has lost 10 lbs.     Patient reports side effects as follows: none. No evidence of EPS, no cogwheeling or abnormal motor movements.     Absent  suicidal ideation.   Reports compliance with medications as good .      Current Substance Use:  See history     BP: There were no vitals taken for this visit.        Review of Systems - 14 point review:  Negative except being treated for:  depression, anxiety, panic attacks, suicidal dreams, weight gain, right fractured ankle with walking boot, enuresis, increased liver enzymes (being treated with spirolactone)        Constitutional: (fevers, chills, night sweats, wt loss/gain, change in appetite, fatigue, somnolence)     HEENT: (ear pain or discharge, hearing loss, ear ringing, sinus pressure, nosebleed, nasal discharge, sore throat, oral sores, tooth pain, bleeding gums, hoarse voice, neck pain)      Cardiovascular: (HTN, chest pain, elevated cholesterol/lipids, palpitations, leg swelling, leg pain with walking)     Respiratory: (cough, wheezing, snoring, SOB with activity (dyspnea), SOB while lying flat (orthopnea), awakening with severe SOB (paroxysmal nocturnal dyspnea))     Gastrointestinal: (NVD, constipation, abdominal pain, bright red stools, black tarry stools, stool incontinence)     Genitourinary:  (pelvic pain, burning or frequency of urination, urinary urgency, blood in urine incomplete bladder emptying, urinary incontinence, STD; MEN: testicular pain or swelling, erectile dysfunction; WOMEN: LMP, heavy menstrual bleeding (menorrhagia), irregular periods, postmenopausal bleeding, menstrual pain (dymenorrhea, vaginal discharge)     Musculoskeletal: (bone pain/fracture, joint pain or swelling, musle pain)     Integumentary: (rashes, acne, non-healing sores, itching, breast lumps, breast pain, nipple discharge, hair loss)     Neurologic: (HA, muscle weakness, paresthesias (numbness, coldness, crawling or prickling), memory loss, seizure, dizziness)     Psychiatric:  (anxiety, sadness, irritability/anger, insomnia, suicidality)     Endocrine: (heat or cold intolerance, excessive thirst (polydipsia), excessive hunger (polyphagia))     Immune/Allergic: (hives, seasonal or environmental allergies, HIV exposure)     Hematologic/Lymphatic: (lymph node enlargement, easy bleeding or bruising)     History obtained via chart review and patient     PCP is Jamaal Castillo DO, DO         Current Meds:     Home Medications           Prior to Admission medications    Medication Sig Start Date End Date Taking?  Authorizing Provider   DULoxetine (CYMBALTA) 30 MG extended release capsule Take 1 capsule by mouth Daily with supper 9/22/20   Yes CAPO Zarate NP   DULoxetine (CYMBALTA) 60 MG extended release capsule Take 1 capsule by mouth daily 9/22/20   Yes CAPO Zarate NP   eszopiclone (LUNESTA) 3 MG TABS Take 1 tablet by mouth nightly for 30 days. (for sleep) 9/22/20 10/22/20 Yes CAPO Hamilton - NP   lamoTRIgine (LAMICTAL) 150 MG tablet Take 1 tablet by mouth daily 9/22/20   Yes CAPO Hamilton - NP   doxepin (SINEQUAN) 10 MG capsule Take 1-2 capsules by mouth nightly as needed for sleep. 9/22/20   Yes CAPO Hamilton - NP   gabapentin (NEURONTIN) 300 MG capsule TAKE 2 CAPSULES BY MOUTH NIGHTLY 9/17/20 10/16/20   Yuri Witt APRN - NP   oxybutynin (DITROPAN) 5 MG tablet TAKE 1 TABLET BY MOUTH EVERY DAY AT NIGHT 7/28/20     CAPO Lim   spironolactone (ALDACTONE) 25 MG tablet   3/5/20     Historical Provider, MD   diclofenac (VOLTAREN) 75 MG EC tablet Take 75 mg by mouth 2 times daily       Historical Provider, MD   metoprolol succinate (TOPROL XL) 50 MG extended release tablet Take 50 mg by mouth daily 4/25/19 per direction of Dr. Yisel Zee pt to take 1/2 tab daily.       Historical Provider, MD   Magnesium Oxide 250 MG TABS Take by mouth daily       Historical Provider, MD        Social History   Social History            Socioeconomic History    Marital status: Legally        Spouse name: None    Number of children: 1    Years of education: 12th grade + some college    Highest education level: None   Occupational History    None   Social Needs    Financial resource strain: None    Food insecurity       Worry: None       Inability: None    Transportation needs       Medical: None       Non-medical: None   Tobacco Use    Smoking status: Current Every Day Smoker       Packs/day: 0.75    Smokeless tobacco: Never Used   Substance and Sexual Activity    Alcohol use: Not Currently       Comment: history of abuse, last drink was early December, 2018    Drug use: Not Currently       Types: Marijuana       Comment: last use was summer, 2017    Sexual activity: Not Currently   Lifestyle    Physical activity       Days per week: None       Minutes per session: None    Stress: None   Relationships    Social connections       Talks on phone: Once a week       Gets together: Three times a week       Attends [employer] service: Never       Active member of club or organization: No       Attends meetings of clubs or organizations: Never       Relationship status:     Intimate partner violence       Fear of current or ex partner: No       Emotionally abused: No       Physically abused:  No       Forced sexual activity: No   Other Topics Concern    None   Social History Narrative     PRIOR MEDICATION TRIALS     Trazodone (having more suicidal dreams), at 100mg, not working for sleep     Seroquel (wt gain, 14 lb in 3 months, enuresis, indigestion, abnormal blood work, increased blood sugar)     Duloxetine (has not been effective and no noticeable change even with dose increases to 90mg)     Paxil, 20mg     Wellbutrin XL, (tried it recently to quit smoking)     Prozac (made her numb, added to her eating disorder)     Zoloft (thought she did well on this, took for a couple of years, she stopped it because when she returned to [facility] the doctor put her back on Prozac)     Remeron (increased her dreams and panic attacks)     Lunesta (worked)     Librium (in 2018 for 15 days to stop drinking alcohol)     Risperdal-stopped on 7/6/20 due to weight gain     Doxepin, started on 9/22/20 for sleep     Prazosin, ineffective for dreams/nightmares           Psychiatric Review of Systems, 3/19/19           Mood:  Sadness, tearfulness, low appetite, low concentration, low energy, loss of interest, denies suicidality today       (Depression: sadness, tearfulness, sleep, appetite, energy, concentration, sexual function, guilt, psychomotor agitation or slowing, interest, suicidality)           Julia: She says that there have been periods of time when she could go 3 days without sleep, she does say that there have been days in a row when she has done reckless, impulsive things       (impulsivity, grandiosity, recklessness, excessive energy, decreased need for sleep, increased spending beyond means, hyperverbal, grandiose, racing thoughts, hypersexuality)           Other: anger from being tired all the time       (Irritability, lability, anger)           Anxiety:  She says that she worries every night about sleeping and panic attacks. She says that she worries about her neighbors. She says that they always want something from her. She worries about running into them. She says that this causes panic attacks. She says that there is a lot of drug use in her neighborhood and she is fearful of her neighbors.       (Generalized anxiety: where, when, who, how long, how frequent)           Panic Disorder symptoms: She says that she is having panic attacks at night, 1 at night (this has improved from 3 weeks ago before she started Trazodone when she was having 2-3 at night). She says that she wakes up with dreams of her family and with her anxiety about [place] trailer park.       (Palpitations, racing heart beat, sweating, sense of impending doom, fear of recurrence, shortness of breath)           OCD symptoms:  She gets flustered if things are not in a routine, is ritualized about the way she dresses, and the way she laces her shoes       (checking, cleaning, organizing, rituals, hang-ups, obsessive thoughts, counting, rational vs. Irrational beliefs)           PTSD symptoms:  Increased startle response, wakes up with dreams at night, she also says she has flashbacks during the day.       (nightmares, flashbacks, startle respoinse, avoidance)           Social anxiety symptoms:  Negative           Simple phobias:   Heights, claustrophobia, snakes       (heights, planes, spiders, etc.)           Psychosis: She reports hearing and seeing things that aren't there, sees animals out her window that really aren't there. She says this happens almost every day.  She says that she has never seen things when she wasn't depressed.       (hallucinations, auditory, visual, tactile, olfactory)           Paranoia: Feels that people are watching her most of the time. She thinks this feeling is both irrational and rational.           Delusions:  Negative       (TV, radio, thought broadcasting, mind control, referential thinking)           Patient's perception: Negative       (Spiritual or cultural context of symptoms, reality testing)           ADHD symptoms: Negative           Eating Disorder symptoms:  Positive, lives almost entirely on whole milk, sometimes drinking ensure, she says that in the last month she has only eaten 3 meals, has occasionally eaten a bagel with a plain piece of bread.       (binging, purging, excessive exercising)           MSE:  Patient is  A & O x 4. Appearance:  SAVANAH. Cognition:  Recent memory intact , remote memory intact , good fund of knowledge, average  intelligence level. Speech:  normal  Language: Naming: intact; Word Finding: intact  Conversation no evidence of delusions  Behavior:  Cooperative  Mood: \"melancholy\" (she thinks this is from not sleeping well)  Affect: SAVANAH  Thought Content: negative delusions, negative hallucinations, negative obsessions,  negativehomicidal and negative suicidal   Thought Process: linear, goal directed and coherent (having trouble keeping thoughts together, probably due to lack of sleep)  Associations: logical connections  Attention Span and concentration: Impaired (probably due to lack of sleep)  Judgement Insight:  normal and appropriate  Gait and Station: SAVANAH   Sleep: avg 2-3 hrs, wakes up, then up for an hour, then back to sleep, but the rest of the sleep is restless, maybe only 2 additional hrs    Appetite: ok     COGNITION SCREEN:     Can spell the word, \"world,\" backwards: Yes  Can do serial 7's:  Yes              Lab Results   Component Value Date      (L) 03/10/2020     K 4.7 03/10/2020     CL 89 (L) 03/10/2020     CO2 18 (L) 03/10/2020     BUN 19 03/10/2020     CREATININE 0.9 03/10/2020     GLUCOSE 107 03/10/2020     CALCIUM 9.1 03/10/2020     PROT 8.6 11/10/2015     LABALBU 5.0 11/10/2015     ALKPHOS 127 (H) 11/10/2015     AST 33 (H) 11/10/2015     ALT 22 11/10/2015     LABGLOM >60 03/10/2020            Lab Results   Component Value Date      03/10/2020     K 4.7 03/10/2020     CL 89 03/10/2020     CO2 18 03/10/2020     BUN 19 03/10/2020     CREATININE 0.9 03/10/2020     GLUCOSE 107 03/10/2020     CALCIUM 9.1 03/10/2020      No results found for: CHOL  No results found for: TRIG  No results found for: HDL  No results found for: LDLCHOLESTEROL, LDLCALC  No results found for: LABVLDL, VLDL  No results found for: CHOLHDLRATIO  No results found for: LABA1C  No results found for: EAG        Lab Results   Component Value Date     TSH 2.40 02/03/2015            Lab Results   Component Value Date     VITD25 27.6 (L) 02/03/2015         Assessments Administered:  PHQ9: 7, mild  GAD7: 6, mild     Assessment:    1. Bipolar 2 disorder ([facility] [address]    2. Generalized anxiety disorder    3. Insomnia due to other mental disorder    4. PTSD (post-traumatic stress disorder)          Plan:  Continue:  Cymbalta (Duloxetine), 60mg + 30mg, daily (you can take the 60mg of Cymbalta in the morning and 30mg with the evening meal, not too much past 5pm) It can interfere with sleep. Lamictal, 150mg,  daily  Eszopiclone (Lunesta), 3mg, nightly for sleep  Gabapentin, 300mg, take 2 capsules nightly (restless legs)     Start: Doxepin, 10mg, take 1-2 capsules as needed for sleep     Stop: Trazodone (due to having more dreams)     Continue therapy with Ritu Reyes for therapy     Follow up: Return in about 3 months (around 12/22/2020).     1. The risks, benefits, side effects, indications, contraindications, and adverse effects of the medications have been discussed. Yes.  2. The pt has verbalized understanding and has capacity to give informed consent. 3. The Willi Montgomery report has been reviewed according to [facility] regulations.   4. Supportive therapy offered. 5. Follow up:    Return in about 3 months (around 12/22/2020). 6. The patient has been advised to call with any problems. 7. Controlled substance Treatment Plan: new medication for sleep (eszopiclone). 8. The above listed medications have been continued, modifications in meds and other orders/labs as follows:                 Encounter Medications         Orders Placed This Encounter   Medications    DULoxetine (CYMBALTA) 30 MG extended release capsule       Sig: Take 1 capsule by mouth Daily with supper       Dispense:  90 capsule       Refill:  1    DULoxetine (CYMBALTA) 60 MG extended release capsule       Sig: Take 1 capsule by mouth daily       Dispense:  90 capsule       Refill:  1    eszopiclone (LUNESTA) 3 MG TABS       Sig: Take 1 tablet by mouth nightly for 30 days. (for sleep)       Dispense:  30 tablet       Refill:  0    lamoTRIgine (LAMICTAL) 150 MG tablet       Sig: Take 1 tablet by mouth daily       Dispense:  90 tablet       Refill:  1       Fill as her insurance allows.  doxepin (SINEQUAN) 10 MG capsule       Sig: Take 1-2 capsules by mouth nightly as needed for sleep.       Dispense:  60 capsule       Refill:  3                    No orders of the defined types were placed in this encounter.        9. Additional comments: She reports that her dreams have worsened. She also reports that since stopping Risperdal she is not sleeping as well, but doesn't want to restart it because she has lost 10 lbs since stopping it. Will try Doxepin for sleep. Otherwise she is doing well on her medications. Will make no other changes. She continues to see Robbie Valenzuela for therapy. Will follow up in about 3 months. Patient was informed that this provider will be out of the office during the month of October and the first couple of weeks of November but that there will be coverage in the case of issues that need to be addressed. The patient verbalized understanding.     10. Over 50% of the total visit time of  15  minutes was spent on counseling and/or coordination of care of:                         1. Bipolar 2 disorder ([facility] [address]    2. Generalized anxiety disorder    3. Insomnia due to other mental disorder    4.  PTSD (post-traumatic stress disorder)                        Psychotherapy Topics: mood/medication effectiveness         Gerardo Lowe [facility]

## 2020-11-24 NOTE — TELEPHONE ENCOUNTER
VM left to inform pt that RX refill (Neurotin) had been sent to her pharmacy per [address]. Pt encouraged to call back with any questions.

## 2020-11-25 ENCOUNTER — TELEPHONE (OUTPATIENT)
Dept: PSYCHIATRY | Age: 55
End: 2020-11-25

## 2020-11-25 NOTE — TELEPHONE ENCOUNTER
VM left to inform pt that RX refill she requested Lopez Rodriguez) had been sent to her pharmacy per [facility].

## 2020-12-23 RX ORDER — ESZOPICLONE 3 MG/1
3 TABLET, FILM COATED ORAL NIGHTLY PRN
Qty: 30 TABLET | Refills: 0 | Status: SHIPPED | OUTPATIENT
Start: 2020-12-23 | End: 2021-01-22

## 2020-12-23 RX ORDER — GABAPENTIN 300 MG/1
CAPSULE ORAL
Qty: 60 CAPSULE | Refills: 0 | Status: SHIPPED | OUTPATIENT
Start: 2020-12-23 | End: 2021-01-27 | Stop reason: SDUPTHER
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
## 2020-12-28 ENCOUNTER — TELEPHONE (OUTPATIENT)
Dept: PSYCHIATRY | Age: 55
End: 2020-12-28

## 2020-12-28 NOTE — TELEPHONE ENCOUNTER
VM left to inform pt that [address] [place] [address] sent in the 2 RXs she requested on 12/23/2020 to her pharmacy. Pt encouraged to call back with any questions.

## 2021-01-08 ENCOUNTER — VIRTUAL VISIT (OUTPATIENT)
Dept: PSYCHIATRY | Age: 56
End: 2021-01-08
Payer: MEDICARE

## 2021-01-08 DIAGNOSIS — F41.1 GAD (GENERALIZED ANXIETY DISORDER): ICD-10-CM

## 2021-01-08 DIAGNOSIS — F99 INSOMNIA DUE TO OTHER MENTAL DISORDER: ICD-10-CM

## 2021-01-08 DIAGNOSIS — F51.05 INSOMNIA DUE TO OTHER MENTAL DISORDER: ICD-10-CM

## 2021-01-08 DIAGNOSIS — F31.81 MODERATE DEPRESSED BIPOLAR II DISORDER (HCC): Primary | ICD-10-CM

## 2021-01-08 DIAGNOSIS — F43.10 PTSD (POST-TRAUMATIC STRESS DISORDER): ICD-10-CM

## 2021-01-08 PROCEDURE — 99443 PR PHYS/QHP TELEPHONE EVALUATION 21-30 MIN: CPT | Performed by: NURSE PRACTITIONER

## 2021-01-08 ASSESSMENT — PATIENT HEALTH QUESTIONNAIRE - PHQ9
5. POOR APPETITE OR OVEREATING: 0
10. IF YOU CHECKED OFF ANY PROBLEMS, HOW DIFFICULT HAVE THESE PROBLEMS MADE IT FOR YOU TO DO YOUR WORK, TAKE CARE OF THINGS AT HOME, OR GET ALONG WITH OTHER PEOPLE: 3
1. LITTLE INTEREST OR PLEASURE IN DOING THINGS: 3
3. TROUBLE FALLING OR STAYING ASLEEP: 3
7. TROUBLE CONCENTRATING ON THINGS, SUCH AS READING THE NEWSPAPER OR WATCHING TELEVISION: 2
8. MOVING OR SPEAKING SO SLOWLY THAT OTHER PEOPLE COULD HAVE NOTICED. OR THE OPPOSITE, BEING SO FIGETY OR RESTLESS THAT YOU HAVE BEEN MOVING AROUND A LOT MORE THAN USUAL: 0
SUM OF ALL RESPONSES TO PHQ9 QUESTIONS 1 & 2: 6

## 2021-01-08 ASSESSMENT — ANXIETY QUESTIONNAIRES
7. FEELING AFRAID AS IF SOMETHING AWFUL MIGHT HAPPEN: 0-NOT AT ALL
3. WORRYING TOO MUCH ABOUT DIFFERENT THINGS: 2-OVER HALF THE DAYS
1. FEELING NERVOUS, ANXIOUS, OR ON EDGE: 2-OVER HALF THE DAYS
2. NOT BEING ABLE TO STOP OR CONTROL WORRYING: 1-SEVERAL DAYS
GAD7 TOTAL SCORE: 8
4. TROUBLE RELAXING: 0-NOT AT ALL

## 2021-01-08 ASSESSMENT — COLUMBIA-SUICIDE SEVERITY RATING SCALE - C-SSRS
6. HAVE YOU EVER DONE ANYTHING, STARTED TO DO ANYTHING, OR PREPARED TO DO ANYTHING TO END YOUR LIFE?: NO
2. HAVE YOU ACTUALLY HAD ANY THOUGHTS OF KILLING YOURSELF?: NO

## 2021-01-08 NOTE — PATIENT INSTRUCTIONS
Plan:  Continue:  Cymbalta (Duloxetine), 60mg + 30mg, daily (you can take the 60mg of Cymbalta in the morning and 30mg with the evening meal, not too much past 5pm) It can interfere with sleep. Lamictal, 150mg,  daily  Eszopiclone (Lunesta), 3mg, nightly for sleep  Gabapentin, 300mg, take 2 capsules nightly (restless legs)    Doxepin, 10mg, take 1-2 capsules as needed for sleep (has been taking 2 tabs for the last 1.5 mo, not effective for sleep)    Start:  Lurasidone, 20mg, evening, with the evening meal (350 calories)    Continue therapy with Sivan Guerin for therapy    Follow up: Return in about 2 weeks (around 1/22/2021). If you become suicidal, follow up with this office or call the [facility] at [phone] or dial [phone]. Patient Education        lurasidone  Pronunciation:  phuong ALMENDAREZ i done  Brand:  Latuda  What is the most important information I should know about lurasidone? Lurasidone is not approved for use in older adults with dementia-related psychosis. Some people have thoughts about suicide while taking lurasidone. Stay alert to changes in your mood or symptoms. Report any new or worsening symptoms to your doctor. Tell your doctor about all your current medicines and any you start or stop using. Many drugs can interact, and some drugs should not be used together. What is lurasidone? Lurasidone is an antipsychotic medicine that is used to treat schizophrenia in adults and teenagers who are at least 15years old. Lurasidone is also used to treat episodes of depression related to bipolar disorder (manic depression) in adults and children who are at least 8years old. Lurasidone may also be used for purposes not listed in this medication guide. What should I discuss with my healthcare provider before taking lurasidone? You should not use lurasidone if you are allergic to it. Many drugs can interact and cause dangerous effects. Some drugs should not be used together with lurasidone. Your doctor may change your treatment plan if you also use:  · antifungal medicine such as ketoconazole or voriconazole;  · an antibiotic such as clarithromycin or rifampin;  · an antiviral such as ritonavir;  · Mosier's wort; or  · seizure medicine such as carbamazepine or phenytoin. Lurasidone may increase the risk of death in older adults with dementia-related psychosis and is not approved for this use. Tell your doctor if you have ever had:  · heart disease or a stroke;  · high or low blood pressure;  · high cholesterol or triglycerides (a type of fat in the blood);  · diabetes or high blood sugar (in you or your family);  · a seizure;  · liver or kidney disease;  · low white blood cell (WBC) counts;  · abnormal hormone function tests (thyroid, pituitary gland);  · breast cancer; or  · suicidal thoughts or actions. Some people have thoughts about suicide while taking lurasidone. Your doctor will need to check your progress at regular visits. Your family or other caregivers should also be alert to changes in your mood or symptoms. Taking antipsychotic medicine in the last 3 months of pregnancy may cause breathing problems, feeding problems, or withdrawal symptoms in the . If you get pregnant, tell your doctor right away. Do not stop taking lurasidone without your doctor's advice. If you are pregnant, your name may be listed on a pregnancy registry to track the effects of lurasidone on the baby. It may not be safe to breastfeed a baby while you are using this medicine. Ask your doctor about any risks. Lurasidone is not approved for schizophrenia  in anyone younger than 15years old. Lurasidone is not approved for depression in anyone younger than 8years old. How should I take lurasidone? Follow all directions on your prescription label and read all medication guides or instruction sheets. Use the medicine exactly as directed. Lurasidone should be taken with food (at least 350 calories). You may need frequent blood tests. It may take several weeks before your symptoms improve. Keep using the medication as directed. Call your doctor if your symptoms do not improve, or if they get worse while using lurasidone. You should not stop using lurasidone suddenly. Stopping suddenly may cause other problems. It is easier to become dangerously overheated and dehydrated while you are taking lurasidone. Drink plenty of fluids, especially in hot weather and during exercise. You may also be more sensitive to temperature extremes (hot or cold). Store at room temperature away from moisture and heat. What happens if I miss a dose? Take the medicine as soon as you can, but skip the missed dose if it is almost time for your next dose. Do not take two doses at one time. Get your prescription refilled before you run out of medicine completely. What happens if I overdose? Seek emergency medical attention or call the Poison Help line at 1-920.983.8690. What should I avoid while taking lurasidone? Avoid drinking alcohol. Dangerous side effects could occur. Avoid driving or operating machinery until you know how this medicine will affect you. Avoid getting up too fast from a sitting or lying position, or you may feel dizzy. Dizziness or drowsiness can cause falls, accidents, or severe injuries. Grapefruit and grapefruit juice may interact with lurasidone and lead to unwanted side effects. Avoid the use of grapefruit products while taking lurasidone. What are the possible side effects of lurasidone? Get emergency medical help if you have signs of an allergic reaction: hives; difficulty breathing; swelling of your face, lips, tongue, or throat. Report any new or worsening symptoms to your doctor, such as: mood or behavior changes, anxiety, panic attacks, trouble sleeping, or if you feel impulsive, irritable, agitated, hostile, aggressive, restless, hyperactive (mentally or physically), more depressed, or have thoughts about suicide or hurting yourself. High doses or long-term use of lurasidone can cause a serious movement disorder that may not be reversible. The longer you use lurasidone, the more likely you are to develop this disorder, especially if you are a woman or an older adult. Call your doctor at once if you have:  · any new or unusual muscle movements you cannot control;  · a light-headed feeling, like you might pass out;  · a seizure (convulsions); · (in women) irregular menstrual periods, breast or vaginal changes, nipple discharge;  · (in men) breast swelling, impotence;  · trouble swallowing;  · manic episodes --racing thoughts, increased energy, decreased need for sleep, risk-taking behavior, being agitated or talkative;  · low white blood cell counts --fever, chills, mouth sores, skin sores, sore throat, cough, trouble breathing;  · high blood sugar --increased thirst, increased urination, hunger, dry mouth, fruity breath odor; or  · severe nervous system reaction --very stiff (rigid) muscles, high fever, sweating, confusion, fast or uneven heartbeats, tremors, feeling like you might pass out. Common side effects may include:  · drowsiness;  · weight gain;  · tremors, muscle stiffness, slow muscle movement;  · feeling restless or being unable to sit still;  · nausea, vomiting;  · runny nose; or  · sleep problems (insomnia). This is not a complete list of side effects and others may occur. Call your doctor for medical advice about side effects. You may report side effects to FDA at 2-949-FDA-2336. What other drugs will affect lurasidone? Using lurasidone with other drugs that slow your breathing can cause dangerous side effects or death. Ask your doctor before using opioid medication, a sleeping pill, a muscle relaxer, or medicine for anxiety or seizures. Tell your doctor about all your current medicines. Many drugs can affect lurasidone, especially:  · depression or psychotic episodes;  · sleep problems (insomnia);  · high blood pressure or a heart rhythm disorder;  · swelling or inflammation;  · seizures; or  · Parkinson's disease. This list is not complete and many other drugs may affect lurasidone. This includes prescription and over-the-counter medicines, vitamins, and herbal products. Not all possible drug interactions are listed here. Where can I get more information? Your pharmacist can provide more information about lurasidone. Remember, keep this and all other medicines out of the reach of children, never share your medicines with others, and use this medication only for the indication prescribed. Every effort has been made to ensure that the information provided by [address] is accurate, up-to-date, and complete, but no guarantee is made to that effect. Drug information contained herein may be time sensitive. [facility] information has been compiled for use by healthcare practitioners and consumers in the [place] and therefore [facility] does not warrant that uses outside of the [place] are appropriate, unless specifically indicated otherwise. [facility]'s drug information does not endorse drugs, diagnose patients or recommend therapy. [facility]'s drug information is an informational resource designed to assist licensed healthcare practitioners in caring for their patients and/or to serve consumers viewing this service as a supplement to, and not a substitute for, the expertise, skill, knowledge and judgment of healthcare practitioners. The absence of a warning for a given drug or drug combination in no way should be construed to indicate that the drug or drug combination is safe, effective or appropriate for any given patient. [facility] does not assume any responsibility for any aspect of healthcare administered with the aid of information [facility] provides. The information contained herein is not intended to cover all possible uses, directions, precautions, warnings, drug interactions, allergic reactions, or adverse effects. If you have questions about the drugs you are taking, check with your doctor, nurse or pharmacist.  Copyright 1409-9043 [address]. Version: 8.02. Revision date: 2/5/2020. Care instructions adapted under license by [facility] [facility] If you have questions about a medical condition or this instruction, always ask your healthcare professional. [place] [address] any warranty or liability for your use of this information.

## 2021-01-08 NOTE — PROGRESS NOTES
Dianna Marsh is a 64 y.o. female evaluated via telephone on 1/8/2021. Consent:  She and/or health care decision maker is aware that that she may receive a bill for this telephone service, depending on her insurance coverage, and has provided verbal consent to proceed: Yes      Documentation:  I communicated with the patient and/or health care decision maker about depression/anxiety/insomnia. Details of this discussion including any medical advice provided: see below      I affirm this is a Patient Initiated Episode with an Established Patient who has not had a related appointment within my department in the past 7 days or scheduled within the next 24 hours. Total Time: minutes: 21-30 minutes    Note: not billable if this call serves to triage the patient into an appointment for the relevant concern      Carlie Rose           1/8/2021 10:12 AM   Progress Note    IN:  [id number]  OUT: Christian [address] 1965      Chief Complaint   Patient presents with    Insomnia    Depression         Subjective:  Patient is a 64 y.o. female diagnosed with Bipolar 2 Disorder and presents today for follow-up. Last seen in clinic on 9/22/20 and prior records were reviewed. Today patient states, \"Not hot. Depression is really bad because I'm not sleeping at all. \" She reports that her dreams are horrible, she is not sleeping and that Doxepin has made everything worse. She reports a few panic attacks since the last visit which have happened at night. She reports that her dreams have changed to the topics of her ex's trying to get back together with her. Patient reports side effects as follows: none. No evidence of EPS, no cogwheeling or abnormal motor movements. Absent  suicidal ideation. Reports compliance with medications as good . Current Substance Use:  See history    BP: There were no vitals taken for this visit.       Review of Systems - 14 point review:  Negative except being treated for: depression, anxiety, panic attacks, suicidal dreams, weight gain, right fractured ankle with walking boot, enuresis, increased liver enzymes (being treated with spirolactone)      Constitutional: (fevers, chills, night sweats, wt loss/gain, change in appetite, fatigue, somnolence)    HEENT: (ear pain or discharge, hearing loss, ear ringing, sinus pressure, nosebleed, nasal discharge, sore throat, oral sores, tooth pain, bleeding gums, hoarse voice, neck pain)      Cardiovascular: (HTN, chest pain, elevated cholesterol/lipids, palpitations, leg swelling, leg pain with walking)    Respiratory: (cough, wheezing, snoring, SOB with activity (dyspnea), SOB while lying flat (orthopnea), awakening with severe SOB (paroxysmal nocturnal dyspnea))    Gastrointestinal: (NVD, constipation, abdominal pain, bright red stools, black tarry stools, stool incontinence)     Genitourinary:  (pelvic pain, burning or frequency of urination, urinary urgency, blood in urine incomplete bladder emptying, urinary incontinence, STD; MEN: testicular pain or swelling, erectile dysfunction; WOMEN: LMP, heavy menstrual bleeding (menorrhagia), irregular periods, postmenopausal bleeding, menstrual pain (dymenorrhea, vaginal discharge)    Musculoskeletal: (bone pain/fracture, joint pain or swelling, musle pain)    Integumentary: (rashes, acne, non-healing sores, itching, breast lumps, breast pain, nipple discharge, hair loss)    Neurologic: (HA, muscle weakness, paresthesias (numbness, coldness, crawling or prickling), memory loss, seizure, dizziness)    Psychiatric:  (anxiety, sadness, irritability/anger, insomnia, suicidality)    Endocrine: (heat or cold intolerance, excessive thirst (polydipsia), excessive hunger (polyphagia))    Immune/Allergic: (hives, seasonal or environmental allergies, HIV exposure)    Hematologic/Lymphatic: (lymph node enlargement, easy bleeding or bruising)    History obtained via chart review and patient    PCP is Mariana Sherine Abarca DO, DO       Current Meds:    Prior to Admission medications    Medication Sig Start Date End Date Taking? Authorizing Provider   lurasidone (LATUDA) 20 MG TABS tablet Take 1 tablet by mouth Daily with supper (350 calories for best absorption) 1/8/21  Yes CAPO Kang - NP   eszopiclone (ESZOPICLONE) 3 MG TABS Take 1 tablet by mouth nightly as needed (for sleep) for up to 30 days. 12/23/20 1/22/21  CAPO Kang - NP   gabapentin (NEURONTIN) 300 MG capsule TAKE 2 CAPSULES BY MOUTH EVERY DAY AT NIGHT 12/23/20 1/22/21  Joshua Mejias APRN - NP   DULoxetine (CYMBALTA) 30 MG extended release capsule Take 1 capsule by mouth Daily with supper 9/22/20   CAPO Kang - NP   DULoxetine (CYMBALTA) 60 MG extended release capsule Take 1 capsule by mouth daily 9/22/20   CAPO Kang - NP   lamoTRIgine (LAMICTAL) 150 MG tablet Take 1 tablet by mouth daily 9/22/20   Joshua Mejias APRN - NP   doxepin (SINEQUAN) 10 MG capsule Take 1-2 capsules by mouth nightly as needed for sleep. 9/22/20   CAPO Kang - NP   oxybutynin (DITROPAN) 5 MG tablet TAKE 1 TABLET BY MOUTH EVERY DAY AT NIGHT 7/28/20   Tania Closs, APRN   spironolactone (ALDACTONE) 25 MG tablet  3/5/20   Historical Provider, MD   diclofenac (VOLTAREN) 75 MG EC tablet Take 75 mg by mouth 2 times daily    Historical Provider, MD   metoprolol succinate (TOPROL XL) 50 MG extended release tablet Take 50 mg by mouth daily 4/25/19 per direction of Dr. Shaheed Loo pt to take 1/2 tab daily.     Historical Provider, MD   Magnesium Oxide 250 MG TABS Take by mouth daily    Historical Provider, MD     Social History     Socioeconomic History    Marital status: Legally      Spouse name: None    Number of children: 1    Years of education: 12th grade + some college    Highest education level: None   Occupational History    None   Social Needs    Financial resource strain: None    Food insecurity     Worry: None     Inability: None  Transportation needs     Medical: None     Non-medical: None   Tobacco Use    Smoking status: Current Every Day Smoker     Packs/day: 0.75    Smokeless tobacco: Never Used   Substance and Sexual Activity    Alcohol use: Not Currently     Comment: history of abuse, last drink was early December, 2018    Drug use: Not Currently     Types: Marijuana     Comment: last use was summer, 2017    Sexual activity: Not Currently   Lifestyle    Physical activity     Days per week: None     Minutes per session: None    Stress: None   Relationships    Social connections     Talks on phone: Once a week     Gets together:  Three times a week     Attends Rastafari service: Never     Active member of club or organization: No     Attends meetings of clubs or organizations: Never     Relationship status:     Intimate partner violence     Fear of current or ex partner: No     Emotionally abused: No     Physically abused: No     Forced sexual activity: No   Other Topics Concern    None   Social History Narrative    PRIOR MEDICATION TRIALS    Trazodone (having more suicidal dreams), at 100mg, not working for sleep    Seroquel (wt gain, 14 lb in 3 months, enuresis, indigestion, abnormal blood work, increased blood sugar)    Duloxetine (has not been effective and no noticeable change even with dose increases to 90mg)    Paxil, 20mg    Wellbutrin XL, (tried it recently to quit smoking)    Prozac (made her numb, added to her eating disorder)    Zoloft (thought she did well on this, took for a couple of years, she stopped it because when she returned to [facility] the doctor put her back on Prozac)    Remeron (increased her dreams and panic attacks)    Jabier Valadez (worked)    Librium (in 2018 for 15 days to stop drinking alcohol)    Risperdal-stopped on 7/6/20 due to weight gain    Doxepin, started on 9/22/20 for sleep (not effective)    Prazosin, ineffective for dreams/nightmares        Psychiatric Review of Systems, 3/19/19      Mood:  Sadness, tearfulness, low appetite, low concentration, low energy, loss of interest, denies suicidality today      (Depression: sadness, tearfulness, sleep, appetite, energy, concentration, sexual function, guilt, psychomotor agitation or slowing, interest, suicidality)         Julia: She says that there have been periods of time when she could go 3 days without sleep, she does say that there have been days in a row when she has done reckless, impulsive things      (impulsivity, grandiosity, recklessness, excessive energy, decreased need for sleep, increased spending beyond means, hyperverbal, grandiose, racing thoughts, hypersexuality)         Other: anger from being tired all the time      (Irritability, lability, anger)         Anxiety:  She says that she worries every night about sleeping and panic attacks. She says that she worries about her neighbors. She says that they always want something from her. She worries about running into them. She says that this causes panic attacks. She says that there is a lot of drug use in her neighborhood and she is fearful of her neighbors.      (Generalized anxiety: where, when, who, how long, how frequent)         Panic Disorder symptoms: She says that she is having panic attacks at night, 1 at night (this has improved from 3 weeks ago before she started Trazodone when she was having 2-3 at night).  She says that she wakes up with dreams of her family and with her anxiety about [place] trailer park.      (Palpitations, racing heart beat, sweating, sense of impending doom, fear of recurrence, shortness of breath)         OCD symptoms:  She gets flustered if things are not in a routine, is ritualized about the way she dresses, and the way she laces her shoes      (checking, cleaning, organizing, rituals, hang-ups, obsessive thoughts, counting, rational vs. Irrational beliefs)         PTSD symptoms:  Increased startle response, wakes up with dreams at night, she also says she has flashbacks during the day.      (nightmares, flashbacks, startle respoinse, avoidance)         Social anxiety symptoms:  Negative         Simple phobias:   Heights, claustrophobia, snakes      (heights, planes, spiders, etc.)         Psychosis: She reports hearing and seeing things that aren't there, sees animals out her window that really aren't there. She says this happens almost every day. She says that she has never seen things when she wasn't depressed.      (hallucinations, auditory, visual, tactile, olfactory)         Paranoia: Feels that people are watching her most of the time. She thinks this feeling is both irrational and rational.         Delusions:  Negative      (TV, radio, thought broadcasting, mind control, referential thinking)         Patient's perception: Negative      (Spiritual or cultural context of symptoms, reality testing)         ADHD symptoms: Negative         Eating Disorder symptoms:  Positive, lives almost entirely on whole milk, sometimes drinking ensure, she says that in the last month she has only eaten 3 meals, has occasionally eaten a bagel with a plain piece of bread.      (binging, purging, excessive exercising)       MSE:  Patient is  A & O x 4. Appearance:  SAVANAH. Cognition:  Recent memory intact , remote memory intact , good fund of knowledge, average  intelligence level. Speech:  normal  Language: Naming: intact;  Word Finding: intact  Conversation no evidence of delusions  Behavior:  Cooperative  Mood: \"tired, down\" (she thinks this is from not sleeping well)  Affect: SAVANAH  Thought Content: negative delusions, negative hallucinations, negative obsessions,  negativehomicidal and negative suicidal   Thought Process: linear, goal directed and coherent (having trouble keeping thoughts together, probably due to lack of sleep)  Associations: logical connections  Attention Span and concentration: Impaired (probably due to lack of sleep)  Judgement Insight:  normal and appropriate  Gait and Station: SAVANAH   Sleep: avg  2-3 hrs, has extreme difficulty falling asleep, sometimes takes several hours, wakes up, then up for an hour, then back to sleep, but the rest of the sleep is restless, maybe only 2 additional hrs    Appetite: ok    COGNITION SCREEN:    Can spell the word, \"world,\" backwards: Yes  Can do serial 7's: Yes      Lab Results   Component Value Date     (L) 03/10/2020    K 4.7 03/10/2020    CL 89 (L) 03/10/2020    CO2 18 (L) 03/10/2020    BUN 19 03/10/2020    CREATININE 0.9 03/10/2020    GLUCOSE 107 03/10/2020    CALCIUM 9.1 03/10/2020    PROT 8.6 11/10/2015    LABALBU 5.0 11/10/2015    ALKPHOS 127 (H) 11/10/2015    AST 33 (H) 11/10/2015    ALT 22 11/10/2015    LABGLOM >60 03/10/2020     Lab Results   Component Value Date     03/10/2020    K 4.7 03/10/2020    CL 89 03/10/2020    CO2 18 03/10/2020    BUN 19 03/10/2020    CREATININE 0.9 03/10/2020    GLUCOSE 107 03/10/2020    CALCIUM 9.1 03/10/2020      No results found for: CHOL  No results found for: TRIG  No results found for: HDL  No results found for: LDLCHOLESTEROL, LDLCALC  No results found for: LABVLDL, VLDL  No results found for: CHOLHDLRATIO  No results found for: LABA1C  No results found for: EAG  Lab Results   Component Value Date    TSH 2.40 02/03/2015     Lab Results   Component Value Date    VITD25 27.6 (L) 02/03/2015       Assessments Administered:  PHQ9: 14, moderate  GAD7: 8, mild    C-SSRS completed in [facility]    Assessment:   1. Moderate depressed bipolar II disorder ([address]    2. STEFFEN (generalized anxiety disorder)    3. PTSD (post-traumatic stress disorder)    4. Insomnia due to other mental disorder        Plan:  Continue:  Cymbalta (Duloxetine), 60mg + 30mg, daily (you can take the 60mg of Cymbalta in the morning and 30mg with the evening meal, not too much past 5pm) It can interfere with sleep.   Lamictal, 150mg,  daily  Eszopiclone (Lunesta), 3mg, nightly for sleep  Gabapentin, 300mg, take 2 capsules nightly (restless legs)    Doxepin, 10mg, take 1-2 capsules as needed for sleep (has been taking 2 tabs for the last 1.5 mo, not effective for sleep)    Start:  Lurasidone, 20mg, evening, with the evening meal (350 calories)    Continue therapy with Sivan Guerin for therapy    Follow up: Return in about 2 weeks (around 1/22/2021). If you become suicidal, follow up with this office or call the [facility] at [phone]), or dial [phone].    1. The risks, benefits, side effects, indications, contraindications, and adverse effects of the medications have been discussed. Yes.  2. The pt has verbalized understanding and has capacity to give informed consent. 3. The [place] report has been reviewed according to [facility] regulations. 4. Supportive therapy offered. 5. Follow up: Return in about 2 weeks (around 1/22/2021). 6. The patient has been advised to call with any problems. 7. Controlled substance Treatment Plan: new medication for sleep (eszopiclone). 8. The above listed medications have been continued, modifications in meds and other orders/labs as follows:      Orders Placed This Encounter   Medications    DISCONTD: lurasidone (LATUDA) 20 MG TABS tablet     Sig: Take 1 tablet by mouth daily     Dispense:  30 tablet     Refill:  1    lurasidone (LATUDA) 20 MG TABS tablet     Sig: Take 1 tablet by mouth Daily with supper (350 calories for best absorption)     Dispense:  30 tablet     Refill:  3      No orders of the defined types were placed in this encounter. 9. Additional comments: She reports increased depression and fatigue since she cannot sleep at night. She was doing very well on Risperdal but doesn't want to restart it because she gained weight and since being off of it has lost 15 lb. Will start Lurasidone and see if it helps. Will also consider stopping the evening dose of Duloxetine and restarting Trazodone.  Will not make these changes until we determine whether Lurasidone is effective. Will follow up in about 2 weeks. 10.Over 50% of the total visit time of 25 minutes was spent on counseling and/or coordination of care of:                        1. Moderate depressed bipolar II disorder ([facility] [address]    2. STEFFEN (generalized anxiety disorder)    3. PTSD (post-traumatic stress disorder)    4.  Insomnia due to other mental disorder                      Psychotherapy Topics: mood/medication effectiveness       Rosemary Garcia [facility]

## 2021-01-13 ENCOUNTER — TELEPHONE (OUTPATIENT)
Dept: PSYCHIATRY | Age: 56
End: 2021-01-13

## 2021-01-13 RX ORDER — DOXEPIN HYDROCHLORIDE 10 MG/1
CAPSULE ORAL
Qty: 60 CAPSULE | Refills: 3 | Status: SHIPPED | OUTPATIENT
Start: 2021-01-13 | End: 2021-02-08 | Stop reason: SDUPTHER

## 2021-01-13 NOTE — TELEPHONE ENCOUNTER
Pharmacy sent med refill request below. Last office visit : 1/8/2021 with Abisai SCANLON  Next office visit : 1/27/2021 with Abisai SCANLON    Requested Prescriptions     Pending Prescriptions Disp Refills    doxepin (SINEQUAN) 10 MG capsule [Pharmacy Med Name: DOXEPIN 10 MG CAPSULE] 60 capsule 3     Sig: TAKE 1-2 CAPSULES BY MOUTH NIGHTLY AS NEEDED FOR SLEEP. Omar Shelton RN            1/8/2021 10:12 AM                             Progress Note     IN:  0940  OUT: 1005        Dorene Rojas 1965                           Chief Complaint   Patient presents with    Insomnia    Depression            Subjective:  Patient is a 64 y.o. female diagnosed with Bipolar 2 Disorder and presents today for follow-up. Last seen in clinic on 9/22/20 and prior records were reviewed.     Today patient states, \"Not hot. Depression is really bad because I'm not sleeping at all. \" She reports that her dreams are horrible, she is not sleeping and that Doxepin has made everything worse. She reports a few panic attacks since the last visit which have happened at night. She reports that her dreams have changed to the topics of her ex's trying to get back together with her.     Patient reports side effects as follows: none. No evidence of EPS, no cogwheeling or abnormal motor movements.     Absent  suicidal ideation.   Reports compliance with medications as good .      Current Substance Use:  See history     BP: There were no vitals taken for this visit.        Review of Systems - 14 point review:  Negative except being treated for:  depression, anxiety, panic attacks, suicidal dreams, weight gain, right fractured ankle with walking boot, enuresis, increased liver enzymes (being treated with spirolactone)        Constitutional: (fevers, chills, night sweats, wt loss/gain, change in appetite, fatigue, somnolence)     HEENT: (ear pain or discharge, hearing loss, ear ringing, sinus pressure, nosebleed, nasal discharge, sore throat, oral sores, tooth pain, bleeding gums, hoarse voice, neck pain)      Cardiovascular: (HTN, chest pain, elevated cholesterol/lipids, palpitations, leg swelling, leg pain with walking)     Respiratory: (cough, wheezing, snoring, SOB with activity (dyspnea), SOB while lying flat (orthopnea), awakening with severe SOB (paroxysmal nocturnal dyspnea))     Gastrointestinal: (NVD, constipation, abdominal pain, bright red stools, black tarry stools, stool incontinence)     Genitourinary:  (pelvic pain, burning or frequency of urination, urinary urgency, blood in urine incomplete bladder emptying, urinary incontinence, STD; MEN: testicular pain or swelling, erectile dysfunction; WOMEN: LMP, heavy menstrual bleeding (menorrhagia), irregular periods, postmenopausal bleeding, menstrual pain (dymenorrhea, vaginal discharge)     Musculoskeletal: (bone pain/fracture, joint pain or swelling, musle pain)     Integumentary: (rashes, acne, non-healing sores, itching, breast lumps, breast pain, nipple discharge, hair loss)     Neurologic: (HA, muscle weakness, paresthesias (numbness, coldness, crawling or prickling), memory loss, seizure, dizziness)     Psychiatric:  (anxiety, sadness, irritability/anger, insomnia, suicidality)     Endocrine: (heat or cold intolerance, excessive thirst (polydipsia), excessive hunger (polyphagia))     Immune/Allergic: (hives, seasonal or environmental allergies, HIV exposure)     Hematologic/Lymphatic: (lymph node enlargement, easy bleeding or bruising)     History obtained via chart review and patient     PCP is Milburn Favre. Amita Wilkinson DO, DO         Current Meds:     Home Medications           Prior to Admission medications    Medication Sig Start Date End Date Taking?  Authorizing Provider   lurasidone (LATUDA) 20 MG TABS tablet Take 1 tablet by mouth Daily with supper (350 calories for best absorption) 1/8/21   Yes CAPO Chang NP   eszopiclone (ESZOPICLONE) 3 MG TABS Take 1 tablet by mouth nightly as needed (for sleep) for up to 30 days.  12/23/20 1/22/21   Lindsay Montez, APRN - NP   gabapentin (NEURONTIN) 300 MG capsule TAKE 2 CAPSULES BY MOUTH EVERY DAY AT NIGHT 12/23/20 1/22/21   Lindsay Mediate, APRN - NP   DULoxetine (CYMBALTA) 30 MG extended release capsule Take 1 capsule by mouth Daily with supper 9/22/20     Lindsay Melida, APRN - NP   DULoxetine (CYMBALTA) 60 MG extended release capsule Take 1 capsule by mouth daily 9/22/20     Lindsay Melida, APRN - NP   lamoTRIgine (LAMICTAL) 150 MG tablet Take 1 tablet by mouth daily 9/22/20     Lindsay Melida, APRN - NP   doxepin (SINEQUAN) 10 MG capsule Take 1-2 capsules by mouth nightly as needed for sleep. 9/22/20     Lindsay Montez, APRN - NP   oxybutynin (DITROPAN) 5 MG tablet TAKE 1 TABLET BY MOUTH EVERY DAY AT NIGHT 7/28/20     CAPO Mejia   spironolactone (ALDACTONE) 25 MG tablet   3/5/20     Historical Provider, MD   diclofenac (VOLTAREN) 75 MG EC tablet Take 75 mg by mouth 2 times daily       Historical Provider, MD   metoprolol succinate (TOPROL XL) 50 MG extended release tablet Take 50 mg by mouth daily 4/25/19 per direction of Dr. Elizabeth Green pt to take 1/2 tab daily.       Historical Provider, MD   Magnesium Oxide 250 MG TABS Take by mouth daily       Historical Provider, MD         Social History   Social History            Socioeconomic History    Marital status: Legally        Spouse name: None    Number of children: 1    Years of education: 12th grade + some college    Highest education level: None   Occupational History    None   Social Needs    Financial resource strain: None    Food insecurity       Worry: None       Inability: None    Transportation needs       Medical: None       Non-medical: None   Tobacco Use    Smoking status: Current Every Day Smoker       Packs/day: 0.75    Smokeless tobacco: Never Used   Substance and Sexual Activity    Alcohol use: Not Currently       Comment: history of abuse, last drink was early December, 2018    Drug use: Not Currently       Types: Marijuana       Comment: last use was summer, 2017    Sexual activity: Not Currently   Lifestyle    Physical activity       Days per week: None       Minutes per session: None    Stress: None   Relationships    Social connections       Talks on phone: Once a week       Gets together: Three times a week       Attends Taoist service: Never       Active member of club or organization: No       Attends meetings of clubs or organizations: Never       Relationship status:     Intimate partner violence       Fear of current or ex partner: No       Emotionally abused: No       Physically abused:  No       Forced sexual activity: No   Other Topics Concern    None   Social History Narrative     PRIOR MEDICATION TRIALS     Trazodone (having more suicidal dreams), at 100mg, not working for sleep     Seroquel (wt gain, 14 lb in 3 months, enuresis, indigestion, abnormal blood work, increased blood sugar)     Duloxetine (has not been effective and no noticeable change even with dose increases to 90mg)     Paxil, 20mg     Wellbutrin XL, (tried it recently to quit smoking)     Prozac (made her numb, added to her eating disorder)     Zoloft (thought she did well on this, took for a couple of years, she stopped it because when she returned to [facility] the doctor put her back on Prozac)     Remeron (increased her dreams and panic attacks)     Lunesta (worked)     Librium (in 2018 for 15 days to stop drinking alcohol)     Risperdal-stopped on 7/6/20 due to weight gain     Doxepin, started on 9/22/20 for sleep (not effective)     Prazosin, ineffective for dreams/nightmares           Psychiatric Review of Systems, 3/19/19           Mood:  Sadness, tearfulness, low appetite, low concentration, low energy, loss of interest, denies suicidality today       (Depression: sadness, tearfulness, sleep, appetite, energy, concentration, sexual function, guilt, psychomotor agitation or slowing, interest, suicidality)           Julia: She says that there have been periods of time when she could go 3 days without sleep, she does say that there have been days in a row when she has done reckless, impulsive things       (impulsivity, grandiosity, recklessness, excessive energy, decreased need for sleep, increased spending beyond means, hyperverbal, grandiose, racing thoughts, hypersexuality)           Other: anger from being tired all the time       (Irritability, lability, anger)           Anxiety:  She says that she worries every night about sleeping and panic attacks. She says that she worries about her neighbors. She says that they always want something from her. She worries about running into them. She says that this causes panic attacks. She says that there is a lot of drug use in her neighborhood and she is fearful of her neighbors.       (Generalized anxiety: where, when, who, how long, how frequent)           Panic Disorder symptoms: She says that she is having panic attacks at night, 1 at night (this has improved from 3 weeks ago before she started Trazodone when she was having 2-3 at night).  She says that she wakes up with dreams of her family and with her anxiety about [place].       (Palpitations, racing heart beat, sweating, sense of impending doom, fear of recurrence, shortness of breath)           OCD symptoms:  She gets flustered if things are not in a routine, is ritualized about the way she dresses, and the way she laces her shoes       (checking, cleaning, organizing, rituals, hang-ups, obsessive thoughts, counting, rational vs. Irrational beliefs)           PTSD symptoms:  Increased startle response, wakes up with dreams at night, she also says she has flashbacks during the day.       (nightmares, flashbacks, startle respoinse, avoidance)           Social anxiety symptoms:  Negative           Simple phobias:   Heights, claustrophobia, snakes     the rest of the sleep is restless, maybe only 2 additional hrs    Appetite: ok     COGNITION SCREEN:     Can spell the word, \"world,\" backwards: Yes  Can do serial 7's: Yes              Lab Results   Component Value Date      (L) 03/10/2020     K 4.7 03/10/2020     CL 89 (L) 03/10/2020     CO2 18 (L) 03/10/2020     BUN 19 03/10/2020     CREATININE 0.9 03/10/2020     GLUCOSE 107 03/10/2020     CALCIUM 9.1 03/10/2020     PROT 8.6 11/10/2015     LABALBU 5.0 11/10/2015     ALKPHOS 127 (H) 11/10/2015     AST 33 (H) 11/10/2015     ALT 22 11/10/2015     LABGLOM >60 03/10/2020            Lab Results   Component Value Date      03/10/2020     K 4.7 03/10/2020     CL 89 03/10/2020     CO2 18 03/10/2020     BUN 19 03/10/2020     CREATININE 0.9 03/10/2020     GLUCOSE 107 03/10/2020     CALCIUM 9.1 03/10/2020      No results found for: CHOL  No results found for: TRIG  No results found for: HDL  No results found for: LDLCHOLESTEROL, LDLCALC  No results found for: LABVLDL, VLDL  No results found for: CHOLHDLRATIO  No results found for: LABA1C  No results found for: EAG        Lab Results   Component Value Date     TSH 2.40 02/03/2015      Lab Results   Component Value Date     VITD25 27.6 (L) 02/03/2015         Assessments Administered:  PHQ9: 14, moderate  GAD7: 8, mild     [facility]     Assessment:    1. Moderate depressed bipolar II disorder ([facility] [address]    2. STEFFEN (generalized anxiety disorder)    3. PTSD (post-traumatic stress disorder)    4. Insomnia due to other mental disorder          Plan:  Continue:  Cymbalta (Duloxetine), 60mg + 30mg, daily (you can take the 60mg of Cymbalta in the morning and 30mg with the evening meal, not too much past 5pm) It can interfere with sleep.   Lamictal, 150mg,  daily  Eszopiclone (Lunesta), 3mg, nightly for sleep  Gabapentin, 300mg, take 2 capsules nightly (restless legs)     Doxepin, 10mg, take 1-2 capsules as needed for sleep (has been taking 2 tabs for the last 1.5 mo, not effective for sleep)     Start:  Lurasidone, 20mg, evening, with the evening meal (350 calories)     Continue therapy with Melania Estrada for therapy     Follow up: Return in about 2 weeks (around 1/22/2021).    If you become suicidal, follow up with this office or call the [facility] at [phone]), or dial [phone].     1. The risks, benefits, side effects, indications, contraindications, and adverse effects of the medications have been discussed. Yes.  2. The pt has verbalized understanding and has capacity to give informed consent. 3. The Jennifer Norton report has been reviewed according to [facility] regulations. 4. Supportive therapy offered. 5. Follow up:    Return in about 2 weeks (around 1/22/2021). 6. The patient has been advised to call with any problems. 7. Controlled substance Treatment Plan: new medication for sleep (eszopiclone). 8. The above listed medications have been continued, modifications in meds and other orders/labs as follows:                 Encounter Medications         Orders Placed This Encounter   Medications    DISCONTD: lurasidone (LATUDA) 20 MG TABS tablet       Sig: Take 1 tablet by mouth daily       Dispense:  30 tablet       Refill:  1    lurasidone (LATUDA) 20 MG TABS tablet       Sig: Take 1 tablet by mouth Daily with supper (350 calories for best absorption)       Dispense:  30 tablet       Refill:  3                     No orders of the defined types were placed in this encounter.        9. Additional comments: She reports increased depression and fatigue since she cannot sleep at night. She was doing very well on Risperdal but doesn't want to restart it because she gained weight and since being off of it has lost 15 lb. Will start Lurasidone and see if it helps. Will also consider stopping the evening dose of Duloxetine and restarting Trazodone. Will not make these changes until we determine whether Lurasidone is effective.  Will follow up in about 2 weeks.     10. Over 50% of the total visit time of 25 minutes was spent on counseling and/or coordination of care of:                         1. Moderate depressed bipolar II disorder ([facility] [address]    2. STEFFEN (generalized anxiety disorder)    3. PTSD (post-traumatic stress disorder)    4.  Insomnia due to other mental disorder                        Psychotherapy Topics: mood/medication effectiveness         Carlie Rose PMP-BC

## 2021-01-27 ENCOUNTER — VIRTUAL VISIT (OUTPATIENT)
Dept: PSYCHIATRY | Age: 56
End: 2021-01-27
Payer: MEDICARE

## 2021-01-27 DIAGNOSIS — F51.05 INSOMNIA DUE TO OTHER MENTAL DISORDER: ICD-10-CM

## 2021-01-27 DIAGNOSIS — F43.10 PTSD (POST-TRAUMATIC STRESS DISORDER): ICD-10-CM

## 2021-01-27 DIAGNOSIS — F45.8 BRUXISM: ICD-10-CM

## 2021-01-27 DIAGNOSIS — F31.81 MILD MIXED BIPOLAR II DISORDER (HCC): Primary | ICD-10-CM

## 2021-01-27 DIAGNOSIS — F41.1 GENERALIZED ANXIETY DISORDER: ICD-10-CM

## 2021-01-27 DIAGNOSIS — F99 INSOMNIA DUE TO OTHER MENTAL DISORDER: ICD-10-CM

## 2021-01-27 PROCEDURE — 99443 PR PHYS/QHP TELEPHONE EVALUATION 21-30 MIN: CPT | Performed by: NURSE PRACTITIONER

## 2021-01-27 RX ORDER — CLONIDINE HYDROCHLORIDE 0.1 MG/1
0.1 TABLET ORAL NIGHTLY
Qty: 30 TABLET | Refills: 3 | Status: SHIPPED | OUTPATIENT
Start: 2021-01-27 | End: 2021-04-20

## 2021-01-27 RX ORDER — ESZOPICLONE 3 MG/1
3 TABLET, FILM COATED ORAL NIGHTLY PRN
Qty: 30 TABLET | Refills: 0 | Status: SHIPPED | OUTPATIENT
Start: 2021-01-27 | End: 2021-02-24 | Stop reason: SDUPTHER

## 2021-01-27 RX ORDER — DULOXETIN HYDROCHLORIDE 60 MG/1
60 CAPSULE, DELAYED RELEASE ORAL DAILY
Qty: 90 CAPSULE | Refills: 1 | Status: SHIPPED | OUTPATIENT
Start: 2021-01-27 | End: 2021-03-03 | Stop reason: ALTCHOICE

## 2021-01-27 RX ORDER — GABAPENTIN 300 MG/1
CAPSULE ORAL
Qty: 60 CAPSULE | Refills: 0 | Status: SHIPPED | OUTPATIENT
Start: 2021-01-27 | End: 2021-02-24 | Stop reason: SDUPTHER

## 2021-01-27 RX ORDER — LAMOTRIGINE 150 MG/1
150 TABLET ORAL DAILY
Qty: 90 TABLET | Refills: 1 | Status: SHIPPED | OUTPATIENT
Start: 2021-01-27 | End: 2021-08-26

## 2021-01-27 ASSESSMENT — ANXIETY QUESTIONNAIRES
3. WORRYING TOO MUCH ABOUT DIFFERENT THINGS: 3-NEARLY EVERY DAY
5. BEING SO RESTLESS THAT IT IS HARD TO SIT STILL: 0-NOT AT ALL
7. FEELING AFRAID AS IF SOMETHING AWFUL MIGHT HAPPEN: 0-NOT AT ALL
6. BECOMING EASILY ANNOYED OR IRRITABLE: 0-NOT AT ALL

## 2021-01-27 ASSESSMENT — PATIENT HEALTH QUESTIONNAIRE - PHQ9
9. THOUGHTS THAT YOU WOULD BE BETTER OFF DEAD, OR OF HURTING YOURSELF: 0
SUM OF ALL RESPONSES TO PHQ QUESTIONS 1-9: 9
2. FEELING DOWN, DEPRESSED OR HOPELESS: 1
4. FEELING TIRED OR HAVING LITTLE ENERGY: 0
SUM OF ALL RESPONSES TO PHQ QUESTIONS 1-9: 9
6. FEELING BAD ABOUT YOURSELF - OR THAT YOU ARE A FAILURE OR HAVE LET YOURSELF OR YOUR FAMILY DOWN: 0
5. POOR APPETITE OR OVEREATING: 2
8. MOVING OR SPEAKING SO SLOWLY THAT OTHER PEOPLE COULD HAVE NOTICED. OR THE OPPOSITE, BEING SO FIGETY OR RESTLESS THAT YOU HAVE BEEN MOVING AROUND A LOT MORE THAN USUAL: 0

## 2021-01-27 NOTE — PATIENT INSTRUCTIONS
Plan:  Continue:  Lamotrigine, 150mg,  daily  Eszopiclone (Lunesta), 3mg, nightly for sleep  Gabapentin, 300mg, take 2 capsules nightly (restless legs)  Lurasidone, 20mg, evening, with the evening meal (350 calories)    Doxepin, 10mg, take 1-2 capsules as needed for sleep (has been taking 2 tabs for the last 1.5 mo, not effective for sleep) (try taking up to 5 capsules, call this provider to report if any dose worked)    Start:  Clonidine, 0.1mg, nightly (teeth grinding, nightmares), can lower BP, get your balance before you get up to walk    Decrease:  Cymbalta (Duloxetine), 60mg, daily     Continue therapy with Judi Marks for therapy    Try the [facility] tabby for sleep    Follow up: Return in about 3 months (around 4/27/2021). If you become suicidal, follow up with this office or call the [facility] at [phone]), or dial [phone]. Patient Education        clonidine (oral)  Pronunciation:  MATT gandara  Brand:  Aliya Morfin  What is the most important information I should know about clonidine? Follow all directions on your medicine label and package. Tell each of your healthcare providers about all your medical conditions, allergies, and all medicines you use. What is clonidine? Clonidine lowers blood pressure by decreasing the levels of certain chemicals in your blood. This allows your blood vessels to relax and your heart to beat more slowly and easily. Clonidine is used to treat hypertension (high blood pressure). The Kapvay brand of clonidine is used to treat attention deficit hyperactivity disorder (ADHD). Clonidine is sometimes given with other medications. Clonidine may also be used for purposes not listed in this medication guide. What should I discuss with my healthcare provider before taking clonidine? You should not take this medicine if you are allergic to clonidine.   To make sure clonidine is safe for you, tell your doctor if you have: · heart disease or severe coronary artery disease;  · heart rhythm disorder, slow heartbeats;  · low blood pressure, or a history of fainting spells;  · a history of heart attack or stroke;  · pheochromocytoma (tumor of the adrenal gland);  · kidney disease; or  · if you have ever had an allergic reaction to a clonidine transdermal skin patch (Catapres TTS). Older adults may be more sensitive to the effects of this medicine. It is not known whether this medicine will harm an unborn baby. Tell your doctor if you are pregnant or plan to become pregnant while taking clonidine. Clonidine can pass into breast milk and may harm a nursing baby. Tell your doctor if you are breast-feeding a baby. Catapres is not approved for use by anyone younger than 25years old. Do not give Dae Thuan to a child younger than 10years old. How should I take clonidine? Follow all directions on your prescription label. Your doctor may occasionally change your dose to make sure you get the best results. Do not take this medicine in larger or smaller amounts or for longer than recommended. Clonidine is usually taken in the morning and at bedtime. If you take different doses of this medicine at each dosing time, it may be best to take the larger dose at bedtime. Clonidine may be taken with or without food. Do not use two forms of clonidine at the same time. This medicine is also available as a transdermal patch worn on the skin. Do not crush, chew, or break an extended-release tablet. Swallow it whole. Tell your doctor if you have trouble swallowing the tablet. If you need surgery, tell the surgeon ahead of time that you are using clonidine. You may need to stop using the medicine for a short time. Do not stop using clonidine suddenly, or you could have unpleasant withdrawal symptoms. Ask your doctor how to safely stop using clonidine. Call your doctor if you are sick with vomiting. Prolonged illness can make it harder for your body to absorb clonidine, which may lead to withdrawal symptoms. This is especially important for a child taking clonidine. If you are being treated for high blood pressure, keep using this medication even if you feel well. High blood pressure often has no symptoms. You may need to use blood pressure medication for the rest of your life. Store at room temperature away from moisture, heat, and light. What happens if I miss a dose? Take the missed dose as soon as you remember. Skip the missed dose if it is almost time for your next scheduled dose. Do not take extra medicine to make up the missed dose. What happens if I overdose? Seek emergency medical attention or call the Poison Help line at 1-902.300.8538. Overdose symptoms may include dangerously high blood pressure (severe headache, pounding in your neck or ears, nosebleed, anxiety, chest pain, shortness of breath) followed by low blood pressure (feeling like you might pass out). Other overdose symptoms may include feeling cold, extreme weakness or drowsiness, weak or shallow breathing, pinpoint pupils, fainting, or seizure (convulsions). What should I avoid while taking clonidine? Avoid drinking alcohol. It may increase certain side effects of clonidine. Clonidine may impair your thinking or reactions. Avoid driving or operating machinery until you know how this medicine will affect you. Dizziness or severe drowsiness can cause falls or other accidents. What are the possible side effects of clonidine? Get emergency medical help if you have signs of an allergic reaction: hives; difficult breathing; swelling of your face, lips, tongue, or throat.   Call your doctor at once if you have:  · severe chest pain, shortness of breath, irregular heartbeats;  · a very slow heart rate;  · severe headache, pounding in your neck or ears, blurred vision;  · nosebleeds; · anxiety, confusion; or  · a light-headed feeling, like you might pass out. Serious side effects may be more likely in older adults. Common side effects may include:  · drowsiness, dizziness;  · feeling tired or irritable;  · dry mouth, loss of appetite;  · constipation;  · dry eyes, contact lens discomfort; or  · sleep problems (insomnia), nightmares. This is not a complete list of side effects and others may occur. Call your doctor for medical advice about side effects. You may report side effects to FDA at 7-033-FDA-0517. What other drugs will affect clonidine? Taking this medicine with other drugs that make you sleepy can worsen this effect. Ask your doctor before taking clonidine with a sleeping pill, narcotic pain medicine, muscle relaxer, or medicine for anxiety, depression, or seizures. Tell your doctor about all your current medicines and any you start or stop using, especially:  · other heart or blood pressure medications;  · an antidepressant; or  · any other medicine that contains clonidine. This list is not complete. Other drugs may interact with clonidine, including prescription and over-the-counter medicines, vitamins, and herbal products. Not all possible interactions are listed in this medication guide. Where can I get more information? Your pharmacist can provide more information about clonidine. Remember, keep this and all other medicines out of the reach of children, never share your medicines with others, and use this medication only for the indication prescribed. Every effort has been made to ensure that the information provided by [address] is accurate, up-to-date, and complete, but no guarantee is made to that effect. Drug information contained herein may be time sensitive. [facility] information has been compiled for use by healthcare practitioners and consumers in the [facility] and therefore [facility] does not warrant that uses outside of the [facility] are appropriate, unless specifically indicated otherwise. [facility]'s drug information does not endorse drugs, diagnose patients or recommend therapy. [facility]'s drug information is an informational resource designed to assist licensed healthcare practitioners in caring for their patients and/or to serve consumers viewing this service as a supplement to, and not a substitute for, the expertise, skill, knowledge and judgment of healthcare practitioners. The absence of a warning for a given drug or drug combination in no way should be construed to indicate that the drug or drug combination is safe, effective or appropriate for any given patient. [facility] does not assume any responsibility for any aspect of healthcare administered with the aid of information [facility] provides. The information contained herein is not intended to cover all possible uses, directions, precautions, warnings, drug interactions, allergic reactions, or adverse effects. If you have questions about the drugs you are taking, check with your doctor, nurse or pharmacist.  Copyright 6866-4676 [address]. Version: 9.01. Revision date: 3/23/2016. Care instructions adapted under license by [facility] ([facility]). If you have questions about a medical condition or this instruction, always ask your healthcare professional. [place] [address] any warranty or liability for your use of this information.

## 2021-01-27 NOTE — PROGRESS NOTES
Adrián Walton is a 64 y.o. female evaluated via telephone on 1/27/2021. Consent:  She and/or health care decision maker is aware that that she may receive a bill for this telephone service, depending on her insurance coverage, and has provided verbal consent to proceed: Yes      Documentation:  I communicated with the patient and/or health care decision maker about depression/anxiety/insomnia. Details of this discussion including any medical advice provided: see below      I affirm this is a Patient Initiated Episode with an Established Patient who has not had a related appointment within my department in the past 7 days or scheduled within the next 24 hours. Total Time: minutes: 21-30 minutes    Note: not billable if this call serves to triage the patient into an appointment for the relevant concern      Brooke Sewellbrandon           1/27/2021 6:03 PM   Progress Note    IN:  0915  OUT: [id number] [place] 1965      Chief Complaint   Patient presents with    Follow-up    Insomnia    Depression         Subjective:  Patient is a 64 y.o. female diagnosed with Bipolar 2 Disorder and presents today for follow-up. Last seen in clinic on 1/8/21 and prior records were reviewed. Today patient states, \"Well, it (Lurasidone) definitely changes my mood, but I have to take it in the morning. It doesn't help me sleep. \" She reports that Lurasidone made her RLS worse at night. She reports that she has been off caffeine and she doesn't understand why her sleep is so off. She reports that she is awake until 3-5am.    Patient reports side effects as follows: RLS (Lurasidone). No evidence of EPS, no cogwheeling or abnormal motor movements. Absent  suicidal ideation. Reports compliance with medications as good . Current Substance Use:  See history    BP: There were no vitals taken for this visit.       Review of Systems - 14 point review:  Negative except being treated for:  depression, anxiety, panic attacks, suicidal dreams, weight gain, right fractured ankle with walking boot, enuresis, increased liver enzymes (being treated with spirolactone)      Constitutional: (fevers, chills, night sweats, wt loss/gain, change in appetite, fatigue, somnolence)    HEENT: (ear pain or discharge, hearing loss, ear ringing, sinus pressure, nosebleed, nasal discharge, sore throat, oral sores, tooth pain, bleeding gums, hoarse voice, neck pain)      Cardiovascular: (HTN, chest pain, elevated cholesterol/lipids, palpitations, leg swelling, leg pain with walking)    Respiratory: (cough, wheezing, snoring, SOB with activity (dyspnea), SOB while lying flat (orthopnea), awakening with severe SOB (paroxysmal nocturnal dyspnea))    Gastrointestinal: (NVD, constipation, abdominal pain, bright red stools, black tarry stools, stool incontinence)     Genitourinary:  (pelvic pain, burning or frequency of urination, urinary urgency, blood in urine incomplete bladder emptying, urinary incontinence, STD; MEN: testicular pain or swelling, erectile dysfunction; WOMEN: LMP, heavy menstrual bleeding (menorrhagia), irregular periods, postmenopausal bleeding, menstrual pain (dymenorrhea, vaginal discharge)    Musculoskeletal: (bone pain/fracture, joint pain or swelling, musle pain)    Integumentary: (rashes, acne, non-healing sores, itching, breast lumps, breast pain, nipple discharge, hair loss)    Neurologic: (HA, muscle weakness, paresthesias (numbness, coldness, crawling or prickling), memory loss, seizure, dizziness)    Psychiatric:  (anxiety, sadness, irritability/anger, insomnia, suicidality)    Endocrine: (heat or cold intolerance, excessive thirst (polydipsia), excessive hunger (polyphagia))    Immune/Allergic: (hives, seasonal or environmental allergies, HIV exposure)    Hematologic/Lymphatic: (lymph node enlargement, easy bleeding or bruising)    History obtained via chart review and patient    PCP is Adair Couch.  Daniel Isaacs DO,  Current Meds:    Prior to Admission medications    Medication Sig Start Date End Date Taking? Authorizing Provider   lamoTRIgine (LAMICTAL) 150 MG tablet Take 1 tablet by mouth daily 1/27/21  Yes CAPO Bui - NP   gabapentin (NEURONTIN) 300 MG capsule TAKE 2 CAPSULES BY MOUTH EVERY DAY AT NIGHT 1/27/21 2/26/21 Yes CAPO Bui - NP   DULoxetine (CYMBALTA) 60 MG extended release capsule Take 1 capsule by mouth daily 1/27/21  Yes CAPO Bui - NP   cloNIDine (CATAPRES) 0.1 MG tablet Take 1 tablet by mouth nightly 1/27/21  Yes CAPO Bui - NP   eszopiclone (ESZOPICLONE) 3 MG TABS Take 1 tablet by mouth nightly as needed (insomnia) for up to 30 days. 1/27/21 2/26/21 Yes CAPO Bui - NP   doxepin (SINEQUAN) 10 MG capsule TAKE 1-2 CAPSULES BY MOUTH NIGHTLY AS NEEDED FOR SLEEP. 1/13/21   CAPO Bui - NP   lurasidone (LATUDA) 20 MG TABS tablet Take 1 tablet by mouth Daily with supper (350 calories for best absorption) 1/8/21   CAPO Bui NP   oxybutynin (DITROPAN) 5 MG tablet TAKE 1 TABLET BY MOUTH EVERY DAY AT NIGHT 7/28/20   CAPO Bartholomew   spironolactone (ALDACTONE) 25 MG tablet  3/5/20   Historical Provider, MD   diclofenac (VOLTAREN) 75 MG EC tablet Take 75 mg by mouth 2 times daily    Historical Provider, MD   metoprolol succinate (TOPROL XL) 50 MG extended release tablet Take 50 mg by mouth daily 4/25/19 per direction of Dr. Quyen Oneill pt to take 1/2 tab daily.     Historical Provider, MD   Magnesium Oxide 250 MG TABS Take by mouth daily    Historical Provider, MD     Social History     Socioeconomic History    Marital status: Legally      Spouse name: None    Number of children: 1    Years of education: 12th grade + some college    Highest education level: None   Occupational History    None   Social Needs    Financial resource strain: None    Food insecurity     Worry: None     Inability: None    Transportation needs Medical: None     Non-medical: None   Tobacco Use    Smoking status: Current Every Day Smoker     Packs/day: 0.75    Smokeless tobacco: Never Used   Substance and Sexual Activity    Alcohol use: Not Currently     Comment: history of abuse, last drink was early December, 2018    Drug use: Not Currently     Types: Marijuana     Comment: last use was summer, 2017    Sexual activity: Not Currently   Lifestyle    Physical activity     Days per week: None     Minutes per session: None    Stress: None   Relationships    Social connections     Talks on phone: Once a week     Gets together:  Three times a week     Attends [employer] service: Never     Active member of club or organization: No     Attends meetings of clubs or organizations: Never     Relationship status:     Intimate partner violence     Fear of current or ex partner: No     Emotionally abused: No     Physically abused: No     Forced sexual activity: No   Other Topics Concern    None   Social History Narrative    PRIOR MEDICATION TRIALS    Trazodone (having more suicidal dreams), at 100mg, not working for sleep    Seroquel (wt gain, 14 lb in 3 months, enuresis, indigestion, abnormal blood work, increased blood sugar)    Duloxetine (has not been effective and no noticeable change even with dose increases to 90mg)    Paxil, 20mg    Wellbutrin XL, (tried it recently to quit smoking)    Prozac (made her numb, added to her eating disorder)    Zoloft (thought she did well on this, took for a couple of years, she stopped it because when she [facility] to [facility] the doctor put her back on Prozac)    Remeron (increased her dreams and panic attacks)    Jabier Valadez (worked)    Librium (in 2018 for 15 days to stop drinking alcohol)    Risperdal-stopped on 7/6/20 due to weight gain    Doxepin, started on 9/22/20 for sleep (not effective)    Prazosin, ineffective for dreams/nightmares    Neeru (1/27/21, reports that she switched it to am dosing because taking it at night made her RLS worse)        Psychiatric Review of Systems, 3/19/19         Mood:  Sadness, tearfulness, low appetite, low concentration, low energy, loss of interest, denies suicidality today      (Depression: sadness, tearfulness, sleep, appetite, energy, concentration, sexual function, guilt, psychomotor agitation or slowing, interest, suicidality)         Julia: She says that there have been periods of time when she could go 3 days without sleep, she does say that there have been days in a row when she has done reckless, impulsive things      (impulsivity, grandiosity, recklessness, excessive energy, decreased need for sleep, increased spending beyond means, hyperverbal, grandiose, racing thoughts, hypersexuality)         Other: anger from being tired all the time      (Irritability, lability, anger)         Anxiety:  She says that she worries every night about sleeping and panic attacks. She says that she worries about her neighbors. She says that they always want something from her. She worries about running into them. She says that this causes panic attacks. She says that there is a lot of drug use in her neighborhood and she is fearful of her neighbors.      (Generalized anxiety: where, when, who, how long, how frequent)         Panic Disorder symptoms: She says that she is having panic attacks at night, 1 at night (this has improved from 3 weeks ago before she started Trazodone when she was having 2-3 at night).  She says that she wakes up with dreams of her family and with her anxiety about [place] park.      (Palpitations, racing heart beat, sweating, sense of impending doom, fear of recurrence, shortness of breath)         OCD symptoms:  She gets flustered if things are not in a routine, is ritualized about the way she dresses, and the way she laces her shoes      (checking, cleaning, organizing, rituals, hang-ups, obsessive thoughts, counting, rational vs. Irrational beliefs)         PTSD 2 tabs for the last 1.5 mo, not effective for sleep) (try taking up to 5 capsules, call this provider to report if any dose worked)    Start:  Clonidine, 0.1mg, nightly (teeth grinding, nightmares), can lower BP, get your balance before you get up to walk    Decrease:  Cymbalta (Duloxetine), 60mg, daily     Continue therapy with Krish Castrejon for therapy    [facility]    Follow up: Return in about 3 months (around 4/27/2021). If you become suicidal, follow up with this office or call the [facility] at [phone]), or dial [phone].    1. The risks, benefits, side effects, indications, contraindications, and adverse effects of the medications have been discussed. Yes.  2. The pt has verbalized understanding and has capacity to give informed consent. 3. The [facility] report has been reviewed according to [facility] regulations. 4. Supportive therapy offered. 5. Follow up: Return in about 3 months (around 4/27/2021). 6. The patient has been advised to call with any problems. 7. Controlled substance Treatment Plan: new medication for sleep (eszopiclone). 8. The above listed medications have been continued, modifications in meds and other orders/labs as follows:      Orders Placed This Encounter   Medications    lamoTRIgine (LAMICTAL) 150 MG tablet     Sig: Take 1 tablet by mouth daily     Dispense:  90 tablet     Refill:  1     Fill as her insurance allows.  gabapentin (NEURONTIN) 300 MG capsule     Sig: TAKE 2 CAPSULES BY MOUTH EVERY DAY AT NIGHT     Dispense:  60 capsule     Refill:  0     Not to exceed 5 additional fills before 04/18/2021 DX Code Needed  .     DULoxetine (CYMBALTA) 60 MG extended release capsule     Sig: Take 1 capsule by mouth daily     Dispense:  90 capsule     Refill:  1    cloNIDine (CATAPRES) 0.1 MG tablet     Sig: Take 1 tablet by mouth nightly     Dispense:  30 tablet     Refill:  3    eszopiclone (ESZOPICLONE) 3 MG TABS     Sig: Take 1 tablet by mouth nightly as needed (insomnia) for up to 30 days. Dispense:  30 tablet     Refill:  0      No orders of the defined types were placed in this encounter. 9. Additional comments: Myesha To has helped her mood and anxiety. She reports that she has gained weight and that her hunger has increased. She has found herself wanting to eat which has not been normal for her. Will decrease Duloxetine to 60mg in the morning and will add Clonidine at night for nightmares and bruxism. She reports that she has ground her teeth since she was a child and doesn't think that it has anything to do with Duloxetine. She will try increasing the dose of Doxepin to 30mg, 40mg, and 50mg and report back to this provider if any of those doses help for sleep. If this is not effective may try Prazosin again. Will follow up in about 3 months. 10.Over 50% of the total visit time of 25 minutes was spent on counseling and/or coordination of care of:                        1. Mild mixed bipolar II disorder ([facility] [address]    2. Generalized anxiety disorder    3. Insomnia due to other mental disorder    4. PTSD (post-traumatic stress disorder)    5.  Bruxism                      Psychotherapy Topics: mood/medication effectiveness       Brooke Pal PMHNP-BC

## 2021-02-02 ENCOUNTER — TELEPHONE (OUTPATIENT)
Dept: PSYCHIATRY | Age: 56
End: 2021-02-02

## 2021-02-02 NOTE — TELEPHONE ENCOUNTER
Pt called stating that she would like info on her Doxepin passed on to [employer]. Pt says that she is taking 3 of the Doxepin at this time and would like more time to \"play with it\" as APRN told her she could take up to 5 at night. Pt informed that info would be sent to [employer].

## 2021-02-08 NOTE — TELEPHONE ENCOUNTER
[facility] sent fax requesting 90 DAY SUPPLY of med below. (Last RX sent 1/13/2021 #30 with 3 refills)  Pt says she is taking 3 at bedtime as was discussed with REGGIE Grant want to change the QTY and directions below. Last office visit : 1/27/2021 with Marti SCANLON  Next office visit : 4/27/2021 with Marti SCANLON    Requested Prescriptions     Pending Prescriptions Disp Refills    doxepin (SINEQUAN) 10 MG capsule 180 capsule 0     Sig: Take 1-2 capsules by mouth nightly as needed for sleep. Gerson Coleman RN              1/27/2021 6:03 PM                             Progress Note     IN:  0915  OUT: 0930        Dorene Rojas 1965                           Chief Complaint   Patient presents with    Follow-up    Insomnia    Depression            Subjective:  Patient is a 64 y.o. female diagnosed with Bipolar 2 Disorder and presents today for follow-up. Last seen in clinic on 1/8/21 and prior records were reviewed.     Today patient states, \"Well, it (Lurasidone) definitely changes my mood, but I have to take it in the morning. It doesn't help me sleep. \" She reports that Lurasidone made her RLS worse at night. She reports that she has been off caffeine and she doesn't understand why her sleep is so off. She reports that she is awake until 3-5am.     Patient reports side effects as follows: RLS (Lurasidone). No evidence of EPS, no cogwheeling or abnormal motor movements.     Absent  suicidal ideation.   Reports compliance with medications as good .      Current Substance Use:  See history     BP: There were no vitals taken for this visit.        Review of Systems - 14 point review:  Negative except being treated for:  depression, anxiety, panic attacks, suicidal dreams, weight gain, right fractured ankle with walking boot, enuresis, increased liver enzymes (being treated with spirolactone)        Constitutional: (fevers, chills, night sweats, wt loss/gain, change in appetite, fatigue, somnolence)     HEENT: (ear pain or discharge, hearing loss, ear ringing, sinus pressure, nosebleed, nasal discharge, sore throat, oral sores, tooth pain, bleeding gums, hoarse voice, neck pain)      Cardiovascular: (HTN, chest pain, elevated cholesterol/lipids, palpitations, leg swelling, leg pain with walking)     Respiratory: (cough, wheezing, snoring, SOB with activity (dyspnea), SOB while lying flat (orthopnea), awakening with severe SOB (paroxysmal nocturnal dyspnea))     Gastrointestinal: (NVD, constipation, abdominal pain, bright red stools, black tarry stools, stool incontinence)     Genitourinary:  (pelvic pain, burning or frequency of urination, urinary urgency, blood in urine incomplete bladder emptying, urinary incontinence, STD; MEN: testicular pain or swelling, erectile dysfunction; WOMEN: LMP, heavy menstrual bleeding (menorrhagia), irregular periods, postmenopausal bleeding, menstrual pain (dymenorrhea, vaginal discharge)     Musculoskeletal: (bone pain/fracture, joint pain or swelling, musle pain)     Integumentary: (rashes, acne, non-healing sores, itching, breast lumps, breast pain, nipple discharge, hair loss)     Neurologic: (HA, muscle weakness, paresthesias (numbness, coldness, crawling or prickling), memory loss, seizure, dizziness)     Psychiatric:  (anxiety, sadness, irritability/anger, insomnia, suicidality)     Endocrine: (heat or cold intolerance, excessive thirst (polydipsia), excessive hunger (polyphagia))     Immune/Allergic: (hives, seasonal or environmental allergies, HIV exposure)     Hematologic/Lymphatic: (lymph node enlargement, easy bleeding or bruising)     History obtained via chart review and patient     PCP is Ashley Curtis. Melody John DO, DO         Current Meds:     Home Medications           Prior to Admission medications    Medication Sig Start Date End Date Taking?  Authorizing Provider   lamoTRIgine (LAMICTAL) 150 MG tablet Take 1 tablet by mouth daily 1/27/21   Yes Gabo Bergman CAPO Blake - NP   gabapentin (NEURONTIN) 300 MG capsule TAKE 2 CAPSULES BY MOUTH EVERY DAY AT NIGHT 1/27/21 2/26/21 Yes CAPO Bustillo NP   DULoxetine (CYMBALTA) 60 MG extended release capsule Take 1 capsule by mouth daily 1/27/21   Yes CAPO Bustillo NP   cloNIDine (CATAPRES) 0.1 MG tablet Take 1 tablet by mouth nightly 1/27/21   Yes CAPO Bustillo NP   eszopiclone (ESZOPICLONE) 3 MG TABS Take 1 tablet by mouth nightly as needed (insomnia) for up to 30 days.  1/27/21 2/26/21 Yes CAPO Bustillo NP   doxepin (SINEQUAN) 10 MG capsule TAKE 1-2 CAPSULES BY MOUTH NIGHTLY AS NEEDED FOR SLEEP. 1/13/21     CAPO Bustillo NP   lurasidone (LATUDA) 20 MG TABS tablet Take 1 tablet by mouth Daily with supper (350 calories for best absorption) 1/8/21     CAPO Bustillo NP   oxybutynin (DITROPAN) 5 MG tablet TAKE 1 TABLET BY MOUTH EVERY DAY AT NIGHT 7/28/20     CAPO Back Cea   spironolactone (ALDACTONE) 25 MG tablet   3/5/20     Historical Provider, MD   diclofenac (VOLTAREN) 75 MG EC tablet Take 75 mg by mouth 2 times daily       Historical Provider, MD   metoprolol succinate (TOPROL XL) 50 MG extended release tablet Take 50 mg by mouth daily 4/25/19 per direction of Dr. Kristan Mcgregor pt to take 1/2 tab daily.       Historical Provider, MD   Magnesium Oxide 250 MG TABS Take by mouth daily       Historical Provider, MD         Social History   Social History            Socioeconomic History    Marital status: Legally        Spouse name: None    Number of children: 1    Years of education: 12th grade + some college    Highest education level: None   Occupational History    None   Social Needs    Financial resource strain: None    Food insecurity       Worry: None       Inability: None    Transportation needs       Medical: None       Non-medical: None   Tobacco Use    Smoking status: Current Every Day Smoker       Packs/day: 0.75    Smokeless tobacco: Never Used Substance and Sexual Activity    Alcohol use: Not Currently       Comment: history of abuse, last drink was early December, 2018    Drug use: Not Currently       Types: Marijuana       Comment: last use was summer, 2017    Sexual activity: Not Currently   Lifestyle    Physical activity       Days per week: None       Minutes per session: None    Stress: None   Relationships    Social connections       Talks on phone: Once a week       Gets together: Three times a week       Attends Taoist service: Never       Active member of club or organization: No       Attends meetings of clubs or organizations: Never       Relationship status:     Intimate partner violence       Fear of current or ex partner: No       Emotionally abused: No       Physically abused:  No       Forced sexual activity: No   Other Topics Concern    None   Social History Narrative     PRIOR MEDICATION TRIALS     Trazodone (having more suicidal dreams), at 100mg, not working for sleep     Seroquel (wt gain, 14 lb in 3 months, enuresis, indigestion, abnormal blood work, increased blood sugar)     Duloxetine (has not been effective and no noticeable change even with dose increases to 90mg)     Paxil, 20mg     Wellbutrin XL, (tried it recently to quit smoking)     Prozac (made her numb, added to her eating disorder)     Zoloft (thought she did well on this, took for a couple of years, she stopped it because when she returned to [facility] the doctor put her back on Prozac)     Remeron (increased her dreams and panic attacks)     Lunesta (worked)     Librium (in 2018 for 15 days to stop drinking alcohol)     Risperdal-stopped on 7/6/20 due to weight gain     Doxepin, started on 9/22/20 for sleep (not effective)     Prazosin, ineffective for dreams/nightmares     Neeru (1/27/21, reports that she switched it to am dosing because taking it at night made her RLS worse)           Psychiatric Review of Systems, 3/19/19           Mood:  Sadness, tearfulness, low appetite, low concentration, low energy, loss of interest, denies suicidality today       (Depression: sadness, tearfulness, sleep, appetite, energy, concentration, sexual function, guilt, psychomotor agitation or slowing, interest, suicidality)           Julia: She says that there have been periods of time when she could go 3 days without sleep, she does say that there have been days in a row when she has done reckless, impulsive things       (impulsivity, grandiosity, recklessness, excessive energy, decreased need for sleep, increased spending beyond means, hyperverbal, grandiose, racing thoughts, hypersexuality)           Other: anger from being tired all the time       (Irritability, lability, anger)           Anxiety:  She says that she worries every night about sleeping and panic attacks. She says that she worries about her neighbors. She says that they always want something from her. She worries about running into them. She says that this causes panic attacks. She says that there is a lot of drug use in her neighborhood and she is fearful of her neighbors.       (Generalized anxiety: where, when, who, how long, how frequent)           Panic Disorder symptoms: She says that she is having panic attacks at night, 1 at night (this has improved from 3 weeks ago before she started Trazodone when she was having 2-3 at night).  She says that she wakes up with dreams of her family and with her anxiety about [place] park.       (Palpitations, racing heart beat, sweating, sense of impending doom, fear of recurrence, shortness of breath)           OCD symptoms:  She gets flustered if things are not in a routine, is ritualized about the way she dresses, and the way she laces her shoes       (checking, cleaning, organizing, rituals, hang-ups, obsessive thoughts, counting, rational vs. Irrational beliefs)           PTSD symptoms:  Increased startle response, wakes up with dreams at night, she also says Station: [facility]   Sleep: avg 2-3 hrs, has extreme difficulty falling asleep, sometimes takes several hours, wakes up, then up for an hour, then back to sleep, but the rest of the sleep is restless, maybe only 2 additional hrs    Appetite: ok     COGNITION SCREEN:     Can spell the word, \"world,\" backwards: Yes  Can do serial 7's: Yes              Lab Results   Component Value Date      (L) 03/10/2020     K 4.7 03/10/2020     CL 89 (L) 03/10/2020     CO2 18 (L) 03/10/2020     BUN 19 03/10/2020     CREATININE 0.9 03/10/2020     GLUCOSE 107 03/10/2020     CALCIUM 9.1 03/10/2020     PROT 8.6 11/10/2015     LABALBU 5.0 11/10/2015     ALKPHOS 127 (H) 11/10/2015     AST 33 (H) 11/10/2015     ALT 22 11/10/2015     LABGLOM >60 03/10/2020            Lab Results   Component Value Date      03/10/2020     K 4.7 03/10/2020     CL 89 03/10/2020     CO2 18 03/10/2020     BUN 19 03/10/2020     CREATININE 0.9 03/10/2020     GLUCOSE 107 03/10/2020     CALCIUM 9.1 03/10/2020      No results found for: CHOL  No results found for: TRIG  No results found for: HDL  No results found for: LDLCHOLESTEROL, LDLCALC  No results found for: LABVLDL, VLDL  No results found for: CHOLHDLRATIO  No results found for: LABA1C  No results found for: EAG        Lab Results   Component Value Date     TSH 2.40 02/03/2015            Lab Results   Component Value Date     VITD25 27.6 (L) 02/03/2015         Assessments Administered:  PHQ9: 9, mild  GAD7: 7, mild        Assessment:    1. Mild mixed bipolar II disorder ([address]    2. Generalized anxiety disorder    3. Insomnia due to other mental disorder    4. PTSD (post-traumatic stress disorder)    5.  Bruxism          Plan:  Continue:  Lamotrigine, 150mg,  daily  Eszopiclone (Lunesta), 3mg, nightly for sleep  Gabapentin, 300mg, take 2 capsules nightly (restless legs)  Lurasidone, 20mg, evening, with the evening meal (350 calories)     Doxepin, 10mg, take 1-2 capsules as needed for sleep (has been taking 2 tabs for the last 1.5 mo, not effective for sleep) (try taking up to 5 capsules, call this provider to report if any dose worked)     Start:  Clonidine, 0.1mg, nightly (teeth grinding, nightmares), can lower BP, get your balance before you get up to walk     Decrease:  Cymbalta (Duloxetine), 60mg, daily      Continue therapy with Mirna Sahu for therapy     Try the [facility] tabby for sleep     Follow up: Return in about 3 months (around 4/27/2021).    If you become suicidal, follow up with this office or call the [address] at [phone]), or dial [phone].     1. The risks, benefits, side effects, indications, contraindications, and adverse effects of the medications have been discussed. Yes.  2. The pt has verbalized understanding and has capacity to give informed consent. 3. The [facility]ids[facility] Luisquang report has been reviewed according to [facility] regulations. 4. Supportive therapy offered. 5. Follow up:    Return in about 3 months (around 4/27/2021). 6. The patient has been advised to call with any problems. 7. Controlled substance Treatment Plan: new medication for sleep (eszopiclone). 8. The above listed medications have been continued, modifications in meds and other orders/labs as follows:                 Encounter Medications         Orders Placed This Encounter   Medications    lamoTRIgine (LAMICTAL) 150 MG tablet       Sig: Take 1 tablet by mouth daily       Dispense:  90 tablet       Refill:  1       Fill as her insurance allows.  gabapentin (NEURONTIN) 300 MG capsule       Sig: TAKE 2 CAPSULES BY MOUTH EVERY DAY AT NIGHT       Dispense:  60 capsule       Refill:  0       Not to exceed 5 additional fills before 04/18/2021 DX Code Needed  .     DULoxetine (CYMBALTA) 60 MG extended release capsule       Sig: Take 1 capsule by mouth daily       Dispense:  90 capsule       Refill:  1    cloNIDine (CATAPRES) 0.1 MG tablet       Sig: Take 1 tablet by mouth nightly       Dispense:  30 tablet

## 2021-02-09 ENCOUNTER — TELEPHONE (OUTPATIENT)
Dept: PSYCHIATRY | Age: 56
End: 2021-02-09

## 2021-02-09 RX ORDER — DOXEPIN HYDROCHLORIDE 10 MG/1
CAPSULE ORAL
Qty: 90 CAPSULE | Refills: 1 | Status: SHIPPED | OUTPATIENT
Start: 2021-02-09 | End: 2021-03-03 | Stop reason: ALTCHOICE

## 2021-02-23 ENCOUNTER — TELEPHONE (OUTPATIENT)
Dept: PSYCHIATRY | Age: 56
End: 2021-02-23

## 2021-02-23 NOTE — TELEPHONE ENCOUNTER
Patient reports that she called in regards to the Doxepin that had been prescribed, reports that even after increase she feels like it isn't working, reported that provider had told her to come in if she needed to see her earlier than her appt on 4/27.   Pt scheduled for an appt on 3/3/21 to see provider

## 2021-02-24 DIAGNOSIS — F99 INSOMNIA DUE TO OTHER MENTAL DISORDER: ICD-10-CM

## 2021-02-24 DIAGNOSIS — F51.05 INSOMNIA DUE TO OTHER MENTAL DISORDER: ICD-10-CM

## 2021-02-24 DIAGNOSIS — F41.1 GENERALIZED ANXIETY DISORDER: ICD-10-CM

## 2021-02-24 RX ORDER — GABAPENTIN 300 MG/1
CAPSULE ORAL
Qty: 60 CAPSULE | Refills: 0 | Status: SHIPPED | OUTPATIENT
Start: 2021-02-24 | End: 2021-03-29 | Stop reason: SDUPTHER

## 2021-02-24 RX ORDER — ESZOPICLONE 3 MG/1
3 TABLET, FILM COATED ORAL NIGHTLY PRN
Qty: 30 TABLET | Refills: 0 | Status: SHIPPED | OUTPATIENT
Start: 2021-02-24 | End: 2021-03-26

## 2021-02-24 NOTE — TELEPHONE ENCOUNTER
Angelica Cristina called to request a refill on her medication. Reported that she will be out of her medication this Saturday. Levy Bill scanned into media    Last office visit : 1/27/2021 w/ Eugenio SCANLON  Next office visit : 3/3/2021 w/ Eugenio SCANLON    Requested Prescriptions     Pending Prescriptions Disp Refills    eszopiclone (ESZOPICLONE) 3 MG TABS 30 tablet 0     Sig: Take 1 tablet by mouth nightly as needed (insomnia) for up to 30 days.  gabapentin (NEURONTIN) 300 MG capsule 60 capsule 0     Sig: TAKE 2 CAPSULES BY MOUTH EVERY DAY AT [address] [facility], [facility]         Virtual Visit    1/27/2021  [address] [facility]   CAPO Wu - NP  Nurse Practitioner Psychiatric/Mental Health  Mild mixed bipolar II disorder [facility]) +4 more  Dx  Follow-up , Insomnia , Depression  Reason for Visit   Progress Notes    Expand AllCollapse All  []Expand All by Juan J Sparkskrishna is a 64 y.o. female evaluated via telephone on 1/27/2021.       Consent:  She and/or health care decision maker is aware that that she may receive a bill for this telephone service, depending on her insurance coverage, and has provided verbal consent to proceed: Yes        Documentation:  I communicated with the patient and/or health care decision maker about depression/anxiety/insomnia.    Details of this discussion including any medical advice provided: see below        I affirm this is a Patient Initiated Episode with an Established Patient who has not had a related appointment within my department in the past 7 days or scheduled within the next 24 hours.     Total Time: minutes: 21-30 minutes     Note: not billable if this call serves to triage the patient into an appointment for the relevant concern        Aishwarya Tee               1/27/2021 6:03 PM                             Progress Note     IN:  0915  OUT: 0940        Dorene Rojas 1965                           Chief Complaint   Patient presents with  Follow-up    Insomnia    Depression            Subjective:  Patient is a 64 y.o. female diagnosed with Bipolar 2 Disorder and presents today for follow-up. Last seen in clinic on 1/8/21 and prior records were reviewed.     Today patient states, \"Well, it (Lurasidone) definitely changes my mood, but I have to take it in the morning. It doesn't help me sleep. \" She reports that Lurasidone made her RLS worse at night. She reports that she has been off caffeine and she doesn't understand why her sleep is so off. She reports that she is awake until 3-5am.     Patient reports side effects as follows: RLS (Lurasidone). No evidence of EPS, no cogwheeling or abnormal motor movements.     Absent  suicidal ideation.   Reports compliance with medications as good .      Current Substance Use:  See history     BP: There were no vitals taken for this visit.        Review of Systems - 14 point review:  Negative except being treated for:  depression, anxiety, panic attacks, suicidal dreams, weight gain, right fractured ankle with walking boot, enuresis, increased liver enzymes (being treated with spirolactone)        Constitutional: (fevers, chills, night sweats, wt loss/gain, change in appetite, fatigue, somnolence)     HEENT: (ear pain or discharge, hearing loss, ear ringing, sinus pressure, nosebleed, nasal discharge, sore throat, oral sores, tooth pain, bleeding gums, hoarse voice, neck pain)      Cardiovascular: (HTN, chest pain, elevated cholesterol/lipids, palpitations, leg swelling, leg pain with walking)     Respiratory: (cough, wheezing, snoring, SOB with activity (dyspnea), SOB while lying flat (orthopnea), awakening with severe SOB (paroxysmal nocturnal dyspnea))     Gastrointestinal: (NVD, constipation, abdominal pain, bright red stools, black tarry stools, stool incontinence)     Genitourinary:  (pelvic pain, burning or frequency of urination, urinary urgency, blood in urine incomplete bladder emptying, urinary incontinence, STD; MEN: testicular pain or swelling, erectile dysfunction; WOMEN: LMP, heavy menstrual bleeding (menorrhagia), irregular periods, postmenopausal bleeding, menstrual pain (dymenorrhea, vaginal discharge)     Musculoskeletal: (bone pain/fracture, joint pain or swelling, musle pain)     Integumentary: (rashes, acne, non-healing sores, itching, breast lumps, breast pain, nipple discharge, hair loss)     Neurologic: (HA, muscle weakness, paresthesias (numbness, coldness, crawling or prickling), memory loss, seizure, dizziness)     Psychiatric:  (anxiety, sadness, irritability/anger, insomnia, suicidality)     Endocrine: (heat or cold intolerance, excessive thirst (polydipsia), excessive hunger (polyphagia))     Immune/Allergic: (hives, seasonal or environmental allergies, HIV exposure)     Hematologic/Lymphatic: (lymph node enlargement, easy bleeding or bruising)     History obtained via chart review and patient     PCP is Gabriela Modi. Arnie Merchant DO, DO         Current Meds:     Home Medications           Prior to Admission medications    Medication Sig Start Date End Date Taking? Authorizing Provider   lamoTRIgine (LAMICTAL) 150 MG tablet Take 1 tablet by mouth daily 1/27/21   Yes CAPO Bustillo NP   gabapentin (NEURONTIN) 300 MG capsule TAKE 2 CAPSULES BY MOUTH EVERY DAY AT NIGHT 1/27/21 2/26/21 Yes CAPO Bustillo NP   DULoxetine (CYMBALTA) 60 MG extended release capsule Take 1 capsule by mouth daily 1/27/21   Yes CAPO Bustillo NP   cloNIDine (CATAPRES) 0.1 MG tablet Take 1 tablet by mouth nightly 1/27/21   Yes CAPO Bustillo NP   eszopiclone (ESZOPICLONE) 3 MG TABS Take 1 tablet by mouth nightly as needed (insomnia) for up to 30 days.  1/27/21 2/26/21 Yes CAPO Bustillo NP   doxepin (SINEQUAN) 10 MG capsule TAKE 1-2 CAPSULES BY MOUTH NIGHTLY AS NEEDED FOR SLEEP. 1/13/21     CAPO Bustillo NP   lurasidone (LATUDA) 20 MG TABS tablet Take 1 tablet by mouth Daily with Emotionally abused: No       Physically abused:  No       Forced sexual activity: No   Other Topics Concern    None   Social History Narrative     PRIOR MEDICATION TRIALS     Trazodone (having more suicidal dreams), at 100mg, not working for sleep     Seroquel (wt gain, 14 lb in 3 months, enuresis, indigestion, abnormal blood work, increased blood sugar)     Duloxetine (has not been effective and no noticeable change even with dose increases to 90mg)     Paxil, 20mg     Wellbutrin XL, (tried it recently to quit smoking)     Prozac (made her numb, added to her eating disorder)     Zoloft (thought she did well on this, took for a couple of years, she stopped it because when she returned to [facility] the doctor put her back on Prozac)     Remeron (increased her dreams and panic attacks)     Lunesta (worked)     Librium (in 2018 for 15 days to stop drinking alcohol)     Risperdal-stopped on 7/6/20 due to weight gain     Doxepin, started on 9/22/20 for sleep (not effective)     Prazosin, ineffective for dreams/nightmares     Latuda (1/27/21, reports that she switched it to am dosing because taking it at night made her RLS worse)           Psychiatric Review of Systems, 3/19/19           Mood:  Sadness, tearfulness, low appetite, low concentration, low energy, loss of interest, denies suicidality today       (Depression: sadness, tearfulness, sleep, appetite, energy, concentration, sexual function, guilt, psychomotor agitation or slowing, interest, suicidality)           Julia: She says that there have been periods of time when she could go 3 days without sleep, she does say that there have been days in a row when she has done reckless, impulsive things       (impulsivity, grandiosity, recklessness, excessive energy, decreased need for sleep, increased spending beyond means, hyperverbal, grandiose, racing thoughts, hypersexuality)           Other: anger from being tired all the time       (Irritability, lability, anger)         Anxiety:  She says that she worries every night about sleeping and panic attacks. She says that she worries about her neighbors. She says that they always want something from her. She worries about running into them. She says that this causes panic attacks. She says that there is a lot of drug use in her neighborhood and she is fearful of her neighbors.       (Generalized anxiety: where, when, who, how long, how frequent)           Panic Disorder symptoms: She says that she is having panic attacks at night, 1 at night (this has improved from 3 weeks ago before she started Trazodone when she was having 2-3 at night). She says that she wakes up with dreams of her family and with her anxiety about [place] park.       (Palpitations, racing heart beat, sweating, sense of impending doom, fear of recurrence, shortness of breath)           OCD symptoms:  She gets flustered if things are not in a routine, is ritualized about the way she dresses, and the way she laces her shoes       (checking, cleaning, organizing, rituals, hang-ups, obsessive thoughts, counting, rational vs. Irrational beliefs)           PTSD symptoms:  Increased startle response, wakes up with dreams at night, she also says she has flashbacks during the day.       (nightmares, flashbacks, startle respoinse, avoidance)           Social anxiety symptoms:  Negative           Simple phobias:   Heights, claustrophobia, snakes       (heights, planes, spiders, etc.)           Psychosis: She reports hearing and seeing things that aren't there, sees animals out her window that really aren't there. She says this happens almost every day. She says that she has never seen things when she wasn't depressed.       (hallucinations, auditory, visual, tactile, olfactory)           Paranoia: Feels that people are watching her most of the time.  She thinks this feeling is both irrational and rational.           Delusions:  Negative       (TV, radio, thought broadcasting, mind control, referential thinking)           Patient's perception: Negative       (Spiritual or cultural context of symptoms, reality testing)           ADHD symptoms: Negative           Eating Disorder symptoms:  Positive, lives almost entirely on whole milk, sometimes drinking ensure, she says that in the last month she has only eaten 3 meals, has occasionally eaten a bagel with a plain piece of bread.       (binging, purging, excessive exercising)            MSE:  Patient is  A & O x 4. Appearance:  SAVANAH. Cognition:  Recent memory intact , remote memory intact , good fund of knowledge, average  intelligence level. Speech:  normal  Language: Naming: intact; Word Finding: intact  Conversation no evidence of delusions  Behavior:  Cooperative  Mood: euthymic  Affect: SAVANAH  Thought Content: negative delusions, negative hallucinations, negative obsessions,  negativehomicidal and negative suicidal   Thought Process: linear, goal directed and coherent (having trouble keeping thoughts together, probably due to lack of sleep)  Associations: logical connections  Attention Span and concentration: Impaired (probably due to lack of sleep)  Judgement Insight:  normal and appropriate  Gait and Station: SAVANAH   Sleep: avg 2-3 hrs, has extreme difficulty falling asleep, sometimes takes several hours, wakes up, then up for an hour, then back to sleep, but the rest of the sleep is restless, maybe only 2 additional hrs    Appetite: ok     COGNITION SCREEN:     Can spell the word, \"world,\" backwards: Yes  Can do serial 7's:  Yes              Lab Results   Component Value Date      (L) 03/10/2020     K 4.7 03/10/2020     CL 89 (L) 03/10/2020     CO2 18 (L) 03/10/2020     BUN 19 03/10/2020     CREATININE 0.9 03/10/2020     GLUCOSE 107 03/10/2020     CALCIUM 9.1 03/10/2020     PROT 8.6 11/10/2015     LABALBU 5.0 11/10/2015     ALKPHOS 127 (H) 11/10/2015     AST 33 (H) 11/10/2015     ALT 22 11/10/2015     LABGLOM >60 Yes.  2. The pt has verbalized understanding and has capacity to give informed consent. 3. The   report has been reviewed according to [facility] regulations. 4. Supportive therapy offered. 5. Follow up:    Return in about 3 months (around 4/27/2021). 6. The patient has been advised to call with any problems. 7. Controlled substance Treatment Plan: new medication for sleep (eszopiclone). 8. The above listed medications have been continued, modifications in meds and other orders/labs as follows:                 Encounter Medications         Orders Placed This Encounter   Medications    lamoTRIgine (LAMICTAL) 150 MG tablet       Sig: Take 1 tablet by mouth daily       Dispense:  90 tablet       Refill:  1       Fill as her insurance allows.  gabapentin (NEURONTIN) 300 MG capsule       Sig: TAKE 2 CAPSULES BY MOUTH EVERY DAY AT NIGHT       Dispense:  60 capsule       Refill:  0       Not to exceed 5 additional fills before 04/18/2021 DX Code Needed  .  DULoxetine (CYMBALTA) 60 MG extended release capsule       Sig: Take 1 capsule by mouth daily       Dispense:  90 capsule       Refill:  1    cloNIDine (CATAPRES) 0.1 MG tablet       Sig: Take 1 tablet by mouth nightly       Dispense:  30 tablet       Refill:  3    eszopiclone (ESZOPICLONE) 3 MG TABS       Sig: Take 1 tablet by mouth nightly as needed (insomnia) for up to 30 days.       Dispense:  30 tablet       Refill:  0                     No orders of the defined types were placed in this encounter.        9. Additional comments: Dyana Beckmanflynn has helped her mood and anxiety. She reports that she has gained weight and that her hunger has increased. She has found herself wanting to eat which has not been normal for her. Will decrease Duloxetine to 60mg in the morning and will add Clonidine at night for nightmares and bruxism. She reports that she has ground her teeth since she was a child and doesn't think that it has anything to do with Duloxetine.  She will try increasing the dose of Doxepin to 30mg, 40mg, and 50mg and report back to this provider if any of those doses help for sleep. If this is not effective may try Prazosin again. Will follow up in about 3 months.     10. Over 50% of the total visit time of 25 minutes was spent on counseling and/or coordination of care of:                         1. Mild mixed bipolar II disorder ([address]    2. Generalized anxiety disorder    3. Insomnia due to other mental disorder    4. PTSD (post-traumatic stress disorder)    5. Bruxism                        Psychotherapy Topics: mood/medication effectiveness         Heidy Dopp PMHNP-BC      Instructions       Return in about 3 months (around 4/27/2021). Plan:  Continue:  Lamotrigine, 150mg,  daily  Eszopiclone (Lunesta), 3mg, nightly for sleep  Gabapentin, 300mg, take 2 capsules nightly (restless legs)  Lurasidone, 20mg, evening, with the evening meal (350 calories)     Doxepin, 10mg, take 1-2 capsules as needed for sleep (has been taking 2 tabs for the last 1.5 mo, not effective for sleep) (try taking up to 5 capsules, call this provider to report if any dose worked)     Start:  Clonidine, 0.1mg, nightly (teeth grinding, nightmares), can lower BP, get your balance before you get up to walk     Decrease:  Cymbalta (Duloxetine), 60mg, daily      Continue therapy with Nestor Bailey for therapy     Try the [facility] tabby for sleep     Follow up: Return in about 3 months (around 4/27/2021).    If you become suicidal, follow up with this office or call the [address] at [phone]), or dial [phone].     Patient Education        clonidine (oral)  Pronunciation:  MATT gandara  Brand:  Justin Morfin  What is the most important information I should know about clonidine? Follow all directions on your medicine label and package. Tell each of your healthcare providers about all your medical conditions, allergies, and all medicines you use.   What is clonidine? Clonidine lowers blood pressure by decreasing the levels of certain chemicals in your blood. This allows your blood vessels to relax and your heart to beat more slowly and easily. Clonidine is used to treat hypertension (high blood pressure). The Kapvay brand of clonidine is used to treat attention deficit hyperactivity disorder (ADHD). Clonidine is sometimes given with other medications. Clonidine may also be used for purposes not listed in this medication guide. What should I discuss with my healthcare provider before taking clonidine? You should not take this medicine if you are allergic to clonidine. To make sure clonidine is safe for you, tell your doctor if you have:  · heart disease or severe coronary artery disease;  · heart rhythm disorder, slow heartbeats;  · low blood pressure, or a history of fainting spells;  · a history of heart attack or stroke;  · pheochromocytoma (tumor of the adrenal gland);  · kidney disease; or  · if you have ever had an allergic reaction to a clonidine transdermal skin patch (Catapres TTS). Older adults may be more sensitive to the effects of this medicine. It is not known whether this medicine will harm an unborn baby. Tell your doctor if you are pregnant or plan to become pregnant while taking clonidine. Clonidine can pass into breast milk and may harm a nursing baby. Tell your doctor if you are breast-feeding a baby. Catapres is not approved for use by anyone younger than 25years old. Do not give Mirian Farshad to a child younger than 10years old. How should I take clonidine? Follow all directions on your prescription label. Your doctor may occasionally change your dose to make sure you get the best results. Do not take this medicine in larger or smaller amounts or for longer than recommended. Clonidine is usually taken in the morning and at bedtime.  If you take different doses of this medicine at each dosing time, it may be best to take the larger dose at bedtime. Clonidine may be taken with or without food. Do not use two forms of clonidine at the same time. This medicine is also available as a transdermal patch worn on the skin. Do not crush, chew, or break an extended-release tablet. Swallow it whole. Tell your doctor if you have trouble swallowing the tablet. If you need surgery, tell the surgeon ahead of time that you are using clonidine. You may need to stop using the medicine for a short time. Do not stop using clonidine suddenly, or you could have unpleasant withdrawal symptoms. Ask your doctor how to safely stop using clonidine. Call your doctor if you are sick with vomiting. Prolonged illness can make it harder for your body to absorb clonidine, which may lead to withdrawal symptoms. This is especially important for a child taking clonidine. If you are being treated for high blood pressure, keep using this medication even if you feel well. High blood pressure often has no symptoms. You may need to use blood pressure medication for the rest of your life. Store at room temperature away from moisture, heat, and light. What happens if I miss a dose? Take the missed dose as soon as you remember. Skip the missed dose if it is almost time for your next scheduled dose. Do not take extra medicine to make up the missed dose. What happens if I overdose? Seek emergency medical attention or call the Poison Help line at 1-423.539.5923. Overdose symptoms may include dangerously high blood pressure (severe headache, pounding in your neck or ears, nosebleed, anxiety, chest pain, shortness of breath) followed by low blood pressure (feeling like you might pass out). Other overdose symptoms may include feeling cold, extreme weakness or drowsiness, weak or shallow breathing, pinpoint pupils, fainting, or seizure (convulsions). What should I avoid while taking clonidine? Avoid drinking alcohol. It may increase certain side effects of clonidine.   Clonidine may impair your thinking or reactions. Avoid driving or operating machinery until you know how this medicine will affect you. Dizziness or severe drowsiness can cause falls or other accidents. What are the possible side effects of clonidine? Get emergency medical help if you have signs of an allergic reaction: hives; difficult breathing; swelling of your face, lips, tongue, or throat. Call your doctor at once if you have:  · severe chest pain, shortness of breath, irregular heartbeats;  · a very slow heart rate;  · severe headache, pounding in your neck or ears, blurred vision;  · nosebleeds;  · anxiety, confusion; or  · a light-headed feeling, like you might pass out. Serious side effects may be more likely in older adults. Common side effects may include:  · drowsiness, dizziness;  · feeling tired or irritable;  · dry mouth, loss of appetite;  · constipation;  · dry eyes, contact lens discomfort; or  · sleep problems (insomnia), nightmares. This is not a complete list of side effects and others may occur. Call your doctor for medical advice about side effects. You may report side effects to FDA at 8-902-FDA-1779. What other drugs will affect clonidine? Taking this medicine with other drugs that make you sleepy can worsen this effect. Ask your doctor before taking clonidine with a sleeping pill, narcotic pain medicine, muscle relaxer, or medicine for anxiety, depression, or seizures. Tell your doctor about all your current medicines and any you start or stop using, especially:  · other heart or blood pressure medications;  · an antidepressant; or  · any other medicine that contains clonidine. This list is not complete. Other drugs may interact with clonidine, including prescription and over-the-counter medicines, vitamins, and herbal products. Not all possible interactions are listed in this medication guide. Where can I get more information?   Your pharmacist can provide more information about clonidine. Remember, keep this and all other medicines out of the reach of children, never share your medicines with others, and use this medication only for the indication prescribed. Every effort has been made to ensure that the information provided by [address] is accurate, up-to-date, and complete, but no guarantee is made to that effect. Drug information contained herein may be time sensitive. [facility] information has been compiled for use by healthcare practitioners and consumers in the [facility] and therefore [facility] does not warrant that uses outside of the [facility] are appropriate, unless specifically indicated otherwise. [facility]'s drug information does not endorse drugs, diagnose patients or recommend therapy. [facility]'s drug information is an informational resource designed to assist licensed healthcare practitioners in caring for their patients and/or to serve consumers viewing this service as a supplement to, and not a substitute for, the expertise, skill, knowledge and judgment of healthcare practitioners. The absence of a warning for a given drug or drug combination in no way should be construed to indicate that the drug or drug combination is safe, effective or appropriate for any given patient. [facility] does not assume any responsibility for any aspect of healthcare administered with the aid of information [facility] provides. The information contained herein is not intended to cover all possible uses, directions, precautions, warnings, drug interactions, allergic reactions, or adverse effects. If you have questions about the drugs you are taking, check with your doctor, nurse or pharmacist.  Copyright 4752-4722 [address]. Version: 9.01. Revision date: 3/23/2016. Care instructions adapted under license by [facility] ([facility]).  If you have questions about a medical condition or this instruction, always ask your healthcare professional. [address] any warranty or liability for your use of this information.             After Visit Summary (Printed 1/29/2021)  Additional Documentation    Flowsheets:    Patient Health Questionnaire - 9,   STEFFEN-7      Encounter Info:    Billing Info,   Allergies,   Detailed Report,   History,   Medications,   Questionnaires      Chart Review Routing History    No routing history on file. Encounter Status    Signed by CAPO Medina NP on 1/27/21 at 18:04   [facility] Advisories    Click to view [employer] history   Encounter Messages    No messages in this encounter   Orders Placed     None  Outpatient Medications at End of Encounter as of 1/27/2021    lamoTRIgine (LAMICTAL) 150 MG tablet (Taking) Take 1 tablet by mouth daily   gabapentin (NEURONTIN) 300 MG capsule (Taking) TAKE 2 CAPSULES BY MOUTH EVERY DAY AT NIGHT   DULoxetine (CYMBALTA) 60 MG extended release capsule (Taking) Take 1 capsule by mouth daily   cloNIDine (CATAPRES) 0.1 MG tablet (Taking) Take 1 tablet by mouth nightly   eszopiclone (ESZOPICLONE) 3 MG TABS (Taking) Take 1 tablet by mouth nightly as needed (insomnia) for up to 30 days. doxepin (SINEQUAN) 10 MG capsule TAKE 1-2 CAPSULES BY MOUTH NIGHTLY AS NEEDED FOR SLEEP.   lurasidone (LATUDA) 20 MG TABS tablet Take 1 tablet by mouth Daily with supper (350 calories for best absorption)   oxybutynin (DITROPAN) 5 MG tablet TAKE 1 TABLET BY MOUTH EVERY DAY AT NIGHT   spironolactone (ALDACTONE) 25 MG tablet    diclofenac (VOLTAREN) 75 MG EC tablet Take 75 mg by mouth 2 times daily   metoprolol succinate (TOPROL XL) 50 MG extended release tablet Take 50 mg by mouth daily 4/25/19 per direction of Dr. Charly Stevenson pt to take 1/2 tab daily.    Magnesium Oxide 250 MG TABS Take by mouth daily   Visit Diagnoses       Mild mixed bipolar II disorder (HCC)     Generalized anxiety disorder     Insomnia due to other mental disorder     PTSD (post-traumatic stress disorder)     Bruxism     Problem List

## 2021-03-03 ENCOUNTER — VIRTUAL VISIT (OUTPATIENT)
Dept: PSYCHIATRY | Age: 56
End: 2021-03-03
Payer: MEDICARE

## 2021-03-03 DIAGNOSIS — F51.05 INSOMNIA DUE TO OTHER MENTAL DISORDER: ICD-10-CM

## 2021-03-03 DIAGNOSIS — Z79.899 HIGH RISK MEDICATION USE: ICD-10-CM

## 2021-03-03 DIAGNOSIS — F31.70 BIPOLAR DISORDER IN PARTIAL REMISSION, MOST RECENT EPISODE UNSPECIFIED TYPE (HCC): Primary | ICD-10-CM

## 2021-03-03 DIAGNOSIS — F99 INSOMNIA DUE TO OTHER MENTAL DISORDER: ICD-10-CM

## 2021-03-03 PROCEDURE — 99442 PR PHYS/QHP TELEPHONE EVALUATION 11-20 MIN: CPT | Performed by: NURSE PRACTITIONER

## 2021-03-03 RX ORDER — RISPERIDONE 1 MG/1
1 TABLET, FILM COATED ORAL NIGHTLY
Qty: 90 TABLET | Refills: 1 | Status: SHIPPED | OUTPATIENT
Start: 2021-03-03 | End: 2021-08-26

## 2021-03-03 RX ORDER — DULOXETIN HYDROCHLORIDE 20 MG/1
CAPSULE, DELAYED RELEASE ORAL
Qty: 60 CAPSULE | Refills: 0 | Status: SHIPPED | OUTPATIENT
Start: 2021-03-03 | End: 2021-06-07 | Stop reason: ALTCHOICE

## 2021-03-03 RX ORDER — TRAZODONE HYDROCHLORIDE 50 MG/1
50 TABLET ORAL NIGHTLY
Qty: 90 TABLET | Refills: 1 | Status: SHIPPED | OUTPATIENT
Start: 2021-03-03 | End: 2021-08-30

## 2021-03-03 ASSESSMENT — ANXIETY QUESTIONNAIRES
GAD7 TOTAL SCORE: 1
2. NOT BEING ABLE TO STOP OR CONTROL WORRYING: 0-NOT AT ALL
5. BEING SO RESTLESS THAT IT IS HARD TO SIT STILL: 0-NOT AT ALL
1. FEELING NERVOUS, ANXIOUS, OR ON EDGE: 0-NOT AT ALL
7. FEELING AFRAID AS IF SOMETHING AWFUL MIGHT HAPPEN: 0-NOT AT ALL
3. WORRYING TOO MUCH ABOUT DIFFERENT THINGS: 1-SEVERAL DAYS
6. BECOMING EASILY ANNOYED OR IRRITABLE: 0-NOT AT ALL

## 2021-03-03 ASSESSMENT — PATIENT HEALTH QUESTIONNAIRE - PHQ9
5. POOR APPETITE OR OVEREATING: 0
4. FEELING TIRED OR HAVING LITTLE ENERGY: 1
1. LITTLE INTEREST OR PLEASURE IN DOING THINGS: 0
7. TROUBLE CONCENTRATING ON THINGS, SUCH AS READING THE NEWSPAPER OR WATCHING TELEVISION: 1

## 2021-03-03 NOTE — PROGRESS NOTES
spirolactone)      Constitutional: (fevers, chills, night sweats, wt loss/gain, change in appetite, fatigue, somnolence)    HEENT: (ear pain or discharge, hearing loss, ear ringing, sinus pressure, nosebleed, nasal discharge, sore throat, oral sores, tooth pain, bleeding gums, hoarse voice, neck pain)      Cardiovascular: (HTN, chest pain, elevated cholesterol/lipids, palpitations, leg swelling, leg pain with walking)    Respiratory: (cough, wheezing, snoring, SOB with activity (dyspnea), SOB while lying flat (orthopnea), awakening with severe SOB (paroxysmal nocturnal dyspnea))    Gastrointestinal: (NVD, constipation, abdominal pain, bright red stools, black tarry stools, stool incontinence)     Genitourinary:  (pelvic pain, burning or frequency of urination, urinary urgency, blood in urine incomplete bladder emptying, urinary incontinence, STD; MEN: testicular pain or swelling, erectile dysfunction; WOMEN: LMP, heavy menstrual bleeding (menorrhagia), irregular periods, postmenopausal bleeding, menstrual pain (dymenorrhea, vaginal discharge)    Musculoskeletal: (bone pain/fracture, joint pain or swelling, musle pain)    Integumentary: (rashes, acne, non-healing sores, itching, breast lumps, breast pain, nipple discharge, hair loss)    Neurologic: (HA, muscle weakness, paresthesias (numbness, coldness, crawling or prickling), memory loss, seizure, dizziness)    Psychiatric:  (anxiety, sadness, irritability/anger, insomnia, suicidality)    Endocrine: (heat or cold intolerance, excessive thirst (polydipsia), excessive hunger (polyphagia))    Immune/Allergic: (hives, seasonal or environmental allergies, HIV exposure)    Hematologic/Lymphatic: (lymph node enlargement, easy bleeding or bruising)    History obtained via chart review and patient    PCP is Kulwinder Moreno, DO, DO       Current Meds:    Prior to Admission medications    Medication Sig Start Date End Date Taking?  Authorizing Provider   risperiDONE Medical: None     Non-medical: None   Tobacco Use    Smoking status: Current Every Day Smoker     Packs/day: 0.75    Smokeless tobacco: Never Used   Substance and Sexual Activity    Alcohol use: Not Currently     Comment: history of abuse, last drink was early December, 2018    Drug use: Not Currently     Types: Marijuana     Comment: last use was summer, 2017    Sexual activity: Not Currently   Lifestyle    Physical activity     Days per week: None     Minutes per session: None    Stress: None   Relationships    Social connections     Talks on phone: Once a week     Gets together:  Three times a week     Attends Taoism service: Never     Active member of club or organization: No     Attends meetings of clubs or organizations: Never     Relationship status:     Intimate partner violence     Fear of current or ex partner: No     Emotionally abused: No     Physically abused: No     Forced sexual activity: No   Other Topics Concern    None   Social History Narrative    PRIOR MEDICATION TRIALS    Trazodone (having more suicidal dreams), at 100mg, not working for sleep    Seroquel (wt gain, 14 lb in 3 months, enuresis, indigestion, abnormal blood work, increased blood sugar)    Duloxetine (has not been effective and no noticeable change even with dose increases to 90mg)    Paxil, 20mg    Wellbutrin XL, (tried it recently to quit smoking)    Prozac (made her numb, added to her eating disorder)    Zoloft (thought she did well on this, took for a couple of years, she stopped it because when she returned to [facility] the doctor put her back on Prozac)    Remeron (increased her dreams and panic attacks)    Naveed Faria (worked)    Librium (in 2018 for 15 days to stop drinking alcohol)    Risperdal-stopped on 7/6/20 due to weight gain    Doxepin, started on 9/22/20 for sleep (not effective)    Prazosin, ineffective for dreams/nightmares    Neeru (1/27/21, reports that she switched it to am dosing because taking it at night made her RLS worse)        Psychiatric Review of Systems, 3/19/19         Mood:  Sadness, tearfulness, low appetite, low concentration, low energy, loss of interest, denies suicidality today      (Depression: sadness, tearfulness, sleep, appetite, energy, concentration, sexual function, guilt, psychomotor agitation or slowing, interest, suicidality)         Julia: She says that there have been periods of time when she could go 3 days without sleep, she does say that there have been days in a row when she has done reckless, impulsive things      (impulsivity, grandiosity, recklessness, excessive energy, decreased need for sleep, increased spending beyond means, hyperverbal, grandiose, racing thoughts, hypersexuality)         Other: anger from being tired all the time      (Irritability, lability, anger)         Anxiety:  She says that she worries every night about sleeping and panic attacks. She says that she worries about her neighbors. She says that they always want something from her. She worries about running into them. She says that this causes panic attacks. She says that there is a lot of drug use in her neighborhood and she is fearful of her neighbors.      (Generalized anxiety: where, when, who, how long, how frequent)         Panic Disorder symptoms: She says that she is having panic attacks at night, 1 at night (this has improved from 3 weeks ago before she started Trazodone when she was having 2-3 at night).  She says that she wakes up with dreams of her family and with her anxiety about [place].      (Palpitations, racing heart beat, sweating, sense of impending doom, fear of recurrence, shortness of breath)         OCD symptoms:  She gets flustered if things are not in a routine, is ritualized about the way she dresses, and the way she laces her shoes      (checking, cleaning, organizing, rituals, hang-ups, obsessive thoughts, counting, rational vs. Irrational beliefs)         PTSD symptoms:  Increased startle response, wakes up with dreams at night, she also says she has flashbacks during the day.      (nightmares, flashbacks, startle respoinse, avoidance)         Social anxiety symptoms:  Negative         Simple phobias:   Heights, claustrophobia, snakes      (heights, planes, spiders, etc.)         Psychosis: She reports hearing and seeing things that aren't there, sees animals out her window that really aren't there. She says this happens almost every day. She says that she has never seen things when she wasn't depressed.      (hallucinations, auditory, visual, tactile, olfactory)         Paranoia: Feels that people are watching her most of the time. She thinks this feeling is both irrational and rational.         Delusions:  Negative      (TV, radio, thought broadcasting, mind control, referential thinking)         Patient's perception: Negative      (Spiritual or cultural context of symptoms, reality testing)         ADHD symptoms: Negative         Eating Disorder symptoms:  Positive, lives almost entirely on whole milk, sometimes drinking ensure, she says that in the last month she has only eaten 3 meals, has occasionally eaten a bagel with a plain piece of bread.      (binging, purging, excessive exercising)       MSE:  Patient is  A & O x 4. Appearance:  SAVANAH. Cognition:  Recent memory intact , remote memory intact , good fund of knowledge, average  intelligence level. Speech:  normal  Language: Naming: intact;  Word Finding: intact  Conversation no evidence of delusions  Behavior:  Cooperative  Mood: \"great\"  Affect: SAVANAH  Thought Content: negative delusions, negative hallucinations, negative obsessions,  negativehomicidal and negative suicidal   Thought Process: linear, goal directed and coherent (having trouble keeping thoughts together, probably due to lack of sleep)  Associations: logical connections  Attention Span and concentration: Normal   Judgement Insight:  normal and appropriate  Gait and Station: SAVANAH   Sleep: avg 7-8 hrs    Appetite: ok    COGNITION SCREEN:    Can spell the word, \"world,\" backwards: Yes  Can do serial 7's: Yes      Lab Results   Component Value Date     (L) 03/10/2020    K 4.7 03/10/2020    CL 89 (L) 03/10/2020    CO2 18 (L) 03/10/2020    BUN 19 03/10/2020    CREATININE 0.9 03/10/2020    GLUCOSE 107 03/10/2020    CALCIUM 9.1 03/10/2020    PROT 8.6 11/10/2015    LABALBU 5.0 11/10/2015    ALKPHOS 127 (H) 11/10/2015    AST 33 (H) 11/10/2015    ALT 22 11/10/2015    LABGLOM >60 03/10/2020     Lab Results   Component Value Date     03/10/2020    K 4.7 03/10/2020    CL 89 03/10/2020    CO2 18 03/10/2020    BUN 19 03/10/2020    CREATININE 0.9 03/10/2020    GLUCOSE 107 03/10/2020    CALCIUM 9.1 03/10/2020      No results found for: CHOL  No results found for: TRIG  No results found for: HDL  No results found for: LDLCHOLESTEROL, LDLCALC  No results found for: LABVLDL, VLDL  No results found for: CHOLHDLRATIO  No results found for: LABA1C  No results found for: EAG  Lab Results   Component Value Date    TSH 2.40 02/03/2015     Lab Results   Component Value Date    VITD25 27.6 (L) 02/03/2015       Assessments Administered:  PHQ9: 2, normal  GAD7: 1, normal      Assessment:   1. Bipolar disorder in partial remission, most recent episode [facility]    2. Insomnia due to other mental disorder    3. High risk medication use        Plan:  Continue:  Lamotrigine, 150mg,  Daily    Eszopiclone (Lunesta), 3mg, nightly for sleep (try not taking this to see if you still need it)    Gabapentin, 300mg, take 2 capsules nightly (restless legs)  Lurasidone, 20mg, daily (350 calories)  Clonidine, 0.1mg, nightly (teeth grinding, nightmares), can lower BP, get your balance before you get up to walk    Taper and stop:  Cymbalta (Duloxetine), 20mg, take 2 capsules daily for 1 week, then take 1 capsule daily for 1 week, then stop.      Start: Risperdal, 1mg, nightly Trazodone, 50mg, nightly    Continue therapy with Yogi Elisha for therapy    Come to the lab for a new Urine Drug Screen    Try the [facility] tabby for sleep    Follow up: Return in about 3 months (around 6/3/2021). If you become suicidal, follow up with this office or call the [facility] at [phone]), or dial [phone].    1. The risks, benefits, side effects, indications, contraindications, and adverse effects of the medications have been discussed. Yes.  2. The pt has verbalized understanding and has capacity to give informed consent. 3. The Sheron Spann report has been reviewed according to [facility] regulations. 4. Supportive therapy offered. 5. Follow up: Return in about 3 months (around 6/3/2021). 6. The patient has been advised to call with any problems. 7. Controlled substance Treatment Plan: new medication for sleep (eszopiclone). 8. The above listed medications have been continued, modifications in meds and other orders/labs as follows:      Orders Placed This Encounter   Medications    risperiDONE (RISPERDAL) 1 MG tablet     Sig: Take 1 tablet by mouth nightly     Dispense:  90 tablet     Refill:  1    DULoxetine (CYMBALTA) 20 MG extended release capsule     Sig: Take 2 capsules by mouth daily for 1 week, then decrease to 1 capsule daily for 1 week, then stop. Dispense:  60 capsule     Refill:  0    traZODone (DESYREL) 50 MG tablet     Sig: Take 1 tablet by mouth nightly     Dispense:  90 tablet     Refill:  1        Orders Placed This Encounter   Procedures    Urine Drug Screen     Please do the drug screen required by pain management. Standing Status:   Future     Standing Expiration Date:   3/3/2022       9. Additional comments: She reports that [place] continues to help with her mood. She reports that Clonidine has helped a lot with grinding her teeth.  She reports that she restarted Risperdal, 1mg, and Trazodone, 50mg at night for sleep and that she is willing to live with weight gain because her sleep is so improved. Will stop Doxepin. Will taper and stop Duloxetine as it is not providing any benefit. She was encouraged to try not taking Eszopiclone to see if she can do without it for sleep. She verbalized understanidng. Ordered a UDS. Will follow up in about 3 months. 10.Over 50% of the total visit time of 20 minutes was spent on counseling and/or coordination of care of:                        1. Bipolar disorder in partial remission, most recent episode unspecified type ([facility]    2. Insomnia due to other mental disorder    3.  High risk medication use                      Psychotherapy Topics: mood/medication effectiveness       Mahad Rhodes PMHNP-BC

## 2021-03-03 NOTE — PATIENT INSTRUCTIONS
Plan:  Continue:  Lamotrigine, 150mg,  Daily    Eszopiclone (Lunesta), 3mg, nightly for sleep (try not taking this to see if you still need it)    Gabapentin, 300mg, take 2 capsules nightly (restless legs)  Lurasidone, 20mg, daily (350 calories)  Clonidine, 0.1mg, nightly (teeth grinding, nightmares), can lower BP, get your balance before you get up to walk    Taper and stop:  Cymbalta (Duloxetine), 20mg, take 2 capsules daily for 1 week, then take 1 capsule daily for 1 week, then stop. Start: Risperdal, 1mg, nightly            Trazodone, 50mg, nightly    Continue therapy with Kian Mcclain for therapy    Come to the lab for a new Urine Drug Screen    Try the [employer] tabby for sleep    Follow up: Return in about 3 months (around 6/3/2021). If you become suicidal, follow up with this office or call the [facility] at [phone] ([phone]), or dial [phone].

## 2021-03-05 ENCOUNTER — TELEPHONE (OUTPATIENT)
Dept: PSYCHIATRY | Age: 56
End: 2021-03-05

## 2021-03-05 NOTE — TELEPHONE ENCOUNTER
3/5/21 1645    Called the patient to discuss refilling Prazosin since she is already taking Clonidine which has helped her bruxism. No answer, LVM to call us on Monday.     JAN BuiNP

## 2021-03-08 ENCOUNTER — TELEPHONE (OUTPATIENT)
Dept: PSYCHIATRY | Age: 56
End: 2021-03-08

## 2021-03-08 NOTE — TELEPHONE ENCOUNTER
Had called patient and left a message earlier today, returned call at this time and spoke to patient in regards to the medication Prazosin. Reports that without taking the medication she was dreaming, waking up, at times having panic attacks and sweating, though reports with taking the medication, she is not remembering the dreams nor having the symptoms. She reports that she is taking the 2mg now, she isn't feeling dizzy or off balance, at night if she does wake, she reports that she sits up for a few moments before she stands up. She reports that she hasn't been taking her BP at home, but during her visits with her PCP that her BP has been normal.  She reports that she can take her BP and monitor it at home. When asking if the 2mg was effective, patient reported that she was doing fine, that she isn't waking up, having dreams or the panic attacks.   Message given to [address] [place] [address]

## 2021-03-09 ENCOUNTER — TELEPHONE (OUTPATIENT)
Dept: PSYCHIATRY | Age: 56
End: 2021-03-09

## 2021-03-09 NOTE — TELEPHONE ENCOUNTER
3/9/21 1015    The patient called wanting a refill of Prazosin (1mg) to add to Prazosin (2mg). Called to find out about blood pressure and the effectiveness of 2mg with also taking Clonidine, 0.1mg nightly. She reports that she had restarted Prazosin when she was having sleep issues with Doxepin and she doesn't know if Clonidine would be effective by itself without taking Prazosin. She has a supply of 2mg Prazosin at home which she has not discarded. She will try going without Prazosin for 4-7 days and call back to let this provider know if the dreams/nightmares return without taking it. She reports that Clonidine has been really effective with stopping her teeth clenching at night.     CAPO Schneider-NP

## 2021-03-29 DIAGNOSIS — F51.05 INSOMNIA DUE TO OTHER MENTAL DISORDER: Primary | ICD-10-CM

## 2021-03-29 DIAGNOSIS — F99 INSOMNIA DUE TO OTHER MENTAL DISORDER: Primary | ICD-10-CM

## 2021-03-29 DIAGNOSIS — F41.1 GENERALIZED ANXIETY DISORDER: ICD-10-CM

## 2021-03-29 RX ORDER — ESZOPICLONE 3 MG/1
3 TABLET, FILM COATED ORAL NIGHTLY
Qty: 30 TABLET | Refills: 0 | Status: SHIPPED | OUTPATIENT
Start: 2021-03-29 | End: 2021-04-28

## 2021-03-29 RX ORDER — GABAPENTIN 300 MG/1
CAPSULE ORAL
Qty: 60 CAPSULE | Refills: 0 | Status: SHIPPED | OUTPATIENT
Start: 2021-03-29 | End: 2021-04-29 | Stop reason: SDUPTHER

## 2021-03-29 RX ORDER — ESZOPICLONE 3 MG/1
3 TABLET, FILM COATED ORAL NIGHTLY
COMMUNITY
End: 2021-03-29 | Stop reason: SDUPTHER

## 2021-03-29 NOTE — TELEPHONE ENCOUNTER
Andrew Cifuentes called to request refill on her medication      Last office visit: 3/3/2021  Next office visit: 4/27/2021    Requested Prescriptions     Pending Prescriptions Disp Refills    gabapentin (NEURONTIN) 300 MG capsule 60 capsule 0     Sig: TAKE 2 CAPSULES BY MOUTH EVERY DAY AT NIGHT    eszopiclone (ESZOPICLONE) 3 MG TABS 30 tablet      Sig: Take 1 tablet by mouth nightly. Virtual Visit    3/3/2021  [address], APRN - NP  Nurse Practitioner Psychiatric/Mental Health  Bipolar disorder in partial remission, most recent episode unspecified type ([facility] +2 more  Dx  Follow-up , Anxiety , Depression , Insomnia  Reason for Visit   Progress Notes    Expand AllCollapse All  []Expand All by Yash Corrie is a 64 y.o. female evaluated via telephone on 3/3/2021.       Consent:  She and/or health care decision maker is aware that that she may receive a bill for this telephone service, depending on her insurance coverage, and has provided verbal consent to proceed: Yes        Documentation:  I communicated with the patient and/or health care decision maker about depression/anxiety/insomnia.    Details of this discussion including any medical advice provided: see below        I affirm this is a Patient Initiated Episode with an Established Patient who has not had a related appointment within my department in the past 7 days or scheduled within the next 24 hours.     Total Time: minutes: 11-20 minutes     Note: not billable if this call serves to triage the patient into an appointment for the relevant concern        Lanre Lo               3/3/2021 1:32 PM                               Progress Note     IN:  1045  OUT: 1105        Dorene Rojas 1965                           Chief Complaint   Patient presents with    Follow-up    Anxiety    Depression    Insomnia            Subjective:  Patient is a 64 y.o. female diagnosed with Bipolar 2 Disorder and or swelling, musle pain)     Integumentary: (rashes, acne, non-healing sores, itching, breast lumps, breast pain, nipple discharge, hair loss)     Neurologic: (HA, muscle weakness, paresthesias (numbness, coldness, crawling or prickling), memory loss, seizure, dizziness)     Psychiatric:  (anxiety, sadness, irritability/anger, insomnia, suicidality)     Endocrine: (heat or cold intolerance, excessive thirst (polydipsia), excessive hunger (polyphagia))     Immune/Allergic: (hives, seasonal or environmental allergies, HIV exposure)     Hematologic/Lymphatic: (lymph node enlargement, easy bleeding or bruising)     History obtained via chart review and patient     PCP is Ishan Madera. Real Saunders, DO, DO         Current Meds:     Home Medications           Prior to Admission medications    Medication Sig Start Date End Date Taking? Authorizing Provider   risperiDONE (RISPERDAL) 1 MG tablet Take 1 tablet by mouth nightly 3/3/21   Yes CAPO Huerta NP   DULoxetine (CYMBALTA) 20 MG extended release capsule Take 2 capsules by mouth daily for 1 week, then decrease to 1 capsule daily for 1 week, then stop. 3/3/21   Yes CAPO Huerta NP   traZODone (DESYREL) 50 MG tablet Take 1 tablet by mouth nightly 3/3/21   Yes CAPO Huerta NP   eszopiclone (ESZOPICLONE) 3 MG TABS Take 1 tablet by mouth nightly as needed (insomnia) for up to 30 days.  2/24/21 3/26/21   CAPO Huerta NP   gabapentin (NEURONTIN) 300 MG capsule TAKE 2 CAPSULES BY MOUTH EVERY DAY AT NIGHT 2/24/21 3/26/21   CAPO Huerta NP   lamoTRIgine (LAMICTAL) 150 MG tablet Take 1 tablet by mouth daily 1/27/21     CAPO Huerta NP   cloNIDine (CATAPRES) 0.1 MG tablet Take 1 tablet by mouth nightly 1/27/21     CAPO Huerta NP   lurasidone (LATUDA) 20 MG TABS tablet Take 1 tablet by mouth Daily with supper (350 calories for best absorption) 1/8/21     Maggie Janell, APRN - NP   oxybutynin (DITROPAN) 5 MG tablet TAKE 1 TABLET BY MOUTH EVERY DAY AT NIGHT 7/28/20     CAPO Garrett   spironolactone (ALDACTONE) 25 MG tablet   3/5/20     Historical Provider, MD   diclofenac (VOLTAREN) 75 MG EC tablet Take 75 mg by mouth 2 times daily       Historical Provider, MD   metoprolol succinate (TOPROL XL) 50 MG extended release tablet Take 50 mg by mouth daily 4/25/19 per direction of Dr. Moris Acevedo pt to take 1/2 tab daily.       Historical Provider, MD   Magnesium Oxide 250 MG TABS Take by mouth daily       Historical Provider, MD         Social History   Social History            Socioeconomic History    Marital status: Legally        Spouse name: None    Number of children: 1    Years of education: 12th grade + some college    Highest education level: None   Occupational History    None   Social Needs    Financial resource strain: None    Food insecurity       Worry: None       Inability: None    Transportation needs       Medical: None       Non-medical: None   Tobacco Use    Smoking status: Current Every Day Smoker       Packs/day: 0.75    Smokeless tobacco: Never Used   Substance and Sexual Activity    Alcohol use: Not Currently       Comment: history of abuse, last drink was early December, 2018    Drug use: Not Currently       Types: Marijuana       Comment: last use was summer, 2017    Sexual activity: Not Currently   Lifestyle    Physical activity       Days per week: None       Minutes per session: None    Stress: None   Relationships    Social connections       Talks on phone: Once a week       Gets together: Three times a week       Attends [employer] witness service: Never       Active member of club or organization: No       Attends meetings of clubs or organizations: Never       Relationship status:     Intimate partner violence       Fear of current or ex partner: No       Emotionally abused: No       Physically abused:  No       Forced sexual activity: No   Other Topics Concern    None   Social History Narrative     PRIOR MEDICATION TRIALS     Trazodone (having more suicidal dreams), at 100mg, not working for sleep     Seroquel (wt gain, 14 lb in 3 months, enuresis, indigestion, abnormal blood work, increased blood sugar)     Duloxetine (has not been effective and no noticeable change even with dose increases to 90mg)     Paxil, 20mg     Wellbutrin XL, (tried it recently to quit smoking)     Prozac (made her numb, added to her eating disorder)     Zoloft (thought she did well on this, took for a couple of years, she stopped it because when she [facility] to [facility] the doctor put her back on Prozac)     Remeron (increased her dreams and panic attacks)     Lunesta (worked)     Librium (in 2018 for 15 days to stop drinking alcohol)     Risperdal-stopped on 7/6/20 due to weight gain     Doxepin, started on 9/22/20 for sleep (not effective)     Prazosin, ineffective for dreams/nightmares     Latuda (1/27/21, reports that she switched it to am dosing because taking it at night made her RLS worse)           Psychiatric Review of Systems, 3/19/19           Mood:  Sadness, tearfulness, low appetite, low concentration, low energy, loss of interest, denies suicidality today       (Depression: sadness, tearfulness, sleep, appetite, energy, concentration, sexual function, guilt, psychomotor agitation or slowing, interest, suicidality)           Julia: She says that there have been periods of time when she could go 3 days without sleep, she does say that there have been days in a row when she has done reckless, impulsive things       (impulsivity, grandiosity, recklessness, excessive energy, decreased need for sleep, increased spending beyond means, hyperverbal, grandiose, racing thoughts, hypersexuality)           Other: anger from being tired all the time       (Irritability, lability, anger)           Anxiety:  She says that she worries every night about sleeping and panic attacks. She says that she worries about her neighbors. She says that they always want something from her. She worries about running into them. She says that this causes panic attacks. She says that there is a lot of drug use in her neighborhood and she is fearful of her neighbors.       (Generalized anxiety: where, when, who, how long, how frequent)           Panic Disorder symptoms: She says that she is having panic attacks at night, 1 at night (this has improved from 3 weeks ago before she started Trazodone when she was having 2-3 at night). She says that she wakes up with dreams of her family and with her anxiety about [place] park.       (Palpitations, racing heart beat, sweating, sense of impending doom, fear of recurrence, shortness of breath)           OCD symptoms:  She gets flustered if things are not in a routine, is ritualized about the way she dresses, and the way she laces her shoes       (checking, cleaning, organizing, rituals, hang-ups, obsessive thoughts, counting, rational vs. Irrational beliefs)           PTSD symptoms:  Increased startle response, wakes up with dreams at night, she also says she has flashbacks during the day.       (nightmares, flashbacks, startle respoinse, avoidance)           Social anxiety symptoms:  Negative           Simple phobias:   Heights, claustrophobia, snakes       (heights, planes, spiders, etc.)           Psychosis: She reports hearing and seeing things that aren't there, sees animals out her window that really aren't there. She says this happens almost every day. She says that she has never seen things when she wasn't depressed.       (hallucinations, auditory, visual, tactile, olfactory)           Paranoia: Feels that people are watching her most of the time.  She thinks this feeling is both irrational and rational.           Delusions:  Negative       (TV, radio, thought broadcasting, mind control, referential thinking)           Patient's perception: Negative       (Spiritual or cultural context of symptoms, reality testing)           ADHD symptoms: Negative           Eating Disorder symptoms:  Positive, lives almost entirely on whole milk, sometimes drinking ensure, she says that in the last month she has only eaten 3 meals, has occasionally eaten a bagel with a plain piece of bread.       (binging, purging, excessive exercising)            MSE:  Patient is  A & O x 4. Appearance:  SAVANAH. Cognition:  Recent memory intact , remote memory intact , good fund of knowledge, average  intelligence level. Speech:  normal  Language: Naming: intact; Word Finding: intact  Conversation no evidence of delusions  Behavior:  Cooperative  Mood: \"great\"  Affect: SAVANAH  Thought Content: negative delusions, negative hallucinations, negative obsessions,  negativehomicidal and negative suicidal   Thought Process: linear, goal directed and coherent (having trouble keeping thoughts together, probably due to lack of sleep)  Associations: logical connections  Attention Span and concentration: Normal   Judgement Insight:  normal and appropriate  Gait and Station: SAVANAH   Sleep: avg 7-8 hrs    Appetite: ok     COGNITION SCREEN:     Can spell the word, \"world,\" backwards: Yes  Can do serial 7's:  Yes              Lab Results   Component Value Date      (L) 03/10/2020     K 4.7 03/10/2020     CL 89 (L) 03/10/2020     CO2 18 (L) 03/10/2020     BUN 19 03/10/2020     CREATININE 0.9 03/10/2020     GLUCOSE 107 03/10/2020     CALCIUM 9.1 03/10/2020     PROT 8.6 11/10/2015     LABALBU 5.0 11/10/2015     ALKPHOS 127 (H) 11/10/2015     AST 33 (H) 11/10/2015     ALT 22 11/10/2015     LABGLOM >60 03/10/2020            Lab Results   Component Value Date      03/10/2020     K 4.7 03/10/2020     CL 89 03/10/2020     CO2 18 03/10/2020     BUN 19 03/10/2020     CREATININE 0.9 03/10/2020     GLUCOSE 107 03/10/2020     CALCIUM 9.1 03/10/2020      No results found for: CHOL  No results found for: TRIG  No results found for: HDL  No results found for: LDLCHOLESTEROL, LDLCALC  No results found for: LABVLDL, VLDL  No results found for: CHOLHDLRATIO  No results found for: LABA1C  No results found for: EAG        Lab Results   Component Value Date     TSH 2.40 02/03/2015            Lab Results   Component Value Date     VITD25 27.6 (L) 02/03/2015         Assessments Administered:  PHQ9: 2, normal  GAD7: 1, normal        Assessment:    1. Bipolar disorder in partial remission, most recent episode unspecified type ([facility]    2. Insomnia due to other mental disorder    3. High risk medication use          Plan:  Continue:  Lamotrigine, 150mg,  Daily     Eszopiclone (Lunesta), 3mg, nightly for sleep (try not taking this to see if you still need it)     Gabapentin, 300mg, take 2 capsules nightly (restless legs)  Lurasidone, 20mg, daily (350 calories)  Clonidine, 0.1mg, nightly (teeth grinding, nightmares), can lower BP, get your balance before you get up to walk     Taper and stop:  Cymbalta (Duloxetine), 20mg, take 2 capsules daily for 1 week, then take 1 capsule daily for 1 week, then stop.      Start: Risperdal, 1mg, nightly            Trazodone, 50mg, nightly     Continue therapy with Oscar Lamb for therapy     Come to the lab for a new Urine Drug Screen     Try the [employer] tabby for sleep     Follow up: Return in about 3 months (around 6/3/2021).    If you become suicidal, follow up with this office or call the [facility] at [phone]), or dial [phone].     1. The risks, benefits, side effects, indications, contraindications, and adverse effects of the medications have been discussed. Yes.  2. The pt has verbalized understanding and has capacity to give informed consent. 3. The Briseyda Bernard report has been reviewed according to [facility] regulations. 4. Supportive therapy offered. 5. Follow up:    Return in about 3 months (around 6/3/2021). 6. The patient has been advised to call with any problems.   7. Controlled substance Treatment Plan: new medication for sleep (eszopiclone). 8. The above listed medications have been continued, modifications in meds and other orders/labs as follows:                 Encounter Medications    Orders Placed This Encounter   Medications    risperiDONE (RISPERDAL) 1 MG tablet       Sig: Take 1 tablet by mouth nightly       Dispense:  90 tablet       Refill:  1    DULoxetine (CYMBALTA) 20 MG extended release capsule       Sig: Take 2 capsules by mouth daily for 1 week, then decrease to 1 capsule daily for 1 week, then stop.       Dispense:  60 capsule       Refill:  0    traZODone (DESYREL) 50 MG tablet       Sig: Take 1 tablet by mouth nightly       Dispense:  90 tablet       Refill:  1                             Orders Placed This Encounter   Procedures    Urine Drug Screen       Please do the drug screen required by pain management.       Standing Status:   Future       Standing Expiration Date:   3/3/2022         9. Additional comments: She reports that [place] continues to help with her mood. She reports that Clonidine has helped a lot with grinding her teeth. She reports that she restarted Risperdal, 1mg, and Trazodone, 50mg at night for sleep and that she is willing to live with weight gain because her sleep is so improved. Will stop Doxepin. Will taper and stop Duloxetine as it is not providing any benefit. She was encouraged to try not taking Eszopiclone to see if she can do without it for sleep. She verbalized understanidng. Ordered a UDS. Will follow up in about 3 months.     10. Over 50% of the total visit time of 20 minutes was spent on counseling and/or coordination of care of:                         1. Bipolar disorder in partial remission, most recent episode unspecified type ([facility]    2. Insomnia due to other mental disorder    3.  High risk medication use                        Psychotherapy Topics: mood/medication effectiveness         Issac Johnson PMHNP-BC      Instructions       Return in about 3 months (around 6/3/2021). Plan:  Continue:  Lamotrigine, 150mg,  Daily     Eszopiclone (Lunesta), 3mg, nightly for sleep (try not taking this to see if you still need it)     Gabapentin, 300mg, take 2 capsules nightly (restless legs)  Lurasidone, 20mg, daily (350 calories)  Clonidine, 0.1mg, nightly (teeth grinding, nightmares), can lower BP, get your balance before you get up to walk     Taper and stop:  Cymbalta (Duloxetine), 20mg, take 2 capsules daily for 1 week, then take 1 capsule daily for 1 week, then stop.      Start: Risperdal, 1mg, nightly            Trazodone, 50mg, nightly     Continue therapy with Jael Correia for therapy     Come to the lab for a new Urine Drug Screen     Try the [place] tabby for sleep     Follow up: Return in about 3 months (around 6/3/2021).    If you become suicidal, follow up with this office or call the [facility] 10 at [phone]), or dial [phone]. After Visit Summary (Printed 3/5/2021)  Additional Documentation    Flowsheets:    Patient Health Questionnaire - 9,   STEFFEN-7      Encounter Info:    Billing Info,   Allergies,   Detailed Report,   History,   Medications,   Questionnaires      Chart Review Routing History    No routing history on file. Encounter Status    Signed by CAPO Cadet NP on 3/3/21 at 13:33   BestPractice Advisories    Click to view BestPractice Advisory history   Encounter Messages    No messages in this encounter   Orders Placed     Urine Drug Screen  Outpatient Medications at End of Encounter as of 3/3/2021    risperiDONE (RISPERDAL) 1 MG tablet (Taking) Take 1 tablet by mouth nightly   DULoxetine (CYMBALTA) 20 MG extended release capsule (Taking) Take 2 capsules by mouth daily for 1 week, then decrease to 1 capsule daily for 1 week, then stop.    traZODone (DESYREL) 50 MG tablet (Taking) Take 1 tablet by mouth nightly   eszopiclone (ESZOPICLONE) 3 MG TABS Take 1 tablet by mouth nightly as needed (insomnia) for up to 30 days. gabapentin (NEURONTIN) 300 MG capsule TAKE 2 CAPSULES BY MOUTH EVERY DAY AT NIGHT   lamoTRIgine (LAMICTAL) 150 MG tablet Take 1 tablet by mouth daily   cloNIDine (CATAPRES) 0.1 MG tablet Take 1 tablet by mouth nightly   lurasidone (LATUDA) 20 MG TABS tablet Take 1 tablet by mouth Daily with supper (350 calories for best absorption)   oxybutynin (DITROPAN) 5 MG tablet TAKE 1 TABLET BY MOUTH EVERY DAY AT NIGHT   spironolactone (ALDACTONE) 25 MG tablet    diclofenac (VOLTAREN) 75 MG EC tablet Take 75 mg by mouth 2 times daily   metoprolol succinate (TOPROL XL) 50 MG extended release tablet Take 50 mg by mouth daily 4/25/19 per direction of Dr. Quyen Oneill pt to take 1/2 tab daily. Magnesium Oxide 250 MG TABS Take by mouth daily   Medication Changes       risperiDONE 1 mg Oral NIGHTLY     traZODone HCl 50 mg Oral NIGHTLY     DULoxetine HCl 20 MG Take 2 capsules by mouth daily for 1 week, then decrease to 1 capsule daily for 1 week, then stop.         (Therapy completed)     Protocol Details     Medication List   Visit Diagnoses       Bipolar disorder in partial remission, most recent episode unspecified type ([facility]     Insomnia due to other mental disorder     High risk medication use     Problem List

## 2021-04-12 ENCOUNTER — TELEPHONE (OUTPATIENT)
Dept: PSYCHIATRY | Age: 56
End: 2021-04-12

## 2021-04-12 NOTE — TELEPHONE ENCOUNTER
Pt called to cancel her appt on 4/27 because she said that she saw Gearl Grew last week and didn't need to see her then. Pt did keep the 6/7 appt.     Electronically signed by Tessie Gauthier MA on 4/12/2021 at 2:02 PM

## 2021-04-20 ENCOUNTER — TELEPHONE (OUTPATIENT)
Dept: PSYCHIATRY | Age: 56
End: 2021-04-20

## 2021-04-20 RX ORDER — CLONIDINE HYDROCHLORIDE 0.1 MG/1
TABLET ORAL
Qty: 90 TABLET | Refills: 0 | Status: SHIPPED | OUTPATIENT
Start: 2021-04-20 | End: 2021-07-19

## 2021-04-20 NOTE — TELEPHONE ENCOUNTER
Leola López called to request refill on her medication      Last office visit: 3/3/2021  Next office visit: 6/7/2021    Requested Prescriptions     Pending Prescriptions Disp Refills    cloNIDine (CATAPRES) 0.1 MG tablet [Pharmacy Med Name: CLONIDINE HCL 0.1 MG TABLET] 90 tablet 1     Sig: TAKE 1 TABLET BY MOUTH EVERY DAY AT NIGHT      Virtual Visit    3/3/2021  [address] [facility]   CAPO Faustin - NP  Nurse Practitioner Psychiatric/Mental Health  Bipolar disorder in partial remission, most recent episode unspecified type ([facility] [place] [facility] +2 more  Dx  Follow-up , Anxiety , Depression , Insomnia  Reason for Visit   Progress Notes    Expand AllCollapse All  []Expand All by Juliocesar Abbie is a 64 y.o. female evaluated via telephone on 3/3/2021.       Consent:  She and/or health care decision maker is aware that that she may receive a bill for this telephone service, depending on her insurance coverage, and has provided verbal consent to proceed: Yes        Documentation:  I communicated with the patient and/or health care decision maker about depression/anxiety/insomnia. Details of this discussion including any medical advice provided: see below        I affirm this is a Patient Initiated Episode with an Established Patient who has not had a related appointment within my department in the past 7 days or scheduled within the next 24 hours.     Total Time: minutes: 11-20 minutes     Note: not billable if this call serves to triage the patient into an appointment for the relevant concern        Ricky Figueroa               3/3/2021 1:32 PM                               Progress Note     IN:  1045  OUT: 1105        Dorene Rojas 1965                           Chief Complaint   Patient presents with    Follow-up    Anxiety    Depression    Insomnia            Subjective:  Patient is a 64 y.o. female diagnosed with Bipolar 2 Disorder and presents today for follow-up.   Last seen in clinic on 1/27/21 and prior records were reviewed.     Today patient states, \"I'm doing great because I went back on my old stuff. \" She states that she wants to go back to her old medications. She reports she feels like a new person back on her old medications.     Patient reports side effects as follows: none. No evidence of EPS, no cogwheeling or abnormal motor movements.     Absent  suicidal ideation.   Reports compliance with medications as good .      Current Substance Use:  See history     BP: There were no vitals taken for this visit.        Review of Systems - 14 point review:  Negative except being treated for:  depression, anxiety, panic attacks, suicidal dreams, weight gain, right fractured ankle with walking boot, enuresis, increased liver enzymes (being treated with spirolactone)        Constitutional: (fevers, chills, night sweats, wt loss/gain, change in appetite, fatigue, somnolence)     HEENT: (ear pain or discharge, hearing loss, ear ringing, sinus pressure, nosebleed, nasal discharge, sore throat, oral sores, tooth pain, bleeding gums, hoarse voice, neck pain)      Cardiovascular: (HTN, chest pain, elevated cholesterol/lipids, palpitations, leg swelling, leg pain with walking)     Respiratory: (cough, wheezing, snoring, SOB with activity (dyspnea), SOB while lying flat (orthopnea), awakening with severe SOB (paroxysmal nocturnal dyspnea))     Gastrointestinal: (NVD, constipation, abdominal pain, bright red stools, black tarry stools, stool incontinence)     Genitourinary:  (pelvic pain, burning or frequency of urination, urinary urgency, blood in urine incomplete bladder emptying, urinary incontinence, STD; MEN: testicular pain or swelling, erectile dysfunction; WOMEN: LMP, heavy menstrual bleeding (menorrhagia), irregular periods, postmenopausal bleeding, menstrual pain (dymenorrhea, vaginal discharge)     Musculoskeletal: (bone pain/fracture, joint pain or swelling, musle pain)     Integumentary: (beka acne, non-healing sores, itching, breast lumps, breast pain, nipple discharge, hair loss)     Neurologic: (HA, muscle weakness, paresthesias (numbness, coldness, crawling or prickling), memory loss, seizure, dizziness)     Psychiatric:  (anxiety, sadness, irritability/anger, insomnia, suicidality)     Endocrine: (heat or cold intolerance, excessive thirst (polydipsia), excessive hunger (polyphagia))     Immune/Allergic: (hives, seasonal or environmental allergies, HIV exposure)     Hematologic/Lymphatic: (lymph node enlargement, easy bleeding or bruising)     History obtained via chart review and patient     PCP is Kaitlin Liu. Eli Cornell, DO, DO         Current Meds:     Home Medications           Prior to Admission medications    Medication Sig Start Date End Date Taking? Authorizing Provider   risperiDONE (RISPERDAL) 1 MG tablet Take 1 tablet by mouth nightly 3/3/21   Yes CAPO Summers NP   DULoxetine (CYMBALTA) 20 MG extended release capsule Take 2 capsules by mouth daily for 1 week, then decrease to 1 capsule daily for 1 week, then stop. 3/3/21   Yes CAPO Summers NP   traZODone (DESYREL) 50 MG tablet Take 1 tablet by mouth nightly 3/3/21   Yes CAPO Summers NP   eszopiclone (ESZOPICLONE) 3 MG TABS Take 1 tablet by mouth nightly as needed (insomnia) for up to 30 days.  2/24/21 3/26/21   CAPO Summers NP   gabapentin (NEURONTIN) 300 MG capsule TAKE 2 CAPSULES BY MOUTH EVERY DAY AT NIGHT 2/24/21 3/26/21   CAPO Summers NP   lamoTRIgine (LAMICTAL) 150 MG tablet Take 1 tablet by mouth daily 1/27/21     CAPO Summers NP   cloNIDine (CATAPRES) 0.1 MG tablet Take 1 tablet by mouth nightly 1/27/21     CAPO Summers NP   lurasidone (LATUDA) 20 MG TABS tablet Take 1 tablet by mouth Daily with supper (350 calories for best absorption) 1/8/21     CAPO Summers NP   oxybutynin (DITROPAN) 5 MG tablet TAKE 1 TABLET BY MOUTH EVERY DAY AT NIGHT 7/28/20     Sidney Reyes CAPO Barbosa   spironolactone (ALDACTONE) 25 MG tablet   3/5/20     Historical Provider, MD   diclofenac (VOLTAREN) 75 MG EC tablet Take 75 mg by mouth 2 times daily       Historical Provider, MD   metoprolol succinate (TOPROL XL) 50 MG extended release tablet Take 50 mg by mouth daily 4/25/19 per direction of Dr. Sandra Valentin pt to take 1/2 tab daily.       Historical Provider, MD   Magnesium Oxide 250 MG TABS Take by mouth daily       Historical Provider, MD         Social History   Social History            Socioeconomic History    Marital status: Legally        Spouse name: None    Number of children: 1    Years of education: 12th grade + some college    Highest education level: None   Occupational History    None   Social Needs    Financial resource strain: None    Food insecurity       Worry: None       Inability: None    Transportation needs       Medical: None       Non-medical: None   Tobacco Use    Smoking status: Current Every Day Smoker       Packs/day: 0.75    Smokeless tobacco: Never Used   Substance and Sexual Activity    Alcohol use: Not Currently       Comment: history of abuse, last drink was early December, 2018    Drug use: Not Currently       Types: Marijuana       Comment: last use was summer, 2017    Sexual activity: Not Currently   Lifestyle    Physical activity       Days per week: None       Minutes per session: None    Stress: None   Relationships    Social connections       Talks on phone: Once a week       Gets together: Three times a week       Attends Yarsani service: Never       Active member of club or organization: No       Attends meetings of clubs or organizations: Never       Relationship status:     Intimate partner violence       Fear of current or ex partner: No       Emotionally abused: No       Physically abused:  No       Forced sexual activity: No   Other Topics Concern    None   Social History Narrative     PRIOR MEDICATION TRIALS     Trazodone (having more suicidal dreams), at 100mg, not working for sleep     Seroquel (wt gain, 14 lb in 3 months, enuresis, indigestion, abnormal blood work, increased blood sugar)     Duloxetine (has not been effective and no noticeable change even with dose increases to 90mg)     Paxil, 20mg     Wellbutrin XL, (tried it recently to quit smoking)     Prozac (made her numb, added to her eating disorder)     Zoloft (thought she did well on this, took for a couple of years, she stopped it because when she returned to [facility] the doctor put her back on Prozac)     Remeron (increased her dreams and panic attacks)     Lunesta (worked)     Librium (in 2018 for 15 days to stop drinking alcohol)     Risperdal-stopped on 7/6/20 due to weight gain     Doxepin, started on 9/22/20 for sleep (not effective)     Prazosin, ineffective for dreams/nightmares     Latuda (1/27/21, reports that she switched it to am dosing because taking it at night made her RLS worse)           Psychiatric Review of Systems, 3/19/19           Mood:  Sadness, tearfulness, low appetite, low concentration, low energy, loss of interest, denies suicidality today       (Depression: sadness, tearfulness, sleep, appetite, energy, concentration, sexual function, guilt, psychomotor agitation or slowing, interest, suicidality)           Julia: She says that there have been periods of time when she could go 3 days without sleep, she does say that there have been days in a row when she has done reckless, impulsive things       (impulsivity, grandiosity, recklessness, excessive energy, decreased need for sleep, increased spending beyond means, hyperverbal, grandiose, racing thoughts, hypersexuality)           Other: anger from being tired all the time       (Irritability, lability, anger)           Anxiety:  She says that she worries every night about sleeping and panic attacks. She says that she worries about her neighbors.  She says that they always want something from her. She worries about running into them. She says that this causes panic attacks. She says that there is a lot of drug use in her neighborhood and she is fearful of her neighbors.       (Generalized anxiety: where, when, who, how long, how frequent)           Panic Disorder symptoms: She says that she is having panic attacks at night, 1 at night (this has improved from 3 weeks ago before she started Trazodone when she was having 2-3 at night). She says that she wakes up with dreams of her family and with her anxiety about [place] park.       (Palpitations, racing heart beat, sweating, sense of impending doom, fear of recurrence, shortness of breath)           OCD symptoms:  She gets flustered if things are not in a routine, is ritualized about the way she dresses, and the way she laces her shoes       (checking, cleaning, organizing, rituals, hang-ups, obsessive thoughts, counting, rational vs. Irrational beliefs)           PTSD symptoms:  Increased startle response, wakes up with dreams at night, she also says she has flashbacks during the day.       (nightmares, flashbacks, startle respoinse, avoidance)           Social anxiety symptoms:  Negative           Simple phobias:   Heights, claustrophobia, snakes       (heights, planes, spiders, etc.)           Psychosis: She reports hearing and seeing things that aren't there, sees animals out her window that really aren't there. She says this happens almost every day. She says that she has never seen things when she wasn't depressed.       (hallucinations, auditory, visual, tactile, olfactory)           Paranoia: Feels that people are watching her most of the time.  She thinks this feeling is both irrational and rational.           Delusions:  Negative       (TV, radio, thought broadcasting, mind control, referential thinking)           Patient's perception: Negative       (Spiritual or cultural context of symptoms, reality testing)           ADHD symptoms: Negative           Eating Disorder symptoms:  Positive, lives almost entirely on whole milk, sometimes drinking ensure, she says that in the last month she has only eaten 3 meals, has occasionally eaten a bagel with a plain piece of bread.       (binging, purging, excessive exercising)            MSE:  Patient is  A & O x 4. Appearance:  SAVANAH. Cognition:  Recent memory intact , remote memory intact , good fund of knowledge, average  intelligence level. Speech:  normal  Language: Naming: intact; Word Finding: intact  Conversation no evidence of delusions  Behavior:  Cooperative  Mood: \"great\"  Affect: SAVANAH  Thought Content: negative delusions, negative hallucinations, negative obsessions,  negativehomicidal and negative suicidal   Thought Process: linear, goal directed and coherent (having trouble keeping thoughts together, probably due to lack of sleep)  Associations: logical connections  Attention Span and concentration: Normal   Judgement Insight:  normal and appropriate  Gait and Station: SAVANAH   Sleep: avg 7-8 hrs    Appetite: ok     COGNITION SCREEN:     Can spell the word, \"world,\" backwards: Yes  Can do serial 7's:  Yes              Lab Results   Component Value Date      (L) 03/10/2020     K 4.7 03/10/2020     CL 89 (L) 03/10/2020     CO2 18 (L) 03/10/2020     BUN 19 03/10/2020     CREATININE 0.9 03/10/2020     GLUCOSE 107 03/10/2020     CALCIUM 9.1 03/10/2020     PROT 8.6 11/10/2015     LABALBU 5.0 11/10/2015     ALKPHOS 127 (H) 11/10/2015     AST 33 (H) 11/10/2015     ALT 22 11/10/2015     LABGLOM >60 03/10/2020            Lab Results   Component Value Date      03/10/2020     K 4.7 03/10/2020     CL 89 03/10/2020     CO2 18 03/10/2020     BUN 19 03/10/2020     CREATININE 0.9 03/10/2020     GLUCOSE 107 03/10/2020     CALCIUM 9.1 03/10/2020      No results found for: CHOL  No results found for: TRIG  No results found for: HDL  No results found for: LDLCHOLESTEROL, LDLCALC  No results found for: LABVLDL, VLDL  No results found for: CHOLHDLRATIO  No results found for: LABA1C  No results found for: EAG        Lab Results   Component Value Date     TSH 2.40 02/03/2015            Lab Results   Component Value Date     VITD25 27.6 (L) 02/03/2015         Assessments Administered:  PHQ9: 2, normal  GAD7: 1, normal        Assessment:    1. Bipolar disorder in partial remission, most recent episode unspecified type ([facility] [address]    2. Insomnia due to other mental disorder    3. High risk medication use          Plan:  Continue:  Lamotrigine, 150mg,  Daily     Eszopiclone (Lunesta), 3mg, nightly for sleep (try not taking this to see if you still need it)     Gabapentin, 300mg, take 2 capsules nightly (restless legs)  Lurasidone, 20mg, daily (350 calories)  Clonidine, 0.1mg, nightly (teeth grinding, nightmares), can lower BP, get your balance before you get up to walk     Taper and stop:  Cymbalta (Duloxetine), 20mg, take 2 capsules daily for 1 week, then take 1 capsule daily for 1 week, then stop.      Start: Risperdal, 1mg, nightly            Trazodone, 50mg, nightly     Continue therapy with Jeevan Benitez for therapy     Come to the lab for a new Urine Drug Screen     Try the [employer] tabby for sleep     Follow up: Return in about 3 months (around 6/3/2021).    If you become suicidal, follow up with this office or call the [facility] at [phone]), or dial [phone].     1. The risks, benefits, side effects, indications, contraindications, and adverse effects of the medications have been discussed. Yes.  2. The pt has verbalized understanding and has capacity to give informed consent. 3. The [facility] report has been reviewed according to [facility] regulations. 4. Supportive therapy offered. 5. Follow up:    Return in about 3 months (around 6/3/2021). 6. The patient has been advised to call with any problems. 7. Controlled substance Treatment Plan: new medication for sleep (eszopiclone).   8. The above listed medications have been continued, modifications in meds and other orders/labs as follows:                 Encounter Medications    Orders Placed This Encounter   Medications    risperiDONE (RISPERDAL) 1 MG tablet       Sig: Take 1 tablet by mouth nightly       Dispense:  90 tablet       Refill:  1    DULoxetine (CYMBALTA) 20 MG extended release capsule       Sig: Take 2 capsules by mouth daily for 1 week, then decrease to 1 capsule daily for 1 week, then stop.       Dispense:  60 capsule       Refill:  0    traZODone (DESYREL) 50 MG tablet       Sig: Take 1 tablet by mouth nightly       Dispense:  90 tablet       Refill:  1                             Orders Placed This Encounter   Procedures    Urine Drug Screen       Please do the drug screen required by pain management.       Standing Status:   Future       Standing Expiration Date:   3/3/2022         9. Additional comments: She reports that Lorita Pérez continues to help with her mood. She reports that Clonidine has helped a lot with grinding her teeth. She reports that she restarted Risperdal, 1mg, and Trazodone, 50mg at night for sleep and that she is willing to live with weight gain because her sleep is so improved. Will stop Doxepin. Will taper and stop Duloxetine as it is not providing any benefit. She was encouraged to try not taking Eszopiclone to see if she can do without it for sleep. She verbalized understanidng. Ordered a UDS. Will follow up in about 3 months.     10. Over 50% of the total visit time of 20 minutes was spent on counseling and/or coordination of care of:                         1. Bipolar disorder in partial remission, most recent episode unspecified type ([facility] [address]    2. Insomnia due to other mental disorder    3.  High risk medication use                        Psychotherapy Topics: mood/medication effectiveness         Christiane Lopez PMHNP-BC      Instructions       Return in about 3 months (around 6/3/2021). Plan:  Continue:  Lamotrigine, 150mg,  Daily     Eszopiclone (Lunesta), 3mg, nightly for sleep (try not taking this to see if you still need it)     Gabapentin, 300mg, take 2 capsules nightly (restless legs)  Lurasidone, 20mg, daily (350 calories)  Clonidine, 0.1mg, nightly (teeth grinding, nightmares), can lower BP, get your balance before you get up to walk     Taper and stop:  Cymbalta (Duloxetine), 20mg, take 2 capsules daily for 1 week, then take 1 capsule daily for 1 week, then stop.      Start: Risperdal, 1mg, nightly            Trazodone, 50mg, nightly     Continue therapy with Janet Diamond for therapy     Come to the lab for a new Urine Drug Screen     Try the [employer] tabby for sleep     Follow up: Return in about 3 months (around 6/3/2021).    If you become suicidal, follow up with this office or call the [facility] at [phone]), or dial [phone]. After Visit Summary (Printed 3/5/2021)  Additional Documentation    Flowsheets:    Patient Health Questionnaire - 9,   STEFFEN-7      Encounter Info:    Billing Info,   Allergies,   Detailed Report,   History,   Medications,   Questionnaires      Chart Review Routing History    No routing history on file. Encounter Status    Signed by CAPO Cotto NP on 3/3/21 at 13:33   BestPractice Advisories    Click to view BestPractice Advisory history   Encounter Messages    No messages in this encounter   Orders Placed     Urine Drug Screen  Outpatient Medications at End of Encounter as of 3/3/2021    risperiDONE (RISPERDAL) 1 MG tablet (Taking) Take 1 tablet by mouth nightly   DULoxetine (CYMBALTA) 20 MG extended release capsule (Taking) Take 2 capsules by mouth daily for 1 week, then decrease to 1 capsule daily for 1 week, then stop. traZODone (DESYREL) 50 MG tablet (Taking) Take 1 tablet by mouth nightly   eszopiclone (ESZOPICLONE) 3 MG TABS Take 1 tablet by mouth nightly as needed (insomnia) for up to 30 days. gabapentin (NEURONTIN) 300 MG capsule TAKE 2 CAPSULES BY MOUTH EVERY DAY AT NIGHT   lamoTRIgine (LAMICTAL) 150 MG tablet Take 1 tablet by mouth daily   cloNIDine (CATAPRES) 0.1 MG tablet Take 1 tablet by mouth nightly   lurasidone (LATUDA) 20 MG TABS tablet Take 1 tablet by mouth Daily with supper (350 calories for best absorption)   oxybutynin (DITROPAN) 5 MG tablet TAKE 1 TABLET BY MOUTH EVERY DAY AT NIGHT   spironolactone (ALDACTONE) 25 MG tablet    diclofenac (VOLTAREN) 75 MG EC tablet Take 75 mg by mouth 2 times daily   metoprolol succinate (TOPROL XL) 50 MG extended release tablet Take 50 mg by mouth daily 4/25/19 per direction of Dr. Anay Chang pt to take 1/2 tab daily. Magnesium Oxide 250 MG TABS Take by mouth daily   Medication Changes       risperiDONE 1 mg Oral NIGHTLY     traZODone HCl 50 mg Oral NIGHTLY     DULoxetine HCl 20 MG Take 2 capsules by mouth daily for 1 week, then decrease to 1 capsule daily for 1 week, then stop.         (Therapy completed)     Protocol Details     Medication List   Visit Diagnoses       Bipolar disorder in partial remission, most recent episode unspecified type ([facility] [address]     Insomnia due to other mental disorder     High risk medication use     Problem List

## 2021-04-29 DIAGNOSIS — F41.1 GENERALIZED ANXIETY DISORDER: ICD-10-CM

## 2021-04-29 RX ORDER — GABAPENTIN 300 MG/1
CAPSULE ORAL
Qty: 60 CAPSULE | Refills: 0 | Status: SHIPPED | OUTPATIENT
Start: 2021-04-29 | End: 2021-05-27 | Stop reason: SDUPTHER

## 2021-04-29 NOTE — TELEPHONE ENCOUNTER
reviewed.     Today patient states, \"I'm doing great because I went back on my old stuff. \" She states that she wants to go back to her old medications. She reports she feels like a new person back on her old medications.     Patient reports side effects as follows: none. No evidence of EPS, no cogwheeling or abnormal motor movements.     Absent  suicidal ideation.   Reports compliance with medications as good .      Current Substance Use:  See history     BP: There were no vitals taken for this visit.        Review of Systems - 14 point review:  Negative except being treated for:  depression, anxiety, panic attacks, suicidal dreams, weight gain, right fractured ankle with walking boot, enuresis, increased liver enzymes (being treated with spirolactone)        Constitutional: (fevers, chills, night sweats, wt loss/gain, change in appetite, fatigue, somnolence)     HEENT: (ear pain or discharge, hearing loss, ear ringing, sinus pressure, nosebleed, nasal discharge, sore throat, oral sores, tooth pain, bleeding gums, hoarse voice, neck pain)      Cardiovascular: (HTN, chest pain, elevated cholesterol/lipids, palpitations, leg swelling, leg pain with walking)     Respiratory: (cough, wheezing, snoring, SOB with activity (dyspnea), SOB while lying flat (orthopnea), awakening with severe SOB (paroxysmal nocturnal dyspnea))     Gastrointestinal: (NVD, constipation, abdominal pain, bright red stools, black tarry stools, stool incontinence)     Genitourinary:  (pelvic pain, burning or frequency of urination, urinary urgency, blood in urine incomplete bladder emptying, urinary incontinence, STD; MEN: testicular pain or swelling, erectile dysfunction; WOMEN: LMP, heavy menstrual bleeding (menorrhagia), irregular periods, postmenopausal bleeding, menstrual pain (dymenorrhea, vaginal discharge)     Musculoskeletal: (bone pain/fracture, joint pain or swelling, musle pain)     Integumentary: (rashes, acne, non-healing sores, itching, breast lumps, breast pain, nipple discharge, hair loss)     Neurologic: (HA, muscle weakness, paresthesias (numbness, coldness, crawling or prickling), memory loss, seizure, dizziness)     Psychiatric:  (anxiety, sadness, irritability/anger, insomnia, suicidality)     Endocrine: (heat or cold intolerance, excessive thirst (polydipsia), excessive hunger (polyphagia))     Immune/Allergic: (hives, seasonal or environmental allergies, HIV exposure)     Hematologic/Lymphatic: (lymph node enlargement, easy bleeding or bruising)     History obtained via chart review and patient     PCP is Desire Chacon. Jessica Rodriguez DO, DO         Current Meds:     Home Medications           Prior to Admission medications    Medication Sig Start Date End Date Taking? Authorizing Provider   risperiDONE (RISPERDAL) 1 MG tablet Take 1 tablet by mouth nightly 3/3/21   Yes Hunter Spicer APRN - NP   DULoxetine (CYMBALTA) 20 MG extended release capsule Take 2 capsules by mouth daily for 1 week, then decrease to 1 capsule daily for 1 week, then stop. 3/3/21   Yes Hunter Spicer APRN - NP   traZODone (DESYREL) 50 MG tablet Take 1 tablet by mouth nightly 3/3/21   Yes Hunter Spicer APRN - NP   eszopiclone (ESZOPICLONE) 3 MG TABS Take 1 tablet by mouth nightly as needed (insomnia) for up to 30 days.  2/24/21 3/26/21   Hunter Spicer APRN - NP   gabapentin (NEURONTIN) 300 MG capsule TAKE 2 CAPSULES BY MOUTH EVERY DAY AT NIGHT 2/24/21 3/26/21   Hunter Spicer APRN - NP   lamoTRIgine (LAMICTAL) 150 MG tablet Take 1 tablet by mouth daily 1/27/21     Hunter Spicer APRN - ELKIN   cloNIDine (CATAPRES) 0.1 MG tablet Take 1 tablet by mouth nightly 1/27/21     Hunter Spicer APRN - NP   lurasidone (LATUDA) 20 MG TABS tablet Take 1 tablet by mouth Daily with supper (350 calories for best absorption) 1/8/21     Hunter Spicer APRCONCEPCIÓN - NP   oxybutynin (DITROPAN) 5 MG tablet TAKE 1 TABLET BY MOUTH EVERY DAY AT NIGHT 7/28/20     CAPO Hoang   spironolactone (ALDACTONE) 25 MG tablet   3/5/20     Historical Provider, MD   diclofenac (VOLTAREN) 75 MG EC tablet Take 75 mg by mouth 2 times daily       Historical Provider, MD   metoprolol succinate (TOPROL XL) 50 MG extended release tablet Take 50 mg by mouth daily 4/25/19 per direction of Dr. Sierra Schulz pt to take 1/2 tab daily.       Historical Provider, MD   Magnesium Oxide 250 MG TABS Take by mouth daily       Historical Provider, MD         Social History   Social History            Socioeconomic History    Marital status: Legally        Spouse name: None    Number of children: 1    Years of education: 12th grade + some college    Highest education level: None   Occupational History    None   Social Needs    Financial resource strain: None    Food insecurity       Worry: None       Inability: None    Transportation needs       Medical: None       Non-medical: None   Tobacco Use    Smoking status: Current Every Day Smoker       Packs/day: 0.75    Smokeless tobacco: Never Used   Substance and Sexual Activity    Alcohol use: Not Currently       Comment: history of abuse, last drink was early December, 2018    Drug use: Not Currently       Types: Marijuana       Comment: last use was summer, 2017    Sexual activity: Not Currently   Lifestyle    Physical activity       Days per week: None       Minutes per session: None    Stress: None   Relationships    Social connections       Talks on phone: Once a week       Gets together: Three times a week       Attends Jew service: Never       Active member of club or organization: No       Attends meetings of clubs or organizations: Never       Relationship status:     Intimate partner violence       Fear of current or ex partner: No       Emotionally abused: No       Physically abused:  No       Forced sexual activity: No   Other Topics Concern    None   Social History Narrative     PRIOR MEDICATION TRIALS     Trazodone (having more suicidal dreams), at 100mg, not working for sleep     Seroquel (wt gain, 14 lb in 3 months, enuresis, indigestion, abnormal blood work, increased blood sugar)     Duloxetine (has not been effective and no noticeable change even with dose increases to 90mg)     Paxil, 20mg     Wellbutrin XL, (tried it recently to quit smoking)     Prozac (made her numb, added to her eating disorder)     Zoloft (thought she did well on this, took for a couple of years, she stopped it because when she returned to [facility] the doctor put her back on Prozac)     Remeron (increased her dreams and panic attacks)     Lunesta (worked)     Librium (in 2018 for 15 days to stop drinking alcohol)     Risperdal-stopped on 7/6/20 due to weight gain     Doxepin, started on 9/22/20 for sleep (not effective)     Prazosin, ineffective for dreams/nightmares     Latuda (1/27/21, reports that she switched it to am dosing because taking it at night made her RLS worse)           Psychiatric Review of Systems, 3/19/19           Mood:  Sadness, tearfulness, low appetite, low concentration, low energy, loss of interest, denies suicidality today       (Depression: sadness, tearfulness, sleep, appetite, energy, concentration, sexual function, guilt, psychomotor agitation or slowing, interest, suicidality)           Julia: She says that there have been periods of time when she could go 3 days without sleep, she does say that there have been days in a row when she has done reckless, impulsive things       (impulsivity, grandiosity, recklessness, excessive energy, decreased need for sleep, increased spending beyond means, hyperverbal, grandiose, racing thoughts, hypersexuality)           Other: anger from being tired all the time       (Irritability, lability, anger)           Anxiety:  She says that she worries every night about sleeping and panic attacks. She says that she worries about her neighbors. She says that they always want something from her.  She worries about running symptoms:  Positive, lives almost entirely on whole milk, sometimes drinking ensure, she says that in the last month she has only eaten 3 meals, has occasionally eaten a bagel with a plain piece of bread.       (binging, purging, excessive exercising)            MSE:  Patient is  A & O x 4. Appearance:  SAVANAH. Cognition:  Recent memory intact , remote memory intact , good fund of knowledge, average  intelligence level. Speech:  normal  Language: Naming: intact; Word Finding: intact  Conversation no evidence of delusions  Behavior:  Cooperative  Mood: \"great\"  Affect: SAVANAH  Thought Content: negative delusions, negative hallucinations, negative obsessions,  negativehomicidal and negative suicidal   Thought Process: linear, goal directed and coherent (having trouble keeping thoughts together, probably due to lack of sleep)  Associations: logical connections  Attention Span and concentration: Normal   Judgement Insight:  normal and appropriate  Gait and Station: SAVANAH   Sleep: avg 7-8 hrs    Appetite: ok     COGNITION SCREEN:     Can spell the word, \"world,\" backwards: Yes  Can do serial 7's:  Yes              Lab Results   Component Value Date      (L) 03/10/2020     K 4.7 03/10/2020     CL 89 (L) 03/10/2020     CO2 18 (L) 03/10/2020     BUN 19 03/10/2020     CREATININE 0.9 03/10/2020     GLUCOSE 107 03/10/2020     CALCIUM 9.1 03/10/2020     PROT 8.6 11/10/2015     LABALBU 5.0 11/10/2015     ALKPHOS 127 (H) 11/10/2015     AST 33 (H) 11/10/2015     ALT 22 11/10/2015     LABGLOM >60 03/10/2020            Lab Results   Component Value Date      03/10/2020     K 4.7 03/10/2020     CL 89 03/10/2020     CO2 18 03/10/2020     BUN 19 03/10/2020     CREATININE 0.9 03/10/2020     GLUCOSE 107 03/10/2020     CALCIUM 9.1 03/10/2020      No results found for: CHOL  No results found for: TRIG  No results found for: HDL  No results found for: LDLCHOLESTEROL, LDLCALC  No results found for: LABVLDL, VLDL  No results found for: CHOLHDLRATIO  No results found for: LABA1C  No results found for: EAG        Lab Results   Component Value Date     TSH 2.40 02/03/2015            Lab Results   Component Value Date     VITD25 27.6 (L) 02/03/2015         Assessments Administered:  PHQ9: 2, normal  GAD7: 1, normal        Assessment:    1. Bipolar disorder in partial remission, most recent episode unspecified type ([facility] [address]    2. Insomnia due to other mental disorder    3. High risk medication use          Plan:  Continue:  Lamotrigine, 150mg,  Daily     Eszopiclone (Lunesta), 3mg, nightly for sleep (try not taking this to see if you still need it)     Gabapentin, 300mg, take 2 capsules nightly (restless legs)  Lurasidone, 20mg, daily (350 calories)  Clonidine, 0.1mg, nightly (teeth grinding, nightmares), can lower BP, get your balance before you get up to walk     Taper and stop:  Cymbalta (Duloxetine), 20mg, take 2 capsules daily for 1 week, then take 1 capsule daily for 1 week, then stop.      Start: Risperdal, 1mg, nightly            Trazodone, 50mg, nightly     Continue therapy with Serena Juan for therapy     Come to the lab for a new Urine Drug Screen     Try the Winkapp tabby for sleep     Follow up: Return in about 3 months (around 6/3/2021).    If you become suicidal, follow up with this office or call the [facility] at [phone]), or dial [phone].     1. The risks, benefits, side effects, indications, contraindications, and adverse effects of the medications have been discussed. Yes.  2. The pt has verbalized understanding and has capacity to give informed consent. 3. The Shay Haddad report has been reviewed according to [facility] regulations. 4. Supportive therapy offered. 5. Follow up:    Return in about 3 months (around 6/3/2021). 6. The patient has been advised to call with any problems. 7. Controlled substance Treatment Plan: new medication for sleep (eszopiclone).   8. The above listed medications have been continued, modifications in meds and other orders/labs as follows:                 Encounter Medications    Orders Placed This Encounter   Medications    risperiDONE (RISPERDAL) 1 MG tablet       Sig: Take 1 tablet by mouth nightly       Dispense:  90 tablet       Refill:  1    DULoxetine (CYMBALTA) 20 MG extended release capsule       Sig: Take 2 capsules by mouth daily for 1 week, then decrease to 1 capsule daily for 1 week, then stop.       Dispense:  60 capsule       Refill:  0    traZODone (DESYREL) 50 MG tablet       Sig: Take 1 tablet by mouth nightly       Dispense:  90 tablet       Refill:  1                             Orders Placed This Encounter   Procedures    Urine Drug Screen       Please do the drug screen required by pain management.       Standing Status:   Future       Standing Expiration Date:   3/3/2022         9. Additional comments: She reports that [place] continues to help with her mood. She reports that Clonidine has helped a lot with grinding her teeth. She reports that she restarted Risperdal, 1mg, and Trazodone, 50mg at night for sleep and that she is willing to live with weight gain because her sleep is so improved. Will stop Doxepin. Will taper and stop Duloxetine as it is not providing any benefit. She was encouraged to try not taking Eszopiclone to see if she can do without it for sleep. She verbalized understanidng. Ordered a UDS. Will follow up in about 3 months.     10. Over 50% of the total visit time of 20 minutes was spent on counseling and/or coordination of care of:                         1. Bipolar disorder in partial remission, most recent episode unspecified type ([facility] [address]    2. Insomnia due to other mental disorder    3. High risk medication use                        Psychotherapy Topics: mood/medication effectiveness         Gabriela Astrid PMHNP-BC      Instructions       Return in about 3 months (around 6/3/2021).   Plan:  Continue:  Lamotrigine, 150mg, Daily     Eszopiclone (Lunesta), 3mg, nightly for sleep (try not taking this to see if you still need it)     Gabapentin, 300mg, take 2 capsules nightly (restless legs)  Lurasidone, 20mg, daily (350 calories)  Clonidine, 0.1mg, nightly (teeth grinding, nightmares), can lower BP, get your balance before you get up to walk     Taper and stop:  Cymbalta (Duloxetine), 20mg, take 2 capsules daily for 1 week, then take 1 capsule daily for 1 week, then stop.      Start: Risperdal, 1mg, nightly            Trazodone, 50mg, nightly     Continue therapy with Anny Siddiqui for therapy     Come to the lab for a new Urine Drug Screen     Try the TMS tabby for sleep     Follow up: Return in about 3 months (around 6/3/2021).    If you become suicidal, follow up with this office or call the [facility] at [phone]), or dial [phone]. After Visit Summary (Printed 3/5/2021)  Additional Documentation    Flowsheets:    Patient Health Questionnaire - 9,   STEFFEN-7      Encounter Info:    Billing Info,   Allergies,   Detailed Report,   History,   Medications,   Questionnaires      Chart Review Routing History    No routing history on file. Encounter Status    Signed by CAPO Mast NP on 3/3/21 at 13:33   BestPractice Advisories    Click to view BestPractice Advisory history   Encounter Messages    No messages in this encounter   Orders Placed     Urine Drug Screen  Outpatient Medications at End of Encounter as of 3/3/2021    risperiDONE (RISPERDAL) 1 MG tablet (Taking) Take 1 tablet by mouth nightly   DULoxetine (CYMBALTA) 20 MG extended release capsule (Taking) Take 2 capsules by mouth daily for 1 week, then decrease to 1 capsule daily for 1 week, then stop. traZODone (DESYREL) 50 MG tablet (Taking) Take 1 tablet by mouth nightly   eszopiclone (ESZOPICLONE) 3 MG TABS Take 1 tablet by mouth nightly as needed (insomnia) for up to 30 days.    gabapentin (NEURONTIN) 300 MG capsule TAKE 2 CAPSULES BY MOUTH EVERY DAY AT NIGHT   lamoTRIgine (LAMICTAL) 150 MG tablet Take 1 tablet by mouth daily   cloNIDine (CATAPRES) 0.1 MG tablet Take 1 tablet by mouth nightly   lurasidone (LATUDA) 20 MG TABS tablet Take 1 tablet by mouth Daily with supper (350 calories for best absorption)   oxybutynin (DITROPAN) 5 MG tablet TAKE 1 TABLET BY MOUTH EVERY DAY AT NIGHT   spironolactone (ALDACTONE) 25 MG tablet    diclofenac (VOLTAREN) 75 MG EC tablet Take 75 mg by mouth 2 times daily   metoprolol succinate (TOPROL XL) 50 MG extended release tablet Take 50 mg by mouth daily 4/25/19 per direction of Dr. Chalo Bey pt to take 1/2 tab daily. Magnesium Oxide 250 MG TABS Take by mouth daily   Medication Changes       risperiDONE 1 mg Oral NIGHTLY     traZODone HCl 50 mg Oral NIGHTLY     DULoxetine HCl 20 MG Take 2 capsules by mouth daily for 1 week, then decrease to 1 capsule daily for 1 week, then stop.         (Therapy completed)     Protocol Details     Medication List   Visit Diagnoses       Bipolar disorder in partial remission, most recent episode unspecified type ([facility] [address]     Insomnia due to other mental disorder     High risk medication use     Problem List

## 2021-05-03 DIAGNOSIS — F99 INSOMNIA DUE TO OTHER MENTAL DISORDER: Primary | ICD-10-CM

## 2021-05-03 DIAGNOSIS — F51.05 INSOMNIA DUE TO OTHER MENTAL DISORDER: Primary | ICD-10-CM

## 2021-05-03 RX ORDER — ESZOPICLONE 3 MG/1
3 TABLET, FILM COATED ORAL NIGHTLY PRN
COMMUNITY
End: 2021-05-03 | Stop reason: SDUPTHER

## 2021-05-03 RX ORDER — ESZOPICLONE 3 MG/1
TABLET, FILM COATED ORAL
Qty: 30 TABLET | Refills: 0 | Status: SHIPPED | OUTPATIENT
Start: 2021-05-03 | End: 2021-05-27 | Stop reason: SDUPTHER

## 2021-05-03 NOTE — TELEPHONE ENCOUNTER
Drea Bouton called to request a refill on her medication. Tra under [facility] [place]. MED HAD FALLEN OFF THE CURRENT MED LIST-ADDED BACK ON DUE TO LISTED IN OFFICE NOTE BELOW AND DID NOT SEE WHERE MED WAS STOPPED. Last office visit : 3/3/2021 with Chetan Diane SCANLON  Next office visit : 6/7/2021 with Benton Diane SCANLON    Requested Prescriptions     Pending Prescriptions Disp Refills    eszopiclone (ESZOPICLONE) 3 MG TABS 30 tablet 0     Sig: Take one by mouth nightly as needed for sleep (try not taking this to see if you still need it. Jose Shelton RN        3/3/2021 1:32 PM                               Progress Note     IN:  1045  OUT: 1105        Dorene Rojas 1965                           Chief Complaint   Patient presents with    Follow-up    Anxiety    Depression    Insomnia            Subjective:  Patient is a 64 y.o. female diagnosed with Bipolar 2 Disorder and presents today for follow-up. Last seen in clinic on 1/27/21 and prior records were reviewed.     Today patient states, \"I'm doing great because I went back on my old stuff. \" She states that she wants to go back to her old medications. She reports she feels like a new person back on her old medications.     Patient reports side effects as follows: none. No evidence of EPS, no cogwheeling or abnormal motor movements.     Absent  suicidal ideation.   Reports compliance with medications as good .      Current Substance Use:  See history     BP: There were no vitals taken for this visit.        Review of Systems - 14 point review:  Negative except being treated for:  depression, anxiety, panic attacks, suicidal dreams, weight gain, right fractured ankle with walking boot, enuresis, increased liver enzymes (being treated with spirolactone)        Constitutional: (fevers, chills, night sweats, wt loss/gain, change in appetite, fatigue, somnolence)     HEENT: (ear pain or discharge, hearing loss, ear ringing, sinus pressure, nosebleed, nasal discharge, sore throat, oral sores, tooth pain, bleeding gums, hoarse voice, neck pain)      Cardiovascular: (HTN, chest pain, elevated cholesterol/lipids, palpitations, leg swelling, leg pain with walking)     Respiratory: (cough, wheezing, snoring, SOB with activity (dyspnea), SOB while lying flat (orthopnea), awakening with severe SOB (paroxysmal nocturnal dyspnea))     Gastrointestinal: (NVD, constipation, abdominal pain, bright red stools, black tarry stools, stool incontinence)     Genitourinary:  (pelvic pain, burning or frequency of urination, urinary urgency, blood in urine incomplete bladder emptying, urinary incontinence, STD; MEN: testicular pain or swelling, erectile dysfunction; WOMEN: LMP, heavy menstrual bleeding (menorrhagia), irregular periods, postmenopausal bleeding, menstrual pain (dymenorrhea, vaginal discharge)     Musculoskeletal: (bone pain/fracture, joint pain or swelling, musle pain)     Integumentary: (rashes, acne, non-healing sores, itching, breast lumps, breast pain, nipple discharge, hair loss)     Neurologic: (HA, muscle weakness, paresthesias (numbness, coldness, crawling or prickling), memory loss, seizure, dizziness)     Psychiatric:  (anxiety, sadness, irritability/anger, insomnia, suicidality)     Endocrine: (heat or cold intolerance, excessive thirst (polydipsia), excessive hunger (polyphagia))     Immune/Allergic: (hives, seasonal or environmental allergies, HIV exposure)     Hematologic/Lymphatic: (lymph node enlargement, easy bleeding or bruising)     History obtained via chart review and patient     PCP is Ino Martin. Jaylen Goldsmith DO, DO         Current Meds:     Home Medications   Prior to Admission medications    Medication Sig Start Date End Date Taking?  Authorizing Provider   risperiDONE (RISPERDAL) 1 MG tablet Take 1 tablet by mouth nightly 3/3/21   Yes CAPO Reese NP   DULoxetine (CYMBALTA) 20 MG extended release capsule Take 2 capsules by mouth daily for 1 week, then decrease to 1 capsule daily for 1 week, then stop. 3/3/21   Yes Winnie Spry, APRN - NP   traZODone (DESYREL) 50 MG tablet Take 1 tablet by mouth nightly 3/3/21   Yes Winnie Spry, APRN - NP   eszopiclone (ESZOPICLONE) 3 MG TABS Take 1 tablet by mouth nightly as needed (insomnia) for up to 30 days.  2/24/21 3/26/21   Winnie Spry, APRN - NP   gabapentin (NEURONTIN) 300 MG capsule TAKE 2 CAPSULES BY MOUTH EVERY DAY AT NIGHT 2/24/21 3/26/21   Winnie Spry, APRN - NP   lamoTRIgine (LAMICTAL) 150 MG tablet Take 1 tablet by mouth daily 1/27/21     Winnie Spry, APRN - NP   cloNIDine (CATAPRES) 0.1 MG tablet Take 1 tablet by mouth nightly 1/27/21     Winnie Spry, APRN - NP   lurasidone (LATUDA) 20 MG TABS tablet Take 1 tablet by mouth Daily with supper (350 calories for best absorption) 1/8/21     Winnie Spry, APRN - NP   oxybutynin (DITROPAN) 5 MG tablet TAKE 1 TABLET BY MOUTH EVERY DAY AT NIGHT 7/28/20     CAPO Jay   spironolactone (ALDACTONE) 25 MG tablet   3/5/20     Historical Provider, MD   diclofenac (VOLTAREN) 75 MG EC tablet Take 75 mg by mouth 2 times daily       Historical Provider, MD   metoprolol succinate (TOPROL XL) 50 MG extended release tablet Take 50 mg by mouth daily 4/25/19 per direction of Dr. Chalo Bey pt to take 1/2 tab daily.       Historical Provider, MD   Magnesium Oxide 250 MG TABS Take by mouth daily       Historical Provider, MD         Social History   Social History            Socioeconomic History    Marital status: Legally        Spouse name: None    Number of children: 1    Years of education: 12th grade + some college    Highest education level: None   Occupational History    None   Social Needs    Financial resource strain: None    Food insecurity       Worry: None       Inability: None    Transportation needs       Medical: None       Non-medical: None   Tobacco Use    Smoking status: Current Every Day Smoker       Packs/day: 0.75    Smokeless tobacco: Never Used   Substance and Sexual Activity    Alcohol use: Not Currently       Comment: history of abuse, last drink was early December, 2018    Drug use: Not Currently       Types: Marijuana       Comment: last use was summer, 2017    Sexual activity: Not Currently   Lifestyle    Physical activity       Days per week: None       Minutes per session: None    Stress: None   Relationships    Social connections       Talks on phone: Once a week       Gets together: Three times a week       Attends Jewish service: Never       Active member of club or organization: No       Attends meetings of clubs or organizations: Never       Relationship status:     Intimate partner violence       Fear of current or ex partner: No       Emotionally abused: No       Physically abused:  No       Forced sexual activity: No   Other Topics Concern    None   Social History Narrative     PRIOR MEDICATION TRIALS     Trazodone (having more suicidal dreams), at 100mg, not working for sleep     Seroquel (wt gain, 14 lb in 3 months, enuresis, indigestion, abnormal blood work, increased blood sugar)     Duloxetine (has not been effective and no noticeable change even with dose increases to 90mg)     Paxil, 20mg     Wellbutrin XL, (tried it recently to quit smoking)     Prozac (made her numb, added to her eating disorder)     Zoloft (thought she did well on this, took for a couple of years, she stopped it because when she returned to [facility] the doctor put her back on Prozac)     Remeron (increased her dreams and panic attacks)     Lunesta (worked)     Librium (in 2018 for 15 days to stop drinking alcohol)     Risperdal-stopped on 7/6/20 due to weight gain     Doxepin, started on 9/22/20 for sleep (not effective)     Prazosin, ineffective for dreams/nightmares     Neeru (1/27/21, reports that she switched it to am dosing because taking it at night made her RLS worse)           Psychiatric Review of Systems, 3/19/19           Mood:  Sadness, tearfulness, low appetite, low concentration, low energy, loss of interest, denies suicidality today       (Depression: sadness, tearfulness, sleep, appetite, energy, concentration, sexual function, guilt, psychomotor agitation or slowing, interest, suicidality)           Julia: She says that there have been periods of time when she could go 3 days without sleep, she does say that there have been days in a row when she has done reckless, impulsive things       (impulsivity, grandiosity, recklessness, excessive energy, decreased need for sleep, increased spending beyond means, hyperverbal, grandiose, racing thoughts, hypersexuality)           Other: anger from being tired all the time       (Irritability, lability, anger)           Anxiety:  She says that she worries every night about sleeping and panic attacks. She says that she worries about her neighbors. She says that they always want something from her. She worries about running into them. She says that this causes panic attacks. She says that there is a lot of drug use in her neighborhood and she is fearful of her neighbors.       (Generalized anxiety: where, when, who, how long, how frequent)           Panic Disorder symptoms: She says that she is having panic attacks at night, 1 at night (this has improved from 3 weeks ago before she started Trazodone when she was having 2-3 at night).  She says that she wakes up with dreams of her family and with her anxiety about [place] trailer park.       (Palpitations, racing heart beat, sweating, sense of impending doom, fear of recurrence, shortness of breath)           OCD symptoms:  She gets flustered if things are not in a routine, is ritualized about the way she dresses, and the way she laces her shoes       (checking, cleaning, organizing, rituals, hang-ups, obsessive thoughts, counting, rational vs. Irrational beliefs)           PTSD symptoms:  Increased startle response, wakes up with dreams at night, she also says she has flashbacks during the day.       (nightmares, flashbacks, startle respoinse, avoidance)           Social anxiety symptoms:  Negative           Simple phobias:   Heights, claustrophobia, snakes       (heights, planes, spiders, etc.)           Psychosis: She reports hearing and seeing things that aren't there, sees animals out her window that really aren't there. She says this happens almost every day. She says that she has never seen things when she wasn't depressed.       (hallucinations, auditory, visual, tactile, olfactory)           Paranoia: Feels that people are watching her most of the time. She thinks this feeling is both irrational and rational.           Delusions:  Negative       (TV, radio, thought broadcasting, mind control, referential thinking)           Patient's perception: Negative       (Spiritual or cultural context of symptoms, reality testing)           ADHD symptoms: Negative           Eating Disorder symptoms:  Positive, lives almost entirely on whole milk, sometimes drinking ensure, she says that in the last month she has only eaten 3 meals, has occasionally eaten a bagel with a plain piece of bread.       (binging, purging, excessive exercising)            MSE:  Patient is  A & O x 4. Appearance:  SAVANAH. Cognition:  Recent memory intact , remote memory intact , good fund of knowledge, average  intelligence level. Speech:  normal  Language: Naming: intact;  Word Finding: intact  Conversation no evidence of delusions  Behavior:  Cooperative  Mood: \"great\"  Affect: SAVANAH  Thought Content: negative delusions, negative hallucinations, negative obsessions,  negativehomicidal and negative suicidal   Thought Process: linear, goal directed and coherent (having trouble keeping thoughts together, probably due to lack of sleep)  Associations: logical connections  Attention Span and concentration: Normal   Judgement Insight:  normal and appropriate  Gait and stop.      Start: Risperdal, 1mg, nightly            Trazodone, 50mg, nightly     Continue therapy with Alberto Burrows for therapy     Come to the lab for a new Urine Drug Screen     Try the [employer] tabby for sleep     Follow up: Return in about 3 months (around 6/3/2021).    If you become suicidal, follow up with this office or call the [facility] at [phone]), or dial 911.     1. The risks, benefits, side effects, indications, contraindications, and adverse effects of the medications have been discussed. Yes.  2. The pt has verbalized understanding and has capacity to give informed consent. 3. The Tiffany Berry report has been reviewed according to [facility] regulations. 4. Supportive therapy offered. 5. Follow up:    Return in about 3 months (around 6/3/2021). 6. The patient has been advised to call with any problems. 7. Controlled substance Treatment Plan: new medication for sleep (eszopiclone). 8. The above listed medications have been continued, modifications in meds and other orders/labs as follows:                 Encounter Medications    Orders Placed This Encounter   Medications    risperiDONE (RISPERDAL) 1 MG tablet       Sig: Take 1 tablet by mouth nightly       Dispense:  90 tablet       Refill:  1    DULoxetine (CYMBALTA) 20 MG extended release capsule       Sig: Take 2 capsules by mouth daily for 1 week, then decrease to 1 capsule daily for 1 week, then stop.       Dispense:  60 capsule       Refill:  0    traZODone (DESYREL) 50 MG tablet       Sig: Take 1 tablet by mouth nightly       Dispense:  90 tablet       Refill:  1                             Orders Placed This Encounter   Procedures    Urine Drug Screen       Please do the drug screen required by pain management.       Standing Status:   Future       Standing Expiration Date:   3/3/2022         9. Additional comments: She reports that [place] continues to help with her mood.  She reports that Clonidine has helped a

## 2021-05-04 ENCOUNTER — TELEPHONE (OUTPATIENT)
Dept: PSYCHIATRY | Age: 56
End: 2021-05-04

## 2021-05-04 NOTE — TELEPHONE ENCOUNTER
VM left as pt requested to inform her that the RX refill she requested Milderitz Pickering) had been sent to her pharmacy per [facility]. Pt encouraged to call back with any questions.

## 2021-05-18 ENCOUNTER — TELEPHONE (OUTPATIENT)
Dept: PSYCHIATRY | Age: 56
End: 2021-05-18

## 2021-05-18 RX ORDER — PRAZOSIN HYDROCHLORIDE 2 MG/1
2 CAPSULE ORAL NIGHTLY
Qty: 90 CAPSULE | Refills: 0 | Status: SHIPPED | OUTPATIENT
Start: 2021-05-18 | End: 2021-06-07

## 2021-05-18 NOTE — TELEPHONE ENCOUNTER
Pt left VM wanting staff to let REGGIE Cris Valentin CAPO know that she had tried to wean herself off of the Trazodone but went back to the full dose. Pt reported that she was having some higher anxiety related to the fact that the only person she socialized with has moved away.

## 2021-05-18 NOTE — TELEPHONE ENCOUNTER
Pt called stating that she needed a prescription sent in for Prazosin 2 mg. She reported that she tried a week without it and the dreams came back, but were not as bad as in the past.  Pt stated that she has been taking the Prazosin 2 mg with the Clonidine at night and has not had any dizziness. Pt stated that she feels good. Pt requesting RX for Prazosin 2 mg to be sent to her pharmacy. Pt informed the request would be sent to [facility].

## 2021-05-27 DIAGNOSIS — F51.05 INSOMNIA DUE TO OTHER MENTAL DISORDER: ICD-10-CM

## 2021-05-27 DIAGNOSIS — F99 INSOMNIA DUE TO OTHER MENTAL DISORDER: ICD-10-CM

## 2021-05-27 DIAGNOSIS — F41.1 GENERALIZED ANXIETY DISORDER: ICD-10-CM

## 2021-05-27 RX ORDER — ESZOPICLONE 3 MG/1
TABLET, FILM COATED ORAL
Qty: 30 TABLET | Refills: 0 | Status: SHIPPED | OUTPATIENT
Start: 2021-05-27 | End: 2021-06-27

## 2021-05-27 RX ORDER — GABAPENTIN 300 MG/1
CAPSULE ORAL
Qty: 60 CAPSULE | Refills: 0 | Status: SHIPPED | OUTPATIENT
Start: 2021-05-27 | End: 2021-06-30 | Stop reason: SDUPTHER

## 2021-05-27 NOTE — TELEPHONE ENCOUNTER
Mara Talbot called to request a refill on her medication. She said she called a little early due to the upcoming holiday. Berta Amezquita under media and attached. Last office visit : 3/3/2021 with Kellee SCANLON  Next office visit : 6/7/2021 with Kellee SCANLON    Requested Prescriptions     Pending Prescriptions Disp Refills    eszopiclone (ESZOPICLONE) 3 MG TABS 30 tablet 0     Sig: Take one by mouth nightly as needed for sleep (try not taking this to see if you still need it.  gabapentin (NEURONTIN) 300 MG capsule 60 capsule 0     Sig: TAKE 2 CAPSULES BY MOUTH EVERY DAY AT NIGHT            Kitty Shelton RN          3/3/2021 1:32 PM                               Progress Note     IN:  3018  OUT: 1105        Dorene Rojas 1965                           Chief Complaint   Patient presents with    Follow-up    Anxiety    Depression    Insomnia            Subjective:  Patient is a 64 y.o. female diagnosed with Bipolar 2 Disorder and presents today for follow-up. Last seen in clinic on 1/27/21 and prior records were reviewed.     Today patient states, \"I'm doing great because I went back on my old stuff. \" She states that she wants to go back to her old medications. She reports she feels like a new person back on her old medications.     Patient reports side effects as follows: none. No evidence of EPS, no cogwheeling or abnormal motor movements.     Absent  suicidal ideation.   Reports compliance with medications as good .      Current Substance Use:  See history     BP: There were no vitals taken for this visit.        Review of Systems - 14 point review:  Negative except being treated for:  depression, anxiety, panic attacks, suicidal dreams, weight gain, right fractured ankle with walking boot, enuresis, increased liver enzymes (being treated with spirolactone)        Constitutional: (fevers, chills, night sweats, wt loss/gain, change in appetite, fatigue, somnolence)     HEENT: (ear pain or discharge, hearing loss, ear ringing, sinus pressure, nosebleed, nasal discharge, sore throat, oral sores, tooth pain, bleeding gums, hoarse voice, neck pain)      Cardiovascular: (HTN, chest pain, elevated cholesterol/lipids, palpitations, leg swelling, leg pain with walking)     Respiratory: (cough, wheezing, snoring, SOB with activity (dyspnea), SOB while lying flat (orthopnea), awakening with severe SOB (paroxysmal nocturnal dyspnea))     Gastrointestinal: (NVD, constipation, abdominal pain, bright red stools, black tarry stools, stool incontinence)     Genitourinary:  (pelvic pain, burning or frequency of urination, urinary urgency, blood in urine incomplete bladder emptying, urinary incontinence, STD; MEN: testicular pain or swelling, erectile dysfunction; WOMEN: LMP, heavy menstrual bleeding (menorrhagia), irregular periods, postmenopausal bleeding, menstrual pain (dymenorrhea, vaginal discharge)     Musculoskeletal: (bone pain/fracture, joint pain or swelling, musle pain)     Integumentary: (rashes, acne, non-healing sores, itching, breast lumps, breast pain, nipple discharge, hair loss)     Neurologic: (HA, muscle weakness, paresthesias (numbness, coldness, crawling or prickling), memory loss, seizure, dizziness)     Psychiatric:  (anxiety, sadness, irritability/anger, insomnia, suicidality)     Endocrine: (heat or cold intolerance, excessive thirst (polydipsia), excessive hunger (polyphagia))     Immune/Allergic: (hives, seasonal or environmental allergies, HIV exposure)     Hematologic/Lymphatic: (lymph node enlargement, easy bleeding or bruising)     History obtained via chart review and patient     PCP is Liz Rich. Natasha Soliz DO, DO         Current Meds:     Home Medications           Prior to Admission medications    Medication Sig Start Date End Date Taking?  Authorizing Provider   risperiDONE (RISPERDAL) 1 MG tablet Take 1 tablet by mouth nightly 3/3/21   Yes CAPO Faustin NP   DULoxetine (CYMBALTA) 20 MG extended release capsule Take 2 capsules by mouth daily for 1 week, then decrease to 1 capsule daily for 1 week, then stop. 3/3/21   Yes Winnie Kerriy, APRN - NP   traZODone (DESYREL) 50 MG tablet Take 1 tablet by mouth nightly 3/3/21   Yes Winnie Spry, APRN - NP   eszopiclone (ESZOPICLONE) 3 MG TABS Take 1 tablet by mouth nightly as needed (insomnia) for up to 30 days.  2/24/21 3/26/21   Winnie Spry, APRN - NP   gabapentin (NEURONTIN) 300 MG capsule TAKE 2 CAPSULES BY MOUTH EVERY DAY AT NIGHT 2/24/21 3/26/21   Winnie Spry, APRN - NP   lamoTRIgine (LAMICTAL) 150 MG tablet Take 1 tablet by mouth daily 1/27/21     Winnie Spry, APRN - NP   cloNIDine (CATAPRES) 0.1 MG tablet Take 1 tablet by mouth nightly 1/27/21     Winnie Kerriy, APRN - NP   lurasidone (LATUDA) 20 MG TABS tablet Take 1 tablet by mouth Daily with supper (350 calories for best absorption) 1/8/21     Winnie Manningy, APRN - NP   oxybutynin (DITROPAN) 5 MG tablet TAKE 1 TABLET BY MOUTH EVERY DAY AT NIGHT 7/28/20     CAPO Jay   spironolactone (ALDACTONE) 25 MG tablet   3/5/20     Historical Provider, MD   diclofenac (VOLTAREN) 75 MG EC tablet Take 75 mg by mouth 2 times daily       Historical Provider, MD   metoprolol succinate (TOPROL XL) 50 MG extended release tablet Take 50 mg by mouth daily 4/25/19 per direction of Dr. Chalo Bey pt to take 1/2 tab daily.       Historical Provider, MD   Magnesium Oxide 250 MG TABS Take by mouth daily       Historical Provider, MD         Social History   Social History            Socioeconomic History    Marital status: Legally        Spouse name: None    Number of children: 1    Years of education: 12th grade + some college    Highest education level: None   Occupational History    None   Social Needs    Financial resource strain: None    Food insecurity       Worry: None       Inability: None    Transportation needs       Medical: None       Non-medical: None   Tobacco Use    Smoking status: Current Every Day Smoker       Packs/day: 0.75    Smokeless tobacco: Never Used   Substance and Sexual Activity    Alcohol use: Not Currently       Comment: history of abuse, last drink was early December, 2018    Drug use: Not Currently       Types: Marijuana       Comment: last use was summer, 2017    Sexual activity: Not Currently   Lifestyle    Physical activity       Days per week: None       Minutes per session: None    Stress: None   Relationships    Social connections       Talks on phone: Once a week       Gets together: Three times a week       Attends Christian service: Never       Active member of club or organization: No       Attends meetings of clubs or organizations: Never       Relationship status:     Intimate partner violence       Fear of current or ex partner: No       Emotionally abused: No       Physically abused:  No       Forced sexual activity: No   Other Topics Concern    None   Social History Narrative     PRIOR MEDICATION TRIALS     Trazodone (having more suicidal dreams), at 100mg, not working for sleep     Seroquel (wt gain, 14 lb in 3 months, enuresis, indigestion, abnormal blood work, increased blood sugar)     Duloxetine (has not been effective and no noticeable change even with dose increases to 90mg)     Paxil, 20mg     Wellbutrin XL, (tried it recently to quit smoking)     Prozac (made her numb, added to her eating disorder)     Zoloft (thought she did well on this, took for a couple of years, she stopped it because when she returned to [facility] the doctor put her back on Prozac)     Remeron (increased her dreams and panic attacks)     Lunesta (worked)     Librium (in 2018 for 15 days to stop drinking alcohol)     Risperdal-stopped on 7/6/20 due to weight gain     Doxepin, started on 9/22/20 for sleep (not effective)     Prazosin, ineffective for dreams/nightmares     Neeru (1/27/21, reports that she switched it to am dosing because taking it at night made her RLS worse)           Psychiatric Review of Systems, 3/19/19           Mood:  Sadness, tearfulness, low appetite, low concentration, low energy, loss of interest, denies suicidality today       (Depression: sadness, tearfulness, sleep, appetite, energy, concentration, sexual function, guilt, psychomotor agitation or slowing, interest, suicidality)           Julia: She says that there have been periods of time when she could go 3 days without sleep, she does say that there have been days in a row when she has done reckless, impulsive things       (impulsivity, grandiosity, recklessness, excessive energy, decreased need for sleep, increased spending beyond means, hyperverbal, grandiose, racing thoughts, hypersexuality)           Other: anger from being tired all the time       (Irritability, lability, anger)           Anxiety:  She says that she worries every night about sleeping and panic attacks. She says that she worries about her neighbors. She says that they always want something from her. She worries about running into them. She says that this causes panic attacks. She says that there is a lot of drug use in her neighborhood and she is fearful of her neighbors.       (Generalized anxiety: where, when, who, how long, how frequent)           Panic Disorder symptoms: She says that she is having panic attacks at night, 1 at night (this has improved from 3 weeks ago before she started Trazodone when she was having 2-3 at night).  She says that she wakes up with dreams of her family and with her anxiety about [place] trailer park.       (Palpitations, racing heart beat, sweating, sense of impending doom, fear of recurrence, shortness of breath)           OCD symptoms:  She gets flustered if things are not in a routine, is ritualized about the way she dresses, and the way she laces her shoes       (checking, cleaning, organizing, rituals, hang-ups, obsessive thoughts, counting, rational vs. Irrational beliefs)           PTSD symptoms:  Increased startle response, wakes up with dreams at night, she also says she has flashbacks during the day.       (nightmares, flashbacks, startle respoinse, avoidance)           Social anxiety symptoms:  Negative           Simple phobias:   Heights, claustrophobia, snakes       (heights, planes, spiders, etc.)           Psychosis: She reports hearing and seeing things that aren't there, sees animals out her window that really aren't there. She says this happens almost every day. She says that she has never seen things when she wasn't depressed.       (hallucinations, auditory, visual, tactile, olfactory)           Paranoia: Feels that people are watching her most of the time. She thinks this feeling is both irrational and rational.           Delusions:  Negative       (TV, radio, thought broadcasting, mind control, referential thinking)           Patient's perception: Negative       (Spiritual or cultural context of symptoms, reality testing)           ADHD symptoms: Negative           Eating Disorder symptoms:  Positive, lives almost entirely on whole milk, sometimes drinking ensure, she says that in the last month she has only eaten 3 meals, has occasionally eaten a bagel with a plain piece of bread.       (binging, purging, excessive exercising)            MSE:  Patient is  A & O x 4. Appearance:  SAVANAH. Cognition:  Recent memory intact , remote memory intact , good fund of knowledge, average  intelligence level. Speech:  normal  Language: Naming: intact;  Word Finding: intact  Conversation no evidence of delusions  Behavior:  Cooperative  Mood: \"great\"  Affect: SAVANAH  Thought Content: negative delusions, negative hallucinations, negative obsessions,  negativehomicidal and negative suicidal   Thought Process: linear, goal directed and coherent (having trouble keeping thoughts together, probably due to lack of sleep)  Associations: logical connections  Attention Span and take 2 capsules daily for 1 week, then take 1 capsule daily for 1 week, then stop.      Start: Risperdal, 1mg, nightly            Trazodone, 50mg, nightly     Continue therapy with Gris Matthew for therapy     Come to the lab for a new Urine Drug Screen     Try the [facility] [employer] tabby for sleep     Follow up: Return in about 3 months (around 6/3/2021).    If you become suicidal, follow up with this office or call the [facility] at [phone]), or dial [phone].     1. The risks, benefits, side effects, indications, contraindications, and adverse effects of the medications have been discussed. Yes.  2. The pt has verbalized understanding and has capacity to give informed consent. 3. The Oneyda Sarah report has been reviewed according to [facility] regulations. 4. Supportive therapy offered. 5. Follow up:    Return in about 3 months (around 6/3/2021). 6. The patient has been advised to call with any problems. 7. Controlled substance Treatment Plan: new medication for sleep (eszopiclone). 8. The above listed medications have been continued, modifications in meds and other orders/labs as follows:                 Encounter Medications         Orders Placed This Encounter   Medications    risperiDONE (RISPERDAL) 1 MG tablet       Sig: Take 1 tablet by mouth nightly       Dispense:  90 tablet       Refill:  1    DULoxetine (CYMBALTA) 20 MG extended release capsule       Sig: Take 2 capsules by mouth daily for 1 week, then decrease to 1 capsule daily for 1 week, then stop.       Dispense:  60 capsule       Refill:  0    traZODone (DESYREL) 50 MG tablet       Sig: Take 1 tablet by mouth nightly       Dispense:  90 tablet       Refill:  1                             Orders Placed This Encounter   Procedures    Urine Drug Screen       Please do the drug screen required by pain management.       Standing Status:   Future       Standing Expiration Date:   3/3/2022         9.  Additional comments: She reports

## 2021-05-28 ENCOUNTER — TELEPHONE (OUTPATIENT)
Dept: PSYCHIATRY | Age: 56
End: 2021-05-28

## 2021-05-28 NOTE — TELEPHONE ENCOUNTER
Attempted to contact to inform her that RX she requested had been sent to her pharmacy per Sonya Silveira APRN-no answer.

## 2021-06-04 ENCOUNTER — TELEPHONE (OUTPATIENT)
Dept: PSYCHIATRY | Age: 56
End: 2021-06-04

## 2021-06-04 NOTE — TELEPHONE ENCOUNTER
Pt reminded of need for UDS as ordered by Levi SCANLON. Pt said she had forgotten about it and will get it next week.

## 2021-06-04 NOTE — TELEPHONE ENCOUNTER
Called pt for appointment reminder.   -Pt confirmed    Electronically signed by Jessica Flores MA on 6/4/2021 at 11:54 AM

## 2021-06-07 ENCOUNTER — TELEPHONE (OUTPATIENT)
Dept: PSYCHIATRY | Age: 56
End: 2021-06-07

## 2021-06-07 ENCOUNTER — VIRTUAL VISIT (OUTPATIENT)
Dept: PSYCHIATRY | Age: 56
End: 2021-06-07
Payer: MEDICARE

## 2021-06-07 DIAGNOSIS — F51.05 INSOMNIA DUE TO OTHER MENTAL DISORDER: ICD-10-CM

## 2021-06-07 DIAGNOSIS — F99 INSOMNIA DUE TO OTHER MENTAL DISORDER: ICD-10-CM

## 2021-06-07 DIAGNOSIS — F31.62 BIPOLAR AFFECTIVE DISORDER, MIXED, MODERATE (HCC): Primary | ICD-10-CM

## 2021-06-07 DIAGNOSIS — F41.0 PANIC DISORDER: ICD-10-CM

## 2021-06-07 PROCEDURE — 99443 PR PHYS/QHP TELEPHONE EVALUATION 21-30 MIN: CPT | Performed by: NURSE PRACTITIONER

## 2021-06-07 RX ORDER — PRAZOSIN HYDROCHLORIDE 1 MG/1
1 CAPSULE ORAL NIGHTLY
Qty: 6 CAPSULE | Refills: 0 | Status: SHIPPED | OUTPATIENT
Start: 2021-06-07 | End: 2021-07-21 | Stop reason: ALTCHOICE

## 2021-06-07 ASSESSMENT — PATIENT HEALTH QUESTIONNAIRE - PHQ9
SUM OF ALL RESPONSES TO PHQ QUESTIONS 1-9: 11
SUM OF ALL RESPONSES TO PHQ QUESTIONS 1-9: 11
4. FEELING TIRED OR HAVING LITTLE ENERGY: 0
9. THOUGHTS THAT YOU WOULD BE BETTER OFF DEAD, OR OF HURTING YOURSELF: 0
6. FEELING BAD ABOUT YOURSELF - OR THAT YOU ARE A FAILURE OR HAVE LET YOURSELF OR YOUR FAMILY DOWN: 2
3. TROUBLE FALLING OR STAYING ASLEEP: 1
SUM OF ALL RESPONSES TO PHQ QUESTIONS 1-9: 11
2. FEELING DOWN, DEPRESSED OR HOPELESS: 1
8. MOVING OR SPEAKING SO SLOWLY THAT OTHER PEOPLE COULD HAVE NOTICED. OR THE OPPOSITE, BEING SO FIGETY OR RESTLESS THAT YOU HAVE BEEN MOVING AROUND A LOT MORE THAN USUAL: 1
SUM OF ALL RESPONSES TO PHQ9 QUESTIONS 1 & 2: 4
5. POOR APPETITE OR OVEREATING: 1
1. LITTLE INTEREST OR PLEASURE IN DOING THINGS: 3
7. TROUBLE CONCENTRATING ON THINGS, SUCH AS READING THE NEWSPAPER OR WATCHING TELEVISION: 2

## 2021-06-07 ASSESSMENT — ANXIETY QUESTIONNAIRES
GAD7 TOTAL SCORE: 16
6. BECOMING EASILY ANNOYED OR IRRITABLE: 1-SEVERAL DAYS
5. BEING SO RESTLESS THAT IT IS HARD TO SIT STILL: 2-OVER HALF THE DAYS
1. FEELING NERVOUS, ANXIOUS, OR ON EDGE: 2-OVER HALF THE DAYS
4. TROUBLE RELAXING: 2-OVER HALF THE DAYS
3. WORRYING TOO MUCH ABOUT DIFFERENT THINGS: 3-NEARLY EVERY DAY
7. FEELING AFRAID AS IF SOMETHING AWFUL MIGHT HAPPEN: 3-NEARLY EVERY DAY
2. NOT BEING ABLE TO STOP OR CONTROL WORRYING: 3-NEARLY EVERY DAY

## 2021-06-07 NOTE — PROGRESS NOTES
Ben Peres is a 64 y.o. female evaluated via telephone on 6/7/2021. Consent:  She and/or health care decision maker is aware that that she may receive a bill for this telephone service, depending on her insurance coverage, and has provided verbal consent to proceed: Yes      Documentation:  I communicated with the patient and/or health care decision maker about depression/anxiety/insomnia. Details of this discussion including any medical advice provided: see below      I affirm this is a Patient Initiated Episode with an Established Patient who has not had a related appointment within my department in the past 7 days or scheduled within the next 24 hours. Total Time: minutes: 11-20 minutes    Note: not billable if this call serves to triage the patient into an appointment for the relevant concern      CAPO Lawson NP           6/7/2021 6:08 PM   Progress Note    IN:  0940  OUT: [address] [facility] 1965      Chief Complaint   Patient presents with    Follow-up    Anxiety    Depression    Insomnia    Panic Attack    Other     bruxism, nightmares         Subjective:  Patient is a 64 y.o. female diagnosed with Bipolar 2 Disorder and presents today for follow-up. Last seen in clinic on 3/3/21 and prior records were reviewed. Today patient states, \"Yes (in response to if her medicines are still working. )\" She reports no complaints or concerns. She reports that her only trustworthy friend has moved 100 miles away. She states that this has made her feel like her \"security blanket\" has been ripped away from her. She reports that she is fearful of falling (outside and in her house), fearful of walking her dog. Patient reports side effects as follows: none. No evidence of EPS, no cogwheeling or abnormal motor movements. Absent  suicidal ideation. Reports compliance with medications as good .      Current Substance Use:  See history    BP: There were no vitals taken for this visit.       Review of Systems - 14 point review:  Negative except being treated for:  depression, anxiety, panic attacks, suicidal dreams, weight gain, hx of right fractured ankle, enuresis, increased liver enzymes (being treated with spirolactone)      Constitutional: (fevers, chills, night sweats, wt loss/gain, change in appetite, fatigue, somnolence)    HEENT: (ear pain or discharge, hearing loss, ear ringing, sinus pressure, nosebleed, nasal discharge, sore throat, oral sores, tooth pain, bleeding gums, hoarse voice, neck pain)      Cardiovascular: (HTN, chest pain, elevated cholesterol/lipids, palpitations, leg swelling, leg pain with walking)    Respiratory: (cough, wheezing, snoring, SOB with activity (dyspnea), SOB while lying flat (orthopnea), awakening with severe SOB (paroxysmal nocturnal dyspnea))    Gastrointestinal: (NVD, constipation, abdominal pain, bright red stools, black tarry stools, stool incontinence)     Genitourinary:  (pelvic pain, burning or frequency of urination, urinary urgency, blood in urine incomplete bladder emptying, urinary incontinence, STD; MEN: testicular pain or swelling, erectile dysfunction; WOMEN: LMP, heavy menstrual bleeding (menorrhagia), irregular periods, postmenopausal bleeding, menstrual pain (dymenorrhea, vaginal discharge)    Musculoskeletal: (bone pain/fracture, joint pain or swelling, musle pain)    Integumentary: (rashes, acne, non-healing sores, itching, breast lumps, breast pain, nipple discharge, hair loss)    Neurologic: (HA, muscle weakness, paresthesias (numbness, coldness, crawling or prickling), memory loss, seizure, dizziness)    Psychiatric:  (anxiety, sadness, irritability/anger, insomnia, suicidality)    Endocrine: (heat or cold intolerance, excessive thirst (polydipsia), excessive hunger (polyphagia))    Immune/Allergic: (hives, seasonal or environmental allergies, HIV exposure)    Hematologic/Lymphatic: (lymph node enlargement, easy bleeding or bruising)    History obtained via chart review and patient    PCP is Rashida Vincent. Mariana Tafoya, DO, DO       Current Meds:    Prior to Admission medications    Medication Sig Start Date End Date Taking? Authorizing Provider   prazosin (MINIPRESS) 1 MG capsule Take 1 capsule by mouth nightly 6/7/21  Yes Winnie Spry, APRN - NP   eszopiclone (ESZOPICLONE) 3 MG TABS Take one by mouth nightly as needed for sleep (try not taking this to see if you still need it. 5/27/21 6/27/21  Winnie Spry, APRN - NP   gabapentin (NEURONTIN) 300 MG capsule TAKE 2 CAPSULES BY MOUTH EVERY DAY AT NIGHT 5/27/21 6/26/21  Winnie Spry, APRN - NP   cloNIDine (CATAPRES) 0.1 MG tablet TAKE 1 TABLET BY MOUTH EVERY DAY AT NIGHT 4/20/21   Winnie Spry, APRN - NP   risperiDONE (RISPERDAL) 1 MG tablet Take 1 tablet by mouth nightly 3/3/21   Winnie Spry, APRN - NP   traZODone (DESYREL) 50 MG tablet Take 1 tablet by mouth nightly 3/3/21   Winnie Spry, APRN - NP   lamoTRIgine (LAMICTAL) 150 MG tablet Take 1 tablet by mouth daily 1/27/21   Winnie Spry, APRN - NP   lurasidone (LATUDA) 20 MG TABS tablet Take 1 tablet by mouth Daily with supper (350 calories for best absorption) 1/8/21   Winnie Spry, APRN - NP   oxybutynin (DITROPAN) 5 MG tablet TAKE 1 TABLET BY MOUTH EVERY DAY AT NIGHT 7/28/20   CAPO Jay   spironolactone (ALDACTONE) 25 MG tablet  3/5/20   Historical Provider, MD   diclofenac (VOLTAREN) 75 MG EC tablet Take 75 mg by mouth 2 times daily    Historical Provider, MD   metoprolol succinate (TOPROL XL) 50 MG extended release tablet Take 50 mg by mouth daily 4/25/19 per direction of Dr. Chalo Bey pt to take 1/2 tab daily.     Historical Provider, MD   Magnesium Oxide 250 MG TABS Take by mouth daily    Historical Provider, MD     Social History     Socioeconomic History    Marital status: Legally      Spouse name: None    Number of children: 1    Years of education: 12th grade + some college    Highest education level: Cooperative  Mood: \"pretty good\" (a lot of anxiety with her friend moving away)  Affect: SAVANAH  Thought Content: negative delusions, negative hallucinations, negative obsessions,  negativehomicidal and negative suicidal   Thought Process: linear, goal directed and coherent (having trouble keeping thoughts together, probably due to lack of sleep)  Associations: logical connections  Attention Span and concentration: Normal   Judgement Insight:  normal and appropriate  Gait and Station: SAVANAH   Sleep: avg 7-8 hrs    Appetite: ok    COGNITION SCREEN:    Can spell the word, \"world,\" backwards: Yes  Can do serial 7's: Yes      Lab Results   Component Value Date     (L) 03/10/2020    K 4.7 03/10/2020    CL 89 (L) 03/10/2020    CO2 18 (L) 03/10/2020    BUN 19 03/10/2020    CREATININE 0.9 03/10/2020    GLUCOSE 107 03/10/2020    CALCIUM 9.1 03/10/2020    PROT 8.6 11/10/2015    LABALBU 5.0 11/10/2015    ALKPHOS 127 (H) 11/10/2015    AST 33 (H) 11/10/2015    ALT 22 11/10/2015    LABGLOM >60 03/10/2020     Lab Results   Component Value Date     03/10/2020    K 4.7 03/10/2020    CL 89 03/10/2020    CO2 18 03/10/2020    BUN 19 03/10/2020    CREATININE 0.9 03/10/2020    GLUCOSE 107 03/10/2020    CALCIUM 9.1 03/10/2020      No results found for: CHOL  No results found for: TRIG  No results found for: HDL  No results found for: LDLCHOLESTEROL, LDLCALC  No results found for: LABVLDL, VLDL  No results found for: CHOLHDLRATIO  No results found for: LABA1C  No results found for: EAG  Lab Results   Component Value Date    TSH 2.40 02/03/2015     Lab Results   Component Value Date    VITD25 27.6 (L) 02/03/2015       Assessments Administered:  PHQ9: 11, moderate  GAD7: 16, severe (panic attacks every couple of days, she reports this has stemmed from her friend moving away)      Assessment:   1. Bipolar affective disorder, mixed, moderate ([address]    2. Panic disorder    3.  Insomnia due to other mental disorder Plan:  Continue:  Lamotrigine, 150mg,  Daily    Eszopiclone (Lunesta), 3mg, nightly for sleep (she has tried going off of this with no success, can't sleep without it)    Gabapentin, 300mg, take 2 capsules nightly (restless legs)  Lurasidone, 20mg, daily in the morning (with 350 calories)  Clonidine, 0.1mg, nightly (teeth grinding, nightmares), can lower BP, get your balance before you get up to walk    Risperdal, 1mg, nightly  Trazodone, 50mg, nightly    Taper and stop:  Prazosin, 1mg, x 6 days, then stop    Continue therapy with Dejuna Redd    Come to the lab for a new Urine Drug Screen    Try the NEBOTRADE tabby for sleep    Follow up: Return in about 6 weeks (around 7/19/2021). If you become suicidal, follow up with this office or call the [facility] at [phone]), or dial [phone].    1. The risks, benefits, side effects, indications, contraindications, and adverse effects of the medications have been discussed. Yes.  2. The pt has verbalized understanding and has capacity to give informed consent. 3. The [place] report has been reviewed according to [facility] regulations. 4. Supportive therapy offered. 5. Follow up: Return in about 6 weeks (around 7/19/2021). 6. The patient has been advised to call with any problems. 7. Controlled substance Treatment Plan: new medication for sleep (eszopiclone). 8. The above listed medications have been continued, modifications in meds and other orders/labs as follows:      Orders Placed This Encounter   Medications    prazosin (MINIPRESS) 1 MG capsule     Sig: Take 1 capsule by mouth nightly     Dispense:  6 capsule     Refill:  0     We are tapering and stopping this medication. No orders of the defined types were placed in this encounter. 9. Additional comments: She says that her avg BP is about 114/78. She says that when she is in a panic mode that it shoots up.  She reports significant changes in anxiety since her friend has moved away. She has been having panic attacks every other day. She reports no nightmares. She reports that her sleep has continued to be good. She has several things that are stressors for her: her friend moving away, bedbugs in her trailer which she has not been successful in eradicating, an unsafe environment [place]. She feels the anxiety is a temporary thing and will resolve once she adjusts to her friend not being here. Will make no changes in her medication today. Since it is not desirable for her to be taking Prazosin and Clonidine, will taper and stop Prazosin. She reports that her BP has been fine with taking both Clonidine and Metroprolol. She reports that Clonidine has really helped with her bruxism and a tooth that was loose because of it is now no longer loose. She reports that she has had balance problems since she was a child and before she started taking medications. She reports that she has tried going without Eszopiclone but cannot sleep without it. She is on 3 different medications for sleep and prior to this she had been unable to sleep. She will come in for a UDS. It was explained that we are out of compliance on this. She verbalized understanding. Will make no other changes today and will follow up in about 6 weeks to reassess panic attacks and nightmares. If they have not subsided, will consider an increase in Lamictal.  Patient was informed that this provider is moving back to [place] in October. 10.Over 50% of the total visit time of 30 minutes was spent on counseling and/or coordination of care of:                        1. Bipolar affective disorder, mixed, moderate ([facility] [address]    2. Panic disorder    3.  Insomnia due to other mental disorder                      Psychotherapy Topics: mood/medication effectiveness       George Lewis, CAPO - NP PMHNP-BC

## 2021-06-07 NOTE — TELEPHONE ENCOUNTER
Pt was called for virtual appointment check in.     Electronically signed by Gina Galloway MA on 6/7/2021 at 9:13 AM

## 2021-06-07 NOTE — PATIENT INSTRUCTIONS
Plan:  Continue:  Lamotrigine, 150mg,  Daily    Eszopiclone (Lunesta), 3mg, nightly for sleep (she has tried going off of this with no success, can't sleep without it)    Gabapentin, 300mg, take 2 capsules nightly (restless legs)  Lurasidone, 20mg, daily in the morning (with 350 calories)  Clonidine, 0.1mg, nightly (teeth grinding, nightmares), can lower BP, get your balance before you get up to walk    Risperdal, 1mg, nightly  Trazodone, 50mg, nightly    Taper and stop:  Prazosin, 1mg, x 6 days, then stop    Continue therapy with Angelique Martínez    Come to the lab for a new Urine Drug Screen    Try the [employer] tabby for sleep    Follow up: Return in about 6 weeks (around 7/19/2021). If you become suicidal, follow up with this office or call the [place] [address] at [phone] or dial [phone].

## 2021-06-14 DIAGNOSIS — Z79.899 HIGH RISK MEDICATION USE: ICD-10-CM

## 2021-06-14 LAB
AMPHETAMINE SCREEN, URINE: NEGATIVE
BARBITURATE SCREEN URINE: NEGATIVE
BENZODIAZEPINE SCREEN, URINE: NEGATIVE
CANNABINOID SCREEN URINE: NEGATIVE
COCAINE METABOLITE SCREEN URINE: NEGATIVE
Lab: NORMAL
OPIATE SCREEN URINE: NEGATIVE

## 2021-06-16 RX ORDER — LURASIDONE HYDROCHLORIDE 20 MG/1
TABLET, FILM COATED ORAL
Qty: 90 TABLET | Refills: 0 | Status: SHIPPED | OUTPATIENT
Start: 2021-06-16 | End: 2021-09-13
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
## 2021-06-30 DIAGNOSIS — F51.05 INSOMNIA DUE TO OTHER MENTAL DISORDER: Primary | ICD-10-CM

## 2021-06-30 DIAGNOSIS — F99 INSOMNIA DUE TO OTHER MENTAL DISORDER: Primary | ICD-10-CM

## 2021-06-30 DIAGNOSIS — F41.1 GENERALIZED ANXIETY DISORDER: ICD-10-CM

## 2021-06-30 RX ORDER — ESZOPICLONE 3 MG/1
3 TABLET, FILM COATED ORAL NIGHTLY
COMMUNITY
Start: 2020-03-23 | End: 2021-06-30 | Stop reason: SDUPTHER

## 2021-06-30 RX ORDER — ESZOPICLONE 3 MG/1
3 TABLET, FILM COATED ORAL NIGHTLY
Qty: 30 TABLET | Refills: 0 | Status: SHIPPED | OUTPATIENT
Start: 2021-06-30 | End: 2021-07-30 | Stop reason: SDUPTHER

## 2021-06-30 RX ORDER — GABAPENTIN 300 MG/1
CAPSULE ORAL
Qty: 60 CAPSULE | Refills: 0 | Status: SHIPPED | OUTPATIENT
Start: 2021-06-30 | End: 2021-08-03 | Stop reason: SDUPTHER

## 2021-06-30 NOTE — TELEPHONE ENCOUNTER
Arlen Dillon called to request refill on her medication      Last office visit: 6/7/2021  Next office visit: 7/21/2021    Requested Prescriptions     Pending Prescriptions Disp Refills    gabapentin (NEURONTIN) 300 MG capsule 60 capsule 0     Sig: TAKE 2 CAPSULES BY MOUTH EVERY DAY AT NIGHT    eszopiclone (LUNESTA) 3 MG TABS 30 tablet      Sig: Take 1 tablet by mouth nightly. Virtual Visit  6/7/2021  [address] [facility]   CAPO Putnam NP  Nurse Practitioner Psychiatric/Mental Health  Bipolar affective disorder, mixed, moderate ([facility] [address] +2 more  Dx  Follow-up  Anxiety  Depression  Insomnia  Panic Attack  Other   Reason for Visit   Progress Notes  CAPO Putnam NP (Advanced Practice Nurse) [facility] [facility] [address]  []Expand All by Edie Billings is a 64 y.o. female evaluated via telephone on 6/7/2021.       Consent:  She and/or health care decision maker is aware that that she may receive a bill for this telephone service, depending on her insurance coverage, and has provided verbal consent to proceed: Yes        Documentation:  I communicated with the patient and/or health care decision maker about depression/anxiety/insomnia.    Details of this discussion including any medical advice provided: see below        I affirm this is a Patient Initiated Episode with an Established Patient who has not had a related appointment within my department in the past 7 days or scheduled within the next 24 hours.     Total Time: minutes: 11-20 minutes     Note: not billable if this call serves to triage the patient into an appointment for the relevant concern        CAPO Putnam NP               6/7/2021 6:08 PM                               Progress Note     IN:  [address]  OUT: [address] 1965                            Chief Complaint   Patient presents with    Follow-up    Anxiety    Depression    Insomnia  Panic Attack    Other       bruxism, nightmares            Subjective:  Patient is a 64 y.o. female diagnosed with Bipolar 2 Disorder and presents today for follow-up. Last seen in clinic on 3/3/21 and prior records were reviewed.     Today patient states, \"Yes (in response to if her medicines are still working. )\" She reports no complaints or concerns. She reports that her only trustworthy friend has moved 100 miles away. She states that this has made her feel like her \"security blanket\" has been ripped away from her. She reports that she is fearful of falling (outside and in her house), fearful of walking her dog.      Patient reports side effects as follows: none. No evidence of EPS, no cogwheeling or abnormal motor movements.     Absent  suicidal ideation.   Reports compliance with medications as good .      Current Substance Use:  See history     BP: There were no vitals taken for this visit.        Review of Systems - 14 point review:  Negative except being treated for:  depression, anxiety, panic attacks, suicidal dreams, weight gain, hx of right fractured ankle, enuresis, increased liver enzymes (being treated with spirolactone)        Constitutional: (fevers, chills, night sweats, wt loss/gain, change in appetite, fatigue, somnolence)     HEENT: (ear pain or discharge, hearing loss, ear ringing, sinus pressure, nosebleed, nasal discharge, sore throat, oral sores, tooth pain, bleeding gums, hoarse voice, neck pain)      Cardiovascular: (HTN, chest pain, elevated cholesterol/lipids, palpitations, leg swelling, leg pain with walking)     Respiratory: (cough, wheezing, snoring, SOB with activity (dyspnea), SOB while lying flat (orthopnea), awakening with severe SOB (paroxysmal nocturnal dyspnea))     Gastrointestinal: (NVD, constipation, abdominal pain, bright red stools, black tarry stools, stool incontinence)     Genitourinary:  (pelvic pain, burning or frequency of urination, urinary urgency, blood in tablet by mouth daily 1/27/21     CAPO Faustin NP   lurasidone (LATUDA) 20 MG TABS tablet Take 1 tablet by mouth Daily with supper (350 calories for best absorption) 1/8/21     CAPO Faustin NP   oxybutynin (DITROPAN) 5 MG tablet TAKE 1 TABLET BY MOUTH EVERY DAY AT NIGHT 7/28/20     CAPO Ramesh   spironolactone (ALDACTONE) 25 MG tablet   3/5/20     Historical Provider, MD   diclofenac (VOLTAREN) 75 MG EC tablet Take 75 mg by mouth 2 times daily       Historical Provider, MD   metoprolol succinate (TOPROL XL) 50 MG extended release tablet Take 50 mg by mouth daily 4/25/19 per direction of Dr. Sandra Valentin pt to take 1/2 tab daily.       Historical Provider, MD   Magnesium Oxide 250 MG TABS Take by mouth daily       Historical Provider, MD         Social History   Social History            Socioeconomic History    Marital status: Legally        Spouse name: None    Number of children: 1    Years of education: 12th grade + some college    Highest education level: None   Occupational History    None   Tobacco Use    Smoking status: Current Every Day Smoker       Packs/day: 0.75    Smokeless tobacco: Never Used   Vaping Use    Vaping Use: Never used   Substance and Sexual Activity    Alcohol use: Not Currently       Comment: history of abuse, last drink was early December, 2018    Drug use: Not Currently       Types: Marijuana       Comment: last use was summer, 2017    Sexual activity: Not Currently   Other Topics Concern    None   Social History Narrative     PRIOR MEDICATION TRIALS     Trazodone (having more suicidal dreams), at 100mg, not working for sleep     Seroquel (wt gain, 14 lb in 3 months, enuresis, indigestion, abnormal blood work, increased blood sugar, seizures)     Duloxetine (has not been effective and no noticeable change even with dose increases to 90mg)     Paxil, 20mg     Wellbutrin XL, (tried it recently to quit smoking)     Prozac (made her numb, added to her eating disorder)     Zoloft (thought she did well on this, took for a couple of years, she stopped it because when she returned to [place] the doctor put her back on Prozac)     Remeron (increased her dreams and panic attacks)     Arjun Fong (worked)     Librium (in 2018 for 15 days to stop drinking alcohol)     Risperdal-stopped on 7/6/20 due to weight gain     Doxepin, started on 9/22/20 for sleep (not effective)     Prazosin, ineffective for dreams/nightmares     Latuda (1/27/21, reports that she switched it to am dosing because taking it at night made her RLS worse)           Psychiatric Review of Systems, 3/19/19           Mood:  Sadness, tearfulness, low appetite, low concentration, low energy, loss of interest, denies suicidality today       (Depression: sadness, tearfulness, sleep, appetite, energy, concentration, sexual function, guilt, psychomotor agitation or slowing, interest, suicidality)           Julia: She says that there have been periods of time when she could go 3 days without sleep, she does say that there have been days in a row when she has done reckless, impulsive things       (impulsivity, grandiosity, recklessness, excessive energy, decreased need for sleep, increased spending beyond means, hyperverbal, grandiose, racing thoughts, hypersexuality)           Other: anger from being tired all the time       (Irritability, lability, anger)           Anxiety:  She says that she worries every night about sleeping and panic attacks. She says that she worries about her neighbors. She says that they always want something from her. She worries about running into them. She says that this causes panic attacks.  She says that there is a lot of drug use in her neighborhood and she is fearful of her neighbors.       (Generalized anxiety: where, when, who, how long, how frequent)           Panic Disorder symptoms: She says that she is having panic attacks at night, 1 at night (this has improved from 3 weeks ago Expenses:    Food Insecurity:     Worried About Running Out of Food in the Last Year:     [address] in the Last Year:    Transportation Needs:     Lack of Transportation (Medical):  Lack of Transportation (Non-Medical):    Physical Activity:     Days of Exercise per Week:     Minutes of Exercise per Session:    Stress:     Feeling of Stress :    Social Connections:     Frequency of Communication with Friends and Family:     Frequency of Social Gatherings with Friends and Family:     Attends Holiness Services:     Active Member of Clubs or Organizations:     Attends Club or Organization Meetings:     Marital Status:    Intimate Partner Violence:     Fear of Current or Ex-Partner:     Emotionally Abused:     Physically Abused:     Sexually Abused:             MSE:  Patient is  A & O x 4. Appearance:  SAVANAH. Cognition:  Recent memory intact , remote memory intact , good fund of knowledge, average  intelligence level. Speech:  normal  Language: Naming: intact; Word Finding: intact  Conversation no evidence of delusions  Behavior:  Cooperative  Mood: \"pretty good\" (a lot of anxiety with her friend moving away)  Affect: SAVANAH  Thought Content: negative delusions, negative hallucinations, negative obsessions,  negativehomicidal and negative suicidal   Thought Process: linear, goal directed and coherent (having trouble keeping thoughts together, probably due to lack of sleep)  Associations: logical connections  Attention Span and concentration: Normal   Judgement Insight:  normal and appropriate  Gait and Station: SAVANAH   Sleep: avg 7-8 hrs    Appetite: ok     COGNITION SCREEN:     Can spell the word, \"world,\" backwards: Yes  Can do serial 7's:  Yes              Lab Results   Component Value Date      (L) 03/10/2020     K 4.7 03/10/2020     CL 89 (L) 03/10/2020     CO2 18 (L) 03/10/2020     BUN 19 03/10/2020     CREATININE 0.9 03/10/2020     GLUCOSE 107 03/10/2020     CALCIUM 9.1 03/10/2020     911.     1. The risks, benefits, side effects, indications, contraindications, and adverse effects of the medications have been discussed. Yes.  2. The pt has verbalized understanding and has capacity to give informed consent. 3. The Amber Pew report has been reviewed according to [facility] regulations. 4. Supportive therapy offered. 5. Follow up:    Return in about 6 weeks (around 7/19/2021). 6. The patient has been advised to call with any problems. 7. Controlled substance Treatment Plan: new medication for sleep (eszopiclone). 8. The above listed medications have been continued, modifications in meds and other orders/labs as follows:                 Encounter Medications         Orders Placed This Encounter   Medications    prazosin (MINIPRESS) 1 MG capsule       Sig: Take 1 capsule by mouth nightly       Dispense:  6 capsule       Refill:  0       We are tapering and stopping this medication.                       No orders of the defined types were placed in this encounter.        9. Additional comments: She says that her avg BP is about 114/78. She says that when she is in a panic mode that it shoots up. She reports significant changes in anxiety since her friend has moved away. She has been having panic attacks every other day. She reports no nightmares. She reports that her sleep has continued to be good. She has several things that are stressors for her: her friend moving away, bedbugs in her trailer which she has not been successful in eradicating, an unsafe environment in [place]. She feels the anxiety is a temporary thing and will resolve once she adjusts to her friend not being here. Will make no changes in her medication today. Since it is not desirable for her to be taking Prazosin and Clonidine, will taper and stop Prazosin. She reports that her BP has been fine with taking both Clonidine and Metroprolol.  She reports that Clonidine has really helped with her bruxism and a tooth that was loose because of it is now no longer loose. She reports that she has had balance problems since she was a child and before she started taking medications. She reports that she has tried going without Eszopiclone but cannot sleep without it. She is on 3 different medications for sleep and prior to this she had been unable to sleep. She will come in for a UDS. It was explained that we are out of compliance on this. She verbalized understanding. Will make no other changes today and will follow up in about 6 weeks to reassess panic attacks and nightmares. If they have not subsided, will consider an increase in Lamictal.  Patient was informed that this provider is moving back to [place] in October.     10. Over 50% of the total visit time of 30 minutes was spent on counseling and/or coordination of care of:                         1. Bipolar affective disorder, mixed, moderate ([facility] [address]    2. Panic disorder    3. Insomnia due to other mental disorder                        Psychotherapy Topics: mood/medication effectiveness         Polo Villa, CAPO - NP PMHNP-BC      Instructions       Return in about 6 weeks (around 7/19/2021). Plan:  Continue:  Lamotrigine, 150mg,  Daily     Eszopiclone (Lunesta), 3mg, nightly for sleep (she has tried going off of this with no success, can't sleep without it)     Gabapentin, 300mg, take 2 capsules nightly (restless legs)  Lurasidone, 20mg, daily in the morning (with 350 calories)  Clonidine, 0.1mg, nightly (teeth grinding, nightmares), can lower BP, get your balance before you get up to walk     Risperdal, 1mg, nightly  Trazodone, 50mg, nightly     Taper and stop:  Prazosin, 1mg, x 6 days, then stop     Continue therapy with Raymundo Feilx     Come to the lab for a new Urine Drug Screen     Try the [employer] tabby for sleep     Follow up: Return in about 6 weeks (around 7/19/2021).       If you become suicidal, follow up with this office or call the [address] at [employer] tab daily.    Magnesium Oxide 250 MG TABS Take by mouth daily   Medication Changes       Prazosin HCl 1 mg Oral NIGHTLY        (Therapy completed)     Medication List   Visit Diagnoses       Bipolar affective disorder, mixed, moderate (HCC)     Panic disorder     Insomnia due to other mental disorder     Problem List

## 2021-07-19 RX ORDER — CLONIDINE HYDROCHLORIDE 0.1 MG/1
TABLET ORAL
Qty: 90 TABLET | Refills: 0 | Status: SHIPPED | OUTPATIENT
Start: 2021-07-19 | End: 2021-10-25 | Stop reason: SDUPTHER

## 2021-07-19 NOTE — TELEPHONE ENCOUNTER
Trevor Hartley called to request refill on her medication      Last office visit: 6/7/2021  Next office visit: 7/21/2021    Requested Prescriptions     Pending Prescriptions Disp Refills    cloNIDine (CATAPRES) 0.1 MG tablet [Pharmacy Med Name: CLONIDINE HCL 0.1 MG TABLET] 90 tablet 0     Sig: TAKE 1 TABLET BY MOUTH EVERY DAY AT NIGHT        Virtual Visit  6/7/2021  [address] [facility]   CAPO Levi NP  Nurse Practitioner Psychiatric/Mental Health  Bipolar affective disorder, mixed, moderate ([facility] [address] +2 more  Dx  Follow-up  Anxiety  Depression  Insomnia  Panic Attack  Other   Reason for Visit   Progress Notes  CAPO Levi NP (Advanced Practice Nurse) [facility] Nurse Practitioner Brenda Cash is a 64 y.o. female evaluated via telephone on 6/7/2021.       Consent:  She and/or health care decision maker is aware that that she may receive a bill for this telephone service, depending on her insurance coverage, and has provided verbal consent to proceed: Yes        Documentation:  I communicated with the patient and/or health care decision maker about depression/anxiety/insomnia.    Details of this discussion including any medical advice provided: see below        I affirm this is a Patient Initiated Episode with an Established Patient who has not had a related appointment within my department in the past 7 days or scheduled within the next 24 hours.     Total Time: minutes: 11-20 minutes     Note: not billable if this call serves to triage the patient into an appointment for the relevant concern        CAPO eLvi NP               6/7/2021 6:08 PM                               Progress Note     IN:  [address]  OUT: [address] 1965                            Chief Complaint   Patient presents with    Follow-up    Anxiety    Depression    Insomnia    Panic Attack    Other       bruxism, nightmares          Subjective:  Patient is a 64 y.o. female diagnosed with Bipolar 2 Disorder and presents today for follow-up. Last seen in clinic on 3/3/21 and prior records were reviewed.     Today patient states, \"Yes (in response to if her medicines are still working. )\" She reports no complaints or concerns. She reports that her only trustworthy friend has moved 100 miles away. She states that this has made her feel like her \"security blanket\" has been ripped away from her. She reports that she is fearful of falling (outside and in her house), fearful of walking her dog.      Patient reports side effects as follows: none. No evidence of EPS, no cogwheeling or abnormal motor movements.     Absent  suicidal ideation.   Reports compliance with medications as good .      Current Substance Use:  See history     BP: There were no vitals taken for this visit.        Review of Systems - 14 point review:  Negative except being treated for:  depression, anxiety, panic attacks, suicidal dreams, weight gain, hx of right fractured ankle, enuresis, increased liver enzymes (being treated with spirolactone)        Constitutional: (fevers, chills, night sweats, wt loss/gain, change in appetite, fatigue, somnolence)     HEENT: (ear pain or discharge, hearing loss, ear ringing, sinus pressure, nosebleed, nasal discharge, sore throat, oral sores, tooth pain, bleeding gums, hoarse voice, neck pain)      Cardiovascular: (HTN, chest pain, elevated cholesterol/lipids, palpitations, leg swelling, leg pain with walking)     Respiratory: (cough, wheezing, snoring, SOB with activity (dyspnea), SOB while lying flat (orthopnea), awakening with severe SOB (paroxysmal nocturnal dyspnea))     Gastrointestinal: (NVD, constipation, abdominal pain, bright red stools, black tarry stools, stool incontinence)     Genitourinary:  (pelvic pain, burning or frequency of urination, urinary urgency, blood in urine incomplete bladder emptying, urinary incontinence, STD; MEN: testicular pain or swelling, erectile dysfunction; WOMEN: LMP, heavy menstrual bleeding (menorrhagia), irregular periods, postmenopausal bleeding, menstrual pain (dymenorrhea, vaginal discharge)     Musculoskeletal: (bone pain/fracture, joint pain or swelling, musle pain)     Integumentary: (rashes, acne, non-healing sores, itching, breast lumps, breast pain, nipple discharge, hair loss)     Neurologic: (HA, muscle weakness, paresthesias (numbness, coldness, crawling or prickling), memory loss, seizure, dizziness)     Psychiatric:  (anxiety, sadness, irritability/anger, insomnia, suicidality)     Endocrine: (heat or cold intolerance, excessive thirst (polydipsia), excessive hunger (polyphagia))     Immune/Allergic: (hives, seasonal or environmental allergies, HIV exposure)     Hematologic/Lymphatic: (lymph node enlargement, easy bleeding or bruising)     History obtained via chart review and patient     PCP is Mitul Kidd. Elizabeth Eugene DO, DO         Current Meds:     Home Medications           Prior to Admission medications    Medication Sig Start Date End Date Taking?  Authorizing Provider   prazosin (MINIPRESS) 1 MG capsule Take 1 capsule by mouth nightly 6/7/21   Yes Alvina Lisco, APRN - NP   eszopiclone (ESZOPICLONE) 3 MG TABS Take one by mouth nightly as needed for sleep (try not taking this to see if you still need it. 5/27/21 6/27/21   Alvina Lisco, APRN - NP   gabapentin (NEURONTIN) 300 MG capsule TAKE 2 CAPSULES BY MOUTH EVERY DAY AT NIGHT 5/27/21 6/26/21   Alvina Lisco, APRN - NP   cloNIDine (CATAPRES) 0.1 MG tablet TAKE 1 TABLET BY MOUTH EVERY DAY AT NIGHT 4/20/21     Alvina Lisco, APRN - NP   risperiDONE (RISPERDAL) 1 MG tablet Take 1 tablet by mouth nightly 3/3/21     Alvina Lisco, APRN - NP   traZODone (DESYREL) 50 MG tablet Take 1 tablet by mouth nightly 3/3/21     Alvina Lisco, APRN - NP   lamoTRIgine (LAMICTAL) 150 MG tablet Take 1 tablet by mouth daily 1/27/21     Marizol Lindsey CAPO Milton NP   lurasidone (LATUDA) 20 MG TABS tablet Take 1 tablet by mouth Daily with supper (350 calories for best absorption) 1/8/21     CAPO Valencia NP   oxybutynin (DITROPAN) 5 MG tablet TAKE 1 TABLET BY MOUTH EVERY DAY AT NIGHT 7/28/20     CAPO Perez   spironolactone (ALDACTONE) 25 MG tablet   3/5/20     Historical Provider, MD   diclofenac (VOLTAREN) 75 MG EC tablet Take 75 mg by mouth 2 times daily       Historical Provider, MD   metoprolol succinate (TOPROL XL) 50 MG extended release tablet Take 50 mg by mouth daily 4/25/19 per direction of Dr. Maryellen Marie pt to take 1/2 tab daily.       Historical Provider, MD   Magnesium Oxide 250 MG TABS Take by mouth daily       Historical Provider, MD         Social History   Social History            Socioeconomic History    Marital status: Legally        Spouse name: None    Number of children: 1    Years of education: 12th grade + some college    Highest education level: None   Occupational History    None   Tobacco Use    Smoking status: Current Every Day Smoker       Packs/day: 0.75    Smokeless tobacco: Never Used   Vaping Use    Vaping Use: Never used   Substance and Sexual Activity    Alcohol use: Not Currently       Comment: history of abuse, last drink was early December, 2018    Drug use: Not Currently       Types: Marijuana       Comment: last use was summer, 2017    Sexual activity: Not Currently   Other Topics Concern    None   Social History Narrative     PRIOR MEDICATION TRIALS     Trazodone (having more suicidal dreams), at 100mg, not working for sleep     Seroquel (wt gain, 14 lb in 3 months, enuresis, indigestion, abnormal blood work, increased blood sugar, seizures)     Duloxetine (has not been effective and no noticeable change even with dose increases to 90mg)     Paxil, 20mg     Wellbutrin XL, (tried it recently to quit smoking)     Prozac (made her numb, added to her eating disorder)     Zoloft (thought she did well on this, took for a couple of years, she stopped it because when she returned to [place] the doctor put her back on Prozac)     Remeron (increased her dreams and panic attacks)     Vito Horan (worked)     Librium (in 2018 for 15 days to stop drinking alcohol)     Risperdal-stopped on 7/6/20 due to weight gain     Doxepin, started on 9/22/20 for sleep (not effective)     Prazosin, ineffective for dreams/nightmares     Latuda (1/27/21, reports that she switched it to am dosing because taking it at night made her RLS worse)           Psychiatric Review of Systems, 3/19/19           Mood:  Sadness, tearfulness, low appetite, low concentration, low energy, loss of interest, denies suicidality today       (Depression: sadness, tearfulness, sleep, appetite, energy, concentration, sexual function, guilt, psychomotor agitation or slowing, interest, suicidality)           Julia: She says that there have been periods of time when she could go 3 days without sleep, she does say that there have been days in a row when she has done reckless, impulsive things       (impulsivity, grandiosity, recklessness, excessive energy, decreased need for sleep, increased spending beyond means, hyperverbal, grandiose, racing thoughts, hypersexuality)           Other: anger from being tired all the time       (Irritability, lability, anger)           Anxiety:  She says that she worries every night about sleeping and panic attacks. She says that she worries about her neighbors. She says that they always want something from her. She worries about running into them. She says that this causes panic attacks.  She says that there is a lot of drug use in her neighborhood and she is fearful of her neighbors.       (Generalized anxiety: where, when, who, how long, how frequent)           Panic Disorder symptoms: She says that she is having panic attacks at night, 1 at night (this has improved from 3 weeks ago before she started Trazodone when she was having 2-3 at night). She says that she wakes up with dreams of her family and with her anxiety about the trailer park.       (Palpitations, racing heart beat, sweating, sense of impending doom, fear of recurrence, shortness of breath)           OCD symptoms:  She gets flustered if things are not in a routine, is ritualized about the way she dresses, and the way she laces her shoes       (checking, cleaning, organizing, rituals, hang-ups, obsessive thoughts, counting, rational vs. Irrational beliefs)           PTSD symptoms:  Increased startle response, wakes up with dreams at night, she also says she has flashbacks during the day.       (nightmares, flashbacks, startle respoinse, avoidance)           Social anxiety symptoms:  Negative           Simple phobias:   Heights, claustrophobia, snakes       (heights, planes, spiders, etc.)           Psychosis: She reports hearing and seeing things that aren't there, sees animals out her window that really aren't there. She says this happens almost every day. She says that she has never seen things when she wasn't depressed.       (hallucinations, auditory, visual, tactile, olfactory)           Paranoia: Feels that people are watching her most of the time.  She thinks this feeling is both irrational and rational.           Delusions:  Negative       (TV, radio, thought broadcasting, mind control, referential thinking)           Patient's perception: Negative       (Spiritual or cultural context of symptoms, reality testing)           ADHD symptoms: Negative           Eating Disorder symptoms:  Positive, lives almost entirely on whole milk, sometimes drinking ensure, she says that in the last month she has only eaten 3 meals, has occasionally eaten a bagel with a plain piece of bread.       (binging, purging, excessive exercising)      Social Determinants of Health          Financial Resource Strain:     Difficulty of Paying Living Expenses:    Food Insecurity:     Worried About Running Out of Food in the Last Year:     May of Food in the Last Year:    Transportation Needs:     Lack of Transportation (Medical):  Lack of Transportation (Non-Medical):    Physical Activity:     Days of Exercise per Week:     Minutes of Exercise per Session:    Stress:     Feeling of Stress :    Social Connections:     Frequency of Communication with Friends and Family:     Frequency of Social Gatherings with Friends and Family:     Attends Spiritism Services:     Active Member of Clubs or Organizations:     Attends Club or Organization Meetings:     Marital Status:    Intimate Partner Violence:     Fear of Current or Ex-Partner:     Emotionally Abused:     Physically Abused:     Sexually Abused:             MSE:  Patient is  A & O x 4. Appearance:  SAVANAH. Cognition:  Recent memory intact , remote memory intact , good fund of knowledge, average  intelligence level. Speech:  normal  Language: Naming: intact; Word Finding: intact  Conversation no evidence of delusions  Behavior:  Cooperative  Mood: \"pretty good\" (a lot of anxiety with her friend moving away)  Affect: SAVANAH  Thought Content: negative delusions, negative hallucinations, negative obsessions,  negativehomicidal and negative suicidal   Thought Process: linear, goal directed and coherent (having trouble keeping thoughts together, probably due to lack of sleep)  Associations: logical connections  Attention Span and concentration: Normal   Judgement Insight:  normal and appropriate  Gait and Station: SAVANAH   Sleep: avg 7-8 hrs    Appetite: ok     COGNITION SCREEN:     Can spell the word, \"world,\" backwards: Yes  Can do serial 7's:  Yes              Lab Results   Component Value Date      (L) 03/10/2020     K 4.7 03/10/2020     CL 89 (L) 03/10/2020     CO2 18 (L) 03/10/2020     BUN 19 03/10/2020     CREATININE 0.9 03/10/2020     GLUCOSE 107 03/10/2020     CALCIUM 9.1 03/10/2020     PROT 8.6 11/10/2015     LABALBU 5.0 effects, indications, contraindications, and adverse effects of the medications have been discussed. Yes.  2. The pt has verbalized understanding and has capacity to give informed consent. 3. The Jason Carr report has been reviewed according to [facility] regulations. 4. Supportive therapy offered. 5. Follow up:    Return in about 6 weeks (around 7/19/2021). 6. The patient has been advised to call with any problems. 7. Controlled substance Treatment Plan: new medication for sleep (eszopiclone). 8. The above listed medications have been continued, modifications in meds and other orders/labs as follows:                 Encounter Medications         Orders Placed This Encounter   Medications    prazosin (MINIPRESS) 1 MG capsule       Sig: Take 1 capsule by mouth nightly       Dispense:  6 capsule       Refill:  0       We are tapering and stopping this medication.                       No orders of the defined types were placed in this encounter.        9. Additional comments: She says that her avg BP is about 114/78. She says that when she is in a panic mode that it shoots up. She reports significant changes in anxiety since her friend has moved away. She has been having panic attacks every other day. She reports no nightmares. She reports that her sleep has continued to be good. She has several things that are stressors for her: her friend moving away, bedbugs in her trailer which she has not been successful in eradicating, an unsafe environment in [place]. She feels the anxiety is a temporary thing and will resolve once she adjusts to her friend not being here. Will make no changes in her medication today. Since it is not desirable for her to be taking Prazosin and Clonidine, will taper and stop Prazosin. She reports that her BP has been fine with taking both Clonidine and Metroprolol. She reports that Clonidine has really helped with her bruxism and a tooth that was loose because of it is now no longer loose. She reports that she has had balance problems since she was a child and before she started taking medications. She reports that she has tried going without Eszopiclone but cannot sleep without it. She is on 3 different medications for sleep and prior to this she had been unable to sleep. She will come in for a UDS. It was explained that we are out of compliance on this. She verbalized understanding. Will make no other changes today and will follow up in about 6 weeks to reassess panic attacks and nightmares. If they have not subsided, will consider an increase in Lamictal.  Patient was informed that this provider is moving back to [place] in October.     10. Over 50% of the total visit time of 30 minutes was spent on counseling and/or coordination of care of:                         1. Bipolar affective disorder, mixed, moderate ([facility] [address]    2. Panic disorder    3. Insomnia due to other mental disorder                        Psychotherapy Topics: mood/medication effectiveness         CAPO Short - NP PMHNP-BC      Instructions       Return in about 6 weeks (around 7/19/2021). Plan:  Continue:  Lamotrigine, 150mg,  Daily     Eszopiclone (Lunesta), 3mg, nightly for sleep (she has tried going off of this with no success, can't sleep without it)     Gabapentin, 300mg, take 2 capsules nightly (restless legs)  Lurasidone, 20mg, daily in the morning (with 350 calories)  Clonidine, 0.1mg, nightly (teeth grinding, nightmares), can lower BP, get your balance before you get up to walk     Risperdal, 1mg, nightly  Trazodone, 50mg, nightly     Taper and stop:  Prazosin, 1mg, x 6 days, then stop     Continue therapy with Garcia Salazar     Come to the lab for a new Urine Drug Screen     Try the [employer] tabby for sleep     Follow up: Return in about 6 weeks (around 7/19/2021).    If you become suicidal, follow up with this office or call the [address] at [phone]), or dial [phone].           After Visit Summary (Printed 6/8/2021)  Additional Documentation    Flowsheets:  Patient Health Questionnaire - 9,   STEFFEN-7      Encounter Info:  Billing Info,   Allergies,   Detailed Report,   History,   Medications,   Questionnaires      Media  From this encounter  Scan on 6/4/2021 2:44 PM by Adalberto Hoover MA: Sparkle Rico 06/04/21Scan on 6/4/2021 2:44 PM by Adalberto Hoover MA: Mariann Avila Letter 06/04/21   Scan on 6/4/2021 10:27 AM by Mikayla Velazquez RN: Olivia Joy 6/4/21Scan on 6/4/2021 10:27 AM by Mikayla Velazquez RN: Olivia Joy 6/4/21   Chart Review Routing History    No routing history on file. Encounter Status    Signed by CAPO Levi NP on 6/7/21 at 18:09   BestPractice Advisories    Click to view BestPractice Advisory history   Encounter Messages    No messages in this encounter   Orders Placed     None  Outpatient Medications at End of Encounter as of 6/7/2021    prazosin (MINIPRESS) 1 MG capsule (Taking) Take 1 capsule by mouth nightly   eszopiclone (ESZOPICLONE) 3 MG TABS Take one by mouth nightly as needed for sleep (try not taking this to see if you still need it.   gabapentin (NEURONTIN) 300 MG capsule TAKE 2 CAPSULES BY MOUTH EVERY DAY AT NIGHT   cloNIDine (CATAPRES) 0.1 MG tablet TAKE 1 TABLET BY MOUTH EVERY DAY AT NIGHT   risperiDONE (RISPERDAL) 1 MG tablet Take 1 tablet by mouth nightly   traZODone (DESYREL) 50 MG tablet Take 1 tablet by mouth nightly   lamoTRIgine (LAMICTAL) 150 MG tablet Take 1 tablet by mouth daily   lurasidone (LATUDA) 20 MG TABS tablet Take 1 tablet by mouth Daily with supper (350 calories for best absorption)   oxybutynin (DITROPAN) 5 MG tablet TAKE 1 TABLET BY MOUTH EVERY DAY AT NIGHT   spironolactone (ALDACTONE) 25 MG tablet    diclofenac (VOLTAREN) 75 MG EC tablet Take 75 mg by mouth 2 times daily   metoprolol succinate (TOPROL XL) 50 MG extended release tablet Take 50 mg by mouth daily 4/25/19 per direction of Dr. Hanane Glass pt to take 1/2 tab daily.    Magnesium Oxide 250 MG TABS Take by mouth daily   Medication Changes       Prazosin HCl 1 mg Oral NIGHTLY        (Therapy completed)     Medication List   Visit Diagnoses       Bipolar affective disorder, mixed, moderate (HCC)     Panic disorder     Insomnia due to other mental disorder     Problem List

## 2021-07-21 ENCOUNTER — VIRTUAL VISIT (OUTPATIENT)
Dept: PSYCHIATRY | Age: 56
End: 2021-07-21
Payer: MEDICARE

## 2021-07-21 DIAGNOSIS — F31.61 BIPOLAR AFFECTIVE DISORDER, MIXED, MILD (HCC): Primary | ICD-10-CM

## 2021-07-21 DIAGNOSIS — F41.0 GENERALIZED ANXIETY DISORDER WITH PANIC ATTACKS: ICD-10-CM

## 2021-07-21 DIAGNOSIS — F41.1 GENERALIZED ANXIETY DISORDER WITH PANIC ATTACKS: ICD-10-CM

## 2021-07-21 DIAGNOSIS — F99 INSOMNIA DUE TO OTHER MENTAL DISORDER: ICD-10-CM

## 2021-07-21 DIAGNOSIS — F51.05 INSOMNIA DUE TO OTHER MENTAL DISORDER: ICD-10-CM

## 2021-07-21 DIAGNOSIS — F45.8 BRUXISM: ICD-10-CM

## 2021-07-21 PROCEDURE — 99442 PR PHYS/QHP TELEPHONE EVALUATION 11-20 MIN: CPT | Performed by: NURSE PRACTITIONER

## 2021-07-21 ASSESSMENT — PATIENT HEALTH QUESTIONNAIRE - PHQ9
SUM OF ALL RESPONSES TO PHQ QUESTIONS 1-9: 9
6. FEELING BAD ABOUT YOURSELF - OR THAT YOU ARE A FAILURE OR HAVE LET YOURSELF OR YOUR FAMILY DOWN: 1
SUM OF ALL RESPONSES TO PHQ QUESTIONS 1-9: 9
8. MOVING OR SPEAKING SO SLOWLY THAT OTHER PEOPLE COULD HAVE NOTICED. OR THE OPPOSITE, BEING SO FIGETY OR RESTLESS THAT YOU HAVE BEEN MOVING AROUND A LOT MORE THAN USUAL: 1
1. LITTLE INTEREST OR PLEASURE IN DOING THINGS: 0
9. THOUGHTS THAT YOU WOULD BE BETTER OFF DEAD, OR OF HURTING YOURSELF: 0
5. POOR APPETITE OR OVEREATING: 3
3. TROUBLE FALLING OR STAYING ASLEEP: 0
2. FEELING DOWN, DEPRESSED OR HOPELESS: 0
4. FEELING TIRED OR HAVING LITTLE ENERGY: 1
SUM OF ALL RESPONSES TO PHQ QUESTIONS 1-9: 9
SUM OF ALL RESPONSES TO PHQ9 QUESTIONS 1 & 2: 0
7. TROUBLE CONCENTRATING ON THINGS, SUCH AS READING THE NEWSPAPER OR WATCHING TELEVISION: 3

## 2021-07-21 ASSESSMENT — ANXIETY QUESTIONNAIRES
1. FEELING NERVOUS, ANXIOUS, OR ON EDGE: 2-OVER HALF THE DAYS
5. BEING SO RESTLESS THAT IT IS HARD TO SIT STILL: 0-NOT AT ALL
2. NOT BEING ABLE TO STOP OR CONTROL WORRYING: 0-NOT AT ALL
3. WORRYING TOO MUCH ABOUT DIFFERENT THINGS: 3-NEARLY EVERY DAY
7. FEELING AFRAID AS IF SOMETHING AWFUL MIGHT HAPPEN: 2-OVER HALF THE DAYS
GAD7 TOTAL SCORE: 8
4. TROUBLE RELAXING: 1-SEVERAL DAYS
6. BECOMING EASILY ANNOYED OR IRRITABLE: 0-NOT AT ALL

## 2021-07-21 NOTE — PROGRESS NOTES
point review:  Negative except being treated for:  depression, anxiety, panic attacks, suicidal dreams, weight gain, hx of right fractured ankle, enuresis, increased liver enzymes (being treated with spirolactone)      Constitutional: (fevers, chills, night sweats, wt loss/gain, change in appetite, fatigue, somnolence)    HEENT: (ear pain or discharge, hearing loss, ear ringing, sinus pressure, nosebleed, nasal discharge, sore throat, oral sores, tooth pain, bleeding gums, hoarse voice, neck pain)      Cardiovascular: (HTN, chest pain, elevated cholesterol/lipids, palpitations, leg swelling, leg pain with walking)    Respiratory: (cough, wheezing, snoring, SOB with activity (dyspnea), SOB while lying flat (orthopnea), awakening with severe SOB (paroxysmal nocturnal dyspnea))    Gastrointestinal: (NVD, constipation, abdominal pain, bright red stools, black tarry stools, stool incontinence)     Genitourinary:  (pelvic pain, burning or frequency of urination, urinary urgency, blood in urine incomplete bladder emptying, urinary incontinence, STD; MEN: testicular pain or swelling, erectile dysfunction; WOMEN: LMP, heavy menstrual bleeding (menorrhagia), irregular periods, postmenopausal bleeding, menstrual pain (dymenorrhea, vaginal discharge)    Musculoskeletal: (bone pain/fracture, joint pain or swelling, musle pain)    Integumentary: (rashes, acne, non-healing sores, itching, breast lumps, breast pain, nipple discharge, hair loss)    Neurologic: (HA, muscle weakness, paresthesias (numbness, coldness, crawling or prickling), memory loss, seizure, dizziness)    Psychiatric:  (anxiety, sadness, irritability/anger, insomnia, suicidality)    Endocrine: (heat or cold intolerance, excessive thirst (polydipsia), excessive hunger (polyphagia))    Immune/Allergic: (hives, seasonal or environmental allergies, HIV exposure)    Hematologic/Lymphatic: (lymph node enlargement, easy bleeding or bruising)    History obtained via chart review and patient    PCP is Keyanna Corley. Ashley Dockery, DO, DO       Current Meds:    Prior to Admission medications    Medication Sig Start Date End Date Taking? Authorizing Provider   cloNIDine (CATAPRES) 0.1 MG tablet TAKE 1 TABLET BY MOUTH EVERY DAY AT NIGHT 7/19/21   Carlos Panning, APRN - NP   gabapentin (NEURONTIN) 300 MG capsule TAKE 2 CAPSULES BY MOUTH EVERY DAY AT NIGHT 6/30/21 7/30/21  Alice Acosta, APRN - CNP   eszopiclone (LUNESTA) 3 MG TABS Take 1 tablet by mouth nightly for 30 days. 6/30/21 7/30/21  Alice Acosta, APRN - CNP   LATUDA 20 MG TABS tablet TAKE 1 TABLET BY MOUTH DAILY WITH SUPPER 6/16/21   Carlos Panning, APRN - NP   risperiDONE (RISPERDAL) 1 MG tablet Take 1 tablet by mouth nightly 3/3/21   Carlos Panning, APRN - NP   traZODone (DESYREL) 50 MG tablet Take 1 tablet by mouth nightly 3/3/21   Carlos Panning, APRN - NP   lamoTRIgine (LAMICTAL) 150 MG tablet Take 1 tablet by mouth daily 1/27/21   Carlos Panning, APRN - NP   oxybutynin (DITROPAN) 5 MG tablet TAKE 1 TABLET BY MOUTH EVERY DAY AT NIGHT 7/28/20   CAPO Cordero   spironolactone (ALDACTONE) 25 MG tablet  3/5/20   Historical Provider, MD   diclofenac (VOLTAREN) 75 MG EC tablet Take 75 mg by mouth 2 times daily    Historical Provider, MD   metoprolol succinate (TOPROL XL) 50 MG extended release tablet Take 50 mg by mouth daily 4/25/19 per direction of Dr. Rosalie Martin pt to take 1/2 tab daily.     Historical Provider, MD   Magnesium Oxide 250 MG TABS Take by mouth daily    Historical Provider, MD     Social History     Socioeconomic History    Marital status: Legally      Spouse name: Not on file    Number of children: 1    Years of education: 12th grade + some college    Highest education level: Not on file   Occupational History    Not on file   Tobacco Use    Smoking status: Current Every Day Smoker     Packs/day: 0.75    Smokeless tobacco: Never Used   Vaping Use    Vaping Use: Never used   Substance and Sexual Activity    Alcohol use: Not Currently     Comment: history of abuse, last drink was early December, 2018    Drug use: Not Currently     Types: Marijuana     Comment: last use was summer, 2017    Sexual activity: Not Currently   Other Topics Concern    Not on file   Social History Narrative    PRIOR MEDICATION TRIALS    Trazodone (having more suicidal dreams), at 100mg, not working for sleep    Seroquel (wt gain, 14 lb in 3 months, enuresis, indigestion, abnormal blood work, increased blood sugar, seizures)    Duloxetine (has not been effective and no noticeable change even with dose increases to 90mg)    Paxil, 20mg    Wellbutrin XL, (tried it recently to quit smoking)    Prozac (made her numb, added to her eating disorder)    Zoloft (thought she did well on this, took for a couple of years, she stopped it because when she returned to [facility] the doctor put her back on Prozac)    Remeron (increased her dreams and panic attacks)    Arline Leblanc (worked)    Librium (in 2018 for 15 days to stop drinking alcohol)    Risperdal-stopped on 7/6/20 due to weight gain    Doxepin, started on 9/22/20 for sleep (not effective)    Prazosin, ineffective for dreams/nightmares    Latuda (1/27/21, reports that she switched it to am dosing because taking it at night made her RLS worse)        Psychiatric Review of Systems, 3/19/19         Mood:  Sadness, tearfulness, low appetite, low concentration, low energy, loss of interest, denies suicidality today      (Depression: sadness, tearfulness, sleep, appetite, energy, concentration, sexual function, guilt, psychomotor agitation or slowing, interest, suicidality)         Julia: She says that there have been periods of time when she could go 3 days without sleep, she does say that there have been days in a row when she has done reckless, impulsive things      (impulsivity, grandiosity, recklessness, excessive energy, decreased need for sleep, increased spending beyond means, hyperverbal, thinks this feeling is both irrational and rational.         Delusions:  Negative      (TV, radio, thought broadcasting, mind control, referential thinking)         Patient's perception: Negative      (Spiritual or cultural context of symptoms, reality testing)         ADHD symptoms: Negative         Eating Disorder symptoms:  Positive, lives almost entirely on whole milk, sometimes drinking ensure, she says that in the last month she has only eaten 3 meals, has occasionally eaten a bagel with a plain piece of bread.      (binging, purging, excessive exercising)     Social Determinants of Health     Financial Resource Strain:     Difficulty of Paying Living Expenses:    Food Insecurity:     Worried About Running Out of Food in the Last Year:     Ran Out of Food in the Last Year:    Transportation Needs:     Lack of Transportation (Medical):  Lack of Transportation (Non-Medical):    Physical Activity:     Days of Exercise per Week:     Minutes of Exercise per Session:    Stress:     Feeling of Stress :    Social Connections:     Frequency of Communication with Friends and Family:     Frequency of Social Gatherings with Friends and Family:     Attends Religion Services:     Active Member of Clubs or Organizations:     Attends Club or Organization Meetings:     Marital Status:    Intimate Partner Violence:     Fear of Current or Ex-Partner:     Emotionally Abused:     Physically Abused:     Sexually Abused:        MSE:  Patient is  A & O x 4. Appearance:  SAVANAH. Cognition:  Recent memory intact , remote memory intact , good fund of knowledge, average  intelligence level. Speech:  normal  Language: Naming: intact;  Word Finding: intact  Conversation no evidence of delusions  Behavior:  Cooperative  Mood: \"good\"   Affect: SAVANAH  Thought Content: negative delusions, negative hallucinations, negative obsessions,  negativehomicidal and negative suicidal   Thought Process: linear, goal directed and coherent (having trouble keeping thoughts together, probably due to lack of sleep)  Associations: logical connections  Attention Span and concentration: Normal   Judgement Insight:  normal and appropriate  Gait and Station: SAVANAH   Sleep: avg 7-8 hrs    Appetite: ok          Lab Results   Component Value Date     (L) 03/10/2020    K 4.7 03/10/2020    CL 89 (L) 03/10/2020    CO2 18 (L) 03/10/2020    BUN 19 03/10/2020    CREATININE 0.9 03/10/2020    GLUCOSE 107 03/10/2020    CALCIUM 9.1 03/10/2020    PROT 8.6 11/10/2015    LABALBU 5.0 11/10/2015    ALKPHOS 127 (H) 11/10/2015    AST 33 (H) 11/10/2015    ALT 22 11/10/2015    LABGLOM >60 03/10/2020     Lab Results   Component Value Date     03/10/2020    K 4.7 03/10/2020    CL 89 03/10/2020    CO2 18 03/10/2020    BUN 19 03/10/2020    CREATININE 0.9 03/10/2020    GLUCOSE 107 03/10/2020    CALCIUM 9.1 03/10/2020      No results found for: CHOL  No results found for: TRIG  No results found for: HDL  No results found for: LDLCHOLESTEROL, LDLCALC  No results found for: LABVLDL, VLDL  No results found for: CHOLHDLRATIO  No results found for: LABA1C  No results found for: EAG  Lab Results   Component Value Date    TSH 2.40 02/03/2015     Lab Results   Component Value Date    VITD25 27.6 (L) 02/03/2015       Assessments Administered:  PHQ9: 9, mild  GAD7: 8, mild (a couple of panic attacks since the last visit)    COGNITION SCREEN:    Can spell the word, \"world,\" backwards: Yes  Can do serial 7's: Yes      Assessment:   1. Bipolar affective disorder, mixed, mild ([address]    2. Generalized anxiety disorder with panic attacks    3. Insomnia due to other mental disorder    4.  Bruxism        Plan:  Continue:  Lamotrigine, 150mg,  Daily    Eszopiclone (Lunesta), 3mg, nightly for sleep (she has tried going off of this with no success, can't sleep without it)    Gabapentin, 300mg, take 2 capsules nightly (restless legs)  Lurasidone, 20mg, daily in the morning (with 350 calories)  Clonidine, 0.1mg, nightly (teeth grinding, nightmares), can lower BP, get your balance before you get up to walk    Risperdal, 1mg, nightly  Trazodone, 50mg, nightly    Continue therapy with Franck Calix    Try the [facility] tabby for sleep    Follow up: Return in about 3 months (around 10/21/2021). If you become suicidal, follow up with this office or call the [facility] at [phone]), or dial [phone].    1. The risks, benefits, side effects, indications, contraindications, and adverse effects of the medications have been discussed. Yes.  2. The pt has verbalized understanding and has capacity to give informed consent. 3. The Leodis Mealy report has been reviewed according to [facility] regulations. 4. Supportive therapy offered. 5. Follow up: Return in about 3 months (around 10/21/2021). 6. The patient has been advised to call with any problems. 7. Controlled substance Treatment Plan: new medication for sleep (eszopiclone). 8. The above listed medications have been continued, modifications in meds and other orders/labs as follows: No orders of the defined types were placed in this encounter. No orders of the defined types were placed in this encounter. 9. Additional comments: She says that she is doing better on these combination of medications than she has ever done in her life and that it is the first time that she has been able to sleep (she is on 3 different medications to help with sleep). She reports that she is dreaming a little more without Prazosin but that they aren't terrible dreams and aren't bothering her. Will make no medication changes today. Patient was informed that this provider is moving back to [place] in October. She is fine to follow up with another provider in this office in about 3 months. ...   10.Over 50% of the total visit time of 20 minutes was spent on counseling and/or coordination of care of:                        1. Bipolar affective disorder, mixed, mild ([facility]    2. Generalized anxiety disorder with panic attacks    3. Insomnia due to other mental disorder    4.  Bruxism                      Psychotherapy Topics: mood/medication effectiveness       CAPO Dorsey NP PMHNP-BC

## 2021-07-30 ENCOUNTER — TELEPHONE (OUTPATIENT)
Dept: PSYCHIATRY | Age: 56
End: 2021-07-30

## 2021-07-30 DIAGNOSIS — F51.05 INSOMNIA DUE TO OTHER MENTAL DISORDER: ICD-10-CM

## 2021-07-30 DIAGNOSIS — F99 INSOMNIA DUE TO OTHER MENTAL DISORDER: ICD-10-CM

## 2021-07-30 RX ORDER — ESZOPICLONE 3 MG/1
3 TABLET, FILM COATED ORAL NIGHTLY
Qty: 30 TABLET | Refills: 0 | Status: SHIPPED | OUTPATIENT
Start: 2021-07-30 | End: 2021-08-29

## 2021-07-30 NOTE — TELEPHONE ENCOUNTER
Nanda Maynard called to request a refill on her medication. Ken Rust under media and attached. Last office visit : 7/21/2021 with Merryember Merlos  Next office visit : 10/21/2021 with Estephania Albreto APRN    Requested Prescriptions     Pending Prescriptions Disp Refills    eszopiclone (LUNESTA) 3 MG TABS 30 tablet 0     Sig: Take 1 tablet by mouth nightly for 30 days. Jennifer Lozano RN        7/21/2021 6:56 PM                             Progress Note     IN:  1010  OUT: 1030        Dorene Rojas 1965                           Chief Complaint   Patient presents with    Follow-up    Anxiety    Depression    Insomnia            Subjective:  Patient is a 64 y.o. female diagnosed with Bipolar 2 Disorder and presents today for follow-up. Last seen in clinic on 6/7/21 and prior records were reviewed.     Today patient states, \"Hanging in there. \" She reports that her friend has moved back and that she is doing better. She reports some anxiety attacks, her house needs repair and she can't afford it. She continues to have the bedbug problem. She has noticed that she dreams a little more without Prazosin but that Clonidine continues to work for the bruxism. She reports that it's nice to get 7-8 hrs of sleep.     Patient reports side effects as follows: none. No evidence of EPS, no cogwheeling or abnormal motor movements.     Absent  suicidal ideation.   Reports compliance with medications as good .      Current Substance Use:  See history     BP: There were no vitals taken for this visit.        Review of Systems - 14 point review:  Negative except being treated for:  depression, anxiety, panic attacks, suicidal dreams, weight gain, hx of right fractured ankle, enuresis, increased liver enzymes (being treated with spirolactone)        Constitutional: (fevers, chills, night sweats, wt loss/gain, change in appetite, fatigue, somnolence)     HEENT: (ear pain or discharge, hearing loss, ear ringing, sinus pressure, nosebleed, nasal discharge, sore throat, oral sores, tooth pain, bleeding gums, hoarse voice, neck pain)      Cardiovascular: (HTN, chest pain, elevated cholesterol/lipids, palpitations, leg swelling, leg pain with walking)     Respiratory: (cough, wheezing, snoring, SOB with activity (dyspnea), SOB while lying flat (orthopnea), awakening with severe SOB (paroxysmal nocturnal dyspnea))     Gastrointestinal: (NVD, constipation, abdominal pain, bright red stools, black tarry stools, stool incontinence)     Genitourinary:  (pelvic pain, burning or frequency of urination, urinary urgency, blood in urine incomplete bladder emptying, urinary incontinence, STD; MEN: testicular pain or swelling, erectile dysfunction; WOMEN: LMP, heavy menstrual bleeding (menorrhagia), irregular periods, postmenopausal bleeding, menstrual pain (dymenorrhea, vaginal discharge)     Musculoskeletal: (bone pain/fracture, joint pain or swelling, musle pain)     Integumentary: (rashes, acne, non-healing sores, itching, breast lumps, breast pain, nipple discharge, hair loss)     Neurologic: (HA, muscle weakness, paresthesias (numbness, coldness, crawling or prickling), memory loss, seizure, dizziness)     Psychiatric:  (anxiety, sadness, irritability/anger, insomnia, suicidality)     Endocrine: (heat or cold intolerance, excessive thirst (polydipsia), excessive hunger (polyphagia))     Immune/Allergic: (hives, seasonal or environmental allergies, HIV exposure)     Hematologic/Lymphatic: (lymph node enlargement, easy bleeding or bruising)     History obtained via chart review and patient     PCP is Albertina Alexander. Lucero Easton DO, DO         Current Meds:     Home Medications           Prior to Admission medications    Medication Sig Start Date End Date Taking?  Authorizing Provider   cloNIDine (CATAPRES) 0.1 MG tablet TAKE 1 TABLET BY MOUTH EVERY DAY AT NIGHT 7/19/21     CAPO Hanson NP   gabapentin (NEURONTIN) 300 MG capsule TAKE 2 History Narrative     PRIOR MEDICATION TRIALS     Trazodone (having more suicidal dreams), at 100mg, not working for sleep     Seroquel (wt gain, 14 lb in 3 months, enuresis, indigestion, abnormal blood work, increased blood sugar, seizures)     Duloxetine (has not been effective and no noticeable change even with dose increases to 90mg)     Paxil, 20mg     Wellbutrin XL, (tried it recently to quit smoking)     Prozac (made her numb, added to her eating disorder)     Zoloft (thought she did well on this, took for a couple of years, she stopped it because when she returned to [facility] the doctor put her back on Prozac)     Remeron (increased her dreams and panic attacks)     Lunesta (worked)     Librium (in 2018 for 15 days to stop drinking alcohol)     Risperdal-stopped on 7/6/20 due to weight gain     Doxepin, started on 9/22/20 for sleep (not effective)     Prazosin, ineffective for dreams/nightmares     Latuda (1/27/21, reports that she switched it to am dosing because taking it at night made her RLS worse)           Psychiatric Review of Systems, 3/19/19           Mood:  Sadness, tearfulness, low appetite, low concentration, low energy, loss of interest, denies suicidality today       (Depression: sadness, tearfulness, sleep, appetite, energy, concentration, sexual function, guilt, psychomotor agitation or slowing, interest, suicidality)           Julia: She says that there have been periods of time when she could go 3 days without sleep, she does say that there have been days in a row when she has done reckless, impulsive things       (impulsivity, grandiosity, recklessness, excessive energy, decreased need for sleep, increased spending beyond means, hyperverbal, grandiose, racing thoughts, hypersexuality)           Other: anger from being tired all the time       (Irritability, lability, anger)           Anxiety:  She says that she worries every night about sleeping and panic attacks.  She says that she worries about her neighbors. She says that they always want something from her. She worries about running into them. She says that this causes panic attacks. She says that there is a lot of drug use in her neighborhood and she is fearful of her neighbors.       (Generalized anxiety: where, when, who, how long, how frequent)           Panic Disorder symptoms: She says that she is having panic attacks at night, 1 at night (this has improved from 3 weeks ago before she started Trazodone when she was having 2-3 at night). She says that she wakes up with dreams of her family and with her anxiety about [place].       (Palpitations, racing heart beat, sweating, sense of impending doom, fear of recurrence, shortness of breath)           OCD symptoms:  She gets flustered if things are not in a routine, is ritualized about the way she dresses, and the way she laces her shoes       (checking, cleaning, organizing, rituals, hang-ups, obsessive thoughts, counting, rational vs. Irrational beliefs)           PTSD symptoms:  Increased startle response, wakes up with dreams at night, she also says she has flashbacks during the day.       (nightmares, flashbacks, startle respoinse, avoidance)           Social anxiety symptoms:  Negative           Simple phobias:   Heights, claustrophobia, snakes       (heights, planes, spiders, etc.)           Psychosis: She reports hearing and seeing things that aren't there, sees animals out her window that really aren't there. She says this happens almost every day. She says that she has never seen things when she wasn't depressed.       (hallucinations, auditory, visual, tactile, olfactory)           Paranoia: Feels that people are watching her most of the time.  She thinks this feeling is both irrational and rational.           Delusions:  Negative       (TV, radio, thought broadcasting, mind control, referential thinking)           Patient's perception: Negative       (Spiritual or cultural context of symptoms, reality testing)           ADHD symptoms: Negative           Eating Disorder symptoms:  Positive, lives almost entirely on whole milk, sometimes drinking ensure, she says that in the last month she has only eaten 3 meals, has occasionally eaten a bagel with a plain piece of bread.       (binging, purging, excessive exercising)      Social Determinants of Health          Financial Resource Strain:     Difficulty of Paying Living Expenses:    Food Insecurity:     Worried About Running Out of Food in the Last Year:     Ran Out of Food in the Last Year:    Transportation Needs:     Lack of Transportation (Medical):  Lack of Transportation (Non-Medical):    Physical Activity:     Days of Exercise per Week:     Minutes of Exercise per Session:    Stress:     Feeling of Stress :    Social Connections:     Frequency of Communication with Friends and Family:     Frequency of Social Gatherings with Friends and Family:     Attends Caodaism Services:     Active Member of Clubs or Organizations:     Attends Club or Organization Meetings:     Marital Status:    Intimate Partner Violence:     Fear of Current or Ex-Partner:     Emotionally Abused:     Physically Abused:     Sexually Abused:             MSE:  Patient is  A & O x 4. Appearance:  SAVANAH. Cognition:  Recent memory intact , remote memory intact , good fund of knowledge, average  intelligence level. Speech:  normal  Language: Naming: intact;  Word Finding: intact  Conversation no evidence of delusions  Behavior:  Cooperative  Mood: \"good\"   Affect: SAVANAH  Thought Content: negative delusions, negative hallucinations, negative obsessions,  negativehomicidal and negative suicidal   Thought Process: linear, goal directed and coherent (having trouble keeping thoughts together, probably due to lack of sleep)  Associations: logical connections  Attention Span and concentration: Normal   Judgement Insight:  normal and appropriate  Gait and nightly  Trazodone, 50mg, nightly     Continue therapy with Alonso Rutherford the [facility] tabby for sleep     Follow up: Return in about 3 months (around 10/21/2021).    If you become suicidal, follow up with this office or call the [facility] at [phone]), or dial 911.     1. The risks, benefits, side effects, indications, contraindications, and adverse effects of the medications have been discussed. Yes.  2. The pt has verbalized understanding and has capacity to give informed consent. 3. The Vimal Knowles report has been reviewed according to [facility] regulations. 4. Supportive therapy offered. 5. Follow up:    Return in about 3 months (around 10/21/2021). 6. The patient has been advised to call with any problems. 7. Controlled substance Treatment Plan: new medication for sleep (eszopiclone). 8. The above listed medications have been continued, modifications in meds and other orders/labs as follows:                   Encounter Medications    No orders of the defined types were placed in this encounter.                    No orders of the defined types were placed in this encounter.        9. Additional comments: She says that she is doing better on these combination of medications than she has ever done in her life and that it is the first time that she has been able to sleep (she is on 3 different medications to help with sleep). She reports that she is dreaming a little more without Prazosin but that they aren't terrible dreams and aren't bothering her. Will make no medication changes today. Patient was informed that this provider is moving back to [place] in October. She is fine to follow up with another provider in this office in about 3 months. ... 10.Over 50% of the total visit time of 20 minutes was spent on counseling and/or coordination of care of:                         1. Bipolar affective disorder, mixed, mild ([address]    2.  Generalized anxiety disorder with panic attacks

## 2021-07-30 NOTE — TELEPHONE ENCOUNTER
VM left to inform pt that RX for Lunesta had been sent to her pharmacy per [facility] as she had requested.

## 2021-08-03 ENCOUNTER — TELEPHONE (OUTPATIENT)
Dept: PSYCHIATRY | Age: 56
End: 2021-08-03

## 2021-08-03 DIAGNOSIS — F41.1 GENERALIZED ANXIETY DISORDER: ICD-10-CM

## 2021-08-03 RX ORDER — GABAPENTIN 300 MG/1
CAPSULE ORAL
Qty: 60 CAPSULE | Refills: 0 | Status: SHIPPED | OUTPATIENT
Start: 2021-08-03 | End: 2021-08-04 | Stop reason: SDUPTHER

## 2021-08-03 NOTE — TELEPHONE ENCOUNTER
Attempted to contact pt to inform that RX for Gabapentin had been sent to her pharmacy per Greer Casanova APRN-no answer.

## 2021-08-03 NOTE — TELEPHONE ENCOUNTER
sinus pressure, nosebleed, nasal discharge, sore throat, oral sores, tooth pain, bleeding gums, hoarse voice, neck pain)      Cardiovascular: (HTN, chest pain, elevated cholesterol/lipids, palpitations, leg swelling, leg pain with walking)     Respiratory: (cough, wheezing, snoring, SOB with activity (dyspnea), SOB while lying flat (orthopnea), awakening with severe SOB (paroxysmal nocturnal dyspnea))     Gastrointestinal: (NVD, constipation, abdominal pain, bright red stools, black tarry stools, stool incontinence)     Genitourinary:  (pelvic pain, burning or frequency of urination, urinary urgency, blood in urine incomplete bladder emptying, urinary incontinence, STD; MEN: testicular pain or swelling, erectile dysfunction; WOMEN: LMP, heavy menstrual bleeding (menorrhagia), irregular periods, postmenopausal bleeding, menstrual pain (dymenorrhea, vaginal discharge)     Musculoskeletal: (bone pain/fracture, joint pain or swelling, musle pain)     Integumentary: (rashes, acne, non-healing sores, itching, breast lumps, breast pain, nipple discharge, hair loss)     Neurologic: (HA, muscle weakness, paresthesias (numbness, coldness, crawling or prickling), memory loss, seizure, dizziness)     Psychiatric:  (anxiety, sadness, irritability/anger, insomnia, suicidality)     Endocrine: (heat or cold intolerance, excessive thirst (polydipsia), excessive hunger (polyphagia))     Immune/Allergic: (hives, seasonal or environmental allergies, HIV exposure)     Hematologic/Lymphatic: (lymph node enlargement, easy bleeding or bruising)     History obtained via chart review and patient     PCP is Mitul Kidd. Elizabeth Eugene DO, DO         Current Meds:     Home Medications           Prior to Admission medications    Medication Sig Start Date End Date Taking?  Authorizing Provider   cloNIDine (CATAPRES) 0.1 MG tablet TAKE 1 TABLET BY MOUTH EVERY DAY AT NIGHT 7/19/21     CAPO Laboy - NP   gabapentin (NEURONTIN) 300 MG capsule TAKE 2 CAPSULES BY MOUTH EVERY DAY AT NIGHT 6/30/21 7/30/21   Oliver Hester, APRN - CNP   eszopiclone (LUNESTA) 3 MG TABS Take 1 tablet by mouth nightly for 30 days.  6/30/21 7/30/21   Alice Acosta, APRN - CNP   LATUDA 20 MG TABS tablet TAKE 1 TABLET BY MOUTH DAILY WITH SUPPER 6/16/21     Carlos Panning, APRN - NP   risperiDONE (RISPERDAL) 1 MG tablet Take 1 tablet by mouth nightly 3/3/21     Carlos Panning, APRN - NP   traZODone (DESYREL) 50 MG tablet Take 1 tablet by mouth nightly 3/3/21     Carlos Panning, APRN - NP   lamoTRIgine (LAMICTAL) 150 MG tablet Take 1 tablet by mouth daily 1/27/21     Carlos Panning, APRN - NP   oxybutynin (DITROPAN) 5 MG tablet TAKE 1 TABLET BY MOUTH EVERY DAY AT NIGHT 7/28/20     CAPO Cordero   spironolactone (ALDACTONE) 25 MG tablet   3/5/20     Historical Provider, MD   diclofenac (VOLTAREN) 75 MG EC tablet Take 75 mg by mouth 2 times daily       Historical Provider, MD   metoprolol succinate (TOPROL XL) 50 MG extended release tablet Take 50 mg by mouth daily 4/25/19 per direction of Dr. Wiggins Martin pt to take 1/2 tab daily.       Historical Provider, MD   Magnesium Oxide 250 MG TABS Take by mouth daily       Historical Provider, MD         Social History   Social History            Socioeconomic History    Marital status: Legally        Spouse name: Not on file    Number of children: 1    Years of education: 12th grade + some college    Highest education level: Not on file   Occupational History    Not on file   Tobacco Use    Smoking status: Current Every Day Smoker       Packs/day: 0.75    Smokeless tobacco: Never Used   Vaping Use    Vaping Use: Never used   Substance and Sexual Activity    Alcohol use: Not Currently       Comment: history of abuse, last drink was early December, 2018    Drug use: Not Currently       Types: Marijuana       Comment: last use was summer, 2017    Sexual activity: Not Currently   Other Topics Concern    Not on file   Social History Narrative     PRIOR MEDICATION TRIALS     Trazodone (having more suicidal dreams), at 100mg, not working for sleep     Seroquel (wt gain, 14 lb in 3 months, enuresis, indigestion, abnormal blood work, increased blood sugar, seizures)     Duloxetine (has not been effective and no noticeable change even with dose increases to 90mg)     Paxil, 20mg     Wellbutrin XL, (tried it recently to quit smoking)     Prozac (made her numb, added to her eating disorder)     Zoloft (thought she did well on this, took for a couple of years, she stopped it because when she returned to [facility] the doctor put her back on Prozac)     Remeron (increased her dreams and panic attacks)     Lunesta (worked)     Librium (in 2018 for 15 days to stop drinking alcohol)     Risperdal-stopped on 7/6/20 due to weight gain     Doxepin, started on 9/22/20 for sleep (not effective)     Prazosin, ineffective for dreams/nightmares     Latuda (1/27/21, reports that she switched it to am dosing because taking it at night made her RLS worse)           Psychiatric Review of Systems, 3/19/19           Mood:  Sadness, tearfulness, low appetite, low concentration, low energy, loss of interest, denies suicidality today       (Depression: sadness, tearfulness, sleep, appetite, energy, concentration, sexual function, guilt, psychomotor agitation or slowing, interest, suicidality)           Julia: She says that there have been periods of time when she could go 3 days without sleep, she does say that there have been days in a row when she has done reckless, impulsive things       (impulsivity, grandiosity, recklessness, excessive energy, decreased need for sleep, increased spending beyond means, hyperverbal, grandiose, racing thoughts, hypersexuality)           Other: anger from being tired all the time       (Irritability, lability, anger)           Anxiety:  She says that she worries every night about sleeping and panic attacks.  She says that she worries about her neighbors. She says that they always want something from her. She worries about running into them. She says that this causes panic attacks. She says that there is a lot of drug use in her neighborhood and she is fearful of her neighbors.       (Generalized anxiety: where, when, who, how long, how frequent)           Panic Disorder symptoms: She says that she is having panic attacks at night, 1 at night (this has improved from 3 weeks ago before she started Trazodone when she was having 2-3 at night). She says that she wakes up with dreams of her family and with her anxiety about [place].       (Palpitations, racing heart beat, sweating, sense of impending doom, fear of recurrence, shortness of breath)           OCD symptoms:  She gets flustered if things are not in a routine, is ritualized about the way she dresses, and the way she laces her shoes       (checking, cleaning, organizing, rituals, hang-ups, obsessive thoughts, counting, rational vs. Irrational beliefs)           PTSD symptoms:  Increased startle response, wakes up with dreams at night, she also says she has flashbacks during the day.       (nightmares, flashbacks, startle respoinse, avoidance)           Social anxiety symptoms:  Negative           Simple phobias:   Heights, claustrophobia, snakes       (heights, planes, spiders, etc.)           Psychosis: She reports hearing and seeing things that aren't there, sees animals out her window that really aren't there. She says this happens almost every day. She says that she has never seen things when she wasn't depressed.       (hallucinations, auditory, visual, tactile, olfactory)           Paranoia: Feels that people are watching her most of the time.  She thinks this feeling is both irrational and rational.           Delusions:  Negative       (TV, radio, thought broadcasting, mind control, referential thinking)           Patient's perception: Negative       (Spiritual or cultural context of symptoms, reality testing)           ADHD symptoms: Negative           Eating Disorder symptoms:  Positive, lives almost entirely on whole milk, sometimes drinking ensure, she says that in the last month she has only eaten 3 meals, has occasionally eaten a bagel with a plain piece of bread.       (binging, purging, excessive exercising)      Social Determinants of Health          Financial Resource Strain:     Difficulty of Paying Living Expenses:    Food Insecurity:     Worried About Running Out of Food in the Last Year:     Ran Out of Food in the Last Year:    Transportation Needs:     Lack of Transportation (Medical):  Lack of Transportation (Non-Medical):    Physical Activity:     Days of Exercise per Week:     Minutes of Exercise per Session:    Stress:     Feeling of Stress :    Social Connections:     Frequency of Communication with Friends and Family:     Frequency of Social Gatherings with Friends and Family:     Attends Jew Services:     Active Member of Clubs or Organizations:     Attends Club or Organization Meetings:     Marital Status:    Intimate Partner Violence:     Fear of Current or Ex-Partner:     Emotionally Abused:     Physically Abused:     Sexually Abused:             MSE:  Patient is  A & O x 4. Appearance:  SAVANAH. Cognition:  Recent memory intact , remote memory intact , good fund of knowledge, average  intelligence level. Speech:  normal  Language: Naming: intact;  Word Finding: intact  Conversation no evidence of delusions  Behavior:  Cooperative  Mood: \"good\"   Affect: SAVANAH  Thought Content: negative delusions, negative hallucinations, negative obsessions,  negativehomicidal and negative suicidal   Thought Process: linear, goal directed and coherent (having trouble keeping thoughts together, probably due to lack of sleep)  Associations: logical connections  Attention Span and concentration: Normal   Judgement Insight:  normal and appropriate  Gait and Station: [facility]   Sleep: avg 7-8 hrs    Appetite: ok                    Lab Results   Component Value Date      (L) 03/10/2020     K 4.7 03/10/2020     CL 89 (L) 03/10/2020     CO2 18 (L) 03/10/2020     BUN 19 03/10/2020     CREATININE 0.9 03/10/2020     GLUCOSE 107 03/10/2020     CALCIUM 9.1 03/10/2020     PROT 8.6 11/10/2015     LABALBU 5.0 11/10/2015     ALKPHOS 127 (H) 11/10/2015     AST 33 (H) 11/10/2015     ALT 22 11/10/2015     LABGLOM >60 03/10/2020            Lab Results   Component Value Date      03/10/2020     K 4.7 03/10/2020     CL 89 03/10/2020     CO2 18 03/10/2020     BUN 19 03/10/2020     CREATININE 0.9 03/10/2020     GLUCOSE 107 03/10/2020     CALCIUM 9.1 03/10/2020      No results found for: CHOL  No results found for: TRIG  No results found for: HDL  No results found for: LDLCHOLESTEROL, LDLCALC  No results found for: LABVLDL, VLDL  No results found for: CHOLHDLRATIO  No results found for: LABA1C  No results found for: EAG        Lab Results   Component Value Date     TSH 2.40 02/03/2015            Lab Results   Component Value Date     VITD25 27.6 (L) 02/03/2015         Assessments Administered:  PHQ9: 9, mild  GAD7: 8, mild (a couple of panic attacks since the last visit)     COGNITION SCREEN:     Can spell the word, \"world,\" backwards: Yes  Can do serial 7's: Yes        Assessment:    1. Bipolar affective disorder, mixed, mild ([facility] [address]    2. Generalized anxiety disorder with panic attacks    3. Insomnia due to other mental disorder    4.  Bruxism          Plan:  Continue:  Lamotrigine, 150mg,  Daily     Eszopiclone (Lunesta), 3mg, nightly for sleep (she has tried going off of this with no success, can't sleep without it)     Gabapentin, 300mg, take 2 capsules nightly (restless legs)  Lurasidone, 20mg, daily in the morning (with 350 calories)  Clonidine, 0.1mg, nightly (teeth grinding, nightmares), can lower BP, get your balance before you get up to walk     Risperdal, 1mg, nightly  Trazodone, 50mg, nightly     Continue therapy with Anahi Ayers     Try the [facility][1[facility] tabby for sleep     Follow up: Return in about 3 months (around 10/21/2021).    If you become suicidal, follow up with this office or call the [facility] at [phone]), or dial 911.     1. The risks, benefits, side effects, indications, contraindications, and adverse effects of the medications have been discussed. Yes.  2. The pt has verbalized understanding and has capacity to give informed consent. 3. The Margarito Frankel report has been reviewed according to [facility] regulations. 4. Supportive therapy offered. 5. Follow up:    Return in about 3 months (around 10/21/2021). 6. The patient has been advised to call with any problems. 7. Controlled substance Treatment Plan: new medication for sleep (eszopiclone). 8. The above listed medications have been continued, modifications in meds and other orders/labs as follows:                   Encounter Medications    No orders of the defined types were placed in this encounter.                    No orders of the defined types were placed in this encounter.        9. Additional comments: She says that she is doing better on these combination of medications than she has ever done in her life and that it is the first time that she has been able to sleep (she is on 3 different medications to help with sleep). She reports that she is dreaming a little more without Prazosin but that they aren't terrible dreams and aren't bothering her. Will make no medication changes today. Patient was informed that [place] provider is moving back to [place] in October. She is fine to follow up with another provider in this office in about 3 months. ... 10.Over 50% of the total visit time of 20 minutes was spent on counseling and/or coordination of care of:                         1. Bipolar affective disorder, mixed, mild ([address]    2.  Generalized anxiety disorder with panic attacks 3. Insomnia due to other mental disorder    4.  Bruxism                        Psychotherapy Topics: mood/medication effectiveness         CAPO Owens - ELKIN PMHNP-BC

## 2021-08-04 DIAGNOSIS — F41.1 GENERALIZED ANXIETY DISORDER: ICD-10-CM

## 2021-08-04 RX ORDER — GABAPENTIN 300 MG/1
CAPSULE ORAL
Qty: 60 CAPSULE | Refills: 0 | Status: SHIPPED | OUTPATIENT
Start: 2021-08-04 | End: 2021-10-06 | Stop reason: SDUPTHER

## 2021-08-04 NOTE — TELEPHONE ENCOUNTER
Esther Nicole called to request refill on her medication      Last office visit: 7/21/2021  Next office visit: 10/21/2021    Requested Prescriptions     Pending Prescriptions Disp Refills    gabapentin (NEURONTIN) 300 MG capsule 60 capsule 0     Sig: TAKE 2 CAPSULES BY MOUTH EVERY DAY AT NIGHT        Virtual Visit  7/21/2021  [address] [facility]   CAPO Brambila NP  Nurse Practitioner Psychiatric/Mental Health  Bipolar affective disorder, mixed, mild ([address] +3 more  Dx  Follow-up  Anxiety  Depression  Insomnia  Reason for Visit   Progress Notes  CAPO Brambila NP (Advanced Practice Nurse) [facility] [address]  []Expand All by Jeromy Galvan is a 64 y.o. female evaluated via telephone on 7/21/2021.       Consent:  She and/or health care decision maker is aware that that she may receive a bill for this telephone service, depending on her insurance coverage, and has provided verbal consent to proceed: Yes        Documentation:  I communicated with the patient and/or health care decision maker about depression/anxiety/insomnia.    Details of this discussion including any medical advice provided: see below        I affirm this is a Patient Initiated Episode with an Established Patient who has not had a related appointment within my department in the past 7 days or scheduled within the next 24 hours.     Total Time: minutes: 11-20 minutes     Note: not billable if this call serves to triage the patient into an appointment for the relevant concern        CAPO Brambila NP               7/21/2021 6:56 PM                             Progress Note     IN:  1010  OUT: 1030        Dorene Rojas 1965                           Chief Complaint   Patient presents with    Follow-up    Anxiety    Depression    Insomnia            Subjective:  Patient is a 64 y.o. female diagnosed with Bipolar 2 Disorder and presents today for follow-up. Last seen in clinic on 6/7/21 and prior records were reviewed.     Today patient states, \"Hanging in there. \" She reports that her friend has moved back and that she is doing better. She reports some anxiety attacks, her house needs repair and she can't afford it. She continues to have the bedbug problem. She has noticed that she dreams a little more without Prazosin but that Clonidine continues to work for the bruxism. She reports that it's nice to get 7-8 hrs of sleep.     Patient reports side effects as follows: none. No evidence of EPS, no cogwheeling or abnormal motor movements.     Absent  suicidal ideation.   Reports compliance with medications as good .      Current Substance Use:  See history     BP: There were no vitals taken for this visit.        Review of Systems - 14 point review:  Negative except being treated for:  depression, anxiety, panic attacks, suicidal dreams, weight gain, hx of right fractured ankle, enuresis, increased liver enzymes (being treated with spirolactone)        Constitutional: (fevers, chills, night sweats, wt loss/gain, change in appetite, fatigue, somnolence)     HEENT: (ear pain or discharge, hearing loss, ear ringing, sinus pressure, nosebleed, nasal discharge, sore throat, oral sores, tooth pain, bleeding gums, hoarse voice, neck pain)      Cardiovascular: (HTN, chest pain, elevated cholesterol/lipids, palpitations, leg swelling, leg pain with walking)     Respiratory: (cough, wheezing, snoring, SOB with activity (dyspnea), SOB while lying flat (orthopnea), awakening with severe SOB (paroxysmal nocturnal dyspnea))     Gastrointestinal: (NVD, constipation, abdominal pain, bright red stools, black tarry stools, stool incontinence)     Genitourinary:  (pelvic pain, burning or frequency of urination, urinary urgency, blood in urine incomplete bladder emptying, urinary incontinence, STD; MEN: testicular pain or swelling, erectile dysfunction; WOMEN: LMP, heavy menstrual bleeding (menorrhagia), irregular periods, postmenopausal bleeding, menstrual pain (dymenorrhea, vaginal discharge)     Musculoskeletal: (bone pain/fracture, joint pain or swelling, musle pain)     Integumentary: (rashes, acne, non-healing sores, itching, breast lumps, breast pain, nipple discharge, hair loss)     Neurologic: (HA, muscle weakness, paresthesias (numbness, coldness, crawling or prickling), memory loss, seizure, dizziness)     Psychiatric:  (anxiety, sadness, irritability/anger, insomnia, suicidality)     Endocrine: (heat or cold intolerance, excessive thirst (polydipsia), excessive hunger (polyphagia))     Immune/Allergic: (hives, seasonal or environmental allergies, HIV exposure)     Hematologic/Lymphatic: (lymph node enlargement, easy bleeding or bruising)     History obtained via chart review and patient     PCP is Cory Hays. Lucero Easton DO, DO         Current Meds:     Home Medications           Prior to Admission medications    Medication Sig Start Date End Date Taking? Authorizing Provider   cloNIDine (CATAPRES) 0.1 MG tablet TAKE 1 TABLET BY MOUTH EVERY DAY AT NIGHT 7/19/21     CAPO Hanson - NP   gabapentin (NEURONTIN) 300 MG capsule TAKE 2 CAPSULES BY MOUTH EVERY DAY AT NIGHT 6/30/21 7/30/21   CAPO Nieves CNP   eszopiclone (LUNESTA) 3 MG TABS Take 1 tablet by mouth nightly for 30 days.  6/30/21 7/30/21   [facility]   LATUDA 20 MG TABS tablet TAKE 1 TABLET BY MOUTH DAILY WITH SUPPER 6/16/21     CAPO Hanson - ELKIN   risperiDONE (RISPERDAL) 1 MG tablet Take 1 tablet by mouth nightly 3/3/21     CAPO Hanson - NP   traZODone (DESYREL) 50 MG tablet Take 1 tablet by mouth nightly 3/3/21     CAPO Hanson - NP   lamoTRIgine (LAMICTAL) 150 MG tablet Take 1 tablet by mouth daily 1/27/21     CAPO Hanson - NP   oxybutynin (DITROPAN) 5 MG tablet TAKE 1 TABLET BY MOUTH EVERY DAY AT NIGHT 7/28/20     CAPO Morelos   spironolactone (ALDACTONE) 25 MG tablet   3/5/20     Historical Provider, MD   diclofenac (VOLTAREN) 75 MG EC tablet Take 75 mg by mouth 2 times daily       Historical Provider, MD   metoprolol succinate (TOPROL XL) 50 MG extended release tablet Take 50 mg by mouth daily 4/25/19 per direction of Dr. Violetta Smart pt to take 1/2 tab daily.       Historical Provider, MD   Magnesium Oxide 250 MG TABS Take by mouth daily       Historical Provider, MD         Social History   Social History            Socioeconomic History    Marital status: Legally        Spouse name: Not on file    Number of children: 1    Years of education: 12th grade + some college    Highest education level: Not on file   Occupational History    Not on file   Tobacco Use    Smoking status: Current Every Day Smoker       Packs/day: 0.75    Smokeless tobacco: Never Used   Vaping Use    Vaping Use: Never used   Substance and Sexual Activity    Alcohol use: Not Currently       Comment: history of abuse, last drink was early December, 2018    Drug use: Not Currently       Types: Marijuana       Comment: last use was summer, 2017    Sexual activity: Not Currently   Other Topics Concern    Not on file   Social History Narrative     PRIOR MEDICATION TRIALS     Trazodone (having more suicidal dreams), at 100mg, not working for sleep     Seroquel (wt gain, 14 lb in 3 months, enuresis, indigestion, abnormal blood work, increased blood sugar, seizures)     Duloxetine (has not been effective and no noticeable change even with dose increases to 90mg)     Paxil, 20mg     Wellbutrin XL, (tried it recently to quit smoking)     Prozac (made her numb, added to her eating disorder)     Zoloft (thought she did well on this, took for a couple of years, she stopped it because when she returned to [place] the doctor put her back on Prozac)     Remeron (increased her dreams and panic attacks)     Lunesta (worked)     Librium (in 2018 for 15 days to stop drinking alcohol)     Risperdal-stopped on 7/6/20 due to weight gain     Doxepin, started on 9/22/20 for sleep (not effective)     Prazosin, ineffective for dreams/nightmares     Latuda (1/27/21, reports that she switched it to am dosing because taking it at night made her RLS worse)           Psychiatric Review of Systems, 3/19/19           Mood:  Sadness, tearfulness, low appetite, low concentration, low energy, loss of interest, denies suicidality today       (Depression: sadness, tearfulness, sleep, appetite, energy, concentration, sexual function, guilt, psychomotor agitation or slowing, interest, suicidality)           Julia: She says that there have been periods of time when she could go 3 days without sleep, she does say that there have been days in a row when she has done reckless, impulsive things       (impulsivity, grandiosity, recklessness, excessive energy, decreased need for sleep, increased spending beyond means, hyperverbal, grandiose, racing thoughts, hypersexuality)           Other: anger from being tired all the time       (Irritability, lability, anger)           Anxiety:  She says that she worries every night about sleeping and panic attacks. She says that she worries about her neighbors. She says that they always want something from her. She worries about running into them. She says that this causes panic attacks. She says that there is a lot of drug use in her neighborhood and she is fearful of her neighbors.       (Generalized anxiety: where, when, who, how long, how frequent)           Panic Disorder symptoms: She says that she is having panic attacks at night, 1 at night (this has improved from 3 weeks ago before she started Trazodone when she was having 2-3 at night).  She says that she wakes up with dreams of her family and with her anxiety about [place] trailer park.       (Palpitations, racing heart beat, sweating, sense of impending doom, fear of recurrence, shortness of breath)           OCD symptoms:  She gets flustered if things are not in a routine, is ritualized about the way she dresses, and the way she laces her shoes       (checking, cleaning, organizing, rituals, hang-ups, obsessive thoughts, counting, rational vs. Irrational beliefs)           PTSD symptoms:  Increased startle response, wakes up with dreams at night, she also says she has flashbacks during the day.       (nightmares, flashbacks, startle respoinse, avoidance)           Social anxiety symptoms:  Negative           Simple phobias:   Heights, claustrophobia, snakes       (heights, planes, spiders, etc.)           Psychosis: She reports hearing and seeing things that aren't there, sees animals out her window that really aren't there. She says this happens almost every day. She says that she has never seen things when she wasn't depressed.       (hallucinations, auditory, visual, tactile, olfactory)           Paranoia: Feels that people are watching her most of the time. She thinks this feeling is both irrational and rational.           Delusions:  Negative       (TV, radio, thought broadcasting, mind control, referential thinking)           Patient's perception: Negative       (Spiritual or cultural context of symptoms, reality testing)           ADHD symptoms: Negative           Eating Disorder symptoms:  Positive, lives almost entirely on whole milk, sometimes drinking ensure, she says that in the last month she has only eaten 3 meals, has occasionally eaten a bagel with a plain piece of bread.       (binging, purging, excessive exercising)      Social Determinants of Health          Financial Resource Strain:     Difficulty of Paying Living Expenses:    Food Insecurity:     Worried About Running Out of Food in the Last Year:     Ran Out of Food in the Last Year:    Transportation Needs:     Lack of Transportation (Medical):      Lack of Transportation (Non-Medical):    Physical Activity:     Days of Exercise per Week:     Minutes of Exercise per Session:    Stress:     Feeling of Stress :    Social Connections:     Frequency of Communication with Friends and Family:     Frequency of Social Gatherings with Friends and Family:     Attends [employer] Services:     Active Member of Clubs or Organizations:     Attends Club or Organization Meetings:     Marital Status:    Intimate Partner Violence:     Fear of Current or Ex-Partner:     Emotionally Abused:     Physically Abused:     Sexually Abused:             MSE:  Patient is  A & O x 4. Appearance:  SAVANAH. Cognition:  Recent memory intact , remote memory intact , good fund of knowledge, average  intelligence level. Speech:  normal  Language: Naming: intact;  Word Finding: intact  Conversation no evidence of delusions  Behavior:  Cooperative  Mood: \"good\"   Affect: SAVANAH  Thought Content: negative delusions, negative hallucinations, negative obsessions,  negativehomicidal and negative suicidal   Thought Process: linear, goal directed and coherent (having trouble keeping thoughts together, probably due to lack of sleep)  Associations: logical connections  Attention Span and concentration: Normal   Judgement Insight:  normal and appropriate  Gait and Station: SAVANAH   Sleep: avg 7-8 hrs    Appetite: ok                    Lab Results   Component Value Date      (L) 03/10/2020     K 4.7 03/10/2020     CL 89 (L) 03/10/2020     CO2 18 (L) 03/10/2020     BUN 19 03/10/2020     CREATININE 0.9 03/10/2020     GLUCOSE 107 03/10/2020     CALCIUM 9.1 03/10/2020     PROT 8.6 11/10/2015     LABALBU 5.0 11/10/2015     ALKPHOS 127 (H) 11/10/2015     AST 33 (H) 11/10/2015     ALT 22 11/10/2015     LABGLOM >60 03/10/2020            Lab Results   Component Value Date      03/10/2020     K 4.7 03/10/2020     CL 89 03/10/2020     CO2 18 03/10/2020     BUN 19 03/10/2020     CREATININE 0.9 03/10/2020     GLUCOSE 107 03/10/2020     CALCIUM 9.1 03/10/2020      No results found for: CHOL  No results found for: TRIG  No results found for: HDL  No results found for: LDLCHOLESTEROL, LDLCALC  No results found for: LABVLDL, VLDL  No results found for: CHOLHDLRATIO  No results found for: LABA1C  No results found for: EAG        Lab Results   Component Value Date     TSH 2.40 02/03/2015            Lab Results   Component Value Date     VITD25 27.6 (L) 02/03/2015         Assessments Administered:  PHQ9: 9, mild  GAD7: 8, mild (a couple of panic attacks since the last visit)     COGNITION SCREEN:     Can spell the word, \"world,\" backwards: Yes  Can do serial 7's: Yes        Assessment:    1. Bipolar affective disorder, mixed, mild ([address]    2. Generalized anxiety disorder with panic attacks    3. Insomnia due to other mental disorder    4. Bruxism          Plan:  Continue:  Lamotrigine, 150mg,  Daily     Eszopiclone (Lunesta), 3mg, nightly for sleep (she has tried going off of this with no success, can't sleep without it)     Gabapentin, 300mg, take 2 capsules nightly (restless legs)  Lurasidone, 20mg, daily in the morning (with 350 calories)  Clonidine, 0.1mg, nightly (teeth grinding, nightmares), can lower BP, get your balance before you get up to walk     Risperdal, 1mg, nightly  Trazodone, 50mg, nightly     Continue therapy with Manasa Vernon     Try the [facility] tabby for sleep     Follow up: Return in about 3 months (around 10/21/2021).    If you become suicidal, follow up with this office or call the [facility] at [phone]), or dial [phone].     1. The risks, benefits, side effects, indications, contraindications, and adverse effects of the medications have been discussed. Yes.  2. The pt has verbalized understanding and has capacity to give informed consent. 3. The Cindy Ramiress report has been reviewed according to [facility] regulations. 4. Supportive therapy offered. 5. Follow up:    Return in about 3 months (around 10/21/2021). 6. The patient has been advised to call with any problems.   7. Controlled substance Treatment Plan: new medication for sleep (eszopiclone). 8. The above listed medications have been continued, modifications in meds and other orders/labs as follows:                   Encounter Medications    No orders of the defined types were placed in this encounter.                    No orders of the defined types were placed in this encounter.        9. Additional comments: She says that she is doing better on these combination of medications than she has ever done in her life and that it is the first time that she has been able to sleep (she is on 3 different medications to help with sleep). She reports that she is dreaming a little more without Prazosin but that they aren't terrible dreams and aren't bothering her. Will make no medication changes today. Patient was informed that this provider is moving back to [place] in October. She is fine to follow up with another provider in this office in about 3 months. ... 10.Over 50% of the total visit time of 20 minutes was spent on counseling and/or coordination of care of:                         1. Bipolar affective disorder, mixed, mild ([facility] [address]    2. Generalized anxiety disorder with panic attacks    3. Insomnia due to other mental disorder    4. Bruxism                        Psychotherapy Topics: mood/medication effectiveness         CAPO Elliott - NP PMHNP-BC      Instructions       Return in about 3 months (around 10/21/2021).   Plan:  Continue:  Lamotrigine, 150mg,  Daily     Eszopiclone (Lunesta), 3mg, nightly for sleep (she has tried going off of this with no success, can't sleep without it)     Gabapentin, 300mg, take 2 capsules nightly (restless legs)  Lurasidone, 20mg, daily in the morning (with 350 calories)  Clonidine, 0.1mg, nightly (teeth grinding, nightmares), can lower BP, get your balance before you get up to walk     Risperdal, 1mg, nightly  Trazodone, 50mg, nightly     Continue therapy with Ericka Vernon     Try the [facility] tabby for sleep     Follow up: Return in about 3 months (around 10/21/2021).    If you become suicidal, follow up with this office or call the [facility] [place] at [phone]), or dial [phone].     I want you to know that I have enjoyed working with you and have enjoyed my time here in [place]. I have found the people in [place] very kind people, very patient and polite. I will miss you as I return to [place]. This was a personal decision as I want to spend my shelter years closer to my sons who live in [place]. I wish you the very best.          After Visit Summary (Printed 7/22/2021)  Additional Documentation    Flowsheets:  Patient Health Questionnaire - 9,   STEFFEN-7      Encounter Info:  Billing Info,   Allergies,   Detailed Report,   History,   Medications,   Questionnaires      Media  From this encounter  Scan on 7/20/2021 10:38 AM by Simran Amaya RN: Cuco Hernandez 7/20/21Scan on 7/20/2021 10:38 AM by Simran Amaya RN: Cuco Hernandez 7/20/21   Chart Review Routing History    No routing history on file. Encounter Status    Signed by CAPO Cason NP on 7/21/21 at 18:58   BestPractice Advisories    Click to view BestPractice Advisory history   Encounter Messages    No messages in this encounter   Orders Placed     None  Outpatient Medications at End of Encounter as of 7/21/2021    cloNIDine (CATAPRES) 0.1 MG tablet TAKE 1 TABLET BY MOUTH EVERY DAY AT NIGHT   gabapentin (NEURONTIN) 300 MG capsule TAKE 2 CAPSULES BY MOUTH EVERY DAY AT NIGHT   eszopiclone (LUNESTA) 3 MG TABS Take 1 tablet by mouth nightly for 30 days.    LATUDA 20 MG TABS tablet TAKE 1 TABLET BY MOUTH DAILY WITH SUPPER   risperiDONE (RISPERDAL) 1 MG tablet Take 1 tablet by mouth nightly   traZODone (DESYREL) 50 MG tablet Take 1 tablet by mouth nightly   lamoTRIgine (LAMICTAL) 150 MG tablet Take 1 tablet by mouth daily   oxybutynin (DITROPAN) 5 MG tablet TAKE 1 TABLET BY MOUTH EVERY DAY AT NIGHT   spironolactone (ALDACTONE) 25 MG tablet    diclofenac (VOLTAREN) 75 MG EC tablet Take 75 mg by mouth 2 times daily   metoprolol succinate (TOPROL XL) 50 MG extended release tablet Take 50 mg by mouth daily 4/25/19 per direction of Dr. Ken Alvarado pt to take 1/2 tab daily.    Magnesium Oxide 250 MG TABS Take by mouth daily   Medication Changes         (Therapy completed)     Medication List   Visit Diagnoses       Bipolar affective disorder, mixed, mild (HCC)     Generalized anxiety disorder with panic attacks     Insomnia due to other mental disorder     Bruxism     Problem List

## 2021-08-30 ENCOUNTER — TELEPHONE (OUTPATIENT)
Dept: PSYCHIATRY | Age: 56
End: 2021-08-30

## 2021-08-30 DIAGNOSIS — F99 INSOMNIA DUE TO OTHER MENTAL DISORDER: Primary | ICD-10-CM

## 2021-08-30 DIAGNOSIS — F51.05 INSOMNIA DUE TO OTHER MENTAL DISORDER: Primary | ICD-10-CM

## 2021-08-30 RX ORDER — ESZOPICLONE 3 MG/1
3 TABLET, FILM COATED ORAL NIGHTLY
Qty: 30 TABLET | Refills: 0 | Status: SHIPPED | OUTPATIENT
Start: 2021-08-30 | End: 2021-09-29 | Stop reason: SDUPTHER

## 2021-08-30 RX ORDER — ESZOPICLONE 3 MG/1
3 TABLET, FILM COATED ORAL NIGHTLY
COMMUNITY
End: 2021-08-30 | Stop reason: SDUPTHER

## 2021-08-30 NOTE — TELEPHONE ENCOUNTER
Margeret Carrion called to request a refill on her medication. MED HAD FALLEN OFF THE CURRENT MED LIST-ADDED BACK AS LISTED IN LAST OFFICE VISIT BELOW. Yovany Roberts under media and attached. Last office visit : 7/21/2021 with Brett SCANLON  Next office visit : 10/21/2021 with Brett SCANLON    Requested Prescriptions     Pending Prescriptions Disp Refills    eszopiclone (ESZOPICLONE) 3 MG TABS 30 tablet 0     Sig: Take 1 tablet by mouth nightly for 30 days. Vern Salas RN        7/21/2021 6:56 PM                             Progress Note     IN:  1010  OUT: 1030        Dorene Rojas 1965                           Chief Complaint   Patient presents with    Follow-up    Anxiety    Depression    Insomnia            Subjective:  Patient is a 64 y.o. female diagnosed with Bipolar 2 Disorder and presents today for follow-up. Last seen in clinic on 6/7/21 and prior records were reviewed.     Today patient states, \"Hanging in there. \" She reports that her friend has moved back and that she is doing better. She reports some anxiety attacks, her house needs repair and she can't afford it. She continues to have the bedbug problem. She has noticed that she dreams a little more without Prazosin but that Clonidine continues to work for the bruxism. She reports that it's nice to get 7-8 hrs of sleep.     Patient reports side effects as follows: none. No evidence of EPS, no cogwheeling or abnormal motor movements.     Absent  suicidal ideation.   Reports compliance with medications as good .      Current Substance Use:  See history     BP: There were no vitals taken for this visit.        Review of Systems - 14 point review:  Negative except being treated for:  depression, anxiety, panic attacks, suicidal dreams, weight gain, hx of right fractured ankle, enuresis, increased liver enzymes (being treated with spirolactone)        Constitutional: (fevers, chills, night sweats, wt loss/gain, change in appetite, fatigue, somnolence)     HEENT: (ear pain or discharge, hearing loss, ear ringing, sinus pressure, nosebleed, nasal discharge, sore throat, oral sores, tooth pain, bleeding gums, hoarse voice, neck pain)      Cardiovascular: (HTN, chest pain, elevated cholesterol/lipids, palpitations, leg swelling, leg pain with walking)     Respiratory: (cough, wheezing, snoring, SOB with activity (dyspnea), SOB while lying flat (orthopnea), awakening with severe SOB (paroxysmal nocturnal dyspnea))     Gastrointestinal: (NVD, constipation, abdominal pain, bright red stools, black tarry stools, stool incontinence)     Genitourinary:  (pelvic pain, burning or frequency of urination, urinary urgency, blood in urine incomplete bladder emptying, urinary incontinence, STD; MEN: testicular pain or swelling, erectile dysfunction; WOMEN: LMP, heavy menstrual bleeding (menorrhagia), irregular periods, postmenopausal bleeding, menstrual pain (dymenorrhea, vaginal discharge)     Musculoskeletal: (bone pain/fracture, joint pain or swelling, musle pain)     Integumentary: (rashes, acne, non-healing sores, itching, breast lumps, breast pain, nipple discharge, hair loss)     Neurologic: (HA, muscle weakness, paresthesias (numbness, coldness, crawling or prickling), memory loss, seizure, dizziness)     Psychiatric:  (anxiety, sadness, irritability/anger, insomnia, suicidality)     Endocrine: (heat or cold intolerance, excessive thirst (polydipsia), excessive hunger (polyphagia))     Immune/Allergic: (hives, seasonal or environmental allergies, HIV exposure)     Hematologic/Lymphatic: (lymph node enlargement, easy bleeding or bruising)     History obtained via chart review and patient     PCP is Vianney Melchor DO, DO         Current Meds:     Home Medications           Prior to Admission medications    Medication Sig Start Date End Date Taking?  Authorizing Provider   cloNIDine (CATAPRES) 0.1 MG tablet TAKE 1 TABLET BY MOUTH EVERY DAY AT NIGHT 7/19/21     CAPO Cason - NP   gabapentin (NEURONTIN) 300 MG capsule TAKE 2 CAPSULES BY MOUTH EVERY DAY AT NIGHT 6/30/21 7/30/21   CAPO Nieves CNP   eszopiclone (LUNESTA) 3 MG TABS Take 1 tablet by mouth nightly for 30 days.  6/30/21 7/30/21   CAPO Savage - CNP   LATUDA 20 MG TABS tablet TAKE 1 TABLET BY MOUTH DAILY WITH SUPPER 6/16/21     CAPO Cason - ELKIN   risperiDONE (RISPERDAL) 1 MG tablet Take 1 tablet by mouth nightly 3/3/21     CAPO Cason - NP   traZODone (DESYREL) 50 MG tablet Take 1 tablet by mouth nightly 3/3/21     CAPO Cason - NP   lamoTRIgine (LAMICTAL) 150 MG tablet Take 1 tablet by mouth daily 1/27/21     CAPO Cason - NP   oxybutynin (DITROPAN) 5 MG tablet TAKE 1 TABLET BY MOUTH EVERY DAY AT NIGHT 7/28/20     CAPO Villanueva   spironolactone (ALDACTONE) 25 MG tablet   3/5/20     Historical Provider, MD   diclofenac (VOLTAREN) 75 MG EC tablet Take 75 mg by mouth 2 times daily       Historical Provider, MD   metoprolol succinate (TOPROL XL) 50 MG extended release tablet Take 50 mg by mouth daily 4/25/19 per direction of Dr. Kavitha Jain pt to take 1/2 tab daily.       Historical Provider, MD   Magnesium Oxide 250 MG TABS Take by mouth daily       Historical Provider, MD         Social History   Social History            Socioeconomic History    Marital status: Legally        Spouse name: Not on file    Number of children: 1    Years of education: 12th grade + some college    Highest education level: Not on file   Occupational History    Not on file   Tobacco Use    Smoking status: Current Every Day Smoker       Packs/day: 0.75    Smokeless tobacco: Never Used   Vaping Use    Vaping Use: Never used   Substance and Sexual Activity    Alcohol use: Not Currently       Comment: history of abuse, last drink was early December, 2018    Drug use: Not Currently       Types: Marijuana       Comment: last use was summer, 2017    worries every night about sleeping and panic attacks. She says that she worries about her neighbors. She says that they always want something from her. She worries about running into them. She says that this causes panic attacks. She says that there is a lot of drug use in her neighborhood and she is fearful of her neighbors.       (Generalized anxiety: where, when, who, how long, how frequent)           Panic Disorder symptoms: She says that she is having panic attacks at night, 1 at night (this has improved from 3 weeks ago before she started Trazodone when she was having 2-3 at night). She says that she wakes up with dreams of her family and with her anxiety about [place] trailer park.       (Palpitations, racing heart beat, sweating, sense of impending doom, fear of recurrence, shortness of breath)           OCD symptoms:  She gets flustered if things are not in a routine, is ritualized about the way she dresses, and the way she laces her shoes       (checking, cleaning, organizing, rituals, hang-ups, obsessive thoughts, counting, rational vs. Irrational beliefs)           PTSD symptoms:  Increased startle response, wakes up with dreams at night, she also says she has flashbacks during the day.       (nightmares, flashbacks, startle respoinse, avoidance)           Social anxiety symptoms:  Negative           Simple phobias:   Heights, claustrophobia, snakes       (heights, planes, spiders, etc.)           Psychosis: She reports hearing and seeing things that aren't there, sees animals out her window that really aren't there. She says this happens almost every day. She says that she has never seen things when she wasn't depressed.       (hallucinations, auditory, visual, tactile, olfactory)           Paranoia: Feels that people are watching her most of the time.  She thinks this feeling is both irrational and rational.           Delusions:  Negative       (TV, radio, thought broadcasting, mind control, referential thinking)           Patient's perception: Negative       (Spiritual or cultural context of symptoms, reality testing)           ADHD symptoms: Negative           Eating Disorder symptoms:  Positive, lives almost entirely on whole milk, sometimes drinking ensure, she says that in the last month she has only eaten 3 meals, has occasionally eaten a bagel with a plain piece of bread.       (binging, purging, excessive exercising)      Social Determinants of Health          Financial Resource Strain:     Difficulty of Paying Living Expenses:    Food Insecurity:     Worried About Running Out of Food in the Last Year:     Ran Out of Food in the Last Year:    Transportation Needs:     Lack of Transportation (Medical):  Lack of Transportation (Non-Medical):    Physical Activity:     Days of Exercise per Week:     Minutes of Exercise per Session:    Stress:     Feeling of Stress :    Social Connections:     Frequency of Communication with Friends and Family:     Frequency of Social Gatherings with Friends and Family:     Attends Jewish Services:     Active Member of Clubs or Organizations:     Attends Club or Organization Meetings:     Marital Status:    Intimate Partner Violence:     Fear of Current or Ex-Partner:     Emotionally Abused:     Physically Abused:     Sexually Abused:             MSE:  Patient is  A & O x 4. Appearance:  SAVANAH. Cognition:  Recent memory intact , remote memory intact , good fund of knowledge, average  intelligence level. Speech:  normal  Language: Naming: intact;  Word Finding: intact  Conversation no evidence of delusions  Behavior:  Cooperative  Mood: \"good\"   Affect: SAVANAH  Thought Content: negative delusions, negative hallucinations, negative obsessions,  negativehomicidal and negative suicidal   Thought Process: linear, goal directed and coherent (having trouble keeping thoughts together, probably due to lack of sleep)  Associations: logical connections  Attention Span and concentration: Normal   Judgement Insight:  normal and appropriate  Gait and Station: SAVANAH   Sleep: avg 7-8 hrs    Appetite: ok                    Lab Results   Component Value Date      (L) 03/10/2020     K 4.7 03/10/2020     CL 89 (L) 03/10/2020     CO2 18 (L) 03/10/2020     BUN 19 03/10/2020     CREATININE 0.9 03/10/2020     GLUCOSE 107 03/10/2020     CALCIUM 9.1 03/10/2020     PROT 8.6 11/10/2015     LABALBU 5.0 11/10/2015     ALKPHOS 127 (H) 11/10/2015     AST 33 (H) 11/10/2015     ALT 22 11/10/2015     LABGLOM >60 03/10/2020            Lab Results   Component Value Date      03/10/2020     K 4.7 03/10/2020     CL 89 03/10/2020     CO2 18 03/10/2020     BUN 19 03/10/2020     CREATININE 0.9 03/10/2020     GLUCOSE 107 03/10/2020     CALCIUM 9.1 03/10/2020      No results found for: CHOL  No results found for: TRIG  No results found for: HDL  No results found for: LDLCHOLESTEROL, LDLCALC  No results found for: LABVLDL, VLDL  No results found for: CHOLHDLRATIO  No results found for: LABA1C  No results found for: EAG        Lab Results   Component Value Date     TSH 2.40 02/03/2015            Lab Results   Component Value Date     VITD25 27.6 (L) 02/03/2015         Assessments Administered:  PHQ9: 9, mild  GAD7: 8, mild (a couple of panic attacks since the last visit)     COGNITION SCREEN:     Can spell the word, \"world,\" backwards: Yes  Can do serial 7's: Yes        Assessment:    1. Bipolar affective disorder, mixed, mild ([facility] [address]    2. Generalized anxiety disorder with panic attacks    3. Insomnia due to other mental disorder    4.  Bruxism          Plan:  Continue:  Lamotrigine, 150mg,  Daily     Eszopiclone (Lunesta), 3mg, nightly for sleep (she has tried going off of this with no success, can't sleep without it)     Gabapentin, 300mg, take 2 capsules nightly (restless legs)  Lurasidone, 20mg, daily in the morning (with 350 calories)  Clonidine, 0.1mg, nightly (teeth grinding, nightmares), can lower BP, get your balance before you get up to walk     Risperdal, 1mg, nightly  Trazodone, 50mg, nightly     Continue therapy with Alonos Garcia     Try the [facility] tabby for sleep     Follow up: Return in about 3 months (around 10/21/2021).    If you become suicidal, follow up with this office or call the [facility] at [phone] ([phone]), or dial 911.     1. The risks, benefits, side effects, indications, contraindications, and adverse effects of the medications have been discussed. Yes.  2. The pt has verbalized understanding and has capacity to give informed consent. 3. The Vimal Knowles report has been reviewed according to [facility] regulations. 4. Supportive therapy offered. 5. Follow up:    Return in about 3 months (around 10/21/2021). 6. The patient has been advised to call with any problems. 7. Controlled substance Treatment Plan: new medication for sleep (eszopiclone). 8. The above listed medications have been continued, modifications in meds and other orders/labs as follows:                   Encounter Medications    No orders of the defined types were placed in this encounter.                    No orders of the defined types were placed in this encounter.        9. Additional comments: She says that she is doing better on these combination of medications than she has ever done in her life and that it is the first time that she has been able to sleep (she is on 3 different medications to help with sleep). She reports that she is dreaming a little more without Prazosin but that they aren't terrible dreams and aren't bothering her. Will make no medication changes today. Patient was informed that this provider is moving back to [place] in October. She is fine to follow up with another provider in this office in about 3 months. ...   10.Over 50% of the total visit time of 20 minutes was spent on counseling and/or coordination of care of:                         1. Bipolar affective disorder, mixed, mild ([facility] [address]    2. Generalized anxiety disorder with panic attacks    3. Insomnia due to other mental disorder    4.  Bruxism                        Psychotherapy Topics: mood/medication effectiveness         CAPO Owens NP PMHNP-BC

## 2021-08-31 ENCOUNTER — TELEPHONE (OUTPATIENT)
Dept: PSYCHIATRY | Age: 56
End: 2021-08-31

## 2021-08-31 NOTE — TELEPHONE ENCOUNTER
Spoke with Sahara Bland at [facility] to see if pt had a RX available for refill of Neurotin. He said there was one that could be filled on 9/2/21 and would get it ready on that date. Unable to reach pt to provide above info.

## 2021-09-07 ENCOUNTER — OFFICE VISIT (OUTPATIENT)
Dept: UROLOGY | Age: 56
End: 2021-09-07
Payer: MEDICARE

## 2021-09-07 VITALS — BODY MASS INDEX: 21.74 KG/M2 | HEIGHT: 66 IN | TEMPERATURE: 98.4 F | WEIGHT: 135.3 LBS

## 2021-09-07 DIAGNOSIS — N39.44 NOCTURNAL ENURESIS: ICD-10-CM

## 2021-09-07 LAB
APPEARANCE FLUID: CLEAR
BILIRUBIN, POC: NORMAL
BLOOD URINE, POC: NORMAL
CLARITY, POC: CLEAR
COLOR, POC: YELLOW
GLUCOSE URINE, POC: NORMAL
KETONES, POC: NORMAL
LEUKOCYTE EST, POC: NORMAL
NITRITE, POC: NORMAL
PH, POC: 6
PROTEIN, POC: NORMAL
SPECIFIC GRAVITY, POC: 1.01
UROBILINOGEN, POC: 0.2

## 2021-09-07 PROCEDURE — 81002 URINALYSIS NONAUTO W/O SCOPE: CPT | Performed by: NURSE PRACTITIONER

## 2021-09-07 PROCEDURE — 99212 OFFICE O/P EST SF 10 MIN: CPT | Performed by: NURSE PRACTITIONER

## 2021-09-07 RX ORDER — ICOSAPENT ETHYL 1000 MG/1
CAPSULE ORAL
COMMUNITY
Start: 2021-07-06 | End: 2022-05-18

## 2021-09-07 RX ORDER — LORATADINE 10 MG/1
TABLET ORAL
COMMUNITY
Start: 2021-07-23

## 2021-09-07 RX ORDER — ROSUVASTATIN CALCIUM 5 MG/1
TABLET, COATED ORAL
COMMUNITY
Start: 2021-08-02

## 2021-09-07 RX ORDER — OXYBUTYNIN CHLORIDE 5 MG/1
TABLET ORAL
Qty: 90 TABLET | Refills: 3 | Status: SHIPPED | OUTPATIENT
Start: 2021-09-07 | End: 2022-08-30

## 2021-09-07 RX ORDER — FLUTICASONE PROPIONATE 50 MCG
SPRAY, SUSPENSION (ML) NASAL
COMMUNITY
Start: 2021-07-22

## 2021-09-08 ASSESSMENT — ENCOUNTER SYMPTOMS
ABDOMINAL DISTENTION: 0
BACK PAIN: 0
NAUSEA: 0
VOMITING: 0
ABDOMINAL PAIN: 0

## 2021-09-13 RX ORDER — LURASIDONE HYDROCHLORIDE 20 MG/1
TABLET, FILM COATED ORAL
Qty: 90 TABLET | Refills: 0 | Status: SHIPPED | OUTPATIENT
Start: 2021-09-13 | End: 2021-11-19 | Stop reason: ALTCHOICE

## 2021-09-13 NOTE — TELEPHONE ENCOUNTER
Pharmacy sent med refill request below. Last office visit : 7/21/2021 with Karey SCANLON  Next office visit : 10/21/2021 with Karey Dang CAPO    Requested Prescriptions     Pending Prescriptions Disp Refills    LATUDA 20 MG TABS tablet [Pharmacy Med Name: Radha Stephen 20 MG TABLET] 30 tablet 2     Sig: TAKE 1 TABLET BY MOUTH DAILY WITH SUPPER            Shawn Youssef RN        7/21/2021 6:56 PM                             Progress Note     IN:  1010  OUT: 1030        Dorene Rojas 1965                           Chief Complaint   Patient presents with    Follow-up    Anxiety    Depression    Insomnia            Subjective:  Patient is a 64 y.o. female diagnosed with Bipolar 2 Disorder and presents today for follow-up. Last seen in clinic on 6/7/21 and prior records were reviewed.     Today patient states, \"Hanging in there. \" She reports that her friend has moved back and that she is doing better. She reports some anxiety attacks, her house needs repair and she can't afford it. She continues to have the bedbug problem. She has noticed that she dreams a little more without Prazosin but that Clonidine continues to work for the bruxism. She reports that it's nice to get 7-8 hrs of sleep.     Patient reports side effects as follows: none. No evidence of EPS, no cogwheeling or abnormal motor movements.     Absent  suicidal ideation.   Reports compliance with medications as good .      Current Substance Use:  See history     BP: There were no vitals taken for this visit.        Review of Systems - 14 point review:  Negative except being treated for:  depression, anxiety, panic attacks, suicidal dreams, weight gain, hx of right fractured ankle, enuresis, increased liver enzymes (being treated with spirolactone)        Constitutional: (fevers, chills, night sweats, wt loss/gain, change in appetite, fatigue, somnolence)     HEENT: (ear pain or discharge, hearing loss, ear ringing, sinus pressure, nosebleed, nasal discharge, sore throat, oral sores, tooth pain, bleeding gums, hoarse voice, neck pain)      Cardiovascular: (HTN, chest pain, elevated cholesterol/lipids, palpitations, leg swelling, leg pain with walking)     Respiratory: (cough, wheezing, snoring, SOB with activity (dyspnea), SOB while lying flat (orthopnea), awakening with severe SOB (paroxysmal nocturnal dyspnea))     Gastrointestinal: (NVD, constipation, abdominal pain, bright red stools, black tarry stools, stool incontinence)     Genitourinary:  (pelvic pain, burning or frequency of urination, urinary urgency, blood in urine incomplete bladder emptying, urinary incontinence, STD; MEN: testicular pain or swelling, erectile dysfunction; WOMEN: LMP, heavy menstrual bleeding (menorrhagia), irregular periods, postmenopausal bleeding, menstrual pain (dymenorrhea, vaginal discharge)     Musculoskeletal: (bone pain/fracture, joint pain or swelling, musle pain)     Integumentary: (rashes, acne, non-healing sores, itching, breast lumps, breast pain, nipple discharge, hair loss)     Neurologic: (HA, muscle weakness, paresthesias (numbness, coldness, crawling or prickling), memory loss, seizure, dizziness)     Psychiatric:  (anxiety, sadness, irritability/anger, insomnia, suicidality)     Endocrine: (heat or cold intolerance, excessive thirst (polydipsia), excessive hunger (polyphagia))     Immune/Allergic: (hives, seasonal or environmental allergies, HIV exposure)     Hematologic/Lymphatic: (lymph node enlargement, easy bleeding or bruising)     History obtained via chart review and patient     PCP is Christine Page. Claudia Zepeda, DO, DO         Current Meds:     Home Medications           Prior to Admission medications    Medication Sig Start Date End Date Taking?  Authorizing Provider   cloNIDine (CATAPRES) 0.1 MG tablet TAKE 1 TABLET BY MOUTH EVERY DAY AT NIGHT 7/19/21     CAPO Rogers NP   gabapentin (NEURONTIN) 300 MG capsule TAKE 2 CAPSULES BY MOUTH EVERY DAY AT NIGHT 6/30/21 7/30/21   Oliver Lovelace, APRN - CNP   eszopiclone (LUNESTA) 3 MG TABS Take 1 tablet by mouth nightly for 30 days.  6/30/21 7/30/21   Julianne Conklin, APRN - CNP   LATUDA 20 MG TABS tablet TAKE 1 TABLET BY MOUTH DAILY WITH SUPPER 6/16/21     Collene Belling, APRN - NP   risperiDONE (RISPERDAL) 1 MG tablet Take 1 tablet by mouth nightly 3/3/21     Collene Belling, APRN - NP   traZODone (DESYREL) 50 MG tablet Take 1 tablet by mouth nightly 3/3/21     Collene Belling, APRN - NP   lamoTRIgine (LAMICTAL) 150 MG tablet Take 1 tablet by mouth daily 1/27/21     Collene Belling, APRN - NP   oxybutynin (DITROPAN) 5 MG tablet TAKE 1 TABLET BY MOUTH EVERY DAY AT NIGHT 7/28/20     CAPO Friedman   spironolactone (ALDACTONE) 25 MG tablet   3/5/20     Historical Provider, MD   diclofenac (VOLTAREN) 75 MG EC tablet Take 75 mg by mouth 2 times daily       Historical Provider, MD   metoprolol succinate (TOPROL XL) 50 MG extended release tablet Take 50 mg by mouth daily 4/25/19 per direction of Dr. Makayla Mcclain pt to take 1/2 tab daily.       Historical Provider, MD   Magnesium Oxide 250 MG TABS Take by mouth daily       Historical Provider, MD         Social History   Social History            Socioeconomic History    Marital status: Legally        Spouse name: Not on file    Number of children: 1    Years of education: 12th grade + some college    Highest education level: Not on file   Occupational History    Not on file   Tobacco Use    Smoking status: Current Every Day Smoker       Packs/day: 0.75    Smokeless tobacco: Never Used   Vaping Use    Vaping Use: Never used   Substance and Sexual Activity    Alcohol use: Not Currently       Comment: history of abuse, last drink was early December, 2018    Drug use: Not Currently       Types: Marijuana       Comment: last use was summer, 2017    Sexual activity: Not Currently   Other Topics Concern    Not on file   Social History Narrative     PRIOR MEDICATION TRIALS     Trazodone (having more suicidal dreams), at 100mg, not working for sleep     Seroquel (wt gain, 14 lb in 3 months, enuresis, indigestion, abnormal blood work, increased blood sugar, seizures)     Duloxetine (has not been effective and no noticeable change even with dose increases to 90mg)     Paxil, 20mg     Wellbutrin XL, (tried it recently to quit smoking)     Prozac (made her numb, added to her eating disorder)     Zoloft (thought she did well on this, took for a couple of years, she stopped it because when she returned to [facility] the doctor put her back on Prozac)     Remeron (increased her dreams and panic attacks)     Lunesta (worked)     Librium (in 2018 for 15 days to stop drinking alcohol)     Risperdal-stopped on 7/6/20 due to weight gain     Doxepin, started on 9/22/20 for sleep (not effective)     Prazosin, ineffective for dreams/nightmares     Latuda (1/27/21, reports that she switched it to am dosing because taking it at night made her RLS worse)           Psychiatric Review of Systems, 3/19/19           Mood:  Sadness, tearfulness, low appetite, low concentration, low energy, loss of interest, denies suicidality today       (Depression: sadness, tearfulness, sleep, appetite, energy, concentration, sexual function, guilt, psychomotor agitation or slowing, interest, suicidality)           Julia: She says that there have been periods of time when she could go 3 days without sleep, she does say that there have been days in a row when she has done reckless, impulsive things       (impulsivity, grandiosity, recklessness, excessive energy, decreased need for sleep, increased spending beyond means, hyperverbal, grandiose, racing thoughts, hypersexuality)           Other: anger from being tired all the time       (Irritability, lability, anger)           Anxiety:  She says that she worries every night about sleeping and panic attacks. She says that she worries about her neighbors.  She says that they always want something from her. She worries about running into them. She says that this causes panic attacks. She says that there is a lot of drug use in her neighborhood and she is fearful of her neighbors.       (Generalized anxiety: where, when, who, how long, how frequent)           Panic Disorder symptoms: She says that she is having panic attacks at night, 1 at night (this has improved from 3 weeks ago before she started Trazodone when she was having 2-3 at night). She says that she wakes up with dreams of her family and with her anxiety about [place] trailer park.       (Palpitations, racing heart beat, sweating, sense of impending doom, fear of recurrence, shortness of breath)           OCD symptoms:  She gets flustered if things are not in a routine, is ritualized about the way she dresses, and the way she laces her shoes       (checking, cleaning, organizing, rituals, hang-ups, obsessive thoughts, counting, rational vs. Irrational beliefs)           PTSD symptoms:  Increased startle response, wakes up with dreams at night, she also says she has flashbacks during the day.       (nightmares, flashbacks, startle respoinse, avoidance)           Social anxiety symptoms:  Negative           Simple phobias:   Heights, claustrophobia, snakes       (heights, planes, spiders, etc.)           Psychosis: She reports hearing and seeing things that aren't there, sees animals out her window that really aren't there. She says this happens almost every day. She says that she has never seen things when she wasn't depressed.       (hallucinations, auditory, visual, tactile, olfactory)           Paranoia: Feels that people are watching her most of the time.  She thinks this feeling is both irrational and rational.           Delusions:  Negative       (TV, radio, thought broadcasting, mind control, referential thinking)           Patient's perception: Negative       (Spiritual or cultural context of symptoms, reality testing)           ADHD symptoms: Negative           Eating Disorder symptoms:  Positive, lives almost entirely on whole milk, sometimes drinking ensure, she says that in the last month she has only eaten 3 meals, has occasionally eaten a bagel with a plain piece of bread.       (binging, purging, excessive exercising)      Social Determinants of Health          Financial Resource Strain:     Difficulty of Paying Living Expenses:    Food Insecurity:     Worried About Running Out of Food in the Last Year:     Ran Out of Food in the Last Year:    Transportation Needs:     Lack of Transportation (Medical):  Lack of Transportation (Non-Medical):    Physical Activity:     Days of Exercise per Week:     Minutes of Exercise per Session:    Stress:     Feeling of Stress :    Social Connections:     Frequency of Communication with Friends and Family:     Frequency of Social Gatherings with Friends and Family:     Attends Evangelical Services:     Active Member of Clubs or Organizations:     Attends Club or Organization Meetings:     Marital Status:    Intimate Partner Violence:     Fear of Current or Ex-Partner:     Emotionally Abused:     Physically Abused:     Sexually Abused:             MSE:  Patient is  A & O x 4. Appearance:  SAVANAH. Cognition:  Recent memory intact , remote memory intact , good fund of knowledge, average  intelligence level. Speech:  normal  Language: Naming: intact;  Word Finding: intact  Conversation no evidence of delusions  Behavior:  Cooperative  Mood: \"good\"   Affect: SAVANAH  Thought Content: negative delusions, negative hallucinations, negative obsessions,  negativehomicidal and negative suicidal   Thought Process: linear, goal directed and coherent (having trouble keeping thoughts together, probably due to lack of sleep)  Associations: logical connections  Attention Span and concentration: Normal   Judgement Insight:  normal and appropriate  Gait and Station: SAVANAH   Sleep: avg 7-8 hrs    Appetite: ok                    Lab Results   Component Value Date      (L) 03/10/2020     K 4.7 03/10/2020     CL 89 (L) 03/10/2020     CO2 18 (L) 03/10/2020     BUN 19 03/10/2020     CREATININE 0.9 03/10/2020     GLUCOSE 107 03/10/2020     CALCIUM 9.1 03/10/2020     PROT 8.6 11/10/2015     LABALBU 5.0 11/10/2015     ALKPHOS 127 (H) 11/10/2015     AST 33 (H) 11/10/2015     ALT 22 11/10/2015     LABGLOM >60 03/10/2020            Lab Results   Component Value Date      03/10/2020     K 4.7 03/10/2020     CL 89 03/10/2020     CO2 18 03/10/2020     BUN 19 03/10/2020     CREATININE 0.9 03/10/2020     GLUCOSE 107 03/10/2020     CALCIUM 9.1 03/10/2020      No results found for: CHOL  No results found for: TRIG  No results found for: HDL  No results found for: LDLCHOLESTEROL, LDLCALC  No results found for: LABVLDL, VLDL  No results found for: CHOLHDLRATIO  No results found for: LABA1C  No results found for: EAG        Lab Results   Component Value Date     TSH 2.40 02/03/2015            Lab Results   Component Value Date     VITD25 27.6 (L) 02/03/2015         Assessments Administered:  PHQ9: 9, mild  GAD7: 8, mild (a couple of panic attacks since the last visit)     COGNITION SCREEN:     Can spell the word, \"world,\" backwards: Yes  Can do serial 7's: Yes        Assessment:    1. Bipolar affective disorder, mixed, mild ([facility] [address]    2. Generalized anxiety disorder with panic attacks    3. Insomnia due to other mental disorder    4.  Bruxism          Plan:  Continue:  Lamotrigine, 150mg,  Daily     Eszopiclone (Lunesta), 3mg, nightly for sleep (she has tried going off of this with no success, can't sleep without it)     Gabapentin, 300mg, take 2 capsules nightly (restless legs)  Lurasidone, 20mg, daily in the morning (with 350 calories)  Clonidine, 0.1mg, nightly (teeth grinding, nightmares), can lower BP, get your balance before you get up to walk     Risperdal, 1mg, nightly  Trazodone, 50mg, nightly     Continue therapy with Martin Griggs     Try the [facility] tabby for sleep     Follow up: Return in about 3 months (around 10/21/2021).    If you become suicidal, follow up with this office or call the [facility] at [phone] ([phone]), or dial [phone].     1. The risks, benefits, side effects, indications, contraindications, and adverse effects of the medications have been discussed. Yes.  2. The pt has verbalized understanding and has capacity to give informed consent. 3. The Arnita Councilman report has been reviewed according to [facility] regulations. 4. Supportive therapy offered. 5. Follow up:    Return in about 3 months (around 10/21/2021). 6. The patient has been advised to call with any problems. 7. Controlled substance Treatment Plan: new medication for sleep (eszopiclone). 8. The above listed medications have been continued, modifications in meds and other orders/labs as follows:                   Encounter Medications    No orders of the defined types were placed in this encounter.                    No orders of the defined types were placed in this encounter.        9. Additional comments: She says that she is doing better on these combination of medications than she has ever done in her life and that it is the first time that she has been able to sleep (she is on 3 different medications to help with sleep). She reports that she is dreaming a little more without Prazosin but that they aren't terrible dreams and aren't bothering her. Will make no medication changes today. Patient was informed that this provider is moving back to [place] in October. She is fine to follow up with another provider in this office in about 3 months. ... 10.Over 50% of the total visit time of 20 minutes was spent on counseling and/or coordination of care of:                         1. Bipolar affective disorder, mixed, mild ([address]    2. Generalized anxiety disorder with panic attacks    3.  Insomnia due to other mental disorder    4.  Bruxism                        Psychotherapy Topics: mood/medication effectiveness         CAPO De La Paz - NP PMHNP-BC

## 2021-09-14 ENCOUNTER — TELEPHONE (OUTPATIENT)
Dept: PSYCHIATRY | Age: 56
End: 2021-09-14

## 2021-09-14 NOTE — TELEPHONE ENCOUNTER
Attempted to contact pt to inform her that refill RX for Reji Burner had been sent to her pharmacy per Rachel SCANLON-no answer.

## 2021-09-29 ENCOUNTER — TELEPHONE (OUTPATIENT)
Dept: PSYCHIATRY | Age: 56
End: 2021-09-29

## 2021-09-29 DIAGNOSIS — F99 INSOMNIA DUE TO OTHER MENTAL DISORDER: ICD-10-CM

## 2021-09-29 DIAGNOSIS — F51.05 INSOMNIA DUE TO OTHER MENTAL DISORDER: ICD-10-CM

## 2021-09-29 RX ORDER — ESZOPICLONE 3 MG/1
3 TABLET, FILM COATED ORAL NIGHTLY
Qty: 30 TABLET | Refills: 0 | Status: SHIPPED | OUTPATIENT
Start: 2021-09-29 | End: 2021-10-27 | Stop reason: SDUPTHER

## 2021-09-29 NOTE — TELEPHONE ENCOUNTER
Abhi Restrepo called to request a refill on her medication. Alice Showers under media and attached. Last office visit : 7/21/2021 with Valentino Sings APRN  Next office visit : 10/21/2021 with Evelyn SCANLON    Requested Prescriptions     Pending Prescriptions Disp Refills    eszopiclone (ESZOPICLONE) 3 MG TABS 30 tablet 0     Sig: Take 1 tablet by mouth nightly for 30 days. Abigail Duron RN        7/21/2021 6:56 PM                             Progress Note     IN:  1010  OUT: 1030        Dorene Rojas 1965                           Chief Complaint   Patient presents with    Follow-up    Anxiety    Depression    Insomnia            Subjective:  Patient is a 64 y.o. female diagnosed with Bipolar 2 Disorder and presents today for follow-up. Last seen in clinic on 6/7/21 and prior records were reviewed.     Today patient states, \"Hanging in there. \" She reports that her friend has moved back and that she is doing better. She reports some anxiety attacks, her house needs repair and she can't afford it. She continues to have the bedbug problem. She has noticed that she dreams a little more without Prazosin but that Clonidine continues to work for the bruxism. She reports that it's nice to get 7-8 hrs of sleep.     Patient reports side effects as follows: none. No evidence of EPS, no cogwheeling or abnormal motor movements.     Absent  suicidal ideation.   Reports compliance with medications as good .      Current Substance Use:  See history     BP: There were no vitals taken for this visit.        Review of Systems - 14 point review:  Negative except being treated for:  depression, anxiety, panic attacks, suicidal dreams, weight gain, hx of right fractured ankle, enuresis, increased liver enzymes (being treated with spirolactone)        Constitutional: (fevers, chills, night sweats, wt loss/gain, change in appetite, fatigue, somnolence)     HEENT: (ear pain or discharge, hearing loss, ear ringing, sinus pressure, nosebleed, nasal discharge, sore throat, oral sores, tooth pain, bleeding gums, hoarse voice, neck pain)      Cardiovascular: (HTN, chest pain, elevated cholesterol/lipids, palpitations, leg swelling, leg pain with walking)     Respiratory: (cough, wheezing, snoring, SOB with activity (dyspnea), SOB while lying flat (orthopnea), awakening with severe SOB (paroxysmal nocturnal dyspnea))     Gastrointestinal: (NVD, constipation, abdominal pain, bright red stools, black tarry stools, stool incontinence)     Genitourinary:  (pelvic pain, burning or frequency of urination, urinary urgency, blood in urine incomplete bladder emptying, urinary incontinence, STD; MEN: testicular pain or swelling, erectile dysfunction; WOMEN: LMP, heavy menstrual bleeding (menorrhagia), irregular periods, postmenopausal bleeding, menstrual pain (dymenorrhea, vaginal discharge)     Musculoskeletal: (bone pain/fracture, joint pain or swelling, musle pain)     Integumentary: (rashes, acne, non-healing sores, itching, breast lumps, breast pain, nipple discharge, hair loss)     Neurologic: (HA, muscle weakness, paresthesias (numbness, coldness, crawling or prickling), memory loss, seizure, dizziness)     Psychiatric:  (anxiety, sadness, irritability/anger, insomnia, suicidality)     Endocrine: (heat or cold intolerance, excessive thirst (polydipsia), excessive hunger (polyphagia))     Immune/Allergic: (hives, seasonal or environmental allergies, HIV exposure)     Hematologic/Lymphatic: (lymph node enlargement, easy bleeding or bruising)     History obtained via chart review and patient     PCP is Martin Guan, DO, DO         Current Meds:     Home Medications           Prior to Admission medications    Medication Sig Start Date End Date Taking?  Authorizing Provider   cloNIDine (CATAPRES) 0.1 MG tablet TAKE 1 TABLET BY MOUTH EVERY DAY AT NIGHT 7/19/21     CAPO Henry NP   gabapentin (NEURONTIN) 300 MG capsule TAKE 2 CAPSULES BY MOUTH EVERY DAY AT NIGHT 6/30/21 7/30/21   Oliver Hester, APRN - CNP   eszopiclone (LUNESTA) 3 MG TABS Take 1 tablet by mouth nightly for 30 days.  6/30/21 7/30/21   Lenka Arce, APRN - CNP   LATUDA 20 MG TABS tablet TAKE 1 TABLET BY MOUTH DAILY WITH SUPPER 6/16/21     [facility], APRN - NP   risperiDONE (RISPERDAL) 1 MG tablet Take 1 tablet by mouth nightly 3/3/21     [facility], APRN - NP   traZODone (DESYREL) 50 MG tablet Take 1 tablet by mouth nightly 3/3/21     [facility], APRN - NP   lamoTRIgine (LAMICTAL) 150 MG tablet Take 1 tablet by mouth daily 1/27/21     [facility], APRN - NP   oxybutynin (DITROPAN) 5 MG tablet TAKE 1 TABLET BY MOUTH EVERY DAY AT NIGHT 7/28/20     Manjula Wise, APRN   spironolactone (ALDACTONE) 25 MG tablet   3/5/20     Historical Provider, MD   diclofenac (VOLTAREN) 75 MG EC tablet Take 75 mg by mouth 2 times daily       Historical Provider, MD   metoprolol succinate (TOPROL XL) 50 MG extended release tablet Take 50 mg by mouth daily 4/25/19 per direction of Dr. Radha Hardy pt to take 1/2 tab daily.       Historical Provider, MD   Magnesium Oxide 250 MG TABS Take by mouth daily       Historical Provider, MD         Social History   Social History            Socioeconomic History    Marital status: Legally        Spouse name: Not on file    Number of children: 1    Years of education: 12th grade + some college    Highest education level: Not on file   Occupational History    Not on file   Tobacco Use    Smoking status: Current Every Day Smoker       Packs/day: 0.75    Smokeless tobacco: Never Used   Vaping Use    Vaping Use: Never used   Substance and Sexual Activity    Alcohol use: Not Currently       Comment: history of abuse, last drink was early December, 2018    Drug use: Not Currently       Types: Marijuana       Comment: last use was summer, 2017    Sexual activity: Not Currently   Other Topics Concern    Not on file   Social History Narrative     PRIOR MEDICATION TRIALS     Trazodone (having more suicidal dreams), at 100mg, not working for sleep     Seroquel (wt gain, 14 lb in 3 months, enuresis, indigestion, abnormal blood work, increased blood sugar, seizures)     Duloxetine (has not been effective and no noticeable change even with dose increases to 90mg)     Paxil, 20mg     Wellbutrin XL, (tried it recently to quit smoking)     Prozac (made her numb, added to her eating disorder)     Zoloft (thought she did well on this, took for a couple of years, she stopped it because when she returned to [facility] the doctor put her back on Prozac)     Remeron (increased her dreams and panic attacks)     Lunesta (worked)     Librium (in 2018 for 15 days to stop drinking alcohol)     Risperdal-stopped on 7/6/20 due to weight gain     Doxepin, started on 9/22/20 for sleep (not effective)     Prazosin, ineffective for dreams/nightmares     Latuda (1/27/21, reports that she switched it to am dosing because taking it at night made her RLS worse)           Psychiatric Review of Systems, 3/19/19           Mood:  Sadness, tearfulness, low appetite, low concentration, low energy, loss of interest, denies suicidality today       (Depression: sadness, tearfulness, sleep, appetite, energy, concentration, sexual function, guilt, psychomotor agitation or slowing, interest, suicidality)           Julia: She says that there have been periods of time when she could go 3 days without sleep, she does say that there have been days in a row when she has done reckless, impulsive things       (impulsivity, grandiosity, recklessness, excessive energy, decreased need for sleep, increased spending beyond means, hyperverbal, grandiose, racing thoughts, hypersexuality)           Other: anger from being tired all the time       (Irritability, lability, anger)           Anxiety:  She says that she worries every night about sleeping and panic attacks.  She says that she worries about her neighbors. She says that they always want something from her. She worries about running into them. She says that this causes panic attacks. She says that there is a lot of drug use in her neighborhood and she is fearful of her neighbors.       (Generalized anxiety: where, when, who, how long, how frequent)           Panic Disorder symptoms: She says that she is having panic attacks at night, 1 at night (this has improved from 3 weeks ago before she started Trazodone when she was having 2-3 at night). She says that she wakes up with dreams of her family and with her anxiety about [place] trailer park.       (Palpitations, racing heart beat, sweating, sense of impending doom, fear of recurrence, shortness of breath)           OCD symptoms:  She gets flustered if things are not in a routine, is ritualized about the way she dresses, and the way she laces her shoes       (checking, cleaning, organizing, rituals, hang-ups, obsessive thoughts, counting, rational vs. Irrational beliefs)           PTSD symptoms:  Increased startle response, wakes up with dreams at night, she also says she has flashbacks during the day.       (nightmares, flashbacks, startle respoinse, avoidance)           Social anxiety symptoms:  Negative           Simple phobias:   Heights, claustrophobia, snakes       (heights, planes, spiders, etc.)           Psychosis: She reports hearing and seeing things that aren't there, sees animals out her window that really aren't there. She says this happens almost every day. She says that she has never seen things when she wasn't depressed.       (hallucinations, auditory, visual, tactile, olfactory)           Paranoia: Feels that people are watching her most of the time.  She thinks this feeling is both irrational and rational.           Delusions:  Negative       (TV, radio, thought broadcasting, mind control, referential thinking)           Patient's perception: Negative       (Spiritual or cultural context Station: [facility]   Sleep: avg 7-8 hrs    Appetite: ok                    Lab Results   Component Value Date      (L) 03/10/2020     K 4.7 03/10/2020     CL 89 (L) 03/10/2020     CO2 18 (L) 03/10/2020     BUN 19 03/10/2020     CREATININE 0.9 03/10/2020     GLUCOSE 107 03/10/2020     CALCIUM 9.1 03/10/2020     PROT 8.6 11/10/2015     LABALBU 5.0 11/10/2015     ALKPHOS 127 (H) 11/10/2015     AST 33 (H) 11/10/2015     ALT 22 11/10/2015     LABGLOM >60 03/10/2020            Lab Results   Component Value Date      03/10/2020     K 4.7 03/10/2020     CL 89 03/10/2020     CO2 18 03/10/2020     BUN 19 03/10/2020     CREATININE 0.9 03/10/2020     GLUCOSE 107 03/10/2020     CALCIUM 9.1 03/10/2020      No results found for: CHOL  No results found for: TRIG  No results found for: HDL  No results found for: LDLCHOLESTEROL, LDLCALC  No results found for: LABVLDL, VLDL  No results found for: CHOLHDLRATIO  No results found for: LABA1C  No results found for: EAG        Lab Results   Component Value Date     TSH 2.40 02/03/2015            Lab Results   Component Value Date     VITD25 27.6 (L) 02/03/2015         Assessments Administered:  PHQ9: 9, mild  GAD7: 8, mild (a couple of panic attacks since the last visit)     COGNITION SCREEN:     Can spell the word, \"world,\" backwards: Yes  Can do serial 7's: Yes        Assessment:    1. Bipolar affective disorder, mixed, mild ([facility] [address]    2. Generalized anxiety disorder with panic attacks    3. Insomnia due to other mental disorder    4.  Bruxism          Plan:  Continue:  Lamotrigine, 150mg,  Daily     Eszopiclone (Lunesta), 3mg, nightly for sleep (she has tried going off of this with no success, can't sleep without it)     Gabapentin, 300mg, take 2 capsules nightly (restless legs)  Lurasidone, 20mg, daily in the morning (with 350 calories)  Clonidine, 0.1mg, nightly (teeth grinding, nightmares), can lower BP, get your balance before you get up to walk     Risperdal, 1mg, nightly  Trazodone, 50mg, nightly     Continue therapy with Andriy Mars     Try the [facility] tabby for sleep     Follow up: Return in about 3 months (around 10/21/2021).    If you become suicidal, follow up with this office or call the [facility] at [phone]), or dial 911.     1. The risks, benefits, side effects, indications, contraindications, and adverse effects of the medications have been discussed. Yes.  2. The pt has verbalized understanding and has capacity to give informed consent. 3. The Lona Moerl report has been reviewed according to [facility] regulations. 4. Supportive therapy offered. 5. Follow up:    Return in about 3 months (around 10/21/2021). 6. The patient has been advised to call with any problems. 7. Controlled substance Treatment Plan: new medication for sleep (eszopiclone). 8. The above listed medications have been continued, modifications in meds and other orders/labs as follows:                   Encounter Medications    No orders of the defined types were placed in this encounter.                    No orders of the defined types were placed in this encounter.        9. Additional comments: She says that she is doing better on these combination of medications than she has ever done in her life and that it is the first time that she has been able to sleep (she is on 3 different medications to help with sleep). She reports that she is dreaming a little more without Prazosin but that they aren't terrible dreams and aren't bothering her. Will make no medication changes today. Patient was informed that this provider is moving back to [place] in October. She is fine to follow up with another provider in this office in about 3 months. ... 10.Over 50% of the total visit time of 20 minutes was spent on counseling and/or coordination of care of:                         1. Bipolar affective disorder, mixed, mild ([address]    2.  Generalized anxiety disorder with panic attacks 3. Insomnia due to other mental disorder    4.  Bruxism                        Psychotherapy Topics: mood/medication effectiveness         CAPO Valencia - ELKIN PMHNP-BC

## 2021-10-06 DIAGNOSIS — F41.1 GENERALIZED ANXIETY DISORDER: ICD-10-CM

## 2021-10-06 RX ORDER — GABAPENTIN 300 MG/1
CAPSULE ORAL
Qty: 60 CAPSULE | Refills: 0 | Status: SHIPPED | OUTPATIENT
Start: 2021-10-06 | End: 2021-11-08 | Stop reason: SDUPTHER

## 2021-10-06 NOTE — TELEPHONE ENCOUNTER
Yoni Victor called to request a refill on her medication. Sri Mitchell under media and attached. Last office visit : 7/21/2021 with Aubrey S [address]anie [address]. Next office visit : 10/21/2021 with Cindy SCANLON    Requested Prescriptions     Pending Prescriptions Disp Refills    gabapentin (NEURONTIN) 300 MG capsule 60 capsule 0     Sig: TAKE 2 CAPSULES BY MOUTH EVERY DAY AT 1000 Juan Carlos Munroe RN        7/21/2021 6:56 PM                             Progress Note     IN:  1010  OUT: 1030        Dorene Rojas 1965                           Chief Complaint   Patient presents with    Follow-up    Anxiety    Depression    Insomnia            Subjective:  Patient is a 64 y.o. female diagnosed with Bipolar 2 Disorder and presents today for follow-up. Last seen in clinic on 6/7/21 and prior records were reviewed.     Today patient states, \"Hanging in there. \" She reports that her friend has moved back and that she is doing better. She reports some anxiety attacks, her house needs repair and she can't afford it. She continues to have the bedbug problem. She has noticed that she dreams a little more without Prazosin but that Clonidine continues to work for the bruxism. She reports that it's nice to get 7-8 hrs of sleep.     Patient reports side effects as follows: none. No evidence of EPS, no cogwheeling or abnormal motor movements.     Absent  suicidal ideation.   Reports compliance with medications as good .      Current Substance Use:  See history     BP: There were no vitals taken for this visit.        Review of Systems - 14 point review:  Negative except being treated for:  depression, anxiety, panic attacks, suicidal dreams, weight gain, hx of right fractured ankle, enuresis, increased liver enzymes (being treated with spirolactone)        Constitutional: (fevers, chills, night sweats, wt loss/gain, change in appetite, fatigue, somnolence)     HEENT: (ear pain or discharge, hearing loss, ear ringing, sinus pressure, nosebleed, nasal discharge, sore throat, oral sores, tooth pain, bleeding gums, hoarse voice, neck pain)      Cardiovascular: (HTN, chest pain, elevated cholesterol/lipids, palpitations, leg swelling, leg pain with walking)     Respiratory: (cough, wheezing, snoring, SOB with activity (dyspnea), SOB while lying flat (orthopnea), awakening with severe SOB (paroxysmal nocturnal dyspnea))     Gastrointestinal: (NVD, constipation, abdominal pain, bright red stools, black tarry stools, stool incontinence)     Genitourinary:  (pelvic pain, burning or frequency of urination, urinary urgency, blood in urine incomplete bladder emptying, urinary incontinence, STD; MEN: testicular pain or swelling, erectile dysfunction; WOMEN: LMP, heavy menstrual bleeding (menorrhagia), irregular periods, postmenopausal bleeding, menstrual pain (dymenorrhea, vaginal discharge)     Musculoskeletal: (bone pain/fracture, joint pain or swelling, musle pain)     Integumentary: (rashes, acne, non-healing sores, itching, breast lumps, breast pain, nipple discharge, hair loss)     Neurologic: (HA, muscle weakness, paresthesias (numbness, coldness, crawling or prickling), memory loss, seizure, dizziness)     Psychiatric:  (anxiety, sadness, irritability/anger, insomnia, suicidality)     Endocrine: (heat or cold intolerance, excessive thirst (polydipsia), excessive hunger (polyphagia))     Immune/Allergic: (hives, seasonal or environmental allergies, HIV exposure)     Hematologic/Lymphatic: (lymph node enlargement, easy bleeding or bruising)     History obtained via chart review and patient     PCP is Radha Lassiter. Elton Number, DO, DO         Current Meds:     Home Medications           Prior to Admission medications    Medication Sig Start Date End Date Taking?  Authorizing Provider   cloNIDine (CATAPRES) 0.1 MG tablet TAKE 1 TABLET BY MOUTH EVERY DAY AT NIGHT 7/19/21     Andalusia Bone, APRN - NP   gabapentin (NEURONTIN) 300 MG capsule TAKE 2 CAPSULES BY MOUTH EVERY DAY AT NIGHT 6/30/21 7/30/21   Oliver Timks, APRN - CNP   eszopiclone (LUNESTA) 3 MG TABS Take 1 tablet by mouth nightly for 30 days.  6/30/21 7/30/21   Kwabena Spence, APRN - CNP   LATUDA 20 MG TABS tablet TAKE 1 TABLET BY MOUTH DAILY WITH SUPPER 6/16/21     Selene Lowe, APRN - NP   risperiDONE (RISPERDAL) 1 MG tablet Take 1 tablet by mouth nightly 3/3/21     Selene Lowe, APRN - NP   traZODone (DESYREL) 50 MG tablet Take 1 tablet by mouth nightly 3/3/21     Selene Mirandaows, APRN - NP   lamoTRIgine (LAMICTAL) 150 MG tablet Take 1 tablet by mouth daily 1/27/21     Selene Lowe, APRN - NP   oxybutynin (DITROPAN) 5 MG tablet TAKE 1 TABLET BY MOUTH EVERY DAY AT NIGHT 7/28/20     CAPO Amaro   spironolactone (ALDACTONE) 25 MG tablet   3/5/20     Historical Provider, MD   diclofenac (VOLTAREN) 75 MG EC tablet Take 75 mg by mouth 2 times daily       Historical Provider, MD   metoprolol succinate (TOPROL XL) 50 MG extended release tablet Take 50 mg by mouth daily 4/25/19 per direction of Dr. Jose Angel Dunn pt to take 1/2 tab daily.       Historical Provider, MD   Magnesium Oxide 250 MG TABS Take by mouth daily       Historical Provider, MD         Social History   Social History            Socioeconomic History    Marital status: Legally        Spouse name: Not on file    Number of children: 1    Years of education: 12th grade + some college    Highest education level: Not on file   Occupational History    Not on file   Tobacco Use    Smoking status: Current Every Day Smoker       Packs/day: 0.75    Smokeless tobacco: Never Used   Vaping Use    Vaping Use: Never used   Substance and Sexual Activity    Alcohol use: Not Currently       Comment: history of abuse, last drink was early December, 2018    Drug use: Not Currently       Types: Marijuana       Comment: last use was summer, 2017    Sexual activity: Not Currently   Other Topics Concern    Not on file   Social History Narrative     PRIOR MEDICATION TRIALS     Trazodone (having more suicidal dreams), at 100mg, not working for sleep     Seroquel (wt gain, 14 lb in 3 months, enuresis, indigestion, abnormal blood work, increased blood sugar, seizures)     Duloxetine (has not been effective and no noticeable change even with dose increases to 90mg)     Paxil, 20mg     Wellbutrin XL, (tried it recently to quit smoking)     Prozac (made her numb, added to her eating disorder)     Zoloft (thought she did well on this, took for a couple of years, she stopped it because when she returned to [facility] the doctor put her back on Prozac)     Remeron (increased her dreams and panic attacks)     Lunesta (worked)     Librium (in 2018 for 15 days to stop drinking alcohol)     Risperdal-stopped on 7/6/20 due to weight gain     Doxepin, started on 9/22/20 for sleep (not effective)     Prazosin, ineffective for dreams/nightmares     Latuda (1/27/21, reports that she switched it to am dosing because taking it at night made her RLS worse)           Psychiatric Review of Systems, 3/19/19           Mood:  Sadness, tearfulness, low appetite, low concentration, low energy, loss of interest, denies suicidality today       (Depression: sadness, tearfulness, sleep, appetite, energy, concentration, sexual function, guilt, psychomotor agitation or slowing, interest, suicidality)           Julia: She says that there have been periods of time when she could go 3 days without sleep, she does say that there have been days in a row when she has done reckless, impulsive things       (impulsivity, grandiosity, recklessness, excessive energy, decreased need for sleep, increased spending beyond means, hyperverbal, grandiose, racing thoughts, hypersexuality)           Other: anger from being tired all the time       (Irritability, lability, anger)           Anxiety:  She says that she worries every night about sleeping and panic attacks.  She says that she worries about her neighbors. She says that they always want something from her. She worries about running into them. She says that this causes panic attacks. She says that there is a lot of drug use in her neighborhood and she is fearful of her neighbors.       (Generalized anxiety: where, when, who, how long, how frequent)           Panic Disorder symptoms: She says that she is having panic attacks at night, 1 at night (this has improved from 3 weeks ago before she started Trazodone when she was having 2-3 at night). She says that she wakes up with dreams of her family and with her anxiety about [place] trailer park.       (Palpitations, racing heart beat, sweating, sense of impending doom, fear of recurrence, shortness of breath)           OCD symptoms:  She gets flustered if things are not in a routine, is ritualized about the way she dresses, and the way she laces her shoes       (checking, cleaning, organizing, rituals, hang-ups, obsessive thoughts, counting, rational vs. Irrational beliefs)           PTSD symptoms:  Increased startle response, wakes up with dreams at night, she also says she has flashbacks during the day.       (nightmares, flashbacks, startle respoinse, avoidance)           Social anxiety symptoms:  Negative           Simple phobias:   Heights, claustrophobia, snakes       (heights, planes, spiders, etc.)           Psychosis: She reports hearing and seeing things that aren't there, sees animals out her window that really aren't there. She says this happens almost every day. She says that she has never seen things when she wasn't depressed.       (hallucinations, auditory, visual, tactile, olfactory)           Paranoia: Feels that people are watching her most of the time.  She thinks this feeling is both irrational and rational.           Delusions:  Negative       (TV, radio, thought broadcasting, mind control, referential thinking)           Patient's perception: Negative       (Spiritual or cultural context of symptoms, reality testing)           ADHD symptoms: Negative           Eating Disorder symptoms:  Positive, lives almost entirely on whole milk, sometimes drinking ensure, she says that in the last month she has only eaten 3 meals, has occasionally eaten a bagel with a plain piece of bread.       (binging, purging, excessive exercising)      Social Determinants of Health          Financial Resource Strain:     Difficulty of Paying Living Expenses:    Food Insecurity:     Worried About Running Out of Food in the Last Year:     Ran Out of Food in the Last Year:    Transportation Needs:     Lack of Transportation (Medical):  Lack of Transportation (Non-Medical):    Physical Activity:     Days of Exercise per Week:     Minutes of Exercise per Session:    Stress:     Feeling of Stress :    Social Connections:     Frequency of Communication with Friends and Family:     Frequency of Social Gatherings with Friends and Family:     Attends Religion Services:     Active Member of Clubs or Organizations:     Attends Club or Organization Meetings:     Marital Status:    Intimate Partner Violence:     Fear of Current or Ex-Partner:     Emotionally Abused:     Physically Abused:     Sexually Abused:             MSE:  Patient is  A & O x 4. Appearance:  SAVANAH. Cognition:  Recent memory intact , remote memory intact , good fund of knowledge, average  intelligence level. Speech:  normal  Language: Naming: intact;  Word Finding: intact  Conversation no evidence of delusions  Behavior:  Cooperative  Mood: \"good\"   Affect: SAVANAH  Thought Content: negative delusions, negative hallucinations, negative obsessions,  negativehomicidal and negative suicidal   Thought Process: linear, goal directed and coherent (having trouble keeping thoughts together, probably due to lack of sleep)  Associations: logical connections  Attention Span and concentration: Normal   Judgement Insight:  normal and appropriate  Gait and Station: [facility]   Sleep: avg 7-8 hrs    Appetite: ok                    Lab Results   Component Value Date      (L) 03/10/2020     K 4.7 03/10/2020     CL 89 (L) 03/10/2020     CO2 18 (L) 03/10/2020     BUN 19 03/10/2020     CREATININE 0.9 03/10/2020     GLUCOSE 107 03/10/2020     CALCIUM 9.1 03/10/2020     PROT 8.6 11/10/2015     LABALBU 5.0 11/10/2015     ALKPHOS 127 (H) 11/10/2015     AST 33 (H) 11/10/2015     ALT 22 11/10/2015     LABGLOM >60 03/10/2020            Lab Results   Component Value Date      03/10/2020     K 4.7 03/10/2020     CL 89 03/10/2020     CO2 18 03/10/2020     BUN 19 03/10/2020     CREATININE 0.9 03/10/2020     GLUCOSE 107 03/10/2020     CALCIUM 9.1 03/10/2020      No results found for: CHOL  No results found for: TRIG  No results found for: HDL  No results found for: LDLCHOLESTEROL, LDLCALC  No results found for: LABVLDL, VLDL  No results found for: CHOLHDLRATIO  No results found for: LABA1C  No results found for: EAG        Lab Results   Component Value Date     TSH 2.40 02/03/2015            Lab Results   Component Value Date     VITD25 27.6 (L) 02/03/2015         Assessments Administered:  PHQ9: 9, mild  GAD7: 8, mild (a couple of panic attacks since the last visit)     COGNITION SCREEN:     Can spell the word, \"world,\" backwards: Yes  Can do serial 7's: Yes        Assessment:    1. Bipolar affective disorder, mixed, mild ([facility] [address]    2. Generalized anxiety disorder with panic attacks    3. Insomnia due to other mental disorder    4.  Bruxism          Plan:  Continue:  Lamotrigine, 150mg,  Daily     Eszopiclone (Lunesta), 3mg, nightly for sleep (she has tried going off of this with no success, can't sleep without it)     Gabapentin, 300mg, take 2 capsules nightly (restless legs)  Lurasidone, 20mg, daily in the morning (with 350 calories)  Clonidine, 0.1mg, nightly (teeth grinding, nightmares), can lower BP, get your balance before you get up to walk     Risperdal, 1mg, nightly  Trazodone, 50mg, nightly     Continue therapy with Val Merrill     Try the [facility] tabby for sleep     Follow up: Return in about 3 months (around 10/21/2021).    If you become suicidal, follow up with this office or call the [facility] at [phone]), or dial 911.     1. The risks, benefits, side effects, indications, contraindications, and adverse effects of the medications have been discussed. Yes.  2. The pt has verbalized understanding and has capacity to give informed consent. 3. The Pete Wilkes report has been reviewed according to [facility] regulations. 4. Supportive therapy offered. 5. Follow up:    Return in about 3 months (around 10/21/2021). 6. The patient has been advised to call with any problems. 7. Controlled substance Treatment Plan: new medication for sleep (eszopiclone). 8. The above listed medications have been continued, modifications in meds and other orders/labs as follows:                   Encounter Medications    No orders of the defined types were placed in this encounter.                    No orders of the defined types were placed in this encounter.        9. Additional comments: She says that she is doing better on these combination of medications than she has ever done in her life and that it is the first time that she has been able to sleep (she is on 3 different medications to help with sleep). She reports that she is dreaming a little more without Prazosin but that they aren't terrible dreams and aren't bothering her. Will make no medication changes today. Patient was informed that this provider is moving back to [place] in October. She is fine to follow up with another provider in this office in about 3 months. ... 10.Over 50% of the total visit time of 20 minutes was spent on counseling and/or coordination of care of:                         1. Bipolar affective disorder, mixed, mild ([address]    2.  Generalized anxiety disorder with panic attacks 3. Insomnia due to other mental disorder    4.  Bruxism                        Psychotherapy Topics: mood/medication effectiveness         CAPO Acevedo Ala - NP PMHNP-BC

## 2021-10-07 ENCOUNTER — TELEPHONE (OUTPATIENT)
Dept: PSYCHIATRY | Age: 56
End: 2021-10-07

## 2021-10-18 ENCOUNTER — TELEPHONE (OUTPATIENT)
Dept: PSYCHIATRY | Age: 56
End: 2021-10-18

## 2021-10-18 NOTE — TELEPHONE ENCOUNTER
Pt called to cancel/reschedule her appt for 10/21 with Cindy Lozoya because she doesn't have the money right now to pay for the visit. Rescheduled for 11/19 @10:30.     Electronically signed by Betsey Sorenson MA on 10/18/2021 at 11:22 AM

## 2021-10-25 ENCOUNTER — TELEPHONE (OUTPATIENT)
Dept: PSYCHIATRY | Age: 56
End: 2021-10-25

## 2021-10-25 RX ORDER — CLONIDINE HYDROCHLORIDE 0.1 MG/1
TABLET ORAL
Qty: 90 TABLET | Refills: 0 | Status: SHIPPED | OUTPATIENT
Start: 2021-10-25 | End: 2022-01-25 | Stop reason: SDUPTHER

## 2021-10-25 NOTE — TELEPHONE ENCOUNTER
Ki Spann called to request a refill on her medication. Last office visit : 7/21/2021 with Mahoney Callum Villeda  Next office visit : 11/19/2021 with Kate VILLEDA    Requested Prescriptions     Pending Prescriptions Disp Refills    cloNIDine (CATAPRES) 0.1 MG tablet 90 tablet 0     Sig: TAKE 1 TABLET BY MOUTH EVERY DAY AT 1000 Juan Carlos Munroe RN        7/21/2021 6:56 PM                             Progress Note     IN:  1010  OUT: 1030        Dorene Rojas 1965                           Chief Complaint   Patient presents with    Follow-up    Anxiety    Depression    Insomnia            Subjective:  Patient is a 64 y.o. female diagnosed with Bipolar 2 Disorder and presents today for follow-up. Last seen in clinic on 6/7/21 and prior records were reviewed.     Today patient states, \"Hanging in there. \" She reports that her friend has moved back and that she is doing better. She reports some anxiety attacks, her house needs repair and she can't afford it. She continues to have the bedbug problem. She has noticed that she dreams a little more without Prazosin but that Clonidine continues to work for the bruxism. She reports that it's nice to get 7-8 hrs of sleep.     Patient reports side effects as follows: none. No evidence of EPS, no cogwheeling or abnormal motor movements.     Absent  suicidal ideation.   Reports compliance with medications as good .      Current Substance Use:  See history     BP: There were no vitals taken for this visit.        Review of Systems - 14 point review:  Negative except being treated for:  depression, anxiety, panic attacks, suicidal dreams, weight gain, hx of right fractured ankle, enuresis, increased liver enzymes (being treated with spirolactone)        Constitutional: (fevers, chills, night sweats, wt loss/gain, change in appetite, fatigue, somnolence)     HEENT: (ear pain or discharge, hearing loss, ear ringing, sinus pressure, nosebleed, nasal discharge, sore throat, oral sores, tooth pain, bleeding gums, hoarse voice, neck pain)      Cardiovascular: (HTN, chest pain, elevated cholesterol/lipids, palpitations, leg swelling, leg pain with walking)     Respiratory: (cough, wheezing, snoring, SOB with activity (dyspnea), SOB while lying flat (orthopnea), awakening with severe SOB (paroxysmal nocturnal dyspnea))     Gastrointestinal: (NVD, constipation, abdominal pain, bright red stools, black tarry stools, stool incontinence)     Genitourinary:  (pelvic pain, burning or frequency of urination, urinary urgency, blood in urine incomplete bladder emptying, urinary incontinence, STD; MEN: testicular pain or swelling, erectile dysfunction; WOMEN: LMP, heavy menstrual bleeding (menorrhagia), irregular periods, postmenopausal bleeding, menstrual pain (dymenorrhea, vaginal discharge)     Musculoskeletal: (bone pain/fracture, joint pain or swelling, musle pain)     Integumentary: (rashes, acne, non-healing sores, itching, breast lumps, breast pain, nipple discharge, hair loss)     Neurologic: (HA, muscle weakness, paresthesias (numbness, coldness, crawling or prickling), memory loss, seizure, dizziness)     Psychiatric:  (anxiety, sadness, irritability/anger, insomnia, suicidality)     Endocrine: (heat or cold intolerance, excessive thirst (polydipsia), excessive hunger (polyphagia))     Immune/Allergic: (hives, seasonal or environmental allergies, HIV exposure)     Hematologic/Lymphatic: (lymph node enlargement, easy bleeding or bruising)     History obtained via chart review and patient     PCP is Bryan Roberts. Anastacio Jeffery DO, DO         Current Meds:     Home Medications           Prior to Admission medications    Medication Sig Start Date End Date Taking?  Authorizing Provider   cloNIDine (CATAPRES) 0.1 MG tablet TAKE 1 TABLET BY MOUTH EVERY DAY AT NIGHT 7/19/21     CAPO Martinez - NP   gabapentin (NEURONTIN) 300 MG capsule TAKE 2 CAPSULES BY MOUTH EVERY DAY AT NIGHT 6/30/21 7/30/21   Oliver Ibanpillo Snow, APRN - CNP   eszopiclone (LUNESTA) 3 MG TABS Take 1 tablet by mouth nightly for 30 days.  6/30/21 7/30/21   Luis Boston, APRN - CNP   LATUDA 20 MG TABS tablet TAKE 1 TABLET BY MOUTH DAILY WITH SUPPER 6/16/21     Alvester Anselmo, APRN - NP   risperiDONE (RISPERDAL) 1 MG tablet Take 1 tablet by mouth nightly 3/3/21     Alvester Anselmo, APRN - NP   traZODone (DESYREL) 50 MG tablet Take 1 tablet by mouth nightly 3/3/21     Alvester Anselmo, APRN - NP   lamoTRIgine (LAMICTAL) 150 MG tablet Take 1 tablet by mouth daily 1/27/21     Alvester Anselmo, APRN - NP   oxybutynin (DITROPAN) 5 MG tablet TAKE 1 TABLET BY MOUTH EVERY DAY AT NIGHT 7/28/20     CAPO Saleem   spironolactone (ALDACTONE) 25 MG tablet   3/5/20     Historical Provider, MD   diclofenac (VOLTAREN) 75 MG EC tablet Take 75 mg by mouth 2 times daily       Historical Provider, MD   metoprolol succinate (TOPROL XL) 50 MG extended release tablet Take 50 mg by mouth daily 4/25/19 per direction of Dr. Soto Senior pt to take 1/2 tab daily.       Historical Provider, MD   Magnesium Oxide 250 MG TABS Take by mouth daily       Historical Provider, MD         Social History   Social History            Socioeconomic History    Marital status: Legally        Spouse name: Not on file    Number of children: 1    Years of education: 12th grade + some college    Highest education level: Not on file   Occupational History    Not on file   Tobacco Use    Smoking status: Current Every Day Smoker       Packs/day: 0.75    Smokeless tobacco: Never Used   Vaping Use    Vaping Use: Never used   Substance and Sexual Activity    Alcohol use: Not Currently       Comment: history of abuse, last drink was early December, 2018    Drug use: Not Currently       Types: Marijuana       Comment: last use was summer, 2017    Sexual activity: Not Currently   Other Topics Concern    Not on file   Social History Narrative     PRIOR MEDICATION TRIALS     Trazodone (having more suicidal dreams), at 100mg, not working for sleep     Seroquel (wt gain, 14 lb in 3 months, enuresis, indigestion, abnormal blood work, increased blood sugar, seizures)     Duloxetine (has not been effective and no noticeable change even with dose increases to 90mg)     Paxil, 20mg     Wellbutrin XL, (tried it recently to quit smoking)     Prozac (made her numb, added to her eating disorder)     Zoloft (thought she did well on this, took for a couple of years, she stopped it because when she returned to [facility] the doctor put her back on Prozac)     Remeron (increased her dreams and panic attacks)     Lunesta (worked)     Librium (in 2018 for 15 days to stop drinking alcohol)     Risperdal-stopped on 7/6/20 due to weight gain     Doxepin, started on 9/22/20 for sleep (not effective)     Prazosin, ineffective for dreams/nightmares     Latuda (1/27/21, reports that she switched it to am dosing because taking it at night made her RLS worse)           Psychiatric Review of Systems, 3/19/19           Mood:  Sadness, tearfulness, low appetite, low concentration, low energy, loss of interest, denies suicidality today       (Depression: sadness, tearfulness, sleep, appetite, energy, concentration, sexual function, guilt, psychomotor agitation or slowing, interest, suicidality)           Julia: She says that there have been periods of time when she could go 3 days without sleep, she does say that there have been days in a row when she has done reckless, impulsive things       (impulsivity, grandiosity, recklessness, excessive energy, decreased need for sleep, increased spending beyond means, hyperverbal, grandiose, racing thoughts, hypersexuality)           Other: anger from being tired all the time       (Irritability, lability, anger)           Anxiety:  She says that she worries every night about sleeping and panic attacks. She says that she worries about her neighbors.  She says that they always want something from her. She worries about running into them. She says that this causes panic attacks. She says that there is a lot of drug use in her neighborhood and she is fearful of her neighbors.       (Generalized anxiety: where, when, who, how long, how frequent)           Panic Disorder symptoms: She says that she is having panic attacks at night, 1 at night (this has improved from 3 weeks ago before she started Trazodone when she was having 2-3 at night). She says that she wakes up with dreams of her family and with her anxiety about [place] trailer park.       (Palpitations, racing heart beat, sweating, sense of impending doom, fear of recurrence, shortness of breath)           OCD symptoms:  She gets flustered if things are not in a routine, is ritualized about the way she dresses, and the way she laces her shoes       (checking, cleaning, organizing, rituals, hang-ups, obsessive thoughts, counting, rational vs. Irrational beliefs)           PTSD symptoms:  Increased startle response, wakes up with dreams at night, she also says she has flashbacks during the day.       (nightmares, flashbacks, startle respoinse, avoidance)           Social anxiety symptoms:  Negative           Simple phobias:   Heights, claustrophobia, snakes       (heights, planes, spiders, etc.)           Psychosis: She reports hearing and seeing things that aren't there, sees animals out her window that really aren't there. She says this happens almost every day. She says that she has never seen things when she wasn't depressed.       (hallucinations, auditory, visual, tactile, olfactory)           Paranoia: Feels that people are watching her most of the time.  She thinks this feeling is both irrational and rational.           Delusions:  Negative       (TV, radio, thought broadcasting, mind control, referential thinking)           Patient's perception: Negative       (Spiritual or cultural context of symptoms, reality 7-8 hrs    Appetite: ok                    Lab Results   Component Value Date      (L) 03/10/2020     K 4.7 03/10/2020     CL 89 (L) 03/10/2020     CO2 18 (L) 03/10/2020     BUN 19 03/10/2020     CREATININE 0.9 03/10/2020     GLUCOSE 107 03/10/2020     CALCIUM 9.1 03/10/2020     PROT 8.6 11/10/2015     LABALBU 5.0 11/10/2015     ALKPHOS 127 (H) 11/10/2015     AST 33 (H) 11/10/2015     ALT 22 11/10/2015     LABGLOM >60 03/10/2020            Lab Results   Component Value Date      03/10/2020     K 4.7 03/10/2020     CL 89 03/10/2020     CO2 18 03/10/2020     BUN 19 03/10/2020     CREATININE 0.9 03/10/2020     GLUCOSE 107 03/10/2020     CALCIUM 9.1 03/10/2020      No results found for: CHOL  No results found for: TRIG  No results found for: HDL  No results found for: LDLCHOLESTEROL, LDLCALC  No results found for: LABVLDL, VLDL  No results found for: CHOLHDLRATIO  No results found for: LABA1C  No results found for: EAG        Lab Results   Component Value Date     TSH 2.40 02/03/2015            Lab Results   Component Value Date     VITD25 27.6 (L) 02/03/2015         Assessments Administered:  PHQ9: 9, mild  GAD7: 8, mild (a couple of panic attacks since the last visit)     COGNITION SCREEN:     Can spell the word, \"world,\" backwards: Yes  Can do serial 7's: Yes        Assessment:    1. Bipolar affective disorder, mixed, mild ([facility] [address]    2. Generalized anxiety disorder with panic attacks    3. Insomnia due to other mental disorder    4.  Bruxism          Plan:  Continue:  Lamotrigine, 150mg,  Daily     Eszopiclone (Lunesta), 3mg, nightly for sleep (she has tried going off of this with no success, can't sleep without it)     Gabapentin, 300mg, take 2 capsules nightly (restless legs)  Lurasidone, 20mg, daily in the morning (with 350 calories)  Clonidine, 0.1mg, nightly (teeth grinding, nightmares), can lower BP, get your balance before you get up to walk     Risperdal, 1mg, nightly  Trazodone, 50mg, other mental disorder    4.  Bruxism                        Psychotherapy Topics: mood/medication effectiveness         Mendez Hendricks, APRN - NP PMHNP-BC

## 2021-10-25 NOTE — TELEPHONE ENCOUNTER
Pt made aware that refill RX for Clonidine had been sent to her pharmacy per Pérez SCANLON as she requested.

## 2021-10-27 ENCOUNTER — TELEPHONE (OUTPATIENT)
Dept: PSYCHIATRY | Age: 56
End: 2021-10-27

## 2021-10-27 DIAGNOSIS — F99 INSOMNIA DUE TO OTHER MENTAL DISORDER: ICD-10-CM

## 2021-10-27 DIAGNOSIS — F51.05 INSOMNIA DUE TO OTHER MENTAL DISORDER: ICD-10-CM

## 2021-10-27 RX ORDER — ESZOPICLONE 3 MG/1
3 TABLET, FILM COATED ORAL NIGHTLY
Qty: 30 TABLET | Refills: 0 | Status: SHIPPED | OUTPATIENT
Start: 2021-10-27 | End: 2021-11-26

## 2021-10-27 NOTE — TELEPHONE ENCOUNTER
pressure, nosebleed, nasal discharge, sore throat, oral sores, tooth pain, bleeding gums, hoarse voice, neck pain)      Cardiovascular: (HTN, chest pain, elevated cholesterol/lipids, palpitations, leg swelling, leg pain with walking)     Respiratory: (cough, wheezing, snoring, SOB with activity (dyspnea), SOB while lying flat (orthopnea), awakening with severe SOB (paroxysmal nocturnal dyspnea))     Gastrointestinal: (NVD, constipation, abdominal pain, bright red stools, black tarry stools, stool incontinence)     Genitourinary:  (pelvic pain, burning or frequency of urination, urinary urgency, blood in urine incomplete bladder emptying, urinary incontinence, STD; MEN: testicular pain or swelling, erectile dysfunction; WOMEN: LMP, heavy menstrual bleeding (menorrhagia), irregular periods, postmenopausal bleeding, menstrual pain (dymenorrhea, vaginal discharge)     Musculoskeletal: (bone pain/fracture, joint pain or swelling, musle pain)     Integumentary: (rashes, acne, non-healing sores, itching, breast lumps, breast pain, nipple discharge, hair loss)     Neurologic: (HA, muscle weakness, paresthesias (numbness, coldness, crawling or prickling), memory loss, seizure, dizziness)     Psychiatric:  (anxiety, sadness, irritability/anger, insomnia, suicidality)     Endocrine: (heat or cold intolerance, excessive thirst (polydipsia), excessive hunger (polyphagia))     Immune/Allergic: (hives, seasonal or environmental allergies, HIV exposure)     Hematologic/Lymphatic: (lymph node enlargement, easy bleeding or bruising)     History obtained via chart review and patient     PCP is Truman Ojeda. Lucrecia Middleton DO, DO         Current Meds:     Home Medications           Prior to Admission medications    Medication Sig Start Date End Date Taking?  Authorizing Provider   cloNIDine (CATAPRES) 0.1 MG tablet TAKE 1 TABLET BY MOUTH EVERY DAY AT NIGHT 7/19/21     Ahcorrie Capone, APRN - NP   gabapentin (NEURONTIN) 300 MG capsule TAKE 2 CAPSULES BY MOUTH EVERY DAY AT NIGHT 6/30/21 7/30/21   Oliver Hester, APRN - CNP   eszopiclone (LUNESTA) 3 MG TABS Take 1 tablet by mouth nightly for 30 days.  6/30/21 7/30/21   Jass Purcell, APRN - CNP   LATUDA 20 MG TABS tablet TAKE 1 TABLET BY MOUTH DAILY WITH SUPPER 6/16/21     Opal Irvin, APRN - NP   risperiDONE (RISPERDAL) 1 MG tablet Take 1 tablet by mouth nightly 3/3/21     Opal Moellers, APRN - NP   traZODone (DESYREL) 50 MG tablet Take 1 tablet by mouth nightly 3/3/21     Opal Moellers, APRN - NP   lamoTRIgine (LAMICTAL) 150 MG tablet Take 1 tablet by mouth daily 1/27/21     Opal Bryan, APRN - NP   oxybutynin (DITROPAN) 5 MG tablet TAKE 1 TABLET BY MOUTH EVERY DAY AT NIGHT 7/28/20     CAPO Jones   spironolactone (ALDACTONE) 25 MG tablet   3/5/20     Historical Provider, MD   diclofenac (VOLTAREN) 75 MG EC tablet Take 75 mg by mouth 2 times daily       Historical Provider, MD   metoprolol succinate (TOPROL XL) 50 MG extended release tablet Take 50 mg by mouth daily 4/25/19 per direction of Dr. Cristina Dowling pt to take 1/2 tab daily.       Historical Provider, MD   Magnesium Oxide 250 MG TABS Take by mouth daily       Historical Provider, MD         Social History   Social History            Socioeconomic History    Marital status: Legally        Spouse name: Not on file    Number of children: 1    Years of education: 12th grade + some college    Highest education level: Not on file   Occupational History    Not on file   Tobacco Use    Smoking status: Current Every Day Smoker       Packs/day: 0.75    Smokeless tobacco: Never Used   Vaping Use    Vaping Use: Never used   Substance and Sexual Activity    Alcohol use: Not Currently       Comment: history of abuse, last drink was early December, 2018    Drug use: Not Currently       Types: Marijuana       Comment: last use was summer, 2017    Sexual activity: Not Currently   Other Topics Concern    Not on file   Social History Narrative     PRIOR MEDICATION TRIALS     Trazodone (having more suicidal dreams), at 100mg, not working for sleep     Seroquel (wt gain, 14 lb in 3 months, enuresis, indigestion, abnormal blood work, increased blood sugar, seizures)     Duloxetine (has not been effective and no noticeable change even with dose increases to 90mg)     Paxil, 20mg     Wellbutrin XL, (tried it recently to quit smoking)     Prozac (made her numb, added to her eating disorder)     Zoloft (thought she did well on this, took for a couple of years, she stopped it because when she returned to [facility] the doctor put her back on Prozac)     Remeron (increased her dreams and panic attacks)     Lunesta (worked)     Librium (in 2018 for 15 days to stop drinking alcohol)     Risperdal-stopped on 7/6/20 due to weight gain     Doxepin, started on 9/22/20 for sleep (not effective)     Prazosin, ineffective for dreams/nightmares     Latuda (1/27/21, reports that she switched it to am dosing because taking it at night made her RLS worse)           Psychiatric Review of Systems, 3/19/19           Mood:  Sadness, tearfulness, low appetite, low concentration, low energy, loss of interest, denies suicidality today       (Depression: sadness, tearfulness, sleep, appetite, energy, concentration, sexual function, guilt, psychomotor agitation or slowing, interest, suicidality)           Julia: She says that there have been periods of time when she could go 3 days without sleep, she does say that there have been days in a row when she has done reckless, impulsive things       (impulsivity, grandiosity, recklessness, excessive energy, decreased need for sleep, increased spending beyond means, hyperverbal, grandiose, racing thoughts, hypersexuality)           Other: anger from being tired all the time       (Irritability, lability, anger)           Anxiety:  She says that she worries every night about sleeping and panic attacks.  She says that she worries about her neighbors. She says that they always want something from her. She worries about running into them. She says that this causes panic attacks. She says that there is a lot of drug use in her neighborhood and she is fearful of her neighbors.       (Generalized anxiety: where, when, who, how long, how frequent)           Panic Disorder symptoms: She says that she is having panic attacks at night, 1 at night (this has improved from 3 weeks ago before she started Trazodone when she was having 2-3 at night). She says that she wakes up with dreams of her family and with her anxiety about [place].       (Palpitations, racing heart beat, sweating, sense of impending doom, fear of recurrence, shortness of breath)           OCD symptoms:  She gets flustered if things are not in a routine, is ritualized about the way she dresses, and the way she laces her shoes       (checking, cleaning, organizing, rituals, hang-ups, obsessive thoughts, counting, rational vs. Irrational beliefs)           PTSD symptoms:  Increased startle response, wakes up with dreams at night, she also says she has flashbacks during the day.       (nightmares, flashbacks, startle respoinse, avoidance)           Social anxiety symptoms:  Negative           Simple phobias:   Heights, claustrophobia, snakes       (heights, planes, spiders, etc.)           Psychosis: She reports hearing and seeing things that aren't there, sees animals out her window that really aren't there. She says this happens almost every day. She says that she has never seen things when she wasn't depressed.       (hallucinations, auditory, visual, tactile, olfactory)           Paranoia: Feels that people are watching her most of the time.  She thinks this feeling is both irrational and rational.           Delusions:  Negative       (TV, radio, thought broadcasting, mind control, referential thinking)           Patient's perception: Negative       (Spiritual or cultural context of symptoms, reality testing)           ADHD symptoms: Negative           Eating Disorder symptoms:  Positive, lives almost entirely on whole milk, sometimes drinking ensure, she says that in the last month she has only eaten 3 meals, has occasionally eaten a bagel with a plain piece of bread.       (binging, purging, excessive exercising)      Social Determinants of Health          Financial Resource Strain:     Difficulty of Paying Living Expenses:    Food Insecurity:     Worried About Running Out of Food in the Last Year:     Ran Out of Food in the Last Year:    Transportation Needs:     Lack of Transportation (Medical):  Lack of Transportation (Non-Medical):    Physical Activity:     Days of Exercise per Week:     Minutes of Exercise per Session:    Stress:     Feeling of Stress :    Social Connections:     Frequency of Communication with Friends and Family:     Frequency of Social Gatherings with Friends and Family:     Attends [employer] Services:     Active Member of Clubs or Organizations:     Attends Club or Organization Meetings:     Marital Status:    Intimate Partner Violence:     Fear of Current or Ex-Partner:     Emotionally Abused:     Physically Abused:     Sexually Abused:             MSE:  Patient is  A & O x 4. Appearance:  SAVANAH. Cognition:  Recent memory intact , remote memory intact , good fund of knowledge, average  intelligence level. Speech:  normal  Language: Naming: intact;  Word Finding: intact  Conversation no evidence of delusions  Behavior:  Cooperative  Mood: \"good\"   Affect: SAVANAH  Thought Content: negative delusions, negative hallucinations, negative obsessions,  negativehomicidal and negative suicidal   Thought Process: linear, goal directed and coherent (having trouble keeping thoughts together, probably due to lack of sleep)  Associations: logical connections  Attention Span and concentration: Normal   Judgement Insight:  normal and appropriate  Gait and Station: [facility]   Sleep: avg 7-8 hrs    Appetite: ok                    Lab Results   Component Value Date      (L) 03/10/2020     K 4.7 03/10/2020     CL 89 (L) 03/10/2020     CO2 18 (L) 03/10/2020     BUN 19 03/10/2020     CREATININE 0.9 03/10/2020     GLUCOSE 107 03/10/2020     CALCIUM 9.1 03/10/2020     PROT 8.6 11/10/2015     LABALBU 5.0 11/10/2015     ALKPHOS 127 (H) 11/10/2015     AST 33 (H) 11/10/2015     ALT 22 11/10/2015     LABGLOM >60 03/10/2020            Lab Results   Component Value Date      03/10/2020     K 4.7 03/10/2020     CL 89 03/10/2020     CO2 18 03/10/2020     BUN 19 03/10/2020     CREATININE 0.9 03/10/2020     GLUCOSE 107 03/10/2020     CALCIUM 9.1 03/10/2020      No results found for: CHOL  No results found for: TRIG  No results found for: HDL  No results found for: LDLCHOLESTEROL, LDLCALC  No results found for: LABVLDL, VLDL  No results found for: CHOLHDLRATIO  No results found for: LABA1C  No results found for: EAG        Lab Results   Component Value Date     TSH 2.40 02/03/2015            Lab Results   Component Value Date     VITD25 27.6 (L) 02/03/2015         Assessments Administered:  PHQ9: 9, mild  GAD7: 8, mild (a couple of panic attacks since the last visit)     COGNITION SCREEN:     Can spell the word, \"world,\" backwards: Yes  Can do serial 7's: Yes        Assessment:    1. Bipolar affective disorder, mixed, mild ([facility] [address]    2. Generalized anxiety disorder with panic attacks    3. Insomnia due to other mental disorder    4.  Bruxism          Plan:  Continue:  Lamotrigine, 150mg,  Daily     Eszopiclone (Lunesta), 3mg, nightly for sleep (she has tried going off of this with no success, can't sleep without it)     Gabapentin, 300mg, take 2 capsules nightly (restless legs)  Lurasidone, 20mg, daily in the morning (with 350 calories)  Clonidine, 0.1mg, nightly (teeth grinding, nightmares), can lower BP, get your balance before you get up to walk     Risperdal, 1mg, nightly  Trazodone, 50mg, nightly     Continue therapy with Jeff Jorgensen     Try the [facility] tabby for sleep     Follow up: Return in about 3 months (around 10/21/2021).    If you become suicidal, follow up with this office or call the [facility] at [phone] ([phone]), or dial 911.     1. The risks, benefits, side effects, indications, contraindications, and adverse effects of the medications have been discussed. Yes.  2. The pt has verbalized understanding and has capacity to give informed consent. 3. The Mirian Foster report has been reviewed according to [facility] regulations. 4. Supportive therapy offered. 5. Follow up:    Return in about 3 months (around 10/21/2021). 6. The patient has been advised to call with any problems. 7. Controlled substance Treatment Plan: new medication for sleep (eszopiclone). 8. The above listed medications have been continued, modifications in meds and other orders/labs as follows:                   Encounter Medications    No orders of the defined types were placed in this encounter.                    No orders of the defined types were placed in this encounter.        9. Additional comments: She says that she is doing better on these combination of medications than she has ever done in her life and that it is the first time that she has been able to sleep (she is on 3 different medications to help with sleep). She reports that she is dreaming a little more without Prazosin but that they aren't terrible dreams and aren't bothering her. Will make no medication changes today. Patient was informed that this provider is moving back to [place] in October. She is fine to follow up with another provider in this office in about 3 months. ... 10.Over 50% of the total visit time of 20 minutes was spent on counseling and/or coordination of care of:                         1. Bipolar affective disorder, mixed, mild ([address]    2.  Generalized anxiety disorder with panic attacks 3. Insomnia due to other mental disorder    4.  Bruxism                        Psychotherapy Topics: mood/medication effectiveness         Selene Lowe, APRN - NP PMHNP-BC

## 2021-10-27 NOTE — TELEPHONE ENCOUNTER
Attempted to contact pt to inform her that refill RX for Lunesta had been sent to her pharmacy per Paulina SCANLON as she had requested-no answer.

## 2021-11-08 ENCOUNTER — TELEPHONE (OUTPATIENT)
Dept: PSYCHIATRY | Age: 56
End: 2021-11-08

## 2021-11-08 DIAGNOSIS — F41.1 GENERALIZED ANXIETY DISORDER: ICD-10-CM

## 2021-11-08 RX ORDER — GABAPENTIN 300 MG/1
CAPSULE ORAL
Qty: 60 CAPSULE | Refills: 0 | Status: SHIPPED | OUTPATIENT
Start: 2021-11-08 | End: 2021-12-08 | Stop reason: SDUPTHER

## 2021-11-08 NOTE — TELEPHONE ENCOUNTER
Tal Davies called to request a refill on her medication. Cari Linda under media and attached. Last office visit : 7/21/2021 with Aubrey Rosado. Next office visit : 11/19/2021 with Ant SCANLON. Requested Prescriptions     Pending Prescriptions Disp Refills    gabapentin (NEURONTIN) 300 MG capsule 60 capsule 0     Sig: TAKE 2 CAPSULES BY MOUTH EVERY DAY AT NIGHT            Julien Rodriguez RN        7/21/2021 6:56 PM                             Progress Note     IN:  1010  OUT: 1030        Dorene Rojas 1965                           Chief Complaint   Patient presents with    Follow-up    Anxiety    Depression    Insomnia            Subjective:  Patient is a 64 y.o. female diagnosed with Bipolar 2 Disorder and presents today for follow-up. Last seen in clinic on 6/7/21 and prior records were reviewed.     Today patient states, \"Hanging in there. \" She reports that her friend has moved back and that she is doing better. She reports some anxiety attacks, her house needs repair and she can't afford it. She continues to have the bedbug problem. She has noticed that she dreams a little more without Prazosin but that Clonidine continues to work for the bruxism. She reports that it's nice to get 7-8 hrs of sleep.     Patient reports side effects as follows: none. No evidence of EPS, no cogwheeling or abnormal motor movements.     Absent  suicidal ideation.   Reports compliance with medications as good .      Current Substance Use:  See history     BP: There were no vitals taken for this visit.        Review of Systems - 14 point review:  Negative except being treated for:  depression, anxiety, panic attacks, suicidal dreams, weight gain, hx of right fractured ankle, enuresis, increased liver enzymes (being treated with spirolactone)        Constitutional: (fevers, chills, night sweats, wt loss/gain, change in appetite, fatigue, somnolence)     HEENT: (ear pain or discharge, hearing loss, ear ringing, sinus pressure, nosebleed, nasal discharge, sore throat, oral sores, tooth pain, bleeding gums, hoarse voice, neck pain)      Cardiovascular: (HTN, chest pain, elevated cholesterol/lipids, palpitations, leg swelling, leg pain with walking)     Respiratory: (cough, wheezing, snoring, SOB with activity (dyspnea), SOB while lying flat (orthopnea), awakening with severe SOB (paroxysmal nocturnal dyspnea))     Gastrointestinal: (NVD, constipation, abdominal pain, bright red stools, black tarry stools, stool incontinence)     Genitourinary:  (pelvic pain, burning or frequency of urination, urinary urgency, blood in urine incomplete bladder emptying, urinary incontinence, STD; MEN: testicular pain or swelling, erectile dysfunction; WOMEN: LMP, heavy menstrual bleeding (menorrhagia), irregular periods, postmenopausal bleeding, menstrual pain (dymenorrhea, vaginal discharge)     Musculoskeletal: (bone pain/fracture, joint pain or swelling, musle pain)     Integumentary: (rashes, acne, non-healing sores, itching, breast lumps, breast pain, nipple discharge, hair loss)     Neurologic: (HA, muscle weakness, paresthesias (numbness, coldness, crawling or prickling), memory loss, seizure, dizziness)     Psychiatric:  (anxiety, sadness, irritability/anger, insomnia, suicidality)     Endocrine: (heat or cold intolerance, excessive thirst (polydipsia), excessive hunger (polyphagia))     Immune/Allergic: (hives, seasonal or environmental allergies, HIV exposure)     Hematologic/Lymphatic: (lymph node enlargement, easy bleeding or bruising)     History obtained via chart review and patient     PCP is Juliana Mena. Zee Martino DO, DO         Current Meds:     Home Medications           Prior to Admission medications    Medication Sig Start Date End Date Taking?  Authorizing Provider   cloNIDine (CATAPRES) 0.1 MG tablet TAKE 1 TABLET BY MOUTH EVERY DAY AT NIGHT 7/19/21     CAPO Garcia NP   gabapentin (NEURONTIN) 300 MG capsule TAKE 2 CAPSULES BY MOUTH EVERY DAY AT NIGHT 6/30/21 7/30/21   Oliver Timks, APRN - CNP   eszopiclone (LUNESTA) 3 MG TABS Take 1 tablet by mouth nightly for 30 days.  6/30/21 7/30/21   Jered Clarke, APRN - CNP   LATUDA 20 MG TABS tablet TAKE 1 TABLET BY MOUTH DAILY WITH SUPPER 6/16/21     Rosendo Bristle, APRN - NP   risperiDONE (RISPERDAL) 1 MG tablet Take 1 tablet by mouth nightly 3/3/21     Rosendo Bristle, APRN - NP   traZODone (DESYREL) 50 MG tablet Take 1 tablet by mouth nightly 3/3/21     Rosendo Bristle, APRN - NP   lamoTRIgine (LAMICTAL) 150 MG tablet Take 1 tablet by mouth daily 1/27/21     Rosendo Bristle, APRN - NP   oxybutynin (DITROPAN) 5 MG tablet TAKE 1 TABLET BY MOUTH EVERY DAY AT NIGHT 7/28/20     CAPO Sosa   spironolactone (ALDACTONE) 25 MG tablet   3/5/20     Historical Provider, MD   diclofenac (VOLTAREN) 75 MG EC tablet Take 75 mg by mouth 2 times daily       Historical Provider, MD   metoprolol succinate (TOPROL XL) 50 MG extended release tablet Take 50 mg by mouth daily 4/25/19 per direction of Dr. Iris Garduno pt to take 1/2 tab daily.       Historical Provider, MD   Magnesium Oxide 250 MG TABS Take by mouth daily       Historical Provider, MD         Social History   Social History            Socioeconomic History    Marital status: Legally        Spouse name: Not on file    Number of children: 1    Years of education: 12th grade + some college    Highest education level: Not on file   Occupational History    Not on file   Tobacco Use    Smoking status: Current Every Day Smoker       Packs/day: 0.75    Smokeless tobacco: Never Used   Vaping Use    Vaping Use: Never used   Substance and Sexual Activity    Alcohol use: Not Currently       Comment: history of abuse, last drink was early December, 2018    Drug use: Not Currently       Types: Marijuana       Comment: last use was summer, 2017    Sexual activity: Not Currently   Other Topics Concern    Not on file   Social History Narrative     PRIOR MEDICATION TRIALS     Trazodone (having more suicidal dreams), at 100mg, not working for sleep     Seroquel (wt gain, 14 lb in 3 months, enuresis, indigestion, abnormal blood work, increased blood sugar, seizures)     Duloxetine (has not been effective and no noticeable change even with dose increases to 90mg)     Paxil, 20mg     Wellbutrin XL, (tried it recently to quit smoking)     Prozac (made her numb, added to her eating disorder)     Zoloft (thought she did well on this, took for a couple of years, she stopped it because when she returned to [facility] the doctor put her back on Prozac)     Remeron (increased her dreams and panic attacks)     Lunesta (worked)     Librium (in 2018 for 15 days to stop drinking alcohol)     Risperdal-stopped on 7/6/20 due to weight gain     Doxepin, started on 9/22/20 for sleep (not effective)     Prazosin, ineffective for dreams/nightmares     Latuda (1/27/21, reports that she switched it to am dosing because taking it at night made her RLS worse)           Psychiatric Review of Systems, 3/19/19           Mood:  Sadness, tearfulness, low appetite, low concentration, low energy, loss of interest, denies suicidality today       (Depression: sadness, tearfulness, sleep, appetite, energy, concentration, sexual function, guilt, psychomotor agitation or slowing, interest, suicidality)           Julia: She says that there have been periods of time when she could go 3 days without sleep, she does say that there have been days in a row when she has done reckless, impulsive things       (impulsivity, grandiosity, recklessness, excessive energy, decreased need for sleep, increased spending beyond means, hyperverbal, grandiose, racing thoughts, hypersexuality)           Other: anger from being tired all the time       (Irritability, lability, anger)           Anxiety:  She says that she worries every night about sleeping and panic attacks.  She says that she worries about Station: [facility]   Sleep: avg 7-8 hrs    Appetite: ok                    Lab Results   Component Value Date      (L) 03/10/2020     K 4.7 03/10/2020     CL 89 (L) 03/10/2020     CO2 18 (L) 03/10/2020     BUN 19 03/10/2020     CREATININE 0.9 03/10/2020     GLUCOSE 107 03/10/2020     CALCIUM 9.1 03/10/2020     PROT 8.6 11/10/2015     LABALBU 5.0 11/10/2015     ALKPHOS 127 (H) 11/10/2015     AST 33 (H) 11/10/2015     ALT 22 11/10/2015     LABGLOM >60 03/10/2020            Lab Results   Component Value Date      03/10/2020     K 4.7 03/10/2020     CL 89 03/10/2020     CO2 18 03/10/2020     BUN 19 03/10/2020     CREATININE 0.9 03/10/2020     GLUCOSE 107 03/10/2020     CALCIUM 9.1 03/10/2020      No results found for: CHOL  No results found for: TRIG  No results found for: HDL  No results found for: LDLCHOLESTEROL, LDLCALC  No results found for: LABVLDL, VLDL  No results found for: CHOLHDLRATIO  No results found for: LABA1C  No results found for: EAG        Lab Results   Component Value Date     TSH 2.40 02/03/2015            Lab Results   Component Value Date     VITD25 27.6 (L) 02/03/2015         Assessments Administered:  PHQ9: 9, mild  GAD7: 8, mild (a couple of panic attacks since the last visit)     COGNITION SCREEN:     Can spell the word, \"world,\" backwards: Yes  Can do serial 7's: Yes        Assessment:    1. Bipolar affective disorder, mixed, mild ([facility] [address]    2. Generalized anxiety disorder with panic attacks    3. Insomnia due to other mental disorder    4.  Bruxism          Plan:  Continue:  Lamotrigine, 150mg,  Daily     Eszopiclone (Lunesta), 3mg, nightly for sleep (she has tried going off of this with no success, can't sleep without it)     Gabapentin, 300mg, take 2 capsules nightly (restless legs)  Lurasidone, 20mg, daily in the morning (with 350 calories)  Clonidine, 0.1mg, nightly (teeth grinding, nightmares), can lower BP, get your balance before you get up to walk     Risperdal, 1mg, nightly  Trazodone, 50mg, nightly     Continue therapy with Yunior Gonzáles     [facility] tabby for sleep     Follow up: Return in about 3 months (around 10/21/2021).    If you become suicidal, follow up with this office or call the [facility] at [phone] ([phone]), or dial 911.     1. The risks, benefits, side effects, indications, contraindications, and adverse effects of the medications have been discussed. Yes.  2. The pt has verbalized understanding and has capacity to give informed consent. 3. The Corky Carmita report has been reviewed according to [facility] regulations. 4. Supportive therapy offered. 5. Follow up:    Return in about 3 months (around 10/21/2021). 6. The patient has been advised to call with any problems. 7. Controlled substance Treatment Plan: new medication for sleep (eszopiclone). 8. The above listed medications have been continued, modifications in meds and other orders/labs as follows:                   Encounter Medications    No orders of the defined types were placed in this encounter.                    No orders of the defined types were placed in this encounter.        9. Additional comments: She says that she is doing better on these combination of medications than she has ever done in her life and that it is the first time that she has been able to sleep (she is on 3 different medications to help with sleep). She reports that she is dreaming a little more without Prazosin but that they aren't terrible dreams and aren't bothering her. Will make no medication changes today. Patient was informed that this provider is moving back to [place] in October. She is fine to follow up with another provider in this office in about 3 months. ... 10.Over 50% of the total visit time of 20 minutes was spent on counseling and/or coordination of care of:                         1. Bipolar affective disorder, mixed, mild ([address]    2.  Generalized anxiety disorder with panic attacks 3. Insomnia due to other mental disorder    4.  Bruxism                        Psychotherapy Topics: mood/medication effectiveness         Selene Lowe, APRN - NP PMHNP-BC

## 2021-11-08 NOTE — TELEPHONE ENCOUNTER
Attempted to contact pt to inform her that refill RX for Neurotin had been sent to her pharmacy per Ant Gleason APRN-no answer.

## 2021-11-18 ENCOUNTER — TELEPHONE (OUTPATIENT)
Dept: PSYCHIATRY | Age: 56
End: 2021-11-18

## 2021-11-18 NOTE — TELEPHONE ENCOUNTER
Called pt for appointment reminder.     -Pt confirmed      Electronically signed by Betsey Sorenson MA on 11/18/2021 at 2:17 PM

## 2021-11-19 ENCOUNTER — TELEPHONE (OUTPATIENT)
Dept: PSYCHIATRY | Age: 56
End: 2021-11-19

## 2021-11-19 ENCOUNTER — OFFICE VISIT (OUTPATIENT)
Dept: PSYCHIATRY | Age: 56
End: 2021-11-19
Payer: MEDICARE

## 2021-11-19 VITALS
TEMPERATURE: 97.9 F | BODY MASS INDEX: 21.02 KG/M2 | SYSTOLIC BLOOD PRESSURE: 103 MMHG | OXYGEN SATURATION: 97 % | WEIGHT: 130.8 LBS | HEART RATE: 84 BPM | HEIGHT: 66 IN | DIASTOLIC BLOOD PRESSURE: 66 MMHG

## 2021-11-19 DIAGNOSIS — F41.1 GENERALIZED ANXIETY DISORDER: ICD-10-CM

## 2021-11-19 DIAGNOSIS — F45.8 BRUXISM: ICD-10-CM

## 2021-11-19 DIAGNOSIS — F43.10 PTSD (POST-TRAUMATIC STRESS DISORDER): ICD-10-CM

## 2021-11-19 DIAGNOSIS — F31.61 BIPOLAR AFFECTIVE DISORDER, MIXED, MILD (HCC): Primary | ICD-10-CM

## 2021-11-19 PROCEDURE — 99214 OFFICE O/P EST MOD 30 MIN: CPT | Performed by: NURSE PRACTITIONER

## 2021-11-19 RX ORDER — LAMOTRIGINE 150 MG/1
TABLET ORAL
Qty: 90 TABLET | Refills: 0 | Status: SHIPPED | OUTPATIENT
Start: 2021-11-19 | End: 2022-05-23 | Stop reason: SDUPTHER

## 2021-11-19 RX ORDER — RISPERIDONE 1 MG/1
TABLET, FILM COATED ORAL
Qty: 90 TABLET | Refills: 0 | Status: SHIPPED | OUTPATIENT
Start: 2021-11-19 | End: 2022-04-07 | Stop reason: SDUPTHER

## 2021-11-19 NOTE — TELEPHONE ENCOUNTER
Called pt to let her know her RX script has been sent to her pharmacy  Electronically signed by Belinda Vásquez on 11/19/2021 at 11:06 AM
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
## 2021-11-19 NOTE — TELEPHONE ENCOUNTER
Pharmacy sent  request to  refill  her medication. Last office visit : 7/21/2021 Verenamiranda Oz SCANLON  Next office visit : 11/19/2021 Alok Villar CAPO    Requested Prescriptions     Pending Prescriptions Disp Refills    risperiDONE (RISPERDAL) 1 MG tablet 90 tablet 0     Sig: TAKE 1 TABLET BY MOUTH EVERY DAY AT NIGHT    lamoTRIgine (LAMICTAL) 150 MG tablet 90 tablet 0     Sig: TAKE 1 TABLET BY MOUTH EVERY DAY            Rosa Thakur          7/21/2021  [address] [facility]   CAPO Ji NP    Nurse Practitioner Psychiatric/Mental Health  Bipolar affective disorder, mixed, mild ([facility] [address] +3 more    Dx  Follow-up  Anxiety  Depression  Insomnia    Reason for Visit       Progress Notes  CAPO Ji NP (Advanced Practice Nurse) [place] Nurse Practitioner [address] [place] [address] Rd., [address] is a 64 y.o. female evaluated via telephone on 7/21/2021.       Consent:  She and/or health care decision maker is aware that that she may receive a bill for this telephone service, depending on her insurance coverage, and has provided verbal consent to proceed: Yes        Documentation:  I communicated with the patient and/or health care decision maker about depression/anxiety/insomnia.    Details of this discussion including any medical advice provided: see below        I affirm this is a Patient Initiated Episode with an Established Patient who has not had a related appointment within my department in the past 7 days or scheduled within the next 24 hours.     Total Time: minutes: 11-20 minutes     Note: not billable if this call serves to triage the patient into an appointment for the relevant concern        CAPO Ji NP               7/21/2021 6:56 PM                             Progress Note     IN:  1010  OUT: 1030        Dorene Rojas 1965                           Chief Complaint   Patient presents with    Follow-up    Anxiety    Depression    Insomnia          Subjective:  Patient is a 64 y.o. female diagnosed with Bipolar 2 Disorder and presents today for follow-up. Last seen in clinic on 6/7/21 and prior records were reviewed.     Today patient states, \"Hanging in there. \" She reports that her friend has moved back and that she is doing better. She reports some anxiety attacks, her house needs repair and she can't afford it. She continues to have the bedbug problem. She has noticed that she dreams a little more without Prazosin but that Clonidine continues to work for the bruxism. She reports that it's nice to get 7-8 hrs of sleep.     Patient reports side effects as follows: none. No evidence of EPS, no cogwheeling or abnormal motor movements.     Absent  suicidal ideation.   Reports compliance with medications as good .      Current Substance Use:  See history     BP: There were no vitals taken for this visit.        Review of Systems - 14 point review:  Negative except being treated for:  depression, anxiety, panic attacks, suicidal dreams, weight gain, hx of right fractured ankle, enuresis, increased liver enzymes (being treated with spirolactone)        Constitutional: (fevers, chills, night sweats, wt loss/gain, change in appetite, fatigue, somnolence)     HEENT: (ear pain or discharge, hearing loss, ear ringing, sinus pressure, nosebleed, nasal discharge, sore throat, oral sores, tooth pain, bleeding gums, hoarse voice, neck pain)      Cardiovascular: (HTN, chest pain, elevated cholesterol/lipids, palpitations, leg swelling, leg pain with walking)     Respiratory: (cough, wheezing, snoring, SOB with activity (dyspnea), SOB while lying flat (orthopnea), awakening with severe SOB (paroxysmal nocturnal dyspnea))     Gastrointestinal: (NVD, constipation, abdominal pain, bright red stools, black tarry stools, stool incontinence)     Genitourinary:  (pelvic pain, burning or frequency of urination, urinary urgency, blood in urine incomplete bladder emptying, urinary incontinence, STD; MEN: testicular pain or swelling, erectile dysfunction; WOMEN: LMP, heavy menstrual bleeding (menorrhagia), irregular periods, postmenopausal bleeding, menstrual pain (dymenorrhea, vaginal discharge)     Musculoskeletal: (bone pain/fracture, joint pain or swelling, musle pain)     Integumentary: (rashes, acne, non-healing sores, itching, breast lumps, breast pain, nipple discharge, hair loss)     Neurologic: (HA, muscle weakness, paresthesias (numbness, coldness, crawling or prickling), memory loss, seizure, dizziness)     Psychiatric:  (anxiety, sadness, irritability/anger, insomnia, suicidality)     Endocrine: (heat or cold intolerance, excessive thirst (polydipsia), excessive hunger (polyphagia))     Immune/Allergic: (hives, seasonal or environmental allergies, HIV exposure)     Hematologic/Lymphatic: (lymph node enlargement, easy bleeding or bruising)     History obtained via chart review and patient     PCP is Carmen Nicole. Ginna Frame, DO, DO         Current Meds:     Home Medications           Prior to Admission medications    Medication Sig Start Date End Date Taking? Authorizing Provider   cloNIDine (CATAPRES) 0.1 MG tablet TAKE 1 TABLET BY MOUTH EVERY DAY AT NIGHT 7/19/21     CAPO Ji NP   gabapentin (NEURONTIN) 300 MG capsule TAKE 2 CAPSULES BY MOUTH EVERY DAY AT NIGHT 6/30/21 7/30/21   CAPO Nieves CNP   eszopiclone (LUNESTA) 3 MG TABS Take 1 tablet by mouth nightly for 30 days.  6/30/21 7/30/21   Ahmed Dakins, APRN - CNP   LATUDA 20 MG TABS tablet TAKE 1 TABLET BY MOUTH DAILY WITH SUPPER 6/16/21     CAPO Ji NP   risperiDONE (RISPERDAL) 1 MG tablet Take 1 tablet by mouth nightly 3/3/21     CAPO Ji NP   traZODone (DESYREL) 50 MG tablet Take 1 tablet by mouth nightly 3/3/21     CAPO Ji NP   lamoTRIgine (LAMICTAL) 150 MG tablet Take 1 tablet by mouth daily 1/27/21     CAPO Ji NP   oxybutynin (Sebastián Banister) 5 MG tablet TAKE 1 TABLET BY MOUTH EVERY DAY AT NIGHT 7/28/20     CAPO Olivo   spironolactone (ALDACTONE) 25 MG tablet   3/5/20     Historical Provider, MD   diclofenac (VOLTAREN) 75 MG EC tablet Take 75 mg by mouth 2 times daily       Historical Provider, MD   metoprolol succinate (TOPROL XL) 50 MG extended release tablet Take 50 mg by mouth daily 4/25/19 per direction of Dr. Call Deal pt to take 1/2 tab daily.       Historical Provider, MD   Magnesium Oxide 250 MG TABS Take by mouth daily       Historical Provider, MD         Social History   Social History            Socioeconomic History    Marital status: Legally        Spouse name: Not on file    Number of children: 1    Years of education: 12th grade + some college    Highest education level: Not on file   Occupational History    Not on file   Tobacco Use    Smoking status: Current Every Day Smoker       Packs/day: 0.75    Smokeless tobacco: Never Used   Vaping Use    Vaping Use: Never used   Substance and Sexual Activity    Alcohol use: Not Currently       Comment: history of abuse, last drink was early December, 2018    Drug use: Not Currently       Types: Marijuana       Comment: last use was summer, 2017    Sexual activity: Not Currently   Other Topics Concern    Not on file   Social History Narrative     PRIOR MEDICATION TRIALS     Trazodone (having more suicidal dreams), at 100mg, not working for sleep     Seroquel (wt gain, 14 lb in 3 months, enuresis, indigestion, abnormal blood work, increased blood sugar, seizures)     Duloxetine (has not been effective and no noticeable change even with dose increases to 90mg)     Paxil, 20mg     Wellbutrin XL, (tried it recently to quit smoking)     Prozac (made her numb, added to her eating disorder)     Zoloft (thought she did well on this, took for a couple of years, she stopped it because when she [facility] the doctor put her back on Prozac)     Remeron (increased her dreams and panic attacks)     Lunesta (worked)     Librium (in 2018 for 15 days to stop drinking alcohol)     Risperdal-stopped on 7/6/20 due to weight gain     Doxepin, started on 9/22/20 for sleep (not effective)     Prazosin, ineffective for dreams/nightmares     Latuda (1/27/21, reports that she switched it to am dosing because taking it at night made her RLS worse)           Psychiatric Review of Systems, 3/19/19           Mood:  Sadness, tearfulness, low appetite, low concentration, low energy, loss of interest, denies suicidality today       (Depression: sadness, tearfulness, sleep, appetite, energy, concentration, sexual function, guilt, psychomotor agitation or slowing, interest, suicidality)           Julia: She says that there have been periods of time when she could go 3 days without sleep, she does say that there have been days in a row when she has done reckless, impulsive things       (impulsivity, grandiosity, recklessness, excessive energy, decreased need for sleep, increased spending beyond means, hyperverbal, grandiose, racing thoughts, hypersexuality)           Other: anger from being tired all the time       (Irritability, lability, anger)           Anxiety:  She says that she worries every night about sleeping and panic attacks. She says that she worries about her neighbors. She says that they always want something from her. She worries about running into them. She says that this causes panic attacks. She says that there is a lot of drug use in her neighborhood and she is fearful of her neighbors.       (Generalized anxiety: where, when, who, how long, how frequent)           Panic Disorder symptoms: She says that she is having panic attacks at night, 1 at night (this has improved from 3 weeks ago before she started Trazodone when she was having 2-3 at night).  She says that she wakes up with dreams of her family and with her anxiety about the trailer park.       (Palpitations, racing heart beat, sweating, Transportation (Non-Medical):    Physical Activity:     Days of Exercise per Week:     Minutes of Exercise per Session:    Stress:     Feeling of Stress :    Social Connections:     Frequency of Communication with Friends and Family:     Frequency of Social Gatherings with Friends and Family:     Attends Christianity Services:     Active Member of Clubs or Organizations:     Attends Club or Organization Meetings:     Marital Status:    Intimate Partner Violence:     Fear of Current or Ex-Partner:     Emotionally Abused:     Physically Abused:     Sexually Abused:             MSE:  Patient is  A & O x 4. Appearance:  SAVANAH. Cognition:  Recent memory intact , remote memory intact , good fund of knowledge, average  intelligence level. Speech:  normal  Language: Naming: intact;  Word Finding: intact  Conversation no evidence of delusions  Behavior:  Cooperative  Mood: \"good\"   Affect: SAVANAH  Thought Content: negative delusions, negative hallucinations, negative obsessions,  negativehomicidal and negative suicidal   Thought Process: linear, goal directed and coherent (having trouble keeping thoughts together, probably due to lack of sleep)  Associations: logical connections  Attention Span and concentration: Normal   Judgement Insight:  normal and appropriate  Gait and Station: SAVANAH   Sleep: avg 7-8 hrs    Appetite: ok                    Lab Results   Component Value Date      (L) 03/10/2020     K 4.7 03/10/2020     CL 89 (L) 03/10/2020     CO2 18 (L) 03/10/2020     BUN 19 03/10/2020     CREATININE 0.9 03/10/2020     GLUCOSE 107 03/10/2020     CALCIUM 9.1 03/10/2020     PROT 8.6 11/10/2015     LABALBU 5.0 11/10/2015     ALKPHOS 127 (H) 11/10/2015     AST 33 (H) 11/10/2015     ALT 22 11/10/2015     LABGLOM >60 03/10/2020            Lab Results   Component Value Date      03/10/2020     K 4.7 03/10/2020     CL 89 03/10/2020     CO2 18 03/10/2020     BUN 19 03/10/2020     CREATININE 0.9 03/10/2020     GLUCOSE 107 03/10/2020     CALCIUM 9.1 03/10/2020      No results found for: CHOL  No results found for: TRIG  No results found for: HDL  No results found for: LDLCHOLESTEROL, LDLCALC  No results found for: LABVLDL, VLDL  No results found for: CHOLHDLRATIO  No results found for: LABA1C  No results found for: EAG        Lab Results   Component Value Date     TSH 2.40 02/03/2015            Lab Results   Component Value Date     VITD25 27.6 (L) 02/03/2015         Assessments Administered:  PHQ9: 9, mild  GAD7: 8, mild (a couple of panic attacks since the last visit)     COGNITION SCREEN:     Can spell the word, \"world,\" backwards: Yes  Can do serial 7's: Yes        Assessment:    1. Bipolar affective disorder, mixed, mild ([address]    2. Generalized anxiety disorder with panic attacks    3. Insomnia due to other mental disorder    4. Bruxism          Plan:  Continue:  Lamotrigine, 150mg,  Daily     Eszopiclone (Lunesta), 3mg, nightly for sleep (she has tried going off of this with no success, can't sleep without it)     Gabapentin, 300mg, take 2 capsules nightly (restless legs)  Lurasidone, 20mg, daily in the morning (with 350 calories)  Clonidine, 0.1mg, nightly (teeth grinding, nightmares), can lower BP, get your balance before you get up to walk     Risperdal, 1mg, nightly  Trazodone, 50mg, nightly     Continue therapy with Serenity Vernon     Try the [facility] tabby for sleep     Follow up: Return in about 3 months (around 10/21/2021).    If you become suicidal, follow up with this office or call the [facility] at [phone]), or dial [phone].     1. The risks, benefits, side effects, indications, contraindications, and adverse effects of the medications have been discussed. Yes.  2. The pt has verbalized understanding and has capacity to give informed consent. 3. The Mitch Diss report has been reviewed according to [facility] regulations. 4. Supportive therapy offered.   5. Follow up:    Return in about 3

## 2021-11-22 ENCOUNTER — TELEPHONE (OUTPATIENT)
Dept: PSYCHIATRY | Age: 56
End: 2021-11-22

## 2021-11-22 RX ORDER — TRAZODONE HYDROCHLORIDE 50 MG/1
TABLET ORAL
Qty: 90 TABLET | Refills: 0 | Status: SHIPPED | OUTPATIENT
Start: 2021-11-22 | End: 2022-03-03
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
## 2021-11-29 ENCOUNTER — TELEPHONE (OUTPATIENT)
Dept: PSYCHIATRY | Age: 56
End: 2021-11-29

## 2021-11-29 DIAGNOSIS — G47.00 INSOMNIA, UNSPECIFIED TYPE: Primary | ICD-10-CM

## 2021-11-29 RX ORDER — ESZOPICLONE 3 MG/1
3 TABLET, FILM COATED ORAL NIGHTLY
Qty: 30 TABLET | Refills: 0 | Status: SHIPPED | OUTPATIENT
Start: 2021-11-29 | End: 2021-12-27 | Stop reason: SDUPTHER

## 2021-11-29 RX ORDER — ESZOPICLONE 3 MG/1
3 TABLET, FILM COATED ORAL NIGHTLY
COMMUNITY
End: 2021-11-29 | Stop reason: SDUPTHER

## 2021-11-29 NOTE — TELEPHONE ENCOUNTER
Attempted to contact pt to inform that refill RX for Lunesta had been sent to her pharmacy per Hina Dunn APRN-no answer.
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
## 2021-12-08 ENCOUNTER — TELEPHONE (OUTPATIENT)
Dept: PSYCHIATRY | Age: 56
End: 2021-12-08

## 2021-12-08 DIAGNOSIS — F41.1 GENERALIZED ANXIETY DISORDER: ICD-10-CM

## 2021-12-08 RX ORDER — GABAPENTIN 300 MG/1
CAPSULE ORAL
Qty: 60 CAPSULE | Refills: 0 | Status: SHIPPED | OUTPATIENT
Start: 2021-12-08 | End: 2022-01-06 | Stop reason: SDUPTHER

## 2021-12-08 NOTE — TELEPHONE ENCOUNTER
Called and  let pt know that her Rx script was sent to her pharmacy      Electronically signed by Madhuri De La Rosa on 12/8/2021 at 2:15 PM
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
## 2021-12-27 DIAGNOSIS — G47.00 INSOMNIA, UNSPECIFIED TYPE: ICD-10-CM
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
## 2021-12-28 ENCOUNTER — TELEPHONE (OUTPATIENT)
Dept: PSYCHIATRY | Age: 56
End: 2021-12-28

## 2021-12-28 RX ORDER — ESZOPICLONE 3 MG/1
3 TABLET, FILM COATED ORAL NIGHTLY
Qty: 30 TABLET | Refills: 0 | Status: SHIPPED | OUTPATIENT
Start: 2021-12-28 | End: 2022-01-25 | Stop reason: SDUPTHER

## 2021-12-28 NOTE — TELEPHONE ENCOUNTER
VM left to inform pt that refill RX for Lunesta had been sent to her pharmacy per Jacy SCANLON as she requested.

## 2022-01-06 ENCOUNTER — TELEPHONE (OUTPATIENT)
Dept: PSYCHIATRY | Age: 57
End: 2022-01-06

## 2022-01-06 DIAGNOSIS — F41.1 GENERALIZED ANXIETY DISORDER: ICD-10-CM

## 2022-01-06 RX ORDER — GABAPENTIN 300 MG/1
CAPSULE ORAL
Qty: 60 CAPSULE | Refills: 0 | Status: SHIPPED | OUTPATIENT
Start: 2022-01-06 | End: 2022-02-09 | Stop reason: SDUPTHER

## 2022-01-06 NOTE — TELEPHONE ENCOUNTER
Attempted to contact pt to inform her that the refill RX for Gabapentin had been sent to her pharmacy per Gearline Nageotte APRN-no answer.
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
## 2022-01-25 ENCOUNTER — TELEPHONE (OUTPATIENT)
Dept: PSYCHIATRY | Age: 57
End: 2022-01-25

## 2022-01-25 DIAGNOSIS — G47.00 INSOMNIA, UNSPECIFIED TYPE: ICD-10-CM

## 2022-01-25 RX ORDER — ESZOPICLONE 3 MG/1
3 TABLET, FILM COATED ORAL NIGHTLY
Qty: 30 TABLET | Refills: 0 | Status: SHIPPED | OUTPATIENT
Start: 2022-01-25 | End: 2022-02-24 | Stop reason: SDUPTHER

## 2022-01-25 RX ORDER — CLONIDINE HYDROCHLORIDE 0.1 MG/1
TABLET ORAL
Qty: 90 TABLET | Refills: 0 | Status: SHIPPED | OUTPATIENT
Start: 2022-01-25 | End: 2022-04-25

## 2022-01-25 NOTE — TELEPHONE ENCOUNTER
Pt made aware that refill RXs for Lunesta and Clonidine had been sent to her pharmacy per Jose SCANLON.
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
## 2022-02-09 ENCOUNTER — TELEPHONE (OUTPATIENT)
Dept: PSYCHIATRY | Age: 57
End: 2022-02-09

## 2022-02-09 DIAGNOSIS — F41.1 GENERALIZED ANXIETY DISORDER: ICD-10-CM

## 2022-02-09 RX ORDER — GABAPENTIN 300 MG/1
CAPSULE ORAL
Qty: 60 CAPSULE | Refills: 0 | Status: SHIPPED | OUTPATIENT
Start: 2022-02-09 | End: 2022-03-09 | Stop reason: SDUPTHER
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
## 2022-02-09 NOTE — TELEPHONE ENCOUNTER
Attempted to contact pt to inform her that the refill RX for Gabapentin that she requested had been sent to her pharmacy per Bettie SCANLON-no answer.

## 2022-02-18 ENCOUNTER — TELEPHONE (OUTPATIENT)
Dept: PSYCHIATRY | Age: 57
End: 2022-02-18

## 2022-02-21 ENCOUNTER — TELEMEDICINE (OUTPATIENT)
Dept: PSYCHIATRY | Age: 57
End: 2022-02-21
Payer: MEDICARE

## 2022-02-21 ENCOUNTER — TELEPHONE (OUTPATIENT)
Dept: PSYCHIATRY | Age: 57
End: 2022-02-21

## 2022-02-21 DIAGNOSIS — F31.61 BIPOLAR AFFECTIVE DISORDER, MIXED, MILD (HCC): Primary | ICD-10-CM

## 2022-02-21 PROCEDURE — 99442 PR PHYS/QHP TELEPHONE EVALUATION 11-20 MIN: CPT | Performed by: NURSE PRACTITIONER
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
## 2022-02-21 NOTE — TELEPHONE ENCOUNTER
Pt was called for virtual appointment check in.       Electronically signed by Khushi Hernandez MA on 2/21/2022 at 1:03 PM

## 2022-02-24 ENCOUNTER — TELEPHONE (OUTPATIENT)
Dept: PSYCHIATRY | Age: 57
End: 2022-02-24

## 2022-02-24 DIAGNOSIS — G47.00 INSOMNIA, UNSPECIFIED TYPE: ICD-10-CM

## 2022-02-24 RX ORDER — ESZOPICLONE 3 MG/1
3 TABLET, FILM COATED ORAL NIGHTLY
Qty: 30 TABLET | Refills: 0 | Status: SHIPPED | OUTPATIENT
Start: 2022-02-24 | End: 2022-03-26
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
## 2022-02-24 NOTE — TELEPHONE ENCOUNTER
Pt made aware that refill RX for Lunesta had been sent to her pharmacy per EKATERINA SCANLON as she had requested.

## 2022-03-03 ENCOUNTER — TELEPHONE (OUTPATIENT)
Dept: PSYCHIATRY | Age: 57
End: 2022-03-03

## 2022-03-03 RX ORDER — TRAZODONE HYDROCHLORIDE 50 MG/1
TABLET ORAL
Qty: 90 TABLET | Refills: 0 | Status: SHIPPED | OUTPATIENT
Start: 2022-03-03 | End: 2022-05-23 | Stop reason: SDUPTHER
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
## 2022-03-03 NOTE — TELEPHONE ENCOUNTER
Attempted to contact pt to inform her that refill RX for Trazodone had been sent to her pharmacy per Meron Cohen APRN-no answer.

## 2022-03-07 DIAGNOSIS — D64.9 LOW HEMOGLOBIN: Primary | ICD-10-CM

## 2022-03-07 DIAGNOSIS — E61.1 LOW IRON: ICD-10-CM

## 2022-03-08 NOTE — PROGRESS NOTES
OP HEMATOLOGY/ONCOLOGY CONSULTATION      Pt Name: Henrique Pitcairn Islander: 1965  MRN: [id number]  Referring provider: CAPO Ornelas  Requesting provider: Dr. To León  Reason for consultation: Iron deficiency anemia  Date of evaluation: 3/9/2022    History Obtained From:  patient, electronic medical record    CHIEF COMPLAINT:    Chief Complaint   Patient presents with    New Patient     HISTORY OF PRESENT ILLNESS:    Christopher Sin is a 62 y.o.  female referred to the clinic by Dr. To León regarding iron deficiency anemia. Hematology consultation is performed 3/9/2022. PMH significant for EtOH use (quit 2017), liver cirrhosis, multiple miscarriages, depression, arthritis, PTSD, tobacco use, melanoma anterior chest wall. Sherine Garrison was noted to have anemia on routine serology. Labs 1/31/2022:  · CBC: WBC: 13.1, Hgb: 8.3/MCV: 76.2, Hct: 27.9, Platelets: 435  · CMP: Creatinine: 1.3, GFR: 42, Calcium: 9.7, Total protein: 7.6  · Iron: 14, TIBC: 438, Iron Saturation: 3  · B12: 1091  · Folate: 6.4     CBC 8/9/2021: WBC 11.3/ANC 8.7. Hgb 13.5/MCV 96, platelets 256,048  CBC 3/30/2021: WBC 9.0, Hgb 15. 0/MCV 92, platelets 949,937  CBC 11/10/2015: WBC 7.92, Hgb 15.4/MCV 88.1, platelets 719,500. Dorene takes diclofenac twice daily for back pain. She states she quit alcohol use 2017. Liver disease is managed by [facility] in [address] [place] [address]. Endoscopy and colonoscopy were last completed 11/2021 at [facility], records are unavailable and will be requested for review. She denies melena or hematochezia. She denies B symptoms. She reports decreased appetite. Her main complaint is of fatigue. Leukocytosis possibly associated with tobacco use, reactive. Will request peripheral blood smear, BCR/ABL given both leukocytosis and anemia. Serology was consistent with iron deficiency. Patient complains of decreased appetite/nausea, will arrange IV iron. Check stool for OB  Additional serology requested as well including SPEP, QI, light chains etc. with regard to abnormal renal function. Further treatment recommendations pending serology. 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 , Disp: 30 tablet, Rfl: 0    gabapentin (NEURONTIN) 300 MG capsule, TAKE 2 CAPSULES BY MOUTH EVERY DAY AT NIGHT, Disp: 60 capsule, Rfl: 0    cloNIDine (CATAPRES) 0.1 MG tablet, TAKE 1 TABLET BY MOUTH EVERY DAY AT NIGHT, Disp: 90 tablet, Rfl: 0    fluticasone (FLONASE) 50 MCG/ACT nasal spray, SPRAY 1 SPRAY INTO EACH NOSTRIL TWICE A DAY, Disp: , Rfl:     VASCEPA 1 g CAPS capsule, TAKE CAPSULE(S) ORAL TWO TIMES A DAY TAKE WITH FOOD/MEAL, Disp: , Rfl:     loratadine (CLARITIN) 10 MG tablet, TAKE ONE TABLET BY MOUTH DAILY, Disp: , Rfl:     rosuvastatin (CRESTOR) 5 MG tablet, TAKE 1 TABLET BY MOUTH EVERY DAY IN THE EVENING, Disp: , Rfl:     oxybutynin (DITROPAN) 5 MG tablet, TAKE 1 TABLET BY MOUTH EVERY DAY AT NIGHT, Disp: 90 tablet, Rfl: 3    spironolactone (ALDACTONE) 25 MG tablet, , Disp: , Rfl:     diclofenac (VOLTAREN) 75 MG EC tablet, Take 75 mg by mouth 2 times daily, Disp: , Rfl:     metoprolol succinate (TOPROL XL) 50 MG extended release tablet, Take 50 mg by mouth daily 4/25/19 per direction of Dr. Merlinda Campanile pt to take 1/2 tab daily. , Disp: , Rfl:     Magnesium Oxide 250 MG TABS, Take by mouth daily, Disp: , Rfl:     risperiDONE (RISPERDAL) 1 MG tablet, TAKE 1 TABLET BY MOUTH EVERY DAY AT NIGHT, Disp: 90 tablet, Rfl: 0    lamoTRIgine (LAMICTAL) 150 MG tablet, TAKE 1 TABLET BY MOUTH EVERY DAY, Disp: 90 tablet, Rfl: 0   Allergies: Allergies   Allergen Reactions    Seroquel [Quetiapine Fumarate]      Pt reports caused her B/P to drop, faint and increase her cholesterol.      Social History     Tobacco Use    Smoking status: Current Every Day Smoker     Packs/day: 0.50     Types: Cigarettes    Smokeless tobacco: Never Used   Vaping Use    Vaping Use: Never used   Substance Use Topics    Alcohol use: Not Currently     Comment: history of abuse, last drink was early December, 2018    Drug use: Not Currently     Types: Marijuana Dariel Villa     Comment: last use was summer, 2017     Employment: Disabled  Marital status:   Resides: [place]    Family History   Problem Relation Age of Onset    Depression Mother     Suicide Mother     Bipolar Disorder Brother     Other Brother         MRSA    Alzheimer's Disease Maternal Grandmother     Cervical Cancer Maternal Grandmother      Health Maintenance   Topic Date Due    Hepatitis C screen  Never done    COVID-19 Vaccine (1) Never done    Pneumococcal 0-64 years Vaccine (1 of 2 - PPSV23) Never done    Lipid screen  Never done    HIV screen  Never done    DTaP/Tdap/Td vaccine (1 - Tdap) Never done    Cervical cancer screen  Never done    Colorectal Cancer Screen  Never done    Breast cancer screen  Never done    Shingles Vaccine (1 of 2) Never done   [employer] Visit (AWV)  Never done    Potassium monitoring  03/10/2021    Creatinine monitoring  03/10/2021    Flu vaccine (1) Never done    Depression Monitoring  07/21/2022    Hepatitis A vaccine  Aged Out    Hepatitis B vaccine  Aged Out    Hib vaccine  Aged Out    Meningococcal (ACWY) vaccine  Aged Out     Subjective   Review of Systems   Constitutional: Positive for fatigue. Negative for fever. HENT: Positive for congestion. Negative for hearing loss, mouth sores, nosebleeds, sore throat and trouble swallowing. Diminished taste   Eyes: Negative for discharge and itching. Blurred vision   Respiratory: Positive for shortness of breath (Exertional). Negative for cough and wheezing. Cardiovascular: Negative for chest pain, palpitations and leg swelling. Gastrointestinal: Positive for constipation. Negative for abdominal pain, diarrhea, nausea and vomiting. Heartburn, loss of appetite   Endocrine: Positive for cold intolerance and heat intolerance. Genitourinary: Negative for dysuria, frequency, hematuria and urgency. Musculoskeletal: Negative for arthralgias, joint swelling and myalgias. Skin: Negative for pallor and rash. Allergic/Immunologic: Positive for environmental allergies. Negative for immunocompromised state. Neurological: Negative for seizures, syncope and numbness. Balance issues   Hematological: Negative for adenopathy. Does not bruise/bleed easily. Psychiatric/Behavioral: Negative for agitation, behavioral problems and confusion. The patient is not nervous/anxious. Anxiety, depression, poor concentration     Objective   Physical Exam  Vitals reviewed. Constitutional:       General: She is not in acute distress. Appearance: She is well-developed. She is not toxic-appearing or diaphoretic. Comments: Wearing a facial mask. Appears chronically ill   HENT:      Head: Normocephalic and atraumatic. Right Ear: External ear normal.      Left Ear: External ear normal.   Eyes:      General: No scleral icterus. Right eye: No discharge. Left eye: No discharge. Conjunctiva/sclera: Conjunctivae normal.   Neck:      Trachea: No tracheal deviation. Cardiovascular:      Rate and Rhythm: Normal rate and regular rhythm. Pulmonary:      Effort: Pulmonary effort is normal. No respiratory distress. Breath sounds: Examination of the right-lower field reveals decreased breath sounds. Examination of the left-lower field reveals decreased breath sounds. Decreased breath sounds present. No wheezing or rales. Chest:   Breasts:      Right: No supraclavicular adenopathy. Left: No supraclavicular adenopathy. Abdominal:      General: Bowel sounds are normal. There is no distension. Palpations: Abdomen is soft. Tenderness: There is no abdominal tenderness. There is no guarding. Genitourinary:     Comments: Exam deferred  Musculoskeletal:         General: No tenderness or deformity. Cervical back: Neck supple. No muscular tenderness. Comments: Normal ROM all four extremities   Lymphadenopathy:      Cervical:      Right cervical: No superficial or deep cervical adenopathy. Left cervical: No superficial or deep cervical adenopathy. Upper Body:      Right upper body: No supraclavicular adenopathy. Left upper body: No supraclavicular adenopathy. Comments:      Skin:     General: Skin is warm and dry. Findings: No rash. Neurological:      Mental Status: She is alert and oriented to person, place, and time. Comments: follows commands, non-focal   Psychiatric:         Behavior: Behavior normal. Behavior is cooperative. Thought Content: Thought content normal.         Judgment: Judgment normal.      Comments: Alert and oriented to person, place and time.        /66   Pulse 92   Ht 5' 6\" (1.676 m)   Wt 135 lb (61.2 kg)   SpO2 99%   BMI 21.79 kg/m²   Wt Readings from Last 3 Encounters:   03/09/22 135 lb (61.2 kg)   11/19/21 130 lb 12.8 oz (59.3 kg)   09/07/21 135 lb 4.8 oz (61.4 kg)     Labs reviewed by me:  CBC 03/09/22:   Lab Results   Component Value Date    WBC 11.50 (H) 03/09/2022    HGB 8.6 (L) 03/09/2022    HCT 28.3 (L) 03/09/2022    MCV 77.5 (L) 03/09/2022     03/09/2022    LABLYMP 3.83 02/02/2015    LYMPHOPCT 39.6 02/02/2015    RBC 3.65 (L) 03/09/2022    MCH 23.6 (L) 03/09/2022    MCHC 30.4 (L) 03/09/2022    RDW 20.3 (H) 03/09/2022     ASSESSMENT/PLAN:    Amelia Barry was seen today for new patient. Diagnoses and all orders for this visit:    Iron deficiency anemia, unspecified iron deficiency anemia type  Hgb 8.6, MCV 77.5  -     Blood Occult Stool Screen #1; Future  -     Kappa/Lambda Quantitative Free Light Chains, Serum; Future  -     Copper, Serum; Future  -     Erythropoietin; Future  -     Zinc; Future  -     Reticulocytes; Future  - Arrange parenteral iron with Injectafer 750 mg IV weekly x2  - Check stool for OB x3  - Request EGD/Colonoscopy report/path from 11/2021 at [facility]  - Consider holding NSAID  - Continue PPI    Neutrophilic leukocytosis, possibly a/w tobacco use  WBC 11.5, ANC 9.2  -     Miscellaneous Sendout; Future - PBS  -     Miscellaneous Sendout; Future - BCR/ABL  - Recommend tobacco cessation  - Consider low dose CT chest screen if not already completed    Abnormal renal function  Creatinine: 1.3, GFR: 42 on 1/31/2022  -     Kappa/Lambda Quantitative Free Light Chains, Serum; Future        -     Electrophoresis Protein, Serum; Future  -     Erythropoietin; Future    Repeat CBC with 2nd dose of IV iron  I have seen, examined and reviewed this patient medication list, appropriate labs and imaging studies. I reviewed relevant medical records and others physicians notes. I discussed the plan of care with the patient. I answered all questions to the patients satisfaction.  I have also reviewed the chief complaint (CC) and part of the history (History of Present Illness (HPI), Past Family Social History ([facility] [address]), or Review of Systems (ROS) and made changes when appropriated. Office note and labs completed by Dr. Rubio Lind reviewed. Dictated utilizing Dragon transcription software. Return in about 10 weeks (around 5/18/2022) for follow up with CAPO Granados.       CAPO Sampson  3:58 PM  3/9/2022

## 2022-03-09 ENCOUNTER — OFFICE VISIT (OUTPATIENT)
Dept: HEMATOLOGY | Age: 57
End: 2022-03-09
Payer: MEDICARE

## 2022-03-09 ENCOUNTER — HOSPITAL ENCOUNTER (OUTPATIENT)
Dept: INFUSION THERAPY | Age: 57
Discharge: HOME | End: 2022-03-09
Payer: MEDICARE

## 2022-03-09 VITALS
OXYGEN SATURATION: 99 % | BODY MASS INDEX: 21.69 KG/M2 | DIASTOLIC BLOOD PRESSURE: 66 MMHG | HEART RATE: 92 BPM | HEIGHT: 66 IN | SYSTOLIC BLOOD PRESSURE: 110 MMHG | WEIGHT: 135 LBS

## 2022-03-09 DIAGNOSIS — D72.9 NEUTROPHILIC LEUKOCYTOSIS: ICD-10-CM

## 2022-03-09 DIAGNOSIS — K90.49 MALABSORPTION DUE TO INTOLERANCE, NOT ELSEWHERE CLASSIFIED: ICD-10-CM

## 2022-03-09 DIAGNOSIS — N28.9 ABNORMAL RENAL FUNCTION: ICD-10-CM

## 2022-03-09 DIAGNOSIS — D50.9 IRON DEFICIENCY ANEMIA, UNSPECIFIED IRON DEFICIENCY ANEMIA TYPE: ICD-10-CM

## 2022-03-09 DIAGNOSIS — F41.1 GENERALIZED ANXIETY DISORDER: ICD-10-CM

## 2022-03-09 DIAGNOSIS — D50.9 IRON DEFICIENCY ANEMIA, UNSPECIFIED IRON DEFICIENCY ANEMIA TYPE: Primary | ICD-10-CM

## 2022-03-09 LAB
HCT VFR BLD CALC: 28.3 % (ref 34.1–44.9)
HCT VFR BLD CALC: 28.3 % (ref 37–47)
HEMOGLOBIN: 8.6 G/DL (ref 11.2–15.7)
MCH RBC QN AUTO: 23.6 PG (ref 25.6–32.2)
MCHC RBC AUTO-ENTMCNC: 30.4 G/DL (ref 32.3–35.5)
MCV RBC AUTO: 77.5 FL (ref 79.4–94.8)
PDW BLD-RTO: 20.3 % (ref 11.7–14.4)
PLATELET # BLD: 293 K/UL (ref 182–369)
PMV BLD AUTO: 8.4 FL (ref 7.4–10.4)
RBC # BLD: 3.65 M/UL (ref 3.93–5.22)
RETICULOCYTE ABSOLUTE COUNT: 0.08 M/UL (ref 0.03–0.12)
RETICULOCYTE COUNT PCT: 2.05 % (ref 0.5–1.5)
WBC # BLD: 11.5 K/UL (ref 3.98–10.04)

## 2022-03-09 PROCEDURE — 99211 OFF/OP EST MAY X REQ PHY/QHP: CPT

## 2022-03-09 PROCEDURE — 99214 OFFICE O/P EST MOD 30 MIN: CPT | Performed by: NURSE PRACTITIONER

## 2022-03-09 RX ORDER — SODIUM CHLORIDE 9 MG/ML
25 INJECTION, SOLUTION INTRAVENOUS PRN
Status: CANCELLED | OUTPATIENT
Start: 2022-03-10

## 2022-03-09 RX ORDER — SODIUM CHLORIDE 9 MG/ML
INJECTION, SOLUTION INTRAVENOUS CONTINUOUS
Status: CANCELLED | OUTPATIENT
Start: 2022-03-10

## 2022-03-09 RX ORDER — ACETAMINOPHEN 325 MG/1
650 TABLET ORAL
Status: CANCELLED | OUTPATIENT
Start: 2022-03-10

## 2022-03-09 RX ORDER — GABAPENTIN 300 MG/1
CAPSULE ORAL
Qty: 60 CAPSULE | Refills: 0 | Status: SHIPPED | OUTPATIENT
Start: 2022-03-09 | End: 2022-04-07 | Stop reason: SDUPTHER

## 2022-03-09 RX ORDER — PANTOPRAZOLE SODIUM 40 MG/1
40 TABLET, DELAYED RELEASE ORAL DAILY
COMMUNITY

## 2022-03-09 RX ORDER — SODIUM CHLORIDE 0.9 % (FLUSH) 0.9 %
5-40 SYRINGE (ML) INJECTION PRN
Status: CANCELLED | OUTPATIENT
Start: 2022-03-10

## 2022-03-09 RX ORDER — ONDANSETRON 2 MG/ML
8 INJECTION INTRAMUSCULAR; INTRAVENOUS
Status: CANCELLED | OUTPATIENT
Start: 2022-03-10

## 2022-03-09 RX ORDER — EPINEPHRINE 1 MG/ML
0.3 INJECTION, SOLUTION, CONCENTRATE INTRAVENOUS PRN
Status: CANCELLED | OUTPATIENT
Start: 2022-03-10

## 2022-03-09 RX ORDER — ALBUTEROL SULFATE 90 UG/1
4 AEROSOL, METERED RESPIRATORY (INHALATION) PRN
Status: CANCELLED | OUTPATIENT
Start: 2022-03-10

## 2022-03-09 RX ORDER — DIPHENHYDRAMINE HYDROCHLORIDE 50 MG/ML
50 INJECTION INTRAMUSCULAR; INTRAVENOUS
Status: CANCELLED | OUTPATIENT
Start: 2022-03-10

## 2022-03-09 RX ORDER — HEPARIN SODIUM (PORCINE) LOCK FLUSH IV SOLN 100 UNIT/ML 100 UNIT/ML
500 SOLUTION INTRAVENOUS PRN
Status: CANCELLED | OUTPATIENT
Start: 2022-03-10

## 2022-03-09 ASSESSMENT — ENCOUNTER SYMPTOMS
ABDOMINAL PAIN: 0
TROUBLE SWALLOWING: 0
VOMITING: 0
CONSTIPATION: 1
SORE THROAT: 0
EYE DISCHARGE: 0
NAUSEA: 0
EYE ITCHING: 0
WHEEZING: 0
SHORTNESS OF BREATH: 1
COUGH: 0
DIARRHEA: 0
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
## 2022-03-10 ENCOUNTER — TELEPHONE (OUTPATIENT)
Dept: PSYCHIATRY | Age: 57
End: 2022-03-10

## 2022-03-10 LAB — FERRITIN: 15 NG/ML (ref 13–150)

## 2022-03-10 NOTE — TELEPHONE ENCOUNTER
Called and lvm for pt to call the office back       When does call the office back I will let her know that her script for gabapentin was sent to her pharmacy     Electronically signed by Amilcar Simons on 3/10/2022 at 8:46 AM

## 2022-03-10 NOTE — TELEPHONE ENCOUNTER
Pt returned phone call      Let pt know thather script for gabapentin was sent to her pharmacy     Electronically signed by Osei Chin on 3/10/2022 at 1:40 PM

## 2022-03-11 RX ORDER — HEPARIN SODIUM (PORCINE) LOCK FLUSH IV SOLN 100 UNIT/ML 100 UNIT/ML
500 SOLUTION INTRAVENOUS PRN
Status: CANCELLED | OUTPATIENT
Start: 2022-03-16

## 2022-03-11 RX ORDER — SODIUM CHLORIDE 9 MG/ML
INJECTION, SOLUTION INTRAVENOUS CONTINUOUS
Status: CANCELLED | OUTPATIENT
Start: 2022-03-16

## 2022-03-11 RX ORDER — ONDANSETRON 2 MG/ML
8 INJECTION INTRAMUSCULAR; INTRAVENOUS
Status: CANCELLED | OUTPATIENT
Start: 2022-03-16

## 2022-03-11 RX ORDER — SODIUM CHLORIDE 0.9 % (FLUSH) 0.9 %
5-40 SYRINGE (ML) INJECTION PRN
Status: CANCELLED | OUTPATIENT
Start: 2022-03-16

## 2022-03-11 RX ORDER — EPINEPHRINE 1 MG/ML
0.3 INJECTION, SOLUTION, CONCENTRATE INTRAVENOUS PRN
Status: CANCELLED | OUTPATIENT
Start: 2022-03-16

## 2022-03-11 RX ORDER — SODIUM CHLORIDE 9 MG/ML
25 INJECTION, SOLUTION INTRAVENOUS PRN
Status: CANCELLED | OUTPATIENT
Start: 2022-03-16

## 2022-03-11 RX ORDER — DIPHENHYDRAMINE HYDROCHLORIDE 50 MG/ML
50 INJECTION INTRAMUSCULAR; INTRAVENOUS
Status: CANCELLED | OUTPATIENT
Start: 2022-03-16

## 2022-03-11 RX ORDER — ALBUTEROL SULFATE 90 UG/1
4 AEROSOL, METERED RESPIRATORY (INHALATION) PRN
Status: CANCELLED | OUTPATIENT
Start: 2022-03-16

## 2022-03-11 RX ORDER — MEPERIDINE HYDROCHLORIDE 25 MG/ML
12.5 INJECTION INTRAMUSCULAR; INTRAVENOUS; SUBCUTANEOUS PRN
Status: CANCELLED | OUTPATIENT
Start: 2022-03-16

## 2022-03-11 RX ORDER — ACETAMINOPHEN 325 MG/1
650 TABLET ORAL
Status: CANCELLED | OUTPATIENT
Start: 2022-03-16

## 2022-03-12 LAB — ERYTHROPOIETIN: 335 MU/ML (ref 4–27)

## 2022-03-14 DIAGNOSIS — R71.8 HIGH SERUM ERYTHROPOIETIN: Primary | ICD-10-CM

## 2022-03-15 LAB
+IMM: ABNORMAL
ALBUMIN SERPL-MCNC: 3.54 G/DL (ref 3.75–5.01)
ALPHA-1-GLOBULIN: 0.54 G/DL (ref 0.19–0.46)
ALPHA-2-GLOBULIN: 1.12 G/DL (ref 0.48–1.05)
BETA GLOBULIN: 0.97 G/DL (ref 0.48–1.1)
GAMMA GLOBULIN: 1.23 G/DL (ref 0.62–1.51)
IGA: 308 MG/DL (ref 68–408)
IGG: 1143 MG/DL (ref 768–1632)
IGM: 263 MG/DL (ref 35–263)
KAPPA FREE LIGHT CHAINS QNT: 29.28 MG/L (ref 3.3–19.4)
KAPPA/LAMBDA FREE LIGHT CHAIN RATIO: 0.82 (ref 0.26–1.65)
LAMBDA FREE LIGHT CHAINS QNT: 35.71 MG/L (ref 5.71–26.3)
SPE/IFE INTERPRETATION: ABNORMAL
TOTAL PROTEIN: 7.4 G/DL (ref 6.3–8.2)

## 2022-03-16 LAB
COPPER: 167.9 UG/DL (ref 80–155)
ZINC: 95.8 UG/DL (ref 60–120)

## 2022-03-17 ENCOUNTER — TELEPHONE (OUTPATIENT)
Dept: INFUSION THERAPY | Age: 57
End: 2022-03-17

## 2022-03-17 ENCOUNTER — HOSPITAL ENCOUNTER (OUTPATIENT)
Dept: INFUSION THERAPY | Age: 57
Setting detail: INFUSION SERIES
Discharge: HOME | End: 2022-03-17
Payer: MEDICARE

## 2022-03-17 VITALS
OXYGEN SATURATION: 97 % | HEART RATE: 80 BPM | DIASTOLIC BLOOD PRESSURE: 73 MMHG | TEMPERATURE: 97.8 F | RESPIRATION RATE: 17 BRPM | SYSTOLIC BLOOD PRESSURE: 133 MMHG

## 2022-03-17 DIAGNOSIS — D50.9 IRON DEFICIENCY ANEMIA, UNSPECIFIED IRON DEFICIENCY ANEMIA TYPE: Primary | ICD-10-CM

## 2022-03-17 DIAGNOSIS — K90.49 MALABSORPTION DUE TO INTOLERANCE, NOT ELSEWHERE CLASSIFIED: ICD-10-CM

## 2022-03-17 PROCEDURE — 96366 THER/PROPH/DIAG IV INF ADDON: CPT

## 2022-03-17 PROCEDURE — 6360000002 HC RX W HCPCS: Performed by: NURSE PRACTITIONER

## 2022-03-17 PROCEDURE — 96365 THER/PROPH/DIAG IV INF INIT: CPT

## 2022-03-17 PROCEDURE — 2580000003 HC RX 258: Performed by: NURSE PRACTITIONER

## 2022-03-17 RX ORDER — ALBUTEROL SULFATE 90 UG/1
4 AEROSOL, METERED RESPIRATORY (INHALATION) PRN
Status: CANCELLED | OUTPATIENT
Start: 2022-03-24

## 2022-03-17 RX ORDER — SODIUM CHLORIDE 9 MG/ML
25 INJECTION, SOLUTION INTRAVENOUS PRN
Status: CANCELLED | OUTPATIENT
Start: 2022-03-24

## 2022-03-17 RX ORDER — ONDANSETRON 2 MG/ML
8 INJECTION INTRAMUSCULAR; INTRAVENOUS
Status: CANCELLED | OUTPATIENT
Start: 2022-03-24

## 2022-03-17 RX ORDER — SODIUM CHLORIDE 9 MG/ML
INJECTION, SOLUTION INTRAVENOUS CONTINUOUS
Status: CANCELLED | OUTPATIENT
Start: 2022-03-24

## 2022-03-17 RX ORDER — HEPARIN SODIUM (PORCINE) LOCK FLUSH IV SOLN 100 UNIT/ML 100 UNIT/ML
500 SOLUTION INTRAVENOUS PRN
Status: CANCELLED | OUTPATIENT
Start: 2022-03-24

## 2022-03-17 RX ORDER — ACETAMINOPHEN 325 MG/1
650 TABLET ORAL
Status: CANCELLED | OUTPATIENT
Start: 2022-03-24

## 2022-03-17 RX ORDER — MEPERIDINE HYDROCHLORIDE 25 MG/ML
12.5 INJECTION INTRAMUSCULAR; INTRAVENOUS; SUBCUTANEOUS PRN
Status: CANCELLED | OUTPATIENT
Start: 2022-03-24

## 2022-03-17 RX ORDER — EPINEPHRINE 1 MG/ML
0.3 INJECTION, SOLUTION, CONCENTRATE INTRAVENOUS PRN
Status: CANCELLED | OUTPATIENT
Start: 2022-03-24

## 2022-03-17 RX ORDER — SODIUM CHLORIDE 0.9 % (FLUSH) 0.9 %
5-40 SYRINGE (ML) INJECTION PRN
Status: CANCELLED | OUTPATIENT
Start: 2022-03-24

## 2022-03-17 RX ORDER — DIPHENHYDRAMINE HYDROCHLORIDE 50 MG/ML
50 INJECTION INTRAMUSCULAR; INTRAVENOUS
Status: CANCELLED | OUTPATIENT
Start: 2022-03-24

## 2022-03-17 RX ORDER — SODIUM CHLORIDE 9 MG/ML
INJECTION, SOLUTION INTRAVENOUS CONTINUOUS
Status: ACTIVE | OUTPATIENT
Start: 2022-03-17 | End: 2022-03-17

## 2022-03-17 RX ADMIN — SODIUM CHLORIDE: 9 INJECTION, SOLUTION INTRAVENOUS at 11:19

## 2022-03-17 RX ADMIN — IRON SUCROSE 400 MG: 20 INJECTION, SOLUTION INTRAVENOUS at 11:19

## 2022-03-17 NOTE — PROGRESS NOTES
Patient received Venofer 400 mg IV as ordered. Tolerated the infusion well. Scheduled to return in 1 week for her next dose.

## 2022-03-18 ENCOUNTER — HOSPITAL ENCOUNTER (OUTPATIENT)
Dept: INFUSION THERAPY | Age: 57
Setting detail: INFUSION SERIES
Discharge: HOME | End: 2022-03-18

## 2022-03-18 ENCOUNTER — TELEPHONE (OUTPATIENT)
Dept: INFUSION THERAPY | Age: 57
End: 2022-03-18

## 2022-03-18 NOTE — TELEPHONE ENCOUNTER
Attempted to contact patient to inform her that her PCP contacted our office concerned about her Hgb. Iron infusions have been scheduled for today at [facility] no later than 2:00 pm and for Monday at 10:00 am at [address] [facility]. No answer from patient.

## 2022-03-21 ENCOUNTER — TELEPHONE (OUTPATIENT)
Dept: INFUSION THERAPY | Age: 57
End: 2022-03-21

## 2022-03-21 ENCOUNTER — HOSPITAL ENCOUNTER (OUTPATIENT)
Dept: INFUSION THERAPY | Age: 57
Setting detail: INFUSION SERIES
Discharge: HOME | End: 2022-03-21
Payer: MEDICARE

## 2022-03-21 VITALS
HEART RATE: 89 BPM | TEMPERATURE: 98.3 F | OXYGEN SATURATION: 98 % | RESPIRATION RATE: 18 BRPM | SYSTOLIC BLOOD PRESSURE: 137 MMHG | DIASTOLIC BLOOD PRESSURE: 78 MMHG

## 2022-03-21 DIAGNOSIS — D50.9 IRON DEFICIENCY ANEMIA, UNSPECIFIED IRON DEFICIENCY ANEMIA TYPE: Primary | ICD-10-CM

## 2022-03-21 DIAGNOSIS — K90.49 MALABSORPTION DUE TO INTOLERANCE, NOT ELSEWHERE CLASSIFIED: ICD-10-CM

## 2022-03-21 PROCEDURE — 96365 THER/PROPH/DIAG IV INF INIT: CPT

## 2022-03-21 PROCEDURE — 2580000003 HC RX 258: Performed by: NURSE PRACTITIONER

## 2022-03-21 PROCEDURE — 96366 THER/PROPH/DIAG IV INF ADDON: CPT

## 2022-03-21 PROCEDURE — 6360000002 HC RX W HCPCS: Performed by: NURSE PRACTITIONER

## 2022-03-21 RX ORDER — SODIUM CHLORIDE 9 MG/ML
INJECTION, SOLUTION INTRAVENOUS CONTINUOUS
Status: CANCELLED | OUTPATIENT
Start: 2022-03-28

## 2022-03-21 RX ORDER — HEPARIN SODIUM (PORCINE) LOCK FLUSH IV SOLN 100 UNIT/ML 100 UNIT/ML
500 SOLUTION INTRAVENOUS PRN
Status: CANCELLED | OUTPATIENT
Start: 2022-03-28

## 2022-03-21 RX ORDER — DIPHENHYDRAMINE HYDROCHLORIDE 50 MG/ML
50 INJECTION INTRAMUSCULAR; INTRAVENOUS
Status: CANCELLED | OUTPATIENT
Start: 2022-03-28

## 2022-03-21 RX ORDER — ACETAMINOPHEN 325 MG/1
650 TABLET ORAL
Status: CANCELLED | OUTPATIENT
Start: 2022-03-28

## 2022-03-21 RX ORDER — SODIUM CHLORIDE 9 MG/ML
25 INJECTION, SOLUTION INTRAVENOUS PRN
Status: CANCELLED | OUTPATIENT
Start: 2022-03-28

## 2022-03-21 RX ORDER — ALBUTEROL SULFATE 90 UG/1
4 AEROSOL, METERED RESPIRATORY (INHALATION) PRN
Status: CANCELLED | OUTPATIENT
Start: 2022-03-28

## 2022-03-21 RX ORDER — EPINEPHRINE 1 MG/ML
0.3 INJECTION, SOLUTION, CONCENTRATE INTRAVENOUS PRN
Status: CANCELLED | OUTPATIENT
Start: 2022-03-28

## 2022-03-21 RX ORDER — MEPERIDINE HYDROCHLORIDE 25 MG/ML
12.5 INJECTION INTRAMUSCULAR; INTRAVENOUS; SUBCUTANEOUS PRN
Status: CANCELLED | OUTPATIENT
Start: 2022-03-28

## 2022-03-21 RX ORDER — ONDANSETRON 2 MG/ML
8 INJECTION INTRAMUSCULAR; INTRAVENOUS
Status: CANCELLED | OUTPATIENT
Start: 2022-03-28

## 2022-03-21 RX ORDER — SODIUM CHLORIDE 0.9 % (FLUSH) 0.9 %
5-40 SYRINGE (ML) INJECTION PRN
Status: CANCELLED | OUTPATIENT
Start: 2022-03-28

## 2022-03-21 RX ORDER — SODIUM CHLORIDE 9 MG/ML
INJECTION, SOLUTION INTRAVENOUS CONTINUOUS
Status: ACTIVE | OUTPATIENT
Start: 2022-03-21 | End: 2022-03-21

## 2022-03-21 RX ADMIN — IRON SUCROSE 300 MG: 20 INJECTION, SOLUTION INTRAVENOUS at 11:00

## 2022-03-21 RX ADMIN — SODIUM CHLORIDE: 9 INJECTION, SOLUTION INTRAVENOUS at 10:45

## 2022-03-21 NOTE — TELEPHONE ENCOUNTER
Called and spoke to [place]. I introduced my self to [place] and went over her Insurance benefits. She has a co-pay for Out patient infusion of 290.00. I informed her there was no assistance available at the time but would send her a n application for assistance from [facility] ([facility]). She was very appreciative for the help and information.        [address] [facility]  [phone]

## 2022-03-22 ENCOUNTER — HOSPITAL ENCOUNTER (OUTPATIENT)
Dept: INFUSION THERAPY | Age: 57
Setting detail: INFUSION SERIES
Discharge: HOME | End: 2022-03-22
Payer: MEDICARE

## 2022-03-22 VITALS
SYSTOLIC BLOOD PRESSURE: 123 MMHG | HEART RATE: 81 BPM | TEMPERATURE: 98.1 F | OXYGEN SATURATION: 100 % | DIASTOLIC BLOOD PRESSURE: 73 MMHG | RESPIRATION RATE: 17 BRPM

## 2022-03-22 DIAGNOSIS — K90.49 MALABSORPTION DUE TO INTOLERANCE, NOT ELSEWHERE CLASSIFIED: ICD-10-CM

## 2022-03-22 DIAGNOSIS — D50.9 IRON DEFICIENCY ANEMIA, UNSPECIFIED IRON DEFICIENCY ANEMIA TYPE: Primary | ICD-10-CM

## 2022-03-22 PROCEDURE — 96365 THER/PROPH/DIAG IV INF INIT: CPT

## 2022-03-22 PROCEDURE — 2580000003 HC RX 258: Performed by: NURSE PRACTITIONER

## 2022-03-22 PROCEDURE — 96366 THER/PROPH/DIAG IV INF ADDON: CPT

## 2022-03-22 PROCEDURE — 6360000002 HC RX W HCPCS: Performed by: NURSE PRACTITIONER

## 2022-03-22 RX ORDER — EPINEPHRINE 1 MG/ML
0.3 INJECTION, SOLUTION, CONCENTRATE INTRAVENOUS PRN
Status: CANCELLED | OUTPATIENT
Start: 2022-03-28

## 2022-03-22 RX ORDER — DIPHENHYDRAMINE HYDROCHLORIDE 50 MG/ML
50 INJECTION INTRAMUSCULAR; INTRAVENOUS
Status: CANCELLED | OUTPATIENT
Start: 2022-03-28

## 2022-03-22 RX ORDER — ONDANSETRON 2 MG/ML
8 INJECTION INTRAMUSCULAR; INTRAVENOUS
Status: CANCELLED | OUTPATIENT
Start: 2022-03-28

## 2022-03-22 RX ORDER — ALBUTEROL SULFATE 90 UG/1
4 AEROSOL, METERED RESPIRATORY (INHALATION) PRN
Status: CANCELLED | OUTPATIENT
Start: 2022-03-28

## 2022-03-22 RX ORDER — SODIUM CHLORIDE 0.9 % (FLUSH) 0.9 %
5-40 SYRINGE (ML) INJECTION PRN
Status: CANCELLED | OUTPATIENT
Start: 2022-03-28

## 2022-03-22 RX ORDER — HEPARIN SODIUM (PORCINE) LOCK FLUSH IV SOLN 100 UNIT/ML 100 UNIT/ML
500 SOLUTION INTRAVENOUS PRN
Status: CANCELLED | OUTPATIENT
Start: 2022-03-28

## 2022-03-22 RX ORDER — MEPERIDINE HYDROCHLORIDE 25 MG/ML
12.5 INJECTION INTRAMUSCULAR; INTRAVENOUS; SUBCUTANEOUS PRN
Status: CANCELLED | OUTPATIENT
Start: 2022-03-28

## 2022-03-22 RX ORDER — SODIUM CHLORIDE 9 MG/ML
INJECTION, SOLUTION INTRAVENOUS CONTINUOUS
Status: ACTIVE | OUTPATIENT
Start: 2022-03-22 | End: 2022-03-22

## 2022-03-22 RX ORDER — SODIUM CHLORIDE 9 MG/ML
25 INJECTION, SOLUTION INTRAVENOUS PRN
Status: CANCELLED | OUTPATIENT
Start: 2022-03-28

## 2022-03-22 RX ORDER — SODIUM CHLORIDE 9 MG/ML
INJECTION, SOLUTION INTRAVENOUS CONTINUOUS
Status: CANCELLED | OUTPATIENT
Start: 2022-03-28

## 2022-03-22 RX ORDER — ACETAMINOPHEN 325 MG/1
650 TABLET ORAL
Status: CANCELLED | OUTPATIENT
Start: 2022-03-28

## 2022-03-22 RX ADMIN — IRON SUCROSE 300 MG: 20 INJECTION, SOLUTION INTRAVENOUS at 11:19

## 2022-03-22 RX ADMIN — SODIUM CHLORIDE: 9 INJECTION, SOLUTION INTRAVENOUS at 11:07

## 2022-03-28 ENCOUNTER — TELEPHONE (OUTPATIENT)
Dept: PSYCHIATRY | Age: 57
End: 2022-03-28

## 2022-03-28 DIAGNOSIS — G47.00 INSOMNIA, UNSPECIFIED TYPE: Primary | ICD-10-CM

## 2022-03-28 RX ORDER — ESZOPICLONE 3 MG/1
3 TABLET, FILM COATED ORAL NIGHTLY
COMMUNITY
End: 2022-03-28 | Stop reason: SDUPTHER

## 2022-03-28 RX ORDER — ESZOPICLONE 3 MG/1
3 TABLET, FILM COATED ORAL NIGHTLY
Qty: 30 TABLET | Refills: 0 | Status: SHIPPED | OUTPATIENT
Start: 2022-03-28 | End: 2022-04-25 | Stop reason: SDUPTHER
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
## 2022-03-29 ENCOUNTER — HOSPITAL ENCOUNTER (OUTPATIENT)
Dept: ULTRASOUND IMAGING | Age: 57
Discharge: HOME | End: 2022-03-29
Payer: MEDICARE

## 2022-03-29 DIAGNOSIS — R71.8 HIGH SERUM ERYTHROPOIETIN: ICD-10-CM

## 2022-03-29 PROCEDURE — 76770 US EXAM ABDO BACK WALL COMP: CPT

## 2022-03-31 ENCOUNTER — TELEPHONE (OUTPATIENT)
Dept: INFUSION THERAPY | Age: 57
End: 2022-03-31

## 2022-04-07 ENCOUNTER — TELEPHONE (OUTPATIENT)
Dept: PSYCHIATRY | Age: 57
End: 2022-04-07

## 2022-04-07 DIAGNOSIS — F41.1 GENERALIZED ANXIETY DISORDER: ICD-10-CM

## 2022-04-07 RX ORDER — GABAPENTIN 300 MG/1
CAPSULE ORAL
Qty: 60 CAPSULE | Refills: 0 | Status: SHIPPED | OUTPATIENT
Start: 2022-04-07 | End: 2022-05-06 | Stop reason: SDUPTHER

## 2022-04-07 RX ORDER — RISPERIDONE 1 MG/1
TABLET, FILM COATED ORAL
Qty: 90 TABLET | Refills: 0 | Status: SHIPPED | OUTPATIENT
Start: 2022-04-07 | End: 2022-07-06
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
## 2022-04-07 NOTE — TELEPHONE ENCOUNTER
Attempted to contact pt to inform her that refill RX for Gabapentin had been sent to her pharmacy per EKATERINA SCANLON as she requested-no answer.

## 2022-04-25 ENCOUNTER — TELEPHONE (OUTPATIENT)
Dept: PSYCHIATRY | Age: 57
End: 2022-04-25

## 2022-04-25 DIAGNOSIS — G47.00 INSOMNIA, UNSPECIFIED TYPE: ICD-10-CM

## 2022-04-25 RX ORDER — ESZOPICLONE 3 MG/1
3 TABLET, FILM COATED ORAL NIGHTLY
Qty: 30 TABLET | Refills: 0 | Status: SHIPPED | OUTPATIENT
Start: 2022-04-25 | End: 2022-05-25

## 2022-04-25 RX ORDER — CLONIDINE HYDROCHLORIDE 0.1 MG/1
TABLET ORAL
Qty: 90 TABLET | Refills: 0 | Status: SHIPPED | OUTPATIENT
Start: 2022-04-25 | End: 2022-08-09 | Stop reason: SDUPTHER
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
## 2022-04-25 NOTE — TELEPHONE ENCOUNTER
Pt made aware that refill RXs for Lunesta and Clonidine had been sent to her pharmacy per Lashaun SCANLON.

## 2022-05-06 ENCOUNTER — TELEPHONE (OUTPATIENT)
Dept: PSYCHIATRY | Age: 57
End: 2022-05-06

## 2022-05-06 DIAGNOSIS — F41.1 GENERALIZED ANXIETY DISORDER: ICD-10-CM

## 2022-05-06 RX ORDER — GABAPENTIN 300 MG/1
CAPSULE ORAL
Qty: 60 CAPSULE | Refills: 0 | Status: SHIPPED | OUTPATIENT
Start: 2022-05-06 | End: 2022-06-10 | Stop reason: SDUPTHER
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
## 2022-05-06 NOTE — TELEPHONE ENCOUNTER
Called and let pt know that her script for gabapentin was sent to her pharmacy     Electronically signed by Wade Smith on 5/6/2022 at 11:49 AM

## 2022-05-17 NOTE — PROGRESS NOTES
OP HEMATOLOGY/ONCOLOGY CONSULTATION      Pt Name: Rosa M Eth: 1965  MRN: [id number]  Referring provider: Librado Apley, APRN  Requesting provider: Dr. Grecia Armstrong  Reason for consultation: Iron deficiency anemia  Date of evaluation: 5/18/2022    History Obtained From:  patient, electronic medical record    CHIEF COMPLAINT:    Chief Complaint   Patient presents with    Follow-up     Iron deficiency anemia, unspecified iron deficiency anemia type     HISTORY OF PRESENT ILLNESS:    Brenda Leal is a 62 y.o.  female referred to the clinic March, 2022 for iron deficiency anemia. She returns to the clinic to discuss serology completed for further evaluation. She also received parenteral iron replacement with Venofer on 400 mg 3/17/2022 and 300 mg on 3/21/2022 and 3/22/2022. Cortland Apley states she is feeling much better following iron infusion. Fatigue and exertional dyspnea is improved. Hgb is improved from 8.6 on 3/9/2022, currently 13.6/MCV 94. She turned stool sample in today for occult blood testing. Dorene denies melena or hematochezia. Diclofenac 75 mg po BID was discontinued due to both anemia and impaired renal function. Endoscopy/colonoscopy by Dr. Abigail Walton 11/2/2021: Probable fungal esophagitis, otherwise negative. No varices or portal gastropathy. HEMATOLOGY HISTORY: Iron Deficiency Anemia  Jaz Martin was seen in hematology consultation 3/9/2022, referred to the clinic by Dr. Grecia Armstrong regarding iron deficiency anemia. PMH significant for EtOH use (quit 2017), liver cirrhosis, multiple miscarriages, depression, arthritis, PTSD, tobacco use, melanoma anterior chest wall. Cortland Apley was noted to have anemia on routine serology.     Labs 1/31/2022:  · CBC: WBC: 13.1, Hgb: 8.3/MCV: 76.2, Hct: 27.9, Platelets: 614  · CMP: Creatinine: 1.3, GFR: 42, Calcium: 9.7, Total protein: 7.6  · Iron: 14, TIBC: 438, Iron Saturation: 3  · B12: 1091  · Folate: 6.4     CBC 2021: WBC 11.3/ANC 8.7. Hgb 13.5/MCV 96, platelets 612,959  CBC 3/30/2021: WBC 9.0, Hgb 15. 0/MCV 92, platelets 293,226  CBC 11/10/2015: WBC 7.92, Hgb 15.4/MCV 88.1, platelets 937,435. Dorene takes diclofenac twice daily for back pain. She states she quit alcohol use . Liver disease is managed by [facility] in [address] [place] [address]. Ultrasound right upper quadrant 10/13/2021 at [facility]: No focal liver lesions. Prior cholecystectomy, otherwise negative RUQ ultrasound    Endoscopy 2021 at [facility] by Dr. Kirstie Byrd: Probable fungal esophagitis without evidence of varices or portal gastropathy  Colonoscopy 2021 at [facility] by Dr. Arnie Cano: Normal colonoscopy, recall 5 years    She denies melena or hematochezia. She denies B symptoms. She reports decreased appetite. Her main complaint is of fatigue. Leukocytosis possibly associated with tobacco use, reactive. Will request peripheral blood smear, BCR/ABL given both leukocytosis and anemia. Serology was consistent with iron deficiency. Patient complains of decreased appetite/nausea, will arrange IV iron. Labs 3/9/2022:  · Reticulocytes: 2.05  · Zinc: 95.8  · EPO: 335  · Copper: 167.9  · SPEP: Increased alpha-1 region.  Slight beta-gamma bridging.  JEFF gel shows a normal pattern  · Immunofixation: no monoclonal proteins seen  · Ig, IgA: 308, IgM: 263  · Kappa light chain: 29.28, Lambda light chain: 35.71, L/K ratio: 1.22  · Ferritin: 15.0  · FOBTx3: not completed  · BCR/ABL: negative  · Peripheral Blood Smear: Microcytic hypochromic anemia with occasional ovalocytes, neutrophils have normal morphology and appear adequate in number. Lymphocytes have heterogeneous morphologic features. Platelets appear adequate in number with occasional large forms. Hgb normalized at 13.6 at follow-up on 2022, following parenteral iron replacement therapy.     TREATMENT SUMMARY:  · Venofer 400 mg 3/17/2022, 300 mg 3/21/2022, 300 mg 3/22/2022    Past Medical History:   Diagnosis Date    Abdominal pain     Alcohol abuse     Arthritis     Constipation     Decreased appetite     Elevated liver enzymes     Emesis - persistent     Fatty liver     Hypertension     Iron deficiency anemia     Jaundice     Melanoma ([address]     UTI (urinary tract infection)      Past Surgical History:   Procedure Laterality Date    CHOLECYSTECTOMY      COLONOSCOPY  2014    ENDOSCOPY, COLON, DIAGNOSTIC  2014    HERNIA REPAIR      x2    HERNIA REPAIR      lt inguinal area and rt side    SKIN BIOPSY  2015    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 , Disp: 30 tablet, Rfl: 0    risperiDONE (RISPERDAL) 1 MG tablet, TAKE 1 TABLET BY MOUTH EVERY DAY AT NIGHT, Disp: 90 tablet, Rfl: 0    pantoprazole (PROTONIX) 40 MG tablet, Take 40 mg by mouth daily, Disp: , Rfl:     traZODone (DESYREL) 50 MG tablet, TAKE 1 TABLET BY MOUTH EVERY DAY AT NIGHT, Disp: 90 tablet, Rfl: 0    fluticasone (FLONASE) 50 MCG/ACT nasal spray, SPRAY 1 SPRAY INTO EACH NOSTRIL TWICE A DAY, Disp: , Rfl:     loratadine (CLARITIN) 10 MG tablet, TAKE ONE TABLET BY MOUTH DAILY, Disp: , Rfl:     rosuvastatin (CRESTOR) 5 MG tablet, TAKE 1 TABLET BY MOUTH EVERY DAY IN THE EVENING, Disp: , Rfl:     oxybutynin (DITROPAN) 5 MG tablet, TAKE 1 TABLET BY MOUTH EVERY DAY AT NIGHT, Disp: 90 tablet, Rfl: 3    spironolactone (ALDACTONE) 25 MG tablet, , Disp: , Rfl:     metoprolol succinate (TOPROL XL) 50 MG extended release tablet, Take 50 mg by mouth daily 4/25/19 per direction of Dr. Abraham Colin pt to take 1/2 tab daily. , Disp: , Rfl:     Magnesium Oxide 250 MG TABS, Take by mouth daily, Disp: , Rfl:     lamoTRIgine (LAMICTAL) 150 MG tablet, TAKE 1 TABLET BY MOUTH EVERY DAY, Disp: 90 tablet, Rfl: 0   Allergies: Allergies   Allergen Reactions    Seroquel [Quetiapine Fumarate]      Pt reports caused her B/P to drop, faint and increase her cholesterol. Social History     Tobacco Use    Smoking status: Current Every Day Smoker     Packs/day: 0.50     Types: Cigarettes    Smokeless tobacco: Never Used   Vaping Use    Vaping Use: Never used   Substance Use Topics    Alcohol use: Not Currently     Comment: history of abuse, last drink was early December, 2018    Drug use: Not Currently     Types: Marijuana Batsheva Lottie)     Comment: last use was summer, 2017     Employment: Disabled  Marital status:   Resides: [facility]    Family History   Problem Relation Age of Onset    Depression Mother     Suicide Mother     Bipolar Disorder Brother     Other Brother         MRSA    Alzheimer's Disease Maternal Grandmother     Cervical Cancer Maternal Grandmother      Health Maintenance   Topic Date Due    Annual Wellness Visit (AWV)  Never done    Pneumococcal 0-64 years Vaccine (1 - PCV) Never done    Lipids  Never done    COVID-19 Vaccine (1) Never done    HIV screen  Never done    Hepatitis C screen  Never done    DTaP/Tdap/Td vaccine (1 - Tdap) Never done    Cervical cancer screen  Never done    Colorectal Cancer Screen  Never done    Breast cancer screen  Never done    Shingles vaccine (1 of 2) Never done    Depression Monitoring  07/21/2022    Flu vaccine (Season Ended) 09/01/2022    Hepatitis A vaccine  Aged Out    Hepatitis B vaccine  Aged Out    Hib vaccine  Aged Out    Meningococcal (ACWY) vaccine  Aged Out     Subjective   Review of Systems   Constitutional: Positive for fatigue (Improved). Negative for fever.    HENT: Negative for hearing loss, mouth sores, nosebleeds, sore throat and trouble swallowing. Diminished taste   Eyes: Negative for discharge and itching. Blurred vision   Respiratory: Negative for cough, shortness of breath and wheezing. Cardiovascular: Negative for chest pain, palpitations and leg swelling. Gastrointestinal: Positive for constipation (Stable). Negative for abdominal pain, diarrhea, nausea and vomiting. Heartburn, loss of appetite   Endocrine: Positive for cold intolerance and heat intolerance. Genitourinary: Negative for dysuria, frequency, hematuria and urgency. Musculoskeletal: Negative for arthralgias, joint swelling and myalgias. Skin: Negative for pallor and rash. Allergic/Immunologic: Positive for environmental allergies. Negative for immunocompromised state. Neurological: Negative for seizures, syncope and numbness. Balance issues   Hematological: Negative for adenopathy. Does not bruise/bleed easily. Psychiatric/Behavioral: Negative for agitation, behavioral problems and confusion. The patient is not nervous/anxious. Anxiety, depression, poor concentration     Objective   Physical Exam  Vitals reviewed. Constitutional:       General: She is not in acute distress. Appearance: She is well-developed. She is not toxic-appearing or diaphoretic. Comments: Wearing a facial mask. Appears chronically ill, thin   HENT:      Head: Normocephalic and atraumatic. Right Ear: External ear normal.      Left Ear: External ear normal.   Eyes:      General: No scleral icterus. Right eye: No discharge. Left eye: No discharge. Conjunctiva/sclera: Conjunctivae normal.   Neck:      Trachea: No tracheal deviation. Cardiovascular:      Rate and Rhythm: Normal rate and regular rhythm. Pulmonary:      Effort: Pulmonary effort is normal. No respiratory distress.       Breath sounds: Examination of the right-lower field reveals decreased RDW 18.6 (H) 2022     ASSESSMENT/PLAN:      Iron deficiency anemia due to chronic blood loss    Labs 3/9/2022:  · Reticulocytes: 2.05  · Zinc: 95.8  · EPO: 335  · Copper: 167.9  · SPEP: Increased alpha-1 region.  Slight beta-gamma bridging.  JEFF gel shows a normal pattern  · Immunofixation: no monoclonal proteins seen  · Ig, IgA: 308, IgM: 263  · Kappa light chain: 29.28, Lambda light chain: 35.71, L/K ratio: 1.22  · Ferritin: 15.0  · BCR/ABL: negative  · Peripheral Blood Smear: Microcytic hypochromic anemia with occasional ovalocytes, neutrophils have normal morphology and appear adequate in number. Lymphocytes have heterogeneous morphologic features. Platelets appear adequate in number with occasional large forms. Labs were consistent with iron deficiency  Dorene Venofer 400 mg 3/17/2022, 300 mg 3/21/2022, 300 mg 3/22/2022  Hgb is now improved, 13.6/MCV 94.0    Stool for occult blood submitted today, follow-up results    Endoscopy 2021 at [facility] by Dr. Rojelio Kaplan: Probable fungal esophagitis without evidence of varices or portal gastropathy  Colonoscopy 2021 at [facility] by Dr. Rose Valencia: Normal colonoscopy, recall 5 years  Consider capsule endoscopy for persistent/recurrent anemia. High serum erythropoietin  Bilateral renal ultrasound 3/29/2022 was normal, no evidence of renal mass  Elevated erythropoietin level likely due to significant anemia  Repeat erythropoietin level today    Neutrophilic leukocytosis  improved  WBC 10.63, ANC 9.08  Peripheral blood smear and BCR/ABL unrevealing  Likely reactive, continue to monitor    Abnormal renal function  Creatinine: 1.3, GFR: 42 on 2022  Serology not suggestive of monoclonal protein  Diclofenac discontinued    Monitor CBC every 2 months. Return in about 6 months (around 2022) for follow up with CAPO Jackson.       CAPO Mendoza  4:30 PM  2022

## 2022-05-18 ENCOUNTER — HOSPITAL ENCOUNTER (OUTPATIENT)
Dept: INFUSION THERAPY | Age: 57
Discharge: HOME | End: 2022-05-18
Payer: MEDICARE

## 2022-05-18 ENCOUNTER — OFFICE VISIT (OUTPATIENT)
Dept: HEMATOLOGY | Age: 57
End: 2022-05-18
Payer: MEDICARE

## 2022-05-18 VITALS
DIASTOLIC BLOOD PRESSURE: 80 MMHG | OXYGEN SATURATION: 98 % | SYSTOLIC BLOOD PRESSURE: 136 MMHG | WEIGHT: 129.6 LBS | BODY MASS INDEX: 20.83 KG/M2 | HEART RATE: 120 BPM | HEIGHT: 66 IN

## 2022-05-18 DIAGNOSIS — D72.9 NEUTROPHILIC LEUKOCYTOSIS: ICD-10-CM

## 2022-05-18 DIAGNOSIS — R71.8 HIGH SERUM ERYTHROPOIETIN: ICD-10-CM

## 2022-05-18 DIAGNOSIS — D50.0 IRON DEFICIENCY ANEMIA DUE TO CHRONIC BLOOD LOSS: ICD-10-CM

## 2022-05-18 DIAGNOSIS — D50.9 IRON DEFICIENCY ANEMIA, UNSPECIFIED IRON DEFICIENCY ANEMIA TYPE: Primary | ICD-10-CM

## 2022-05-18 DIAGNOSIS — E61.1 LOW IRON: ICD-10-CM

## 2022-05-18 DIAGNOSIS — N28.9 ABNORMAL RENAL FUNCTION: ICD-10-CM

## 2022-05-18 DIAGNOSIS — D64.9 LOW HEMOGLOBIN: ICD-10-CM

## 2022-05-18 DIAGNOSIS — D50.9 IRON DEFICIENCY ANEMIA, UNSPECIFIED IRON DEFICIENCY ANEMIA TYPE: ICD-10-CM

## 2022-05-18 LAB
BASOPHILS ABSOLUTE: 0.05 K/UL (ref 0.01–0.08)
BASOPHILS RELATIVE PERCENT: 0.5 % (ref 0.1–1.2)
EOSINOPHILS ABSOLUTE: 0.01 K/UL (ref 0.04–0.54)
EOSINOPHILS RELATIVE PERCENT: 0.1 % (ref 0.7–7)
FERRITIN: 1260 NG/ML (ref 13–150)
HCT VFR BLD CALC: 40.4 % (ref 34.1–44.9)
HEMOCCULT SP2 STL QL: NORMAL
HEMOCCULT SP3 STL QL: NORMAL
HEMOGLOBIN: 13.6 G/DL (ref 11.2–15.7)
IRON SATURATION: 47 % (ref 14–50)
IRON: 122 UG/DL (ref 37–145)
LYMPHOCYTES ABSOLUTE: 0.9 K/UL (ref 1.18–3.74)
LYMPHOCYTES RELATIVE PERCENT: 8.5 % (ref 19.3–53.1)
MCH RBC QN AUTO: 31.6 PG (ref 25.6–32.2)
MCHC RBC AUTO-ENTMCNC: 33.7 G/DL (ref 32.3–35.5)
MCV RBC AUTO: 94 FL (ref 79.4–94.8)
MONOCYTES ABSOLUTE: 0.52 K/UL (ref 0.24–0.82)
MONOCYTES RELATIVE PERCENT: 4.9 % (ref 4.7–12.5)
NEUTROPHILS ABSOLUTE: 9.08 K/UL (ref 1.56–6.13)
NEUTROPHILS RELATIVE PERCENT: 85.3 % (ref 34–71.1)
OCCULT BLOOD QC: NORMAL
OCCULT BLOOD SCREENING: NORMAL
PDW BLD-RTO: 18.6 % (ref 11.7–14.4)
PLATELET # BLD: 147 K/UL (ref 182–369)
PMV BLD AUTO: 9.9 FL (ref 7.4–10.4)
RBC # BLD: 4.3 M/UL (ref 3.93–5.22)
TOTAL IRON BINDING CAPACITY: 261 UG/DL (ref 250–400)
WBC # BLD: 10.63 K/UL (ref 3.98–10.04)

## 2022-05-18 PROCEDURE — 99214 OFFICE O/P EST MOD 30 MIN: CPT | Performed by: NURSE PRACTITIONER

## 2022-05-18 PROCEDURE — 99212 OFFICE O/P EST SF 10 MIN: CPT

## 2022-05-18 PROCEDURE — 85025 COMPLETE CBC W/AUTO DIFF WBC: CPT

## 2022-05-18 PROCEDURE — 36415 COLL VENOUS BLD VENIPUNCTURE: CPT

## 2022-05-18 RX ORDER — ICOSAPENT ETHYL 1000 MG/1
CAPSULE ORAL 2 TIMES DAILY
COMMUNITY

## 2022-05-18 ASSESSMENT — ENCOUNTER SYMPTOMS
WHEEZING: 0
EYE ITCHING: 0
DIARRHEA: 0
NAUSEA: 0
TROUBLE SWALLOWING: 0
SORE THROAT: 0
CONSTIPATION: 1
ABDOMINAL PAIN: 0
VOMITING: 0
COUGH: 0
SHORTNESS OF BREATH: 0
EYE DISCHARGE: 0

## 2022-05-20 ENCOUNTER — TELEPHONE (OUTPATIENT)
Dept: PSYCHIATRY | Age: 57
End: 2022-05-20

## 2022-05-20 DIAGNOSIS — Z79.899 HISTORY OF ONGOING TREATMENT WITH HIGH-RISK MEDICATION: Primary | ICD-10-CM

## 2022-05-20 LAB — ERYTHROPOIETIN: 23 MU/ML (ref 4–27)

## 2022-05-20 NOTE — TELEPHONE ENCOUNTER
Called pt for appointment reminder.     -Pt confirmed      Electronically signed by Sotero Peñaloza MA on 5/20/2022 at 1:11 PM

## 2022-05-23 ENCOUNTER — TELEPHONE (OUTPATIENT)
Dept: PSYCHIATRY | Age: 57
End: 2022-05-23

## 2022-05-23 ENCOUNTER — SCHEDULED TELEPHONE ENCOUNTER (OUTPATIENT)
Dept: PSYCHIATRY | Age: 57
End: 2022-05-23
Payer: MEDICARE

## 2022-05-23 DIAGNOSIS — F31.61 BIPOLAR AFFECTIVE DISORDER, MIXED, MILD (HCC): ICD-10-CM

## 2022-05-23 DIAGNOSIS — F41.1 GENERALIZED ANXIETY DISORDER: Primary | ICD-10-CM

## 2022-05-23 PROCEDURE — 99443 PR PHYS/QHP TELEPHONE EVALUATION 21-30 MIN: CPT | Performed by: NURSE PRACTITIONER

## 2022-05-23 RX ORDER — TRAZODONE HYDROCHLORIDE 100 MG/1
100 TABLET ORAL NIGHTLY
Qty: 30 TABLET | Refills: 2 | Status: SHIPPED | OUTPATIENT
Start: 2022-05-23 | End: 2022-08-09

## 2022-05-23 RX ORDER — LAMOTRIGINE 200 MG/1
200 TABLET ORAL DAILY
Qty: 30 TABLET | Refills: 1 | Status: SHIPPED | OUTPATIENT
Start: 2022-05-23 | End: 2022-09-19

## 2022-05-23 ASSESSMENT — PATIENT HEALTH QUESTIONNAIRE - PHQ9
SUM OF ALL RESPONSES TO PHQ QUESTIONS 1-9: 7
2. FEELING DOWN, DEPRESSED OR HOPELESS: 2
SUM OF ALL RESPONSES TO PHQ QUESTIONS 1-9: 7
7. TROUBLE CONCENTRATING ON THINGS, SUCH AS READING THE NEWSPAPER OR WATCHING TELEVISION: 0
SUM OF ALL RESPONSES TO PHQ QUESTIONS 1-9: 7
5. POOR APPETITE OR OVEREATING: 0
1. LITTLE INTEREST OR PLEASURE IN DOING THINGS: 1
6. FEELING BAD ABOUT YOURSELF - OR THAT YOU ARE A FAILURE OR HAVE LET YOURSELF OR YOUR FAMILY DOWN: 1
SUM OF ALL RESPONSES TO PHQ9 QUESTIONS 1 & 2: 3
10. IF YOU CHECKED OFF ANY PROBLEMS, HOW DIFFICULT HAVE THESE PROBLEMS MADE IT FOR YOU TO DO YOUR WORK, TAKE CARE OF THINGS AT HOME, OR GET ALONG WITH OTHER PEOPLE: 1
8. MOVING OR SPEAKING SO SLOWLY THAT OTHER PEOPLE COULD HAVE NOTICED. OR THE OPPOSITE, BEING SO FIGETY OR RESTLESS THAT YOU HAVE BEEN MOVING AROUND A LOT MORE THAN USUAL: 0
9. THOUGHTS THAT YOU WOULD BE BETTER OFF DEAD, OR OF HURTING YOURSELF: 0
SUM OF ALL RESPONSES TO PHQ QUESTIONS 1-9: 7
3. TROUBLE FALLING OR STAYING ASLEEP: 2
4. FEELING TIRED OR HAVING LITTLE ENERGY: 1

## 2022-05-23 NOTE — PROGRESS NOTES
Daniel Strong is a 62 y.o. female evaluated via telephone on 5/23/2022. Consent:  She and/or health care decision maker is aware that that she may receive a bill for this telephone service, depending on her insurance coverage, and has provided verbal consent to proceed: Yes      Documentation:  I communicated with the patient and/or health care decision maker about see below. Details of this discussion including any medical advice provided: see below      I affirm this is a Patient Initiated Episode with a Patient who has not had a related appointment within my department in the past 7 days or scheduled within the next 24 hours. The patient  (guardian) is aware that this is a billable service, which includes applicable co-pays. This virtual visit was conducted with patient's (and or legal guardian's) consent. This visit was conducted pursuant to the emergency declaration under the [address] act and the [address] waiver authority in the coronavirus preparedness and response supplemental appropriation's ACT. Patient identification was verified and a caregiver was present when appropriate. The patient was located in a state where the provider was licensed to provide care. Patient identification was verified at the start of the visit: Yes    Total Time: minutes: 11-20 minutes    Note: not billable if this call serves to triage the patient into an appointment for the relevant concern      CAPO Ramos CNP        5/23/22         Progress Note    Daniel Strong 1965      Chief Complaint   Patient presents with    Medication Check    Follow-up         Subjective:    Patient is a 62 y.o. female diagnosed with bipolar and presents today for follow-up. Last seen in clinic on 2/21/22  and prior records were reviewed. Last visit: doing well today. Having difficulty with the cold weather. Feels like her mood has been relatively controlled.   She reports her finances have improved. She is taking a break from therapy right now because she does not have a lot to talk about. She reports her mood and medications are pretty stable for the most part. She denies si hi avh she denies side effects of medications. Will make no changes today, will follow up in 3 months. Today : she reports her mood has not been doing very well. She feels like she is juggled around from doctor to doctor by PCP. She is hesitant to restart therapy at this time also because of low motivation. She states she has been more depressed and having more anxiety and panic attacks. She reports she has been more isolating and withdrawing however she has been sitting with her neighbors a few times over the past couple of weeks. We discussed increasing her lamotrigine to 200 mg daily as well as increasing her trazodone to 100 mg nightly. Patient was encouraged to follow-up with her therapist to discuss financial strains as well as coping strategies to help deal with depression and anxiety. Patient verbalized understanding will follow-up in 6 weeks      Absent  suicidal ideation. Reports compliance with medications as good . Sleep: not as good as she had been. Since she is seeing more doctors she is having more anxious spells. And her finances have taken a hit so she has not been sleeping as well. She has been waking up with panic.     Caffeine use: coffee most morning     Support:  neighbor    PREVIOUS MED TRIALS  Trazodone (having more suicidal dreams), at 100mg, not working for sleep  Seroquel (wt gain, 14 lb in 3 months, enuresis, indigestion, abnormal blood work, increased blood sugar, seizures)  Duloxetine (has not been effective and no noticeable change even with dose increases to 90mg)  Paxil, 20mg  Wellbutrin XL, (tried it recently to quit smoking)  Prozac (made her numb, added to her eating disorder)  Zoloft (thought she did well on this, took for a couple of years, she stopped it because when she returned to [place] the doctor put her back on Prozac)  Remeron (increased her dreams and panic attacks)  Kaitlin Mandoitz (worked)  Librium (in 2018 for 15 days to stop drinking alcohol)  Risperdal- weight gain  Doxepin,  (not effective)  Prazosin, ineffective for dreams/nightmares  Latuda (1/27/21, reports that she switched it to am dosing because taking it at night made her RLS worse)    Current Substance Use:   Alcohol: Denies   illicit drug use: Denies   Marijuana: Denies   Tobacco use: 3/4 ppd  Vape: Denies      BP: There were no vitals taken for this visit.       Review of Systems - 14 point review:  Negative except for stated    Constitutional: (fevers, chills, night sweats, wt loss/gain, change in appetite, fatigue, somnolence)    HEENT: (ear pain or discharge, hearing loss, ear ringing, sinus pressure, nosebleed, nasal discharge, sore throat, oral sores, tooth pain, bleeding gums, hoarse voice, neck pain)      Cardiovascular: (HTN, chest pain, elevated cholesterol/lipids, palpitations, leg swelling, leg pain with walking)    Respiratory: (cough, wheezing, snoring, SOB with activity (dyspnea), SOB while lying flat (orthopnea), awakening with severe SOB (paroxysmal nocturnal dyspnea))    Gastrointestinal: (NVD, constipation, abdominal pain, bright red stools, black tarry stools, stool incontinence)     Genitourinary:  (pelvic pain, burning or frequency of urination, urinary urgency, blood in urine incomplete bladder emptying, urinary incontinence, STD; MEN: testicular pain or swelling, erectile dysfunction; WOMEN: LMP, heavy menstrual bleeding (menorrhagia), irregular periods, postmenopausal bleeding, menstrual pain (dymenorrhea, vaginal discharge)    Musculoskeletal: (bone pain/fracture, joint pain or swelling, musle pain)    Integumentary: (rashes, acne, non-healing sores, itching, breast lumps, breast pain, nipple discharge, hair loss)    Neurologic: (HA, muscle weakness, paresthesias (numbness, coldness, RUBY WallerN - CNP   spironolactone (ALDACTONE) 25 MG tablet  3/5/20   Historical Provider, MD   metoprolol succinate (TOPROL XL) 50 MG extended release tablet Take 50 mg by mouth daily 4/25/19 per direction of Dr. Marianne Swartz pt to take 1/2 tab daily. Historical Provider, MD   Magnesium Oxide 250 MG TABS Take by mouth daily    Historical Provider, MD     Social History     Socioeconomic History    Marital status: Legally      Spouse name: Not on file    Number of children: 1    Years of education: 12th grade + some college    Highest education level: Not on file   Occupational History    Not on file   Tobacco Use    Smoking status: Current Every Day Smoker     Packs/day: 0.50     Types: Cigarettes    Smokeless tobacco: Never Used   Vaping Use    Vaping Use: Never used   Substance and Sexual Activity    Alcohol use: Not Currently     Comment: history of abuse, last drink was early December, 2018    Drug use: Not Currently     Types: Marijuana Elyse Beat)     Comment: last use was summer, 2017    Sexual activity: Not Currently   Other Topics Concern    Not on file   Social History Narrative    Not on file     Social Determinants of Health     Financial Resource Strain:     Difficulty of Paying Living Expenses: Not on file   Food Insecurity:     Worried About [address] in the Last Year: Not on file    [address] in the Last Year: Not on file   Transportation Needs:     Lack of Transportation (Medical): Not on file    Lack of Transportation (Non-Medical):  Not on file   Physical Activity:     Days of Exercise per Week: Not on file    Minutes of Exercise per Session: Not on file   Stress:     Feeling of Stress : Not on file   Social Connections:     Frequency of Communication with Friends and Family: Not on file    Frequency of Social Gatherings with Friends and Family: Not on file    Attends Spiritism Services: Not on file    Active Member of Clubs or Organizations: Not on file  Attends Club or Organization Meetings: Not on file    Marital Status: Not on file   Intimate Partner Violence:     Fear of Current or Ex-Partner: Not on file    Emotionally Abused: Not on file    Physically Abused: Not on file    Sexually Abused: Not on file   Housing Stability:     Unable to Pay for Housing in the Last Year: Not on file    Number of [place] in the Last Year: Not on file    Unstable Housing in the Last Year: Not on file       MSE:  Appearance and gait: UTO due to phone call   Behavior: Calm, cooperative, and socially appropriate. Heddy  Speech: Normal in tone, volume, and quality. No slurring, dysarthria or pressured speech noted. Mood: \"depressed and anxious\"   Affect: UTO due to phone call    Thought Process: Appears linear, logical and goal oriented. Causality appears intact. Thought Content: Denies active suicidal and homicidal ideations. No overt delusions or paranoia appreciated. Perceptions: Denies auditory or visual hallucinations at present time. Not responding to internal stimuli. Concentration: Intact. Orientation: to person, place, date, and situation. Language: Intact. Fund of information: Intact. Memory: Recent and remote appear intact. Impulsivity: Limited. Neurovegitative: Fair appetite and sleep. Insight: Fair. Judgment: Fair. Cognition: Can spell \"world\" backwards: Yes                    Can do serial 7's:  Yes    Lab Results   Component Value Date     (L) 03/10/2020    K 4.7 03/10/2020    CL 89 (L) 03/10/2020    CO2 18 (L) 03/10/2020    BUN 19 03/10/2020    CREATININE 0.9 03/10/2020    GLUCOSE 107 03/10/2020    CALCIUM 9.1 03/10/2020    PROT 7.4 03/09/2022    LABALBU 3.54 (L) 03/09/2022    ALKPHOS 127 (H) 11/10/2015    AST 33 (H) 11/10/2015    ALT 22 11/10/2015    LABGLOM >60 03/10/2020     Lab Results   Component Value Date     03/10/2020    K 4.7 03/10/2020    CL 89 03/10/2020    CO2 18 03/10/2020    BUN 19 03/10/2020 CREATININE 0.9 03/10/2020    GLUCOSE 107 03/10/2020    CALCIUM 9.1 03/10/2020      No results found for: CHOL  No results found for: TRIG  No results found for: HDL  No results found for: LDLCHOLESTEROL, LDLCALC  No results found for: LABVLDL, VLDL  No results found for: CHOLHDLRATIO  No results found for: LABA1C  No results found for: EAG  Lab Results   Component Value Date    TSH 2.40 02/03/2015     Lab Results   Component Value Date    VITD25 27.6 (L) 02/03/2015     No results found for: RZBCCTXP77   No results found for: FOLATE     Assessment:   1. Generalized anxiety disorder    2. Bipolar affective disorder, mixed, mild (HCC)        No evidence of acute suicidality, homicidality or psychosis observed. Patient is psychiatrically stable    Plan:    1. Continue   Eszopiclone (Lunesta), 3mg, nightly for sleep (she has tried going off of this with no success, can't sleep without it)   Gabapentin, 300mg, take 2 capsules nightly (restless legs)   Clonidine, 0.1mg, nightly (teeth grinding, nightmares), can lower BP, get your balance before you get up to walk   Risperdal, 1mg, nightly    Increase  Lamotrigine, 200mg,  Daily  Trazodone, 100mg, nightly      Start      Discontinue      The risks, benefits, side effects, indications, contraindications, and adverse effects of the medications have been discussed. Yes.  2. The pt has verbalized understanding and has capacity to give informed consent. 3. The Diony Hernandez report has been reviewed according to [facility] regulations. 4. Supportive therapy offered. 5. Follow up: Return in about 6 weeks (around 7/4/2022). 6. The patient has been advised to call with any problems. 7. Controlled substance Treatment Plan: this is a maintenance dose. .  8. The above listed medications have been continued, modifications in meds and other orders/labs as follows: No orders of the defined types were placed in this encounter.      No orders of the defined types were placed in this

## 2022-05-23 NOTE — TELEPHONE ENCOUNTER
Pt was called for virtual appointment check in.       Electronically signed by Johnathan Yang MA on 5/23/2022 at 12:49 PM

## 2022-05-26 DIAGNOSIS — G47.00 INSOMNIA, UNSPECIFIED TYPE: Primary | ICD-10-CM

## 2022-05-26 RX ORDER — ESZOPICLONE 3 MG/1
3 TABLET, FILM COATED ORAL NIGHTLY
COMMUNITY
End: 2022-05-26 | Stop reason: SDUPTHER

## 2022-05-26 NOTE — TELEPHONE ENCOUNTER
Apolinar Winn called to request a refill on her medication. MEDICATION HAD FALLEN OFF CURRENT MED LIST-MED IS LISTED IN LAST OFFICE NOTE BELOW-DO NOT SEE WHERE MED WAS STOPPED SO WAS ADDED BACK TO THE MED LIST. Rayo Jeffery under media and attached. Last office visit : 5/23/2022 with Bo SCANLON  Next office visit : 7/7/2022 with Bo SCANLON    Requested Prescriptions     Pending Prescriptions Disp Refills    eszopiclone (ESZOPICLONE) 3 MG TABS 30 tablet 0     Sig: Take 1 tablet by mouth nightly for 30 days. Nathan Miles RN      5/23/22                                          Progress Note     Apolinar Winn 1965                           Chief Complaint   Patient presents with    Medication Check    Follow-up            Subjective:    Patient is a 62 y.o. female diagnosed with bipolar and presents today for follow-up. Last seen in clinic on 2/21/22  and prior records were reviewed.     Last visit: doing well today. Having difficulty with the cold weather. Feels like her mood has been relatively controlled. She reports her finances have improved. She is taking a break from therapy right now because she does not have a lot to talk about. She reports her mood and medications are pretty stable for the most part. She denies si hi avh she denies side effects of medications. Will make no changes today, will follow up in 3 months.      Today : she reports her mood has not been doing very well. She feels like she is juggled around from doctor to doctor by PCP. She is hesitant to restart therapy at this time also because of low motivation. She states she has been more depressed and having more anxiety and panic attacks. She reports she has been more isolating and withdrawing however she has been sitting with her neighbors a few times over the past couple of weeks. We discussed increasing her lamotrigine to 200 mg daily as well as increasing her trazodone to 100 mg nightly. Patient was encouraged to follow-up with her therapist to discuss financial strains as well as coping strategies to help deal with depression and anxiety. Patient verbalized understanding will follow-up in 6 weeks        Absent  suicidal ideation. Reports compliance with medications as good .      Sleep: not as good as she had been. Since she is seeing more doctors she is having more anxious spells. And her finances have taken a hit so she has not been sleeping as well.   She has been waking up with panic.     Caffeine use: coffee most morning     Support:  neighbor     PREVIOUS MED TRIALS  Trazodone (having more suicidal dreams), at 100mg, not working for sleep  Seroquel (wt gain, 14 lb in 3 months, enuresis, indigestion, abnormal blood work, increased blood sugar, seizures)  Duloxetine (has not been effective and no noticeable change even with dose increases to 90mg)  Paxil, 20mg  Wellbutrin XL, (tried it recently to quit smoking)  Prozac (made her numb, added to her eating disorder)  Zoloft (thought she did well on this, took for a couple of years, she stopped it because when she returned to [place] the doctor put her back on Prozac)  Remeron (increased her dreams and panic attacks)  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        Review of Systems - 14 point review:  Negative except for stated     Constitutional: (fevers, chills, night sweats, wt loss/gain, change in appetite, fatigue, somnolence)     HEENT: (ear pain or discharge, hearing loss, ear ringing, sinus pressure, nosebleed, nasal discharge, cloNIDine (CATAPRES) 0.1 MG tablet TAKE 1 TABLET BY MOUTH EVERY DAY AT NIGHT 4/25/22     CAPO Reece CNP   eszopiclone (ESZOPICLONE) 3 MG TABS Take 1 tablet by mouth nightly for 30 days.  4/25/22 5/25/22   CAPO Reece CNP   risperiDONE (RISPERDAL) 1 MG tablet TAKE 1 TABLET BY MOUTH EVERY DAY AT NIGHT 4/7/22 7/6/22   CAPO Reece CNP   pantoprazole (PROTONIX) 40 MG tablet Take 40 mg by mouth daily       Historical Provider, MD   traZODone (DESYREL) 50 MG tablet TAKE 1 TABLET BY MOUTH EVERY DAY AT NIGHT 3/3/22     CAPO Reece CNP   lamoTRIgine (LAMICTAL) 150 MG tablet TAKE 1 TABLET BY MOUTH EVERY DAY 11/19/21 2/17/22   CAPO Reece CNP   fluticasone (FLONASE) 50 MCG/ACT nasal spray SPRAY 1 SPRAY INTO EACH NOSTRIL TWICE A DAY 7/22/21     Historical Provider, MD   loratadine (CLARITIN) 10 MG tablet TAKE ONE TABLET BY MOUTH DAILY 7/23/21     Historical Provider, MD   rosuvastatin (CRESTOR) 5 MG tablet TAKE 1 TABLET BY MOUTH EVERY DAY IN THE EVENING 8/2/21     Historical Provider, MD   oxybutynin (DITROPAN) 5 MG tablet TAKE 1 TABLET BY MOUTH EVERY DAY AT NIGHT 9/7/21     CAPO Ruffin CNP   spironolactone (ALDACTONE) 25 MG tablet   3/5/20     Historical Provider, MD   metoprolol succinate (TOPROL XL) 50 MG extended release tablet Take 50 mg by mouth daily 4/25/19 per direction of Dr. Eddy Speaker pt to take 1/2 tab daily.       Historical Provider, MD   Magnesium Oxide 250 MG TABS Take by mouth daily       Historical Provider, MD         Social History   Social History            Socioeconomic History    Marital status: Legally        Spouse name: Not on file    Number of children: 1    Years of education: 12th grade + some college    Highest education level: Not on file   Occupational History    Not on file   Tobacco Use    Smoking status: Current Every Day Smoker       Packs/day: 0.50       Types: Cigarettes    Smokeless tobacco: Never Used   Vaping Use    Vaping Use: Never used   Substance and Sexual Activity    Alcohol use: Not Currently       Comment: history of abuse, last drink was early December, 2018    Drug use: Not Currently       Types: Marijuana Rodena Capes)       Comment: last use was summer, 2017    Sexual activity: Not Currently   Other Topics Concern    Not on file   Social History Narrative    Not on file      Social Determinants of Health          Financial Resource Strain:     Difficulty of Paying Living Expenses: Not on file   Food Insecurity:     Worried About [employer] [employer] in the Last Year: Not on file    May of Food in the Last Year: Not on file   Transportation Needs:     Lack of Transportation (Medical): Not on file    Lack of Transportation (Non-Medical): Not on file   Physical Activity:     Days of Exercise per Week: Not on file    Minutes of Exercise per Session: Not on file   Stress:     Feeling of Stress : Not on file   Social Connections:     Frequency of Communication with Friends and Family: Not on file    Frequency of Social Gatherings with Friends and Family: Not on file    Attends Hindu Services: Not on file    Active Member of [address] [place] [address] or Organizations: Not on file    Attends Club or Organization Meetings: Not on file    Marital Status: Not on file   Intimate Partner Violence:     Fear of Current or Ex-Partner: Not on file    Emotionally Abused: Not on file    Physically Abused: Not on file    Sexually Abused: Not on file   Housing Stability:     Unable to Pay for Housing in the Last Year: Not on file    Number of [place] in the Last Year: Not on file    Unstable Housing in the Last Year: Not on file            MSE:  Appearance and gait: UTO due to phone call   Behavior: Calm, cooperative, and socially appropriate. Irma Montague Speech: Normal in tone, volume, and quality. No slurring, dysarthria or pressured speech noted.    Mood: \"depressed and anxious\"   Affect: UTO due to phone call    Thought Process: Appears linear, logical and goal oriented. Causality appears intact. Thought Content: Denies active suicidal and homicidal ideations. No overt delusions or paranoia appreciated. Perceptions: Denies auditory or visual hallucinations at present time. Not responding to internal stimuli. Concentration: Intact. Orientation: to person, place, date, and situation. Language: Intact. Fund of information: Intact. Memory: Recent and remote appear intact. Impulsivity: Limited. Neurovegitative: Fair appetite and sleep. Insight: Fair. Judgment: Fair.     Cognition: Can spell \"world\" backwards: Yes                    Can do serial 7's: Yes           Lab Results   Component Value Date      (L) 03/10/2020     K 4.7 03/10/2020     CL 89 (L) 03/10/2020     CO2 18 (L) 03/10/2020     BUN 19 03/10/2020     CREATININE 0.9 03/10/2020     GLUCOSE 107 03/10/2020     CALCIUM 9.1 03/10/2020     PROT 7.4 03/09/2022     LABALBU 3.54 (L) 03/09/2022     ALKPHOS 127 (H) 11/10/2015     AST 33 (H) 11/10/2015     ALT 22 11/10/2015     LABGLOM >60 03/10/2020            Lab Results   Component Value Date      03/10/2020     K 4.7 03/10/2020     CL 89 03/10/2020     CO2 18 03/10/2020     BUN 19 03/10/2020     CREATININE 0.9 03/10/2020     GLUCOSE 107 03/10/2020     CALCIUM 9.1 03/10/2020      No results found for: CHOL  No results found for: TRIG  No results found for: HDL  No results found for: LDLCHOLESTEROL, LDLCALC  No results found for: LABVLDL, VLDL  No results found for: CHOLHDLRATIO  No results found for: LABA1C  No results found for: EAG        Lab Results   Component Value Date     TSH 2.40 02/03/2015            Lab Results   Component Value Date     VITD25 27.6 (L) 02/03/2015      No results found for: RLSNUFLB58   No results found for: FOLATE      Assessment:    1. Generalized anxiety disorder    2.  Bipolar affective disorder, mixed, mild ([facility] [address]          No evidence of acute suicidality, homicidality or psychosis observed. Patient is psychiatrically stable     Plan:     1. Continue   Eszopiclone (Lunesta), 3mg, nightly for sleep (she has tried going off of this with no success, can't sleep without it)   Gabapentin, 300mg, take 2 capsules nightly (restless legs)   Clonidine, 0.1mg, nightly (teeth grinding, nightmares), can lower BP, get your balance before you get up to walk   Risperdal, 1mg, nightly     Increase  Lamotrigine, 200mg,  Daily  Trazodone, 100mg, nightly      Start      Discontinue        The risks, benefits, side effects, indications, contraindications, and adverse effects of the medications have been discussed. Yes.  2. The pt has verbalized understanding and has capacity to give informed consent. 3. The Vimal Knowles report has been reviewed according to [facility] regulations. 4. Supportive therapy offered. 5. Follow up:    Return in about 6 weeks (around 7/4/2022). 6. The patient has been advised to call with any problems. 7. Controlled substance Treatment Plan: this is a maintenance dose. .  8. The above listed medications have been continued, modifications in meds and other orders/labs as follows:                 Encounter Medications    No orders of the defined types were placed in this encounter.                  No orders of the defined types were placed in this encounter.        9. Additional comments: Continue therapy, discussed sleep hygiene, discussed the use of coping skills and relaxation strategies to manage symptoms.            10. Over 50% of the total visit time of   15  minutes was spent on counseling and/or coordination of care of:                         1. Generalized anxiety disorder    2.  Bipolar affective disorder, mixed, mild ([facility]                        Psychotherapy Topics: mood/medication effectiveness family and financial     Krystle Rhodes, CAPO - CNP

## 2022-05-27 ENCOUNTER — TELEPHONE (OUTPATIENT)
Dept: PSYCHIATRY | Age: 57
End: 2022-05-27

## 2022-05-27 RX ORDER — ESZOPICLONE 3 MG/1
3 TABLET, FILM COATED ORAL NIGHTLY
Qty: 30 TABLET | Refills: 0 | Status: SHIPPED | OUTPATIENT
Start: 2022-05-27 | End: 2022-06-26

## 2022-05-27 NOTE — TELEPHONE ENCOUNTER
Attempted to contact pt to inform her that refill RX for David Payment has been sent to her pharmacy per Ha SCANLON as she had requested-no answer.

## 2022-06-01 RX ORDER — TRAZODONE HYDROCHLORIDE 50 MG/1
TABLET ORAL
Qty: 90 TABLET | Refills: 0 | OUTPATIENT
Start: 2022-06-01

## 2022-06-10 ENCOUNTER — TELEPHONE (OUTPATIENT)
Dept: PSYCHIATRY | Age: 57
End: 2022-06-10

## 2022-06-10 DIAGNOSIS — F41.1 GENERALIZED ANXIETY DISORDER: ICD-10-CM

## 2022-06-10 RX ORDER — GABAPENTIN 300 MG/1
CAPSULE ORAL
Qty: 60 CAPSULE | Refills: 0 | Status: SHIPPED | OUTPATIENT
Start: 2022-06-10 | End: 2022-07-07 | Stop reason: SDUPTHER

## 2022-06-10 NOTE — TELEPHONE ENCOUNTER
Heather Tidwell called to request a refill on her medication. Last office visit : 5/23/2022 Basia SCANLON  Next office visit : 7/7/2022 Georgiane Mary SCANLON    Requested Prescriptions     Pending Prescriptions Disp Refills    gabapentin (NEURONTIN) 300 MG capsule 60 capsule 0     Sig: TAKE 2 CAPSULES BY MOUTH EVERY DAY AT [address] [place] [address]        5/23/2022  [address] [facility]   CAPO Dangelo CNP    Psychiatry  Generalized anxiety disorder +1 more    Dx  Medication Check  Follow-up    Reason for Visit       Progress Notes  CAPO Dangelo CNP (Nurse Practitioner) [facility]  Expand All Collapse All  Heather Tidwell is a 62 y.o. female evaluated via telephone on 5/23/2022.       Consent:  She and/or health care decision maker is aware that that she may receive a bill for this telephone service, depending on her insurance coverage, and has provided verbal consent to proceed: Yes        Documentation:  I communicated with the patient and/or health care decision maker about see below.    Details of this discussion including any medical advice provided: see below        I affirm this is a Patient Initiated Episode with a Patient who has not had a related appointment within my department in the past 7 days or scheduled within the next 24 hours.     The patient  (guardian) is aware that this is a billable service, which includes applicable co-pays.  This virtual visit was conducted with patient's (and or legal guardian's) consent.  This visit was conducted pursuant to the emergency declaration under the [place] act and the [place], 1135 waiver authority in the coronavirus preparedness and response supplemental appropriation's ACT.  Patient identification was verified and a caregiver was present when appropriate.  The patient was located in a state where the provider was licensed to provide care.     Patient identification was verified at the start of the visit: Yes     Total Time: minutes: 11-20 minutes     Note: not billable if this call serves to triage the patient into an appointment for the relevant concern        CAPO Ac CNP          5/23/22                                          Progress Note     Tom Lizarraga 1965                           Chief Complaint   Patient presents with    Medication Check    Follow-up            Subjective:    Patient is a 62 y.o. female diagnosed with bipolar and presents today for follow-up. Last seen in clinic on 2/21/22  and prior records were reviewed.     Last visit: doing well today. Having difficulty with the cold weather. Feels like her mood has been relatively controlled. She reports her finances have improved. She is taking a break from therapy right now because she does not have a lot to talk about. She reports her mood and medications are pretty stable for the most part. She denies si hi avh she denies side effects of medications. Will make no changes today, will follow up in 3 months.      Today : she reports her mood has not been doing very well. She feels like she is juggled around from doctor to doctor by PCP. She is hesitant to restart therapy at this time also because of low motivation. She states she has been more depressed and having more anxiety and panic attacks. She reports she has been more isolating and withdrawing however she has been sitting with her neighbors a few times over the past couple of weeks. We discussed increasing her lamotrigine to 200 mg daily as well as increasing her trazodone to 100 mg nightly. Patient was encouraged to follow-up with her therapist to discuss financial strains as well as coping strategies to help deal with depression and anxiety. Patient verbalized understanding will follow-up in 6 weeks        Absent  suicidal ideation. Reports compliance with medications as good .      Sleep: not as good as she had been.   Since she is seeing more doctors she is having more anxious spells. And her finances have taken a hit so she has not been sleeping as well.   She has been waking up with panic.     Caffeine use: coffee most morning     Support:  neighbor     PREVIOUS MED TRIALS  Trazodone (having more suicidal dreams), at 100mg, not working for sleep  Seroquel (wt gain, 14 lb in 3 months, enuresis, indigestion, abnormal blood work, increased blood sugar, seizures)  Duloxetine (has not been effective and no noticeable change even with dose increases to 90mg)  Paxil, 20mg  Wellbutrin XL, (tried it recently to quit smoking)  Prozac (made her numb, added to her eating disorder)  Zoloft (thought she did well on this, took for a couple of years, she stopped it because when she returned to [place] the doctor put her back on Prozac)  Remeron (increased her dreams and panic attacks)  Macrina Yoder (worked)  Librium (in 2018 for 15 days to stop drinking alcohol)  Risperdal- weight gain  Doxepin,  (not effective)  Prazosin, ineffective for dreams/nightmares  Neeru (1/27/21, reports that she switched it to am dosing because taking it at night made her RLS worse)     Current Substance Use:   Alcohol: Denies   illicit drug use: Denies   Marijuana: Denies   Tobacco use: 3/4 ppd  Vape: Denies        BP: There were no vitals taken for this visit.        Review of Systems - 14 point review:  Negative except for stated     Constitutional: (fevers, chills, night sweats, wt loss/gain, change in appetite, fatigue, somnolence)     HEENT: (ear pain or discharge, hearing loss, ear ringing, sinus pressure, nosebleed, nasal discharge, sore throat, oral sores, tooth pain, bleeding gums, hoarse voice, neck pain)      Cardiovascular: (HTN, chest pain, elevated cholesterol/lipids, palpitations, leg swelling, leg pain with walking)     Respiratory: (cough, wheezing, snoring, SOB with activity (dyspnea), SOB while lying flat (orthopnea), awakening with severe SOB (paroxysmal nocturnal dyspnea))     Gastrointestinal: (NVD, constipation, abdominal pain, bright red stools, black tarry stools, stool incontinence)     Genitourinary:  (pelvic pain, burning or frequency of urination, urinary urgency, blood in urine incomplete bladder emptying, urinary incontinence, STD; MEN: testicular pain or swelling, erectile dysfunction; WOMEN: LMP, heavy menstrual bleeding (menorrhagia), irregular periods, postmenopausal bleeding, menstrual pain (dymenorrhea, vaginal discharge)     Musculoskeletal: (bone pain/fracture, joint pain or swelling, musle pain)     Integumentary: (rashes, acne, non-healing sores, itching, breast lumps, breast pain, nipple discharge, hair loss)     Neurologic: (HA, muscle weakness, paresthesias (numbness, coldness, crawling or prickling), memory loss, seizure, dizziness)     Psychiatric:  (anxiety, sadness, irritability/anger, insomnia, suicidality)     Endocrine: (heat or cold intolerance, excessive thirst (polydipsia), excessive hunger (polyphagia))     Immune/Allergic: (hives, seasonal or environmental allergies, HIV exposure)     Hematologic/Lymphatic: (lymph node enlargement, easy bleeding or bruising)     History obtained via chart review and patient     PCP is Darcy Barrientos. Humphrey Gonzales, DO, DO         Current Meds:     Home Medications           Prior to Admission medications    Medication Sig Start Date End Date Taking? Authorizing Provider   Icosapent Ethyl (VASCEPA) 1 g CAPS capsule         Historical Provider, MD   gabapentin (NEURONTIN) 300 MG capsule TAKE 2 CAPSULES BY MOUTH EVERY DAY AT NIGHT 5/6/22 6/5/22   CAPO Coelho CNP   cloNIDine (CATAPRES) 0.1 MG tablet TAKE 1 TABLET BY MOUTH EVERY DAY AT NIGHT 4/25/22     CAPO Coelho CNP   eszopiclone (ESZOPICLONE) 3 MG TABS Take 1 tablet by mouth nightly for 30 days.  4/25/22 5/25/22   CAPO Coelho CNP   risperiDONE (RISPERDAL) 1 MG tablet TAKE 1 TABLET BY MOUTH EVERY DAY AT NIGHT 4/7/22 7/6/22   CAPO Obrien CNP   pantoprazole (PROTONIX) 40 MG tablet Take 40 mg by mouth daily       Historical Provider, MD   traZODone (DESYREL) 50 MG tablet TAKE 1 TABLET BY MOUTH EVERY DAY AT NIGHT 3/3/22     CAPO Obrien CNP   lamoTRIgine (LAMICTAL) 150 MG tablet TAKE 1 TABLET BY MOUTH EVERY DAY 11/19/21 2/17/22   CAPO Obrien CNP   fluticasone (FLONASE) 50 MCG/ACT nasal spray SPRAY 1 SPRAY INTO EACH NOSTRIL TWICE A DAY 7/22/21     Historical Provider, MD   loratadine (CLARITIN) 10 MG tablet TAKE ONE TABLET BY MOUTH DAILY 7/23/21     Historical Provider, MD   rosuvastatin (CRESTOR) 5 MG tablet TAKE 1 TABLET BY MOUTH EVERY DAY IN THE EVENING 8/2/21     Historical Provider, MD   oxybutynin (DITROPAN) 5 MG tablet TAKE 1 TABLET BY MOUTH EVERY DAY AT NIGHT 9/7/21     CAPO Leung CNP   spironolactone (ALDACTONE) 25 MG tablet   3/5/20     Historical Provider, MD   metoprolol succinate (TOPROL XL) 50 MG extended release tablet Take 50 mg by mouth daily 4/25/19 per direction of Dr. Beth Santiago pt to take 1/2 tab daily.       Historical Provider, MD   Magnesium Oxide 250 MG TABS Take by mouth daily       Historical Provider, MD         Social History   Social History            Socioeconomic History    Marital status: Legally        Spouse name: Not on file    Number of children: 1    Years of education: 12th grade + some college    Highest education level: Not on file   Occupational History    Not on file   Tobacco Use    Smoking status: Current Every Day Smoker       Packs/day: 0.50       Types: Cigarettes    Smokeless tobacco: Never Used   Vaping Use    Vaping Use: Never used   Substance and Sexual Activity    Alcohol use: Not Currently       Comment: history of abuse, last drink was early December, 2018    Drug use: Not Currently       Types: Marijuana Michael Pare)       Comment: last use was summer, 2017    Sexual activity: Not Currently   Other Topics Concern    Not on file   Social History Narrative    Not on file      Social Determinants of Health          Financial Resource Strain:     Difficulty of Paying Living Expenses: Not on file   Food Insecurity:     Worried About [address] in the Last Year: Not on file    May of Food in the Last Year: Not on file   Transportation Needs:     Lack of Transportation (Medical): Not on file    Lack of Transportation (Non-Medical): Not on file   Physical Activity:     Days of Exercise per Week: Not on file    Minutes of Exercise per Session: Not on file   Stress:     Feeling of Stress : Not on file   Social Connections:     Frequency of Communication with Friends and Family: Not on file    Frequency of Social Gatherings with Friends and Family: Not on file    Attends Mu-ism Services: Not on file    Active Member of [address] [place] [address] or Organizations: Not on file    Attends Club or Organization Meetings: Not on file    Marital Status: Not on file   Intimate Partner Violence:     Fear of Current or Ex-Partner: Not on file    Emotionally Abused: Not on file    Physically Abused: Not on file    Sexually Abused: Not on file   Housing Stability:     Unable to Pay for Housing in the Last Year: Not on file    Number of [place] in the Last Year: Not on file    Unstable Housing in the Last Year: Not on file            MSE:  Appearance and gait: UTO due to phone call   Behavior: Calm, cooperative, and socially appropriate. Benedetta Ou Speech: Normal in tone, volume, and quality. No slurring, dysarthria or pressured speech noted. Mood: \"depressed and anxious\"   Affect: UTO due to phone call    Thought Process: Appears linear, logical and goal oriented. Causality appears intact. Thought Content: Denies active suicidal and homicidal ideations. No overt delusions or paranoia appreciated. Perceptions: Denies auditory or visual hallucinations at present time. Not responding to internal stimuli. Concentration: Intact. Orientation: to person, place, date, and situation. Language: Intact. Fund of information: Intact. Memory: Recent and remote appear intact. Impulsivity: Limited. Neurovegitative: Fair appetite and sleep. Insight: Fair. Judgment: Fair.     Cognition: Can spell \"world\" backwards: Yes                    Can do serial 7's: Yes           Lab Results   Component Value Date      (L) 03/10/2020     K 4.7 03/10/2020     CL 89 (L) 03/10/2020     CO2 18 (L) 03/10/2020     BUN 19 03/10/2020     CREATININE 0.9 03/10/2020     GLUCOSE 107 03/10/2020     CALCIUM 9.1 03/10/2020     PROT 7.4 03/09/2022     LABALBU 3.54 (L) 03/09/2022     ALKPHOS 127 (H) 11/10/2015     AST 33 (H) 11/10/2015     ALT 22 11/10/2015     LABGLOM >60 03/10/2020            Lab Results   Component Value Date      03/10/2020     K 4.7 03/10/2020     CL 89 03/10/2020     CO2 18 03/10/2020     BUN 19 03/10/2020     CREATININE 0.9 03/10/2020     GLUCOSE 107 03/10/2020     CALCIUM 9.1 03/10/2020      No results found for: CHOL  No results found for: TRIG  No results found for: HDL  No results found for: LDLCHOLESTEROL, LDLCALC  No results found for: LABVLDL, VLDL  No results found for: CHOLHDLRATIO  No results found for: LABA1C  No results found for: EAG        Lab Results   Component Value Date     TSH 2.40 02/03/2015            Lab Results   Component Value Date     VITD25 27.6 (L) 02/03/2015      No results found for: [id number]   No results found for: FOLATE      Assessment:    1. Generalized anxiety disorder    2. Bipolar affective disorder, mixed, mild ([address]          No evidence of acute suicidality, homicidality or psychosis observed. Patient is psychiatrically stable     Plan:     1.    Continue   Eszopiclone (Lunesta), 3mg, nightly for sleep (she has tried going off of this with no success, can't sleep without it)   Gabapentin, 300mg, take 2 capsules nightly (restless legs)   Clonidine, 0.1mg, nightly (teeth grinding, nightmares), can lower BP, get your balance before you get up to walk   Risperdal, 1mg, nightly     Increase  Lamotrigine, 200mg,  Daily  Trazodone, 100mg, nightly      Start      Discontinue        The risks, benefits, side effects, indications, contraindications, and adverse effects of the medications have been discussed. Yes.  2. The pt has verbalized understanding and has capacity to give informed consent. 3. The Lauryn Malik report has been reviewed according to [facility] regulations. 4. Supportive therapy offered. 5. Follow up:    Return in about 6 weeks (around 7/4/2022). 6. The patient has been advised to call with any problems. 7. Controlled substance Treatment Plan: this is a maintenance dose. .  8. The above listed medications have been continued, modifications in meds and other orders/labs as follows:                 Encounter Medications    No orders of the defined types were placed in this encounter.                  No orders of the defined types were placed in this encounter.        9. Additional comments: Continue therapy, discussed sleep hygiene, discussed the use of coping skills and relaxation strategies to manage symptoms.            10. Over 50% of the total visit time of   15  minutes was spent on counseling and/or coordination of care of:                         1. Generalized anxiety disorder    2.  Bipolar affective disorder, mixed, mild ([facility] [address]                        Psychotherapy Topics: mood/medication effectiveness family and financial     Purnima Macias, APRN - CNP

## 2022-06-10 NOTE — TELEPHONE ENCOUNTER
Called and lvm for pt to return phone call    When pt calls I will let her know that her script for gabapentin was sent to her pharmacy     Electronically signed by Pankaj Estrada on 6/10/2022 at 1:33 PM

## 2022-06-20 RX ORDER — LAMOTRIGINE 200 MG/1
TABLET ORAL
Qty: 30 TABLET | Refills: 1 | OUTPATIENT
Start: 2022-06-20

## 2022-06-27 ENCOUNTER — TELEPHONE (OUTPATIENT)
Dept: PSYCHIATRY | Age: 57
End: 2022-06-27

## 2022-06-27 DIAGNOSIS — G47.00 INSOMNIA, UNSPECIFIED TYPE: Primary | ICD-10-CM

## 2022-06-27 RX ORDER — ESZOPICLONE 3 MG/1
3 TABLET, FILM COATED ORAL NIGHTLY
COMMUNITY
End: 2022-06-27 | Stop reason: SDUPTHER

## 2022-06-27 RX ORDER — ESZOPICLONE 3 MG/1
3 TABLET, FILM COATED ORAL NIGHTLY
Qty: 30 TABLET | Refills: 0 | Status: ON HOLD | OUTPATIENT
Start: 2022-06-27 | End: 2022-08-03 | Stop reason: HOSPADM

## 2022-06-27 NOTE — TELEPHONE ENCOUNTER
Jey Wrightacerakesh called to request a refill on her medication. MED BELOW HAD FALLEN OFF CURRENT MED LIST-DO NOT SEE WHERE THE MED WAS STOPPED. MED IS LISTED IN LAST OFFICE VISIT BELOW SO WAS ADDED BACK TO THE CURRENT MED LIST. Jj Likens under media and attached. Last office visit : 5/23/2022 with Tosha SCANLON  Next office visit : 7/7/2022 with Tosha SCANLON    Requested Prescriptions     Pending Prescriptions Disp Refills    eszopiclone (ESZOPICLONE) 3 MG TABS 30 tablet 0     Sig: Take 1 tablet by mouth nightly for 30 days. Indications: [address], [address] [facility]      5/23/22                                          Progress Note     Jey Wrightacerakesh 1965                           Chief Complaint   Patient presents with    Medication Check    Follow-up            Subjective:    Patient is a 62 y.o. female diagnosed with bipolar and presents today for follow-up. Last seen in clinic on 2/21/22  and prior records were reviewed.     Last visit: doing well today. Having difficulty with the cold weather. Feels like her mood has been relatively controlled. She reports her finances have improved. She is taking a break from therapy right now because she does not have a lot to talk about. She reports her mood and medications are pretty stable for the most part. She denies si hi avh she denies side effects of medications. Will make no changes today, will follow up in 3 months.      Today : she reports her mood has not been doing very well. She feels like she is juggled around from doctor to doctor by PCP. She is hesitant to restart therapy at this time also because of low motivation. She states she has been more depressed and having more anxiety and panic attacks. She reports she has been more isolating and withdrawing however she has been sitting with her neighbors a few times over the past couple of weeks.   We discussed increasing her lamotrigine to 200 mg daily as well as increasing her trazodone to 100 mg nightly. Patient was encouraged to follow-up with her therapist to discuss financial strains as well as coping strategies to help deal with depression and anxiety. Patient verbalized understanding will follow-up in 6 weeks        Absent  suicidal ideation. Reports compliance with medications as good .      Sleep: not as good as she had been. Since she is seeing more doctors she is having more anxious spells. And her finances have taken a hit so she has not been sleeping as well.   She has been waking up with panic.     Caffeine use: coffee most morning     Support:  neighbor     PREVIOUS MED TRIALS  Trazodone (having more suicidal dreams), at 100mg, not working for sleep  Seroquel (wt gain, 14 lb in 3 months, enuresis, indigestion, abnormal blood work, increased blood sugar, seizures)  Duloxetine (has not been effective and no noticeable change even with dose increases to 90mg)  Paxil, 20mg  Wellbutrin XL, (tried it recently to quit smoking)  Prozac (made her numb, added to her eating disorder)  Zoloft (thought she did well on this, took for a couple of years, she stopped it because when she returned to [place] the doctor put her back on Prozac)  Remeron (increased her dreams and panic attacks)  Ree Postal (worked)  Librium (in 2018 for 15 days to stop drinking alcohol)  Risperdal- weight gain  Doxepin,  (not effective)  Prazosin, ineffective for dreams/nightmares  Latadalgisa (1/27/21, reports that she switched it to am dosing because taking it at night made her RLS worse)     Current Substance Use:   Alcohol: Denies   illicit drug use: Denies   Marijuana: Denies   Tobacco use: 3/4 ppd  Vape: Denies        BP: There were no vitals taken for this visit.        Review of Systems - 14 point review:  Negative except for stated     Constitutional: (fevers, chills, night sweats, wt loss/gain, change in appetite, fatigue, somnolence)     HEENT: (ear pain or discharge, hearing loss, ear ringing, sinus pressure, nosebleed, nasal discharge, sore throat, oral sores, tooth pain, bleeding gums, hoarse voice, neck pain)      Cardiovascular: (HTN, chest pain, elevated cholesterol/lipids, palpitations, leg swelling, leg pain with walking)     Respiratory: (cough, wheezing, snoring, SOB with activity (dyspnea), SOB while lying flat (orthopnea), awakening with severe SOB (paroxysmal nocturnal dyspnea))     Gastrointestinal: (NVD, constipation, abdominal pain, bright red stools, black tarry stools, stool incontinence)     Genitourinary:  (pelvic pain, burning or frequency of urination, urinary urgency, blood in urine incomplete bladder emptying, urinary incontinence, STD; MEN: testicular pain or swelling, erectile dysfunction; WOMEN: LMP, heavy menstrual bleeding (menorrhagia), irregular periods, postmenopausal bleeding, menstrual pain (dymenorrhea, vaginal discharge)     Musculoskeletal: (bone pain/fracture, joint pain or swelling, musle pain)     Integumentary: (rashes, acne, non-healing sores, itching, breast lumps, breast pain, nipple discharge, hair loss)     Neurologic: (HA, muscle weakness, paresthesias (numbness, coldness, crawling or prickling), memory loss, seizure, dizziness)     Psychiatric:  (anxiety, sadness, irritability/anger, insomnia, suicidality)     Endocrine: (heat or cold intolerance, excessive thirst (polydipsia), excessive hunger (polyphagia))     Immune/Allergic: (hives, seasonal or environmental allergies, HIV exposure)     Hematologic/Lymphatic: (lymph node enlargement, easy bleeding or bruising)     History obtained via chart review and patient     PCP is Martin Guan, DO, DO         Current Meds:     Home Medications           Prior to Admission medications    Medication Sig Start Date End Date Taking?  Authorizing Provider   Icosapent Ethyl (VASCEPA) 1 g CAPS capsule         Historical Provider, MD   gabapentin (NEURONTIN) 300 MG capsule TAKE 2 CAPSULES BY MOUTH EVERY DAY AT NIGHT 5/6/22 6/5/22   Kelsey Presume, CAPO - CNP   cloNIDine (CATAPRES) 0.1 MG tablet TAKE 1 TABLET BY MOUTH EVERY DAY AT NIGHT 4/25/22     Kelsey PresumCAPO castillo - CNP   eszopiclone (ESZOPICLONE) 3 MG TABS Take 1 tablet by mouth nightly for 30 days.  4/25/22 5/25/22   Kelsey PresumeCAPO - CNP   risperiDONE (RISPERDAL) 1 MG tablet TAKE 1 TABLET BY MOUTH EVERY DAY AT NIGHT 4/7/22 7/6/22   Kelsey Presume, APRN - CNP   pantoprazole (PROTONIX) 40 MG tablet Take 40 mg by mouth daily       Historical Provider, MD   traZODone (DESYREL) 50 MG tablet TAKE 1 TABLET BY MOUTH EVERY DAY AT NIGHT 3/3/22     Kelsey PresumeCAPO - CNP   lamoTRIgine (LAMICTAL) 150 MG tablet TAKE 1 TABLET BY MOUTH EVERY DAY 11/19/21 2/17/22   Kelsey PresumCAPO castillo - CNP   fluticasone (FLONASE) 50 MCG/ACT nasal spray SPRAY 1 SPRAY INTO EACH NOSTRIL TWICE A DAY 7/22/21     Historical Provider, MD   loratadine (CLARITIN) 10 MG tablet TAKE ONE TABLET BY MOUTH DAILY 7/23/21     Historical Provider, MD   rosuvastatin (CRESTOR) 5 MG tablet TAKE 1 TABLET BY MOUTH EVERY DAY IN THE EVENING 8/2/21     Historical Provider, MD   oxybutynin (DITROPAN) 5 MG tablet TAKE 1 TABLET BY MOUTH EVERY DAY AT NIGHT 9/7/21     CAPO Alves CNP   spironolactone (ALDACTONE) 25 MG tablet   3/5/20     Historical Provider, MD   metoprolol succinate (TOPROL XL) 50 MG extended release tablet Take 50 mg by mouth daily 4/25/19 per direction of Dr. Bisi Bowens pt to take 1/2 tab daily.       Historical Provider, MD   Magnesium Oxide 250 MG TABS Take by mouth daily       Historical Provider, MD         Social History   Social History            Socioeconomic History    Marital status: Legally        Spouse name: Not on file    Number of children: 1    Years of education: 12th grade + some college    Highest education level: Not on file   Occupational History    Not on file   Tobacco Use    Smoking status: Current Every Day Smoker       Packs/day: 0.50       Types: Cigarettes    Smokeless tobacco: Never Used   Vaping Use    Vaping Use: Never used   Substance and Sexual Activity    Alcohol use: Not Currently       Comment: history of abuse, last drink was early December, 2018    Drug use: Not Currently       Types: Marijuana Elfida Keller)       Comment: last use was summer, 2017    Sexual activity: Not Currently   Other Topics Concern    Not on file   Social History Narrative    Not on file      Social Determinants of Health          Financial Resource Strain:     Difficulty of Paying Living Expenses: Not on file   Food Insecurity:     Worried About [address] [employer] in the Last Year: Not on file    May of Food in the Last Year: Not on file   Transportation Needs:     Lack of Transportation (Medical): Not on file    Lack of Transportation (Non-Medical): Not on file   Physical Activity:     Days of Exercise per Week: Not on file    Minutes of Exercise per Session: Not on file   Stress:     Feeling of Stress : Not on file   Social Connections:     Frequency of Communication with Friends and Family: Not on file    Frequency of Social Gatherings with Friends and Family: Not on file    Attends [employer] Services: Not on file    Active Member of [address] [place] [address] or Organizations: Not on file    Attends Club or Organization Meetings: Not on file    Marital Status: Not on file   Intimate Partner Violence:     Fear of Current or Ex-Partner: Not on file    Emotionally Abused: Not on file    Physically Abused: Not on file    Sexually Abused: Not on file   Housing Stability:     Unable to Pay for Housing in the Last Year: Not on file    Number of [place] in the Last Year: Not on file    Unstable Housing in the Last Year: Not on file            MSE:  Appearance and gait: UTO due to phone call   Behavior: Calm, cooperative, and socially appropriate. Anna Siddiqui Speech: Normal in tone, volume, and quality. No slurring, dysarthria or pressured speech noted.    Mood: \"depressed and anxious\"   Affect: UTO due to phone call    Thought Process: Appears linear, logical and goal oriented. Causality appears intact. Thought Content: Denies active suicidal and homicidal ideations. No overt delusions or paranoia appreciated. Perceptions: Denies auditory or visual hallucinations at present time. Not responding to internal stimuli. Concentration: Intact. Orientation: to person, place, date, and situation. Language: Intact. Fund of information: Intact. Memory: Recent and remote appear intact. Impulsivity: Limited. Neurovegitative: Fair appetite and sleep. Insight: Fair. Judgment: Fair.     Cognition: Can spell \"world\" backwards: Yes                    Can do serial 7's: Yes           Lab Results   Component Value Date      (L) 03/10/2020     K 4.7 03/10/2020     CL 89 (L) 03/10/2020     CO2 18 (L) 03/10/2020     BUN 19 03/10/2020     CREATININE 0.9 03/10/2020     GLUCOSE 107 03/10/2020     CALCIUM 9.1 03/10/2020     PROT 7.4 03/09/2022     LABALBU 3.54 (L) 03/09/2022     ALKPHOS 127 (H) 11/10/2015     AST 33 (H) 11/10/2015     ALT 22 11/10/2015     LABGLOM >60 03/10/2020            Lab Results   Component Value Date      03/10/2020     K 4.7 03/10/2020     CL 89 03/10/2020     CO2 18 03/10/2020     BUN 19 03/10/2020     CREATININE 0.9 03/10/2020     GLUCOSE 107 03/10/2020     CALCIUM 9.1 03/10/2020      No results found for: CHOL  No results found for: TRIG  No results found for: HDL  No results found for: LDLCHOLESTEROL, LDLCALC  No results found for: LABVLDL, VLDL  No results found for: CHOLHDLRATIO  No results found for: LABA1C  No results found for: EAG        Lab Results   Component Value Date     TSH 2.40 02/03/2015            Lab Results   Component Value Date     VITD25 27.6 (L) 02/03/2015      No results found for: VPFFCMJL09   No results found for: FOLATE      Assessment:    1. Generalized anxiety disorder    2.  Bipolar affective disorder, mixed, mild (HCC)          No evidence of acute suicidality, homicidality or psychosis observed. Patient is psychiatrically stable     Plan:     1. Continue   Eszopiclone (Lunesta), 3mg, nightly for sleep (she has tried going off of this with no success, can't sleep without it)   Gabapentin, 300mg, take 2 capsules nightly (restless legs)   Clonidine, 0.1mg, nightly (teeth grinding, nightmares), can lower BP, get your balance before you get up to walk   Risperdal, 1mg, nightly     Increase  Lamotrigine, 200mg,  Daily  Trazodone, 100mg, nightly      Start      Discontinue        The risks, benefits, side effects, indications, contraindications, and adverse effects of the medications have been discussed. Yes.  2. The pt has verbalized understanding and has capacity to give informed consent. 3. The [place] report has been reviewed according to [facility] regulations. 4. Supportive therapy offered. 5. Follow up:    Return in about 6 weeks (around 7/4/2022). 6. The patient has been advised to call with any problems. 7. Controlled substance Treatment Plan: this is a maintenance dose. .  8. The above listed medications have been continued, modifications in meds and other orders/labs as follows:                 Encounter Medications    No orders of the defined types were placed in this encounter.                  No orders of the defined types were placed in this encounter.        9. Additional comments: Continue therapy, discussed sleep hygiene, discussed the use of coping skills and relaxation strategies to manage symptoms.            10. Over 50% of the total visit time of   15  minutes was spent on counseling and/or coordination of care of:                         1. Generalized anxiety disorder    2.  Bipolar affective disorder, mixed, mild ([facility] [address]                        Psychotherapy Topics: mood/medication effectiveness family and financial     Lucrecia Hernandez, APRN - CNP

## 2022-06-27 NOTE — TELEPHONE ENCOUNTER
Attempted to contact pt to inform her that refill Rx for Lunesta had been sent to her pharmacy per Tosha SCANLON-no answer.

## 2022-07-05 ENCOUNTER — HOSPITAL ENCOUNTER (EMERGENCY)
Age: 57
Discharge: HOME | End: 2022-07-05
Attending: EMERGENCY MEDICINE
Payer: MEDICARE

## 2022-07-05 VITALS
RESPIRATION RATE: 18 BRPM | TEMPERATURE: 99.5 F | WEIGHT: 129 LBS | SYSTOLIC BLOOD PRESSURE: 142 MMHG | HEART RATE: 100 BPM | BODY MASS INDEX: 20.82 KG/M2 | OXYGEN SATURATION: 94 % | DIASTOLIC BLOOD PRESSURE: 75 MMHG

## 2022-07-05 DIAGNOSIS — D64.9 CHRONIC ANEMIA: Primary | ICD-10-CM

## 2022-07-05 LAB
ALBUMIN SERPL-MCNC: 3.7 G/DL (ref 3.5–5.2)
ALP BLD-CCNC: 190 U/L (ref 35–104)
ALT SERPL-CCNC: 51 U/L (ref 5–33)
ANION GAP SERPL CALCULATED.3IONS-SCNC: 15 MMOL/L (ref 7–19)
AST SERPL-CCNC: 71 U/L (ref 5–32)
BACTERIA: ABNORMAL /HPF
BASOPHILS ABSOLUTE: 0.1 K/UL (ref 0–0.2)
BASOPHILS RELATIVE PERCENT: 0.5 % (ref 0–1)
BILIRUB SERPL-MCNC: 0.8 MG/DL (ref 0.2–1.2)
BILIRUBIN URINE: ABNORMAL
BLOOD, URINE: NEGATIVE
BUN BLDV-MCNC: 7 MG/DL (ref 6–20)
CALCIUM SERPL-MCNC: 10.2 MG/DL (ref 8.6–10)
CHLORIDE BLD-SCNC: 91 MMOL/L (ref 98–111)
CLARITY: ABNORMAL
CO2: 25 MMOL/L (ref 22–29)
COLOR: ABNORMAL
CREAT SERPL-MCNC: 0.9 MG/DL (ref 0.5–0.9)
CRYSTALS, UA: ABNORMAL /HPF
EOSINOPHILS ABSOLUTE: 0.1 K/UL (ref 0–0.6)
EOSINOPHILS RELATIVE PERCENT: 0.5 % (ref 0–5)
EPITHELIAL CELLS, UA: ABNORMAL /HPF
GFR AFRICAN AMERICAN: >59
GFR NON-AFRICAN AMERICAN: >60
GLUCOSE BLD-MCNC: 126 MG/DL (ref 74–109)
GLUCOSE URINE: NEGATIVE MG/DL
HCT VFR BLD CALC: 25.6 % (ref 37–47)
HEMOGLOBIN: 8.2 G/DL (ref 12–16)
HYALINE CASTS: ABNORMAL /LPF (ref 0–5)
IMMATURE GRANULOCYTES #: 0.1 K/UL
KETONES, URINE: ABNORMAL MG/DL
LEUKOCYTE ESTERASE, URINE: ABNORMAL
LYMPHOCYTES ABSOLUTE: 1.5 K/UL (ref 1.1–4.5)
LYMPHOCYTES RELATIVE PERCENT: 15.5 % (ref 20–40)
MCH RBC QN AUTO: 35.3 PG (ref 27–31)
MCHC RBC AUTO-ENTMCNC: 32 G/DL (ref 33–37)
MCV RBC AUTO: 110.3 FL (ref 81–99)
MONOCYTES ABSOLUTE: 0.5 K/UL (ref 0–0.9)
MONOCYTES RELATIVE PERCENT: 4.6 % (ref 0–10)
NEUTROPHILS ABSOLUTE: 7.6 K/UL (ref 1.5–7.5)
NEUTROPHILS RELATIVE PERCENT: 78.4 % (ref 50–65)
NITRITE, URINE: POSITIVE
PDW BLD-RTO: 15.4 % (ref 11.5–14.5)
PH UA: 5 (ref 5–8)
PLATELET # BLD: 240 K/UL (ref 130–400)
PMV BLD AUTO: 9.3 FL (ref 9.4–12.3)
POTASSIUM SERPL-SCNC: 3.8 MMOL/L (ref 3.5–5)
PROTEIN UA: 30 MG/DL
RBC # BLD: 2.32 M/UL (ref 4.2–5.4)
RBC UA: ABNORMAL /HPF (ref 0–2)
SARS-COV-2, NAAT: NOT DETECTED
SODIUM BLD-SCNC: 131 MMOL/L (ref 136–145)
SPECIFIC GRAVITY UA: 1.03 (ref 1–1.03)
TOTAL PROTEIN: 7.1 G/DL (ref 6.6–8.7)
TROPONIN: <0.01 NG/ML (ref 0–0.03)
UROBILINOGEN, URINE: 1 E.U./DL
WBC # BLD: 9.7 K/UL (ref 4.8–10.8)
WBC UA: ABNORMAL /HPF (ref 0–5)

## 2022-07-05 PROCEDURE — 85025 COMPLETE CBC W/AUTO DIFF WBC: CPT

## 2022-07-05 PROCEDURE — 80053 COMPREHEN METABOLIC PANEL: CPT

## 2022-07-05 PROCEDURE — 99284 EMERGENCY DEPT VISIT MOD MDM: CPT

## 2022-07-05 PROCEDURE — 36415 COLL VENOUS BLD VENIPUNCTURE: CPT

## 2022-07-05 PROCEDURE — 81001 URINALYSIS AUTO W/SCOPE: CPT

## 2022-07-05 PROCEDURE — 93005 ELECTROCARDIOGRAM TRACING: CPT | Performed by: EMERGENCY MEDICINE

## 2022-07-05 PROCEDURE — 87635 SARS-COV-2 COVID-19 AMP PRB: CPT

## 2022-07-05 PROCEDURE — 84484 ASSAY OF TROPONIN QUANT: CPT

## 2022-07-05 NOTE — ED NOTES
Patient reports history of anemic episodes related to fatigue. Also reports current loss of appetite. Appears anxious.      Suzanne Alexander RN  07/05/22 [id number]

## 2022-07-06 ENCOUNTER — TELEPHONE (OUTPATIENT)
Dept: PSYCHIATRY | Age: 57
End: 2022-07-06

## 2022-07-06 LAB
EKG P AXIS: 81 DEGREES
EKG P-R INTERVAL: 168 MS
EKG Q-T INTERVAL: 372 MS
EKG QRS DURATION: 74 MS
EKG QTC CALCULATION (BAZETT): 449 MS
EKG T AXIS: 35 DEGREES

## 2022-07-06 PROCEDURE — 93010 ELECTROCARDIOGRAM REPORT: CPT | Performed by: INTERNAL MEDICINE

## 2022-07-06 RX ORDER — RISPERIDONE 1 MG/1
TABLET, FILM COATED ORAL
Qty: 90 TABLET | Refills: 0 | Status: ON HOLD | OUTPATIENT
Start: 2022-07-06 | End: 2022-08-03 | Stop reason: HOSPADM

## 2022-07-06 NOTE — TELEPHONE ENCOUNTER
Pharmacy sent a request to refill pt's medication. Last office visit : 5/23/2022 [place] SCANLON  Next office visit : 7/7/2022 [place] SCANLON    Requested Prescriptions     Pending Prescriptions Disp Refills    risperiDONE (RISPERDAL) 1 MG tablet [Pharmacy Med Name: RISPERIDONE 1 MG TABLET] 90 tablet 0     Sig: TAKE 1 TABLET BY MOUTH EVERY DAY AT [address] [place] [address]          5/23/2022  [address] [facility]   CAPO Gonzalez CNP    Nurse Practitioner Psychiatric/Mental Health  Generalized anxiety disorder +1 more    Dx  Medication Check  Follow-up    Reason for Visit       Progress Notes  CAPO Gonzalez CNP (Nurse Practitioner) Joycelyn Alan Nurse Practitioner [address] is a 62 y.o. female evaluated via telephone on 5/23/2022.       Consent:  She and/or health care decision maker is aware that that she may receive a bill for this telephone service, depending on her insurance coverage, and has provided verbal consent to proceed: Yes        Documentation:  I communicated with the patient and/or health care decision maker about see below.    Details of this discussion including any medical advice provided: see below        I affirm this is a Patient Initiated Episode with a Patient who has not had a related appointment within my department in the past 7 days or scheduled within the next 24 hours.     The patient  (guardian) is aware that this is a billable service, which includes applicable co-pays.  This virtual visit was conducted with patient's (and or legal guardian's) consent.  This visit was conducted pursuant to the emergency declaration under the [place] act and the [place], 1135 waiver authority in the coronavirus preparedness and response supplemental appropriation's ACT.  Patient identification was verified and a caregiver was present when appropriate.  The patient was located in a state where the provider was licensed to provide care.     Patient identification was verified at the start of the visit: Yes     Total Time: minutes: 11-20 minutes     Note: not billable if this call serves to triage the patient into an appointment for the relevant concern        CAPO Almanza CNP          5/23/22                                          Progress Note     Susan Forrest 1965                           Chief Complaint   Patient presents with    Medication Check    Follow-up            Subjective:    Patient is a 62 y.o. female diagnosed with bipolar and presents today for follow-up. Last seen in clinic on 2/21/22  and prior records were reviewed.     Last visit: doing well today. Having difficulty with the cold weather. Feels like her mood has been relatively controlled. She reports her finances have improved. She is taking a break from therapy right now because she does not have a lot to talk about. She reports her mood and medications are pretty stable for the most part. She denies si hi avh she denies side effects of medications. Will make no changes today, will follow up in 3 months.      Today : she reports her mood has not been doing very well. She feels like she is juggled around from doctor to doctor by PCP. She is hesitant to restart therapy at this time also because of low motivation. She states she has been more depressed and having more anxiety and panic attacks. She reports she has been more isolating and withdrawing however she has been sitting with her neighbors a few times over the past couple of weeks. We discussed increasing her lamotrigine to 200 mg daily as well as increasing her trazodone to 100 mg nightly. Patient was encouraged to follow-up with her therapist to discuss financial strains as well as coping strategies to help deal with depression and anxiety. Patient verbalized understanding will follow-up in 6 weeks        Absent  suicidal ideation.     Reports compliance with medications as good .      Sleep: not as good as she had been. Since she is seeing more doctors she is having more anxious spells. And her finances have taken a hit so she has not been sleeping as well.   She has been waking up with panic.     Caffeine use: coffee most morning     Support:  neighbor     PREVIOUS MED TRIALS  Trazodone (having more suicidal dreams), at 100mg, not working for sleep  Seroquel (wt gain, 14 lb in 3 months, enuresis, indigestion, abnormal blood work, increased blood sugar, seizures)  Duloxetine (has not been effective and no noticeable change even with dose increases to 90mg)  Paxil, 20mg  Wellbutrin XL, (tried it recently to quit smoking)  Prozac (made her numb, added to her eating disorder)  Zoloft (thought she did well on this, took for a couple of years, she stopped it because when she returned to [place] the doctor put her back on Prozac)  Remeron (increased her dreams and panic attacks)  Bloominous (worked)  Librium (in 2018 for 15 days to stop drinking alcohol)  Risperdal- weight gain  Doxepin,  (not effective)  Prazosin, ineffective for dreams/nightmares  Latuda (1/27/21, reports that she switched it to am dosing because taking it at night made her RLS worse)     Current Substance Use:   Alcohol: Denies   illicit drug use: Denies   Marijuana: Denies   Tobacco use: 3/4 ppd  Vape: Denies        BP: There were no vitals taken for this visit.        Review of Systems - 14 point review:  Negative except for stated     Constitutional: (fevers, chills, night sweats, wt loss/gain, change in appetite, fatigue, somnolence)     HEENT: (ear pain or discharge, hearing loss, ear ringing, sinus pressure, nosebleed, nasal discharge, sore throat, oral sores, tooth pain, bleeding gums, hoarse voice, neck pain)      Cardiovascular: (HTN, chest pain, elevated cholesterol/lipids, palpitations, leg swelling, leg pain with walking)     Respiratory: (cough, wheezing, snoring, SOB with activity (dyspnea), SOB while lying flat (orthopnea), awakening with severe SOB (paroxysmal nocturnal dyspnea))     Gastrointestinal: (NVD, constipation, abdominal pain, bright red stools, black tarry stools, stool incontinence)     Genitourinary:  (pelvic pain, burning or frequency of urination, urinary urgency, blood in urine incomplete bladder emptying, urinary incontinence, STD; MEN: testicular pain or swelling, erectile dysfunction; WOMEN: LMP, heavy menstrual bleeding (menorrhagia), irregular periods, postmenopausal bleeding, menstrual pain (dymenorrhea, vaginal discharge)     Musculoskeletal: (bone pain/fracture, joint pain or swelling, musle pain)     Integumentary: (rashes, acne, non-healing sores, itching, breast lumps, breast pain, nipple discharge, hair loss)     Neurologic: (HA, muscle weakness, paresthesias (numbness, coldness, crawling or prickling), memory loss, seizure, dizziness)     Psychiatric:  (anxiety, sadness, irritability/anger, insomnia, suicidality)     Endocrine: (heat or cold intolerance, excessive thirst (polydipsia), excessive hunger (polyphagia))     Immune/Allergic: (hives, seasonal or environmental allergies, HIV exposure)     Hematologic/Lymphatic: (lymph node enlargement, easy bleeding or bruising)     History obtained via chart review and patient     PCP is Lorri Winchester. Lawson Watson DO, DO         Current Meds:     Home Medications           Prior to Admission medications    Medication Sig Start Date End Date Taking? Authorizing Provider   Icosapent Ethyl (VASCEPA) 1 g CAPS capsule         Historical Provider, MD   gabapentin (NEURONTIN) 300 MG capsule TAKE 2 CAPSULES BY MOUTH EVERY DAY AT NIGHT 5/6/22 6/5/22   CAPO Pineda CNP   cloNIDine (CATAPRES) 0.1 MG tablet TAKE 1 TABLET BY MOUTH EVERY DAY AT NIGHT 4/25/22     CAPO Pineda CNP   eszopiclone (ESZOPICLONE) 3 MG TABS Take 1 tablet by mouth nightly for 30 days.  4/25/22 5/25/22   CAPO Pineda CNP risperiDONE (RISPERDAL) 1 MG tablet TAKE 1 TABLET BY MOUTH EVERY DAY AT NIGHT 4/7/22 7/6/22   CAPO Nayak - CNP   pantoprazole (PROTONIX) 40 MG tablet Take 40 mg by mouth daily       Historical Provider, MD   traZODone (DESYREL) 50 MG tablet TAKE 1 TABLET BY MOUTH EVERY DAY AT NIGHT 3/3/22     CAPO Parra - CNP   lamoTRIgine (LAMICTAL) 150 MG tablet TAKE 1 TABLET BY MOUTH EVERY DAY 11/19/21 2/17/22   CAPO Parra - CNP   fluticasone (FLONASE) 50 MCG/ACT nasal spray SPRAY 1 SPRAY INTO EACH NOSTRIL TWICE A DAY 7/22/21     Historical Provider, MD   loratadine (CLARITIN) 10 MG tablet TAKE ONE TABLET BY MOUTH DAILY 7/23/21     Historical Provider, MD   rosuvastatin (CRESTOR) 5 MG tablet TAKE 1 TABLET BY MOUTH EVERY DAY IN THE EVENING 8/2/21     Historical Provider, MD   oxybutynin (DITROPAN) 5 MG tablet TAKE 1 TABLET BY MOUTH EVERY DAY AT NIGHT 9/7/21     CAPO Leung - CNP   spironolactone (ALDACTONE) 25 MG tablet   3/5/20     Historical Provider, MD   metoprolol succinate (TOPROL XL) 50 MG extended release tablet Take 50 mg by mouth daily 4/25/19 per direction of Dr. Fco Davis pt to take 1/2 tab daily.       Historical Provider, MD   Magnesium Oxide 250 MG TABS Take by mouth daily       Historical Provider, MD         Social History   Social History            Socioeconomic History    Marital status: Legally        Spouse name: Not on file    Number of children: 1    Years of education: 12th grade + some college    Highest education level: Not on file   Occupational History    Not on file   Tobacco Use    Smoking status: Current Every Day Smoker       Packs/day: 0.50       Types: Cigarettes    Smokeless tobacco: Never Used   Vaping Use    Vaping Use: Never used   Substance and Sexual Activity    Alcohol use: Not Currently       Comment: history of abuse, last drink was early December, 2018    Drug use: Not Currently       Types: Marijuana Paola Taylor       take 2 capsules nightly (restless legs)   Clonidine, 0.1mg, nightly (teeth grinding, nightmares), can lower BP, get your balance before you get up to walk   Risperdal, 1mg, nightly     Increase  Lamotrigine, 200mg,  Daily  Trazodone, 100mg, nightly      Start      Discontinue        The risks, benefits, side effects, indications, contraindications, and adverse effects of the medications have been discussed. Yes.  2. The pt has verbalized understanding and has capacity to give informed consent. 3. The Carlos Sames report has been reviewed according to [facility] regulations. 4. Supportive therapy offered. 5. Follow up:    Return in about 6 weeks (around 7/4/2022). 6. The patient has been advised to call with any problems. 7. Controlled substance Treatment Plan: this is a maintenance dose. .  8. The above listed medications have been continued, modifications in meds and other orders/labs as follows:                 Encounter Medications    No orders of the defined types were placed in this encounter.                  No orders of the defined types were placed in this encounter.        9. Additional comments: Continue therapy, discussed sleep hygiene, discussed the use of coping skills and relaxation strategies to manage symptoms.            10. Over 50% of the total visit time of   15  minutes was spent on counseling and/or coordination of care of:                         1. Generalized anxiety disorder    2.  Bipolar affective disorder, mixed, mild ([facility]                        Psychotherapy Topics: mood/medication effectiveness family and financial     CAPO Lopez - CNP

## 2022-07-06 NOTE — TELEPHONE ENCOUNTER
Called pt for appointment reminder.     -left voicemail, requesting a return call      Electronically signed by Albin Cobb MA on 7/6/2022 at 12:34 PM

## 2022-07-06 NOTE — ED NOTES
Patient ambulated in miranda, stated that she was dizzy about midway through ambulation and that if she stands for too long her knees feel weak.      Salvador Lange RN  07/05/22 0769

## 2022-07-06 NOTE — TELEPHONE ENCOUNTER
Called and let pt know that her script for risperidone was sent to her pharmacy     Electronically signed by Ryan Mcfarlane on 7/6/2022 at 3:52 PM

## 2022-07-06 NOTE — ED PROVIDER NOTES
[facility] EMERGENCY DEPT  eMERGENCY dEPARTMENT eNCOUnter      Pt Name: Lisy Duron  MRN: [id number]  [place] 1965  Date of evaluation: 7/5/2022  Provider: Annamaria Coreas MD    CHIEF COMPLAINT       Chief Complaint   Patient presents with    Fatigue     Fever, fatigue, and weakness. States last time this happened she was anemic          HISTORY OF PRESENT ILLNESS   (Location/Symptom, Timing/Onset,Context/Setting, Quality, Duration, Modifying Factors, Severity)  Note limiting factors. Lisy Duron is a 62 y.o. female who presents to the emergency department for evaluation regarding feelings of fatigue and generalized weakness. Patient states that she is not felt well for the past couple of days. She is concerned that she may be anemic as this is similar to how she felt last time. She is not had any chest pain or feelings of shortness of breath. She denies palpitations or syncopal events. States that she is really not had much appetite recently. She has had some mild nausea but denies vomiting. She is not had any episodes of diarrhea. She has a prior history of iron deficiency anemia. HPI    NursingNotes were reviewed. REVIEW OF SYSTEMS    (2-9 systems for level 4, 10 or more for level 5)     Review of Systems   Constitutional:  Positive for fatigue. Negative for chills and fever. Respiratory:  Negative for cough and shortness of breath. Cardiovascular:  Negative for chest pain and palpitations. Gastrointestinal:  Negative for abdominal pain, diarrhea, nausea and vomiting. Neurological:  Negative for dizziness and syncope. All other systems reviewed and are negative.          PAST MEDICALHISTORY     Past Medical History:   Diagnosis Date    Abdominal pain     Alcohol abuse     Arthritis     Constipation     Decreased appetite     Elevated liver enzymes     Emesis - persistent     Fatty liver     Hypertension     Iron deficiency anemia     Jaundice     Melanoma ([address]     UTI (urinary tract infection)          SURGICAL HISTORY       Past Surgical History:   Procedure Laterality Date    CHOLECYSTECTOMY      COLONOSCOPY  2014    ENDOSCOPY, COLON, DIAGNOSTIC  2014    HERNIA REPAIR      x2    HERNIA REPAIR      lt inguinal area and rt side    SKIN BIOPSY  2015    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 Indications: Trouble Sleeping, Disp-30 tablet, R-0Normal      gabapentin (NEURONTIN) 300 MG capsule TAKE 2 CAPSULES BY MOUTH EVERY DAY AT NIGHT, Disp-60 capsule, R-0Normal      traZODone (DESYREL) 100 MG tablet Take 1 tablet by mouth nightly, Disp-30 tablet, R-2Normal      lamoTRIgine (LAMICTAL) 200 MG tablet Take 1 tablet by mouth daily TAKE 1 TABLET BY MOUTH EVERY DAY, Disp-30 tablet, R-1Normal      Icosapent Ethyl (VASCEPA) 1 g CAPS capsule Historical Med      cloNIDine (CATAPRES) 0.1 MG tablet TAKE 1 TABLET BY MOUTH EVERY DAY AT NIGHT, Disp-90 tablet, R-0Normal      risperiDONE (RISPERDAL) 1 MG tablet TAKE 1 TABLET BY MOUTH EVERY DAY AT NIGHT, Disp-90 tablet, R-0Normal      pantoprazole (PROTONIX) 40 MG tablet Take 40 mg by mouth dailyHistorical Med      fluticasone (FLONASE) 50 MCG/ACT nasal spray SPRAY 1 SPRAY INTO EACH NOSTRIL TWICE A DAYHistorical Med      loratadine (CLARITIN) 10 MG tablet TAKE ONE TABLET BY MOUTH DAILYHistorical Med      rosuvastatin (CRESTOR) 5 MG tablet TAKE 1 TABLET BY MOUTH EVERY DAY IN THE EVENINGHistorical Med      oxybutynin (DITROPAN) 5 MG tablet TAKE 1 TABLET BY MOUTH EVERY DAY AT NIGHT, Disp-90 tablet, R-3Normal      spironolactone (ALDACTONE) 25 MG tablet Historical Med      metoprolol succinate (TOPROL XL) 50 MG extended release tablet Take 50 mg by mouth daily 4/25/19 per direction of Dr. Jenaro Isaac pt to take 1/2 tab daily. Historical Med Palpations: Abdomen is soft. Tenderness: There is no abdominal tenderness. There is no guarding. Musculoskeletal:      Right lower leg: No edema. Left lower leg: No edema. Skin:     Capillary Refill: Capillary refill takes less than 2 seconds. Coloration: Skin is not pale. Findings: No rash. Neurological:      General: No focal deficit present. Mental Status: She is alert and oriented to person, place, and time. Psychiatric:         Behavior: Behavior is cooperative. DIAGNOSTIC RESULTS     EKG: All EKG's areinterpreted by the Emergency Department Physician who either signs or Co-signs this chart in the absence of a cardiologist.    2010: Normal sinus rhythm at a rate of 104, there is no evidence of acute ST elevation identified. QTc: 449 MS.         LABS:  Labs Reviewed   COMPREHENSIVE METABOLIC PANEL - Abnormal; Notable for the following components:       Result Value    Sodium 131 (*)     Chloride 91 (*)     Glucose 126 (*)     Calcium 10.2 (*)     Alkaline Phosphatase 190 (*)     ALT 51 (*)     AST 71 (*)     All other components within normal limits   CBC WITH AUTO DIFFERENTIAL - Abnormal; Notable for the following components:    RBC 2.32 (*)     Hemoglobin 8.2 (*)     Hematocrit 25.6 (*)     .3 (*)     MCH 35.3 (*)     MCHC 32.0 (*)     RDW 15.4 (*)     MPV 9.3 (*)     Neutrophils % 78.4 (*)     Lymphocytes % 15.5 (*)     Neutrophils Absolute 7.6 (*)     All other components within normal limits   URINALYSIS - Abnormal; Notable for the following components:    Color, UA DARK YELLOW (*)     Clarity, UA CLOUDY (*)     Bilirubin Urine MODERATE (*)     Ketones, Urine TRACE (*)     Protein, UA 30 (*)     Nitrite, Urine POSITIVE (*)     Leukocyte Esterase, Urine SMALL (*)     All other components within normal limits   MICROSCOPIC URINALYSIS - Abnormal; Notable for the following components:    Hyaline Casts, UA TNTC (*)     Bacteria, UA None Seen (*)     Crystals, UA Few Ca. Oxalate (*)     All other components within normal limits   COVID-19, RAPID   TROPONIN       All other labs were within normal range or not returned as of this dictation. EMERGENCY DEPARTMENT COURSE and DIFFERENTIAL DIAGNOSIS/MDM:   Vitals:    Vitals:    07/05/22 1815 07/05/22 1820 07/05/22 1930 07/05/22 2110   BP: 123/80 (!) 126/110 124/69 (!) 142/75   Pulse: (!) 110 (!) 104 99 100   Resp: 20 18 18   Temp:       TempSrc:       SpO2: 97% 96% 96% 94%   Weight:           MDM      Reassessment    Patient's hemoglobin is noted to be 8.2. This looks to be about at her baseline, in March 2022 she was 8.6. She is hemodynamically stable at this time. PROCEDURES:  Unless otherwise noted below, none     Procedures    FINAL IMPRESSION      1.  Chronic anemia          DISPOSITION/PLAN   DISPOSITION Decision To Discharge 07/05/2022 10:03:49 PM      PATIENT REFERRED TO:  Manpreet Mcgraw DO  [address] [place] [address]  [phone]          DISCHARGE MEDICATIONS:  Discharge Medication List as of 7/5/2022 10:15 PM             (Please note that portions of this note were completed with a voice recognition program.  Efforts were made to edit thedictations but occasionally words are mis-transcribed.)    Rachel Sidhu MD (electronically signed)  Attending Emergency Physician         Rachel Sidhu MD  07/15/22 3995 medications

## 2022-07-06 NOTE — TELEPHONE ENCOUNTER
Pt called to change her in office appt to a phone appt for tomorrow 7/7 with Charlie Modi. Also reminded the pt of her urine drug screen she has to gt done as well.     Electronically signed by Skylar Burgess MA on 7/6/2022 at 4:03 PM

## 2022-07-07 ENCOUNTER — TELEMEDICINE (OUTPATIENT)
Dept: PSYCHIATRY | Age: 57
End: 2022-07-07
Payer: MEDICARE

## 2022-07-07 DIAGNOSIS — F41.1 GENERALIZED ANXIETY DISORDER: ICD-10-CM

## 2022-07-07 DIAGNOSIS — F31.61 BIPOLAR AFFECTIVE DISORDER, MIXED, MILD (HCC): Primary | ICD-10-CM

## 2022-07-07 PROCEDURE — 99443 PR PHYS/QHP TELEPHONE EVALUATION 21-30 MIN: CPT | Performed by: NURSE PRACTITIONER

## 2022-07-07 RX ORDER — GABAPENTIN 300 MG/1
CAPSULE ORAL
Qty: 60 CAPSULE | Refills: 0 | Status: SHIPPED | OUTPATIENT
Start: 2022-07-07 | End: 2022-08-24 | Stop reason: SDUPTHER

## 2022-07-07 ASSESSMENT — PATIENT HEALTH QUESTIONNAIRE - PHQ9
SUM OF ALL RESPONSES TO PHQ QUESTIONS 1-9: 5
4. FEELING TIRED OR HAVING LITTLE ENERGY: 1
7. TROUBLE CONCENTRATING ON THINGS, SUCH AS READING THE NEWSPAPER OR WATCHING TELEVISION: 0
9. THOUGHTS THAT YOU WOULD BE BETTER OFF DEAD, OR OF HURTING YOURSELF: 0
2. FEELING DOWN, DEPRESSED OR HOPELESS: 1
8. MOVING OR SPEAKING SO SLOWLY THAT OTHER PEOPLE COULD HAVE NOTICED. OR THE OPPOSITE, BEING SO FIGETY OR RESTLESS THAT YOU HAVE BEEN MOVING AROUND A LOT MORE THAN USUAL: 1
SUM OF ALL RESPONSES TO PHQ9 QUESTIONS 1 & 2: 1
10. IF YOU CHECKED OFF ANY PROBLEMS, HOW DIFFICULT HAVE THESE PROBLEMS MADE IT FOR YOU TO DO YOUR WORK, TAKE CARE OF THINGS AT HOME, OR GET ALONG WITH OTHER PEOPLE: 2
3. TROUBLE FALLING OR STAYING ASLEEP: 1
6. FEELING BAD ABOUT YOURSELF - OR THAT YOU ARE A FAILURE OR HAVE LET YOURSELF OR YOUR FAMILY DOWN: 0
SUM OF ALL RESPONSES TO PHQ QUESTIONS 1-9: 5
1. LITTLE INTEREST OR PLEASURE IN DOING THINGS: 0
5. POOR APPETITE OR OVEREATING: 1

## 2022-07-07 NOTE — PROGRESS NOTES
Bianka Crystal is a 62 y.o. female evaluated via telephone on 7/7/2022. Consent:  She and/or health care decision maker is aware that that she may receive a bill for this telephone service, depending on her insurance coverage, and has provided verbal consent to proceed: Yes      Documentation:  I communicated with the patient and/or health care decision maker about see below. Details of this discussion including any medical advice provided: see below      I affirm this is a Patient Initiated Episode with a Patient who has not had a related appointment within my department in the past 7 days or scheduled within the next 24 hours. The patient  (guardian) is aware that this is a billable service, which includes applicable co-pays. This virtual visit was conducted with patient's (and or legal guardian's) consent. This visit was conducted pursuant to the emergency declaration under the [place] act and the [address] waiver authority in the coronavirus preparedness and response supplemental appropriation's ACT. Patient identification was verified and a caregiver was present when appropriate. The patient was located in a state where the provider was licensed to provide care. Patient identification was verified at the start of the visit: Yes    Total Time: minutes: 11-20 minutes    Note: not billable if this call serves to triage the patient into an appointment for the relevant concern      CAPO Dukes CNP        7/7/22          Progress Note    Bianka Crystal 1965      Chief Complaint   Patient presents with    Medication Check    Follow-up         Subjective:    Patient is a 62 y.o. female diagnosed with bipolar and presents today for follow-up. Last seen in clinic on 5/23/22  and prior records were reviewed. Last visit: she reports her mood has not been doing very well. She feels like she is juggled around from doctor to doctor by PCP.   She is hesitant to restart therapy at this time also because of low motivation. She states she has been more depressed and having more anxiety and panic attacks. She reports she has been more isolating and withdrawing however she has been sitting with her neighbors a few times over the past couple of weeks. We discussed increasing her lamotrigine to 200 mg daily as well as increasing her trazodone to 100 mg nightly. Patient was encouraged to follow-up with her therapist to discuss financial strains as well as coping strategies to help deal with depression and anxiety. Patient verbalized understanding will follow-up in 6 weeks       Today : doing better today than last visit. She is not having as much depression and anxiety. She is also sleeping better. She has not been outside as much because of the heat. She still has been calling her friends and has been interacting with them. She states she has had some extra fatigue lately, we discussed sitting in her home for long periods of time with minimal exercise can contribute to fatigue, we discussed chair aerobics or chair exercises that she could do while at home patient verbalized understanding and agreement. Additionally we discussed diet and nutrition. She denies SI HI AVH she denies side effects of medications at this time she is calm and cooperative on the phone no medication changes at this time we will follow-up in 2 months      Absent  suicidal ideation. Reports compliance with medications as good .      Sleep: She reports that she has been sleeping much better    Caffeine use: coffee most morning     Support:  neighbor    PREVIOUS MED TRIALS  Trazodone (having more suicidal dreams), at 100mg, not working for sleep  Seroquel (wt gain, 14 lb in 3 months, enuresis, indigestion, abnormal blood work, increased blood sugar, seizures)  Duloxetine (has not been effective and no noticeable change even with dose increases to 90mg)  Paxil, 20mg  Wellbutrin XL, (tried it recently to quit smoking)  Prozac (made her numb, added to her eating disorder)  Zoloft (thought she did well on this, took for a couple of years, she stopped it because when she returned to [place] the doctor put her back on Prozac)  Remeron (increased her dreams and panic attacks)  [facility] (worked)  Librium (in 2018 for 15 days to stop drinking alcohol)  Risperdal- weight gain  Doxepin,  (not effective)  Prazosin, ineffective for dreams/nightmares  Latuda (1/27/21, reports that she switched it to am dosing because taking it at night made her RLS worse)    Current Substance Use:   Alcohol: Denies   illicit drug use: Denies   Marijuana: Denies   Tobacco use: 3/4 ppd  Vape: Denies      BP: There were no vitals taken for this visit.       Review of Systems - 14 point review:  Negative except for stated    Constitutional: (fevers, chills, night sweats, wt loss/gain, change in appetite, fatigue, somnolence)    HEENT: (ear pain or discharge, hearing loss, ear ringing, sinus pressure, nosebleed, nasal discharge, sore throat, oral sores, tooth pain, bleeding gums, hoarse voice, neck pain)      Cardiovascular: (HTN, chest pain, elevated cholesterol/lipids, palpitations, leg swelling, leg pain with walking)    Respiratory: (cough, wheezing, snoring, SOB with activity (dyspnea), SOB while lying flat (orthopnea), awakening with severe SOB (paroxysmal nocturnal dyspnea))    Gastrointestinal: (NVD, constipation, abdominal pain, bright red stools, black tarry stools, stool incontinence)     Genitourinary:  (pelvic pain, burning or frequency of urination, urinary urgency, blood in urine incomplete bladder emptying, urinary incontinence, STD; MEN: testicular pain or swelling, erectile dysfunction; WOMEN: LMP, heavy menstrual bleeding (menorrhagia), irregular periods, postmenopausal bleeding, menstrual pain (dymenorrhea, vaginal discharge)    Musculoskeletal: (bone pain/fracture, joint pain or swelling, musle pain)    Integumentary: (rashes, acne, non-healing sores, itching, breast lumps, breast pain, nipple discharge, hair loss)    Neurologic: (HA, muscle weakness, paresthesias (numbness, coldness, crawling or prickling), memory loss, seizure, dizziness)    Psychiatric:  (anxiety, sadness, irritability/anger, insomnia, suicidality)    Endocrine: (heat or cold intolerance, excessive thirst (polydipsia), excessive hunger (polyphagia))    Immune/Allergic: (hives, seasonal or environmental allergies, HIV exposure)    Hematologic/Lymphatic: (lymph node enlargement, easy bleeding or bruising)    History obtained via chart review and patient    PCP is Raine Siddiqui. David Faye DO, DO       Current Meds:    Prior to Admission medications    Medication Sig Start Date End Date Taking? Authorizing Provider   gabapentin (NEURONTIN) 300 MG capsule TAKE 2 CAPSULES BY MOUTH EVERY DAY AT NIGHT 7/7/22 8/6/22 Yes CAPO Lopez CNP   risperiDONE (RISPERDAL) 1 MG tablet TAKE 1 TABLET BY MOUTH EVERY DAY AT NIGHT 7/6/22   CAPO Lopez CNP   eszopiclone (ESZOPICLONE) 3 MG TABS Take 1 tablet by mouth nightly for 30 days.  Indications: Trouble Sleeping 6/27/22 7/27/22  CAPO Lopez CNP   traZODone (DESYREL) 100 MG tablet Take 1 tablet by mouth nightly 5/23/22   CAPO Lopez CNP   lamoTRIgine (LAMICTAL) 200 MG tablet Take 1 tablet by mouth daily TAKE 1 TABLET BY MOUTH EVERY DAY 5/23/22   CAPO Lopez CNP   Icosapent Ethyl (VASCEPA) 1 g CAPS capsule     Historical Provider, MD   cloNIDine (CATAPRES) 0.1 MG tablet TAKE 1 TABLET BY MOUTH EVERY DAY AT NIGHT 4/25/22   CAPO Lopez CNP   pantoprazole (PROTONIX) 40 MG tablet Take 40 mg by mouth daily    Historical Provider, MD   fluticasone (FLONASE) 50 MCG/ACT nasal spray SPRAY 1 SPRAY INTO EACH NOSTRIL TWICE A DAY 7/22/21   Historical Provider, MD   loratadine (CLARITIN) 10 MG tablet TAKE ONE TABLET BY MOUTH DAILY 7/23/21   Historical Provider, MD rosuvastatin (CRESTOR) 5 MG tablet TAKE 1 TABLET BY MOUTH EVERY DAY IN THE EVENING 8/2/21   Historical Provider, MD   oxybutynin (DITROPAN) 5 MG tablet TAKE 1 TABLET BY MOUTH EVERY DAY AT NIGHT 9/7/21   Darien Waller APRN - CNP   spironolactone (ALDACTONE) 25 MG tablet  3/5/20   Historical Provider, MD   metoprolol succinate (TOPROL XL) 50 MG extended release tablet Take 50 mg by mouth daily 4/25/19 per direction of Dr. Jed Staley pt to take 1/2 tab daily. Historical Provider, MD   Magnesium Oxide 250 MG TABS Take by mouth daily    Historical Provider, MD     Social History     Socioeconomic History    Marital status: Legally      Spouse name: Not on file    Number of children: 1    Years of education: 12th grade + some college    Highest education level: Not on file   Occupational History    Not on file   Tobacco Use    Smoking status: Current Every Day Smoker     Packs/day: 0.50     Types: Cigarettes    Smokeless tobacco: Never Used   Vaping Use    Vaping Use: Never used   Substance and Sexual Activity    Alcohol use: Not Currently     Comment: history of abuse, last drink was early December, 2018    Drug use: Not Currently     Types: Marijuana Lucianne Bars)     Comment: last use was summer, 2017    Sexual activity: Not Currently   Other Topics Concern    Not on file   Social History Narrative    Not on file     Social Determinants of Health     Financial Resource Strain:     Difficulty of Paying Living Expenses: Not on file   Food Insecurity:     Worried About [address] in the Last Year: Not on file    [address] in the Last Year: Not on file   Transportation Needs:     Lack of Transportation (Medical): Not on file    Lack of Transportation (Non-Medical):  Not on file   Physical Activity:     Days of Exercise per Week: Not on file    Minutes of Exercise per Session: Not on file   Stress:     Feeling of Stress : Not on file   Social Connections:     Frequency of Communication with Friends and Family: Not on file    Frequency of Social Gatherings with Friends and Family: Not on file    Attends Orthodox Services: Not on file    Active Member of Clubs or Organizations: Not on file    Attends Club or Organization Meetings: Not on file    Marital Status: Not on file   Intimate Partner Violence:     Fear of Current or Ex-Partner: Not on file    Emotionally Abused: Not on file    Physically Abused: Not on file    Sexually Abused: Not on file   Housing Stability:     Unable to Pay for Housing in the Last Year: Not on file    Number of [place] in the Last Year: Not on file    Unstable Housing in the Last Year: Not on file       MSE:  Appearance and gait: UTO due to phone call   Behavior: Calm, cooperative, and socially appropriate. Rhenda Emely Speech: Normal in tone, volume, and quality. No slurring, dysarthria or pressured speech noted. Mood: \"pretty good\"   Affect: UTO due to phone call    Thought Process: Appears linear, logical and goal oriented. Causality appears intact. Thought Content: Denies active suicidal and homicidal ideations. No overt delusions or paranoia appreciated. Perceptions: Denies auditory or visual hallucinations at present time. Not responding to internal stimuli. Concentration: Intact. Orientation: to person, place, date, and situation. Language: Intact. Fund of information: Intact. Memory: Recent and remote appear intact. Impulsivity: Limited. Neurovegitative: Fair appetite and sleep. Insight: Fair. Judgment: Fair. Cognition: Can spell \"world\" backwards: Yes                    Can do serial 7's:  Yes    Lab Results   Component Value Date     (L) 07/05/2022    K 3.8 07/05/2022    CL 91 (L) 07/05/2022    CO2 25 07/05/2022    BUN 7 07/05/2022    CREATININE 0.9 07/05/2022    GLUCOSE 126 (H) 07/05/2022    CALCIUM 10.2 (H) 07/05/2022    PROT 7.1 07/05/2022    LABALBU 3.7 07/05/2022    BILITOT 0.8 07/05/2022    ALKPHOS 190 continued, modifications in meds and other orders/labs as follows:      Orders Placed This Encounter   Medications    gabapentin (NEURONTIN) 300 MG capsule     Sig: TAKE 2 CAPSULES BY MOUTH EVERY DAY AT NIGHT     Dispense:  60 capsule     Refill:  0     Not to exceed 5 additional fills before 04/18/2021 DX Code Needed  . No orders of the defined types were placed in this encounter. 9. Additional comments: Continue therapy, discussed sleep hygiene, discussed the use of coping skills and relaxation strategies to manage symptoms. 10.Over 50% of the total visit time of   22  minutes was spent on counseling and/or coordination of care of:                        1. Bipolar affective disorder, mixed, mild ([facility] [address]    2. Generalized anxiety disorder                      Psychotherapy Topics: mood/medication effectiveness family and financial    Lenka Perez, APRN - CNP    This dictation was generated by voice recognition computer software. Although all attempts are made to edit the dictation for accuracy, there may be errors in the transcription that are not intended.

## 2022-07-15 ASSESSMENT — ENCOUNTER SYMPTOMS
COUGH: 0
NAUSEA: 0
VOMITING: 0
ABDOMINAL PAIN: 0
SHORTNESS OF BREATH: 0
DIARRHEA: 0

## 2022-07-20 ENCOUNTER — APPOINTMENT (OUTPATIENT)
Dept: GENERAL RADIOLOGY | Age: 57
DRG: 918 | End: 2022-07-20
Payer: MEDICARE

## 2022-07-20 ENCOUNTER — HOSPITAL ENCOUNTER (INPATIENT)
Age: 57
LOS: 15 days | Discharge: HOME | DRG: 918 | End: 2022-08-04
Attending: EMERGENCY MEDICINE | Admitting: HOSPITALIST
Payer: MEDICARE

## 2022-07-20 DIAGNOSIS — F10.920 ACUTE ALCOHOLIC INTOXICATION WITHOUT COMPLICATION (HCC): ICD-10-CM

## 2022-07-20 DIAGNOSIS — T50.902A INTENTIONAL DRUG OVERDOSE, INITIAL ENCOUNTER (HCC): Primary | ICD-10-CM

## 2022-07-20 DIAGNOSIS — D64.9 SYMPTOMATIC ANEMIA: ICD-10-CM

## 2022-07-20 DIAGNOSIS — B88.8 INFESTATION BY BED BUG: ICD-10-CM

## 2022-07-20 DIAGNOSIS — R55 SYNCOPE AND COLLAPSE: ICD-10-CM

## 2022-07-20 PROBLEM — F10.20 ALCOHOL ABUSE WITH PHYSIOLOGICAL DEPENDENCE (HCC): Status: ACTIVE | Noted: 2022-07-20

## 2022-07-20 PROBLEM — D50.0 BLOOD LOSS ANEMIA: Status: ACTIVE | Noted: 2022-07-20

## 2022-07-20 LAB
ABO/RH: NORMAL
ACETAMINOPHEN LEVEL: <15 UG/ML
ALBUMIN SERPL-MCNC: 2.8 G/DL (ref 3.5–5.2)
ALP BLD-CCNC: 221 U/L (ref 35–104)
ALT SERPL-CCNC: 44 U/L (ref 5–33)
AMPHETAMINE SCREEN, URINE: NEGATIVE
ANION GAP SERPL CALCULATED.3IONS-SCNC: 23 MMOL/L (ref 7–19)
ANTIBODY SCREEN: NORMAL
APTT: 29.6 SEC (ref 26–36.2)
AST SERPL-CCNC: 94 U/L (ref 5–32)
BARBITURATE SCREEN URINE: NEGATIVE
BASE EXCESS VENOUS: -5 MMOL/L
BASOPHILS ABSOLUTE: 0 K/UL (ref 0–0.2)
BASOPHILS RELATIVE PERCENT: 0 % (ref 0–1)
BENZODIAZEPINE SCREEN, URINE: NEGATIVE
BILIRUB SERPL-MCNC: 0.9 MG/DL (ref 0.2–1.2)
BILIRUBIN URINE: ABNORMAL
BLOOD BANK DISPENSE STATUS: NORMAL
BLOOD BANK DISPENSE STATUS: NORMAL
BLOOD BANK PRODUCT CODE: NORMAL
BLOOD BANK PRODUCT CODE: NORMAL
BLOOD, URINE: NEGATIVE
BPU ID: NORMAL
BPU ID: NORMAL
BUN BLDV-MCNC: 11 MG/DL (ref 6–20)
BUPRENORPHINE URINE: NEGATIVE
CALCIUM SERPL-MCNC: 7.7 MG/DL (ref 8.6–10)
CANNABINOID SCREEN URINE: NEGATIVE
CARBOXYHEMOGLOBIN: 2.5 %
CHLORIDE BLD-SCNC: 90 MMOL/L (ref 98–111)
CLARITY: CLEAR
CO2: 17 MMOL/L (ref 22–29)
COCAINE METABOLITE SCREEN URINE: NEGATIVE
COLOR: YELLOW
CREAT SERPL-MCNC: 1.1 MG/DL (ref 0.5–0.9)
DESCRIPTION BLOOD BANK: NORMAL
DESCRIPTION BLOOD BANK: NORMAL
EKG P AXIS: 71 DEGREES
EKG P-R INTERVAL: 146 MS
EKG Q-T INTERVAL: 338 MS
EKG QRS DURATION: 82 MS
EKG QTC CALCULATION (BAZETT): 430 MS
EKG T AXIS: 36 DEGREES
EOSINOPHILS ABSOLUTE: 0 K/UL (ref 0–0.6)
EOSINOPHILS RELATIVE PERCENT: 0 % (ref 0–5)
ETHANOL: 260 MG/DL (ref 0–0.08)
FERRITIN: 125 NG/ML (ref 13–150)
FOLATE: 8.6 NG/ML (ref 4.8–37.3)
GFR AFRICAN AMERICAN: >59
GFR NON-AFRICAN AMERICAN: 51
GLUCOSE BLD-MCNC: 164 MG/DL (ref 74–109)
GLUCOSE URINE: NEGATIVE MG/DL
HCO3 VENOUS: 19 MMOL/L (ref 23–29)
HCT VFR BLD CALC: 13 % (ref 37–47)
HCT VFR BLD CALC: 14.1 % (ref 37–47)
HCT VFR BLD CALC: 23.9 % (ref 37–47)
HEMOGLOBIN: 3.8 G/DL (ref 12–16)
HEMOGLOBIN: 3.9 G/DL (ref 12–16)
HEMOGLOBIN: 7.9 G/DL (ref 12–16)
HYPOCHROMIA: ABNORMAL
IMMATURE GRANULOCYTES #: 0.1 K/UL
INR BLD: 1.26 (ref 0.88–1.18)
IRON SATURATION: 3 % (ref 14–50)
IRON: 6 UG/DL (ref 37–145)
KETONES, URINE: NEGATIVE MG/DL
LEUKOCYTE ESTERASE, URINE: NEGATIVE
LYMPHOCYTES ABSOLUTE: 0.9 K/UL (ref 1.1–4.5)
LYMPHOCYTES RELATIVE PERCENT: 13.4 % (ref 20–40)
Lab: NORMAL
MACROCYTES: ABNORMAL
MCH RBC QN AUTO: 30.2 PG (ref 27–31)
MCHC RBC AUTO-ENTMCNC: 27.7 G/DL (ref 33–37)
MCV RBC AUTO: 109.3 FL (ref 81–99)
METHADONE SCREEN, URINE: NEGATIVE
METHAMPHETAMINE, URINE: NEGATIVE
METHEMOGLOBIN VENOUS: 0.5 %
MONOCYTES ABSOLUTE: 0.2 K/UL (ref 0–0.9)
MONOCYTES RELATIVE PERCENT: 3.2 % (ref 0–10)
NEUTROPHILS ABSOLUTE: 5.6 K/UL (ref 1.5–7.5)
NEUTROPHILS RELATIVE PERCENT: 82.7 % (ref 50–65)
NITRITE, URINE: NEGATIVE
O2 CONTENT, VEN: 2 ML/DL
O2 SAT, VEN: 37 %
OPIATE SCREEN URINE: NEGATIVE
OXYCODONE URINE: NEGATIVE
PCO2, VEN: 29 MMHG (ref 40–50)
PDW BLD-RTO: 13.9 % (ref 11.5–14.5)
PH UA: 5.5 (ref 5–8)
PH VENOUS: 7.42 (ref 7.35–7.45)
PHENCYCLIDINE SCREEN URINE: NEGATIVE
PLATELET # BLD: 199 K/UL (ref 130–400)
PLATELET SLIDE REVIEW: ADEQUATE
PMV BLD AUTO: 9.4 FL (ref 9.4–12.3)
PO2, VEN: 28 MMHG
POTASSIUM SERPL-SCNC: 3.4 MMOL/L (ref 3.5–5)
PROPOXYPHENE SCREEN: NEGATIVE
PROTEIN UA: ABNORMAL MG/DL
PROTHROMBIN TIME: 15.8 SEC (ref 12–14.6)
RBC # BLD: 1.29 M/UL (ref 4.2–5.4)
SALICYLATE, SERUM: <3 MG/DL (ref 3–10)
SARS-COV-2, NAAT: NOT DETECTED
SODIUM BLD-SCNC: 130 MMOL/L (ref 136–145)
SPECIFIC GRAVITY UA: 1.02 (ref 1–1.03)
TOTAL IRON BINDING CAPACITY: 219 UG/DL (ref 250–400)
TOTAL PROTEIN: 6.1 G/DL (ref 6.6–8.7)
TRICYCLIC, URINE: NEGATIVE
UROBILINOGEN, URINE: 4 E.U./DL
VITAMIN B-12: >2000 PG/ML (ref 211–946)
WBC # BLD: 6.8 K/UL (ref 4.8–10.8)

## 2022-07-20 PROCEDURE — 85730 THROMBOPLASTIN TIME PARTIAL: CPT

## 2022-07-20 PROCEDURE — 36600 WITHDRAWAL OF ARTERIAL BLOOD: CPT

## 2022-07-20 PROCEDURE — 71045 X-RAY EXAM CHEST 1 VIEW: CPT | Performed by: RADIOLOGY

## 2022-07-20 PROCEDURE — C9113 INJ PANTOPRAZOLE SODIUM, VIA: HCPCS | Performed by: EMERGENCY MEDICINE

## 2022-07-20 PROCEDURE — 85610 PROTHROMBIN TIME: CPT

## 2022-07-20 PROCEDURE — 93005 ELECTROCARDIOGRAM TRACING: CPT | Performed by: EMERGENCY MEDICINE

## 2022-07-20 PROCEDURE — 85025 COMPLETE CBC W/AUTO DIFF WBC: CPT

## 2022-07-20 PROCEDURE — 80179 DRUG ASSAY SALICYLATE: CPT

## 2022-07-20 PROCEDURE — 1210000000 HC MED SURG R&B

## 2022-07-20 PROCEDURE — 6360000002 HC RX W HCPCS: Performed by: EMERGENCY MEDICINE

## 2022-07-20 PROCEDURE — 80143 DRUG ASSAY ACETAMINOPHEN: CPT

## 2022-07-20 PROCEDURE — 87635 SARS-COV-2 COVID-19 AMP PRB: CPT

## 2022-07-20 PROCEDURE — 86850 RBC ANTIBODY SCREEN: CPT

## 2022-07-20 PROCEDURE — P9016 RBC LEUKOCYTES REDUCED: HCPCS

## 2022-07-20 PROCEDURE — 2580000003 HC RX 258: Performed by: HOSPITALIST

## 2022-07-20 PROCEDURE — 85014 HEMATOCRIT: CPT

## 2022-07-20 PROCEDURE — 71045 X-RAY EXAM CHEST 1 VIEW: CPT

## 2022-07-20 PROCEDURE — 82607 VITAMIN B-12: CPT

## 2022-07-20 PROCEDURE — 80306 DRUG TEST PRSMV INSTRMNT: CPT

## 2022-07-20 PROCEDURE — 86900 BLOOD TYPING SEROLOGIC ABO: CPT

## 2022-07-20 PROCEDURE — 82077 ASSAY SPEC XCP UR&BREATH IA: CPT

## 2022-07-20 PROCEDURE — 82728 ASSAY OF FERRITIN: CPT

## 2022-07-20 PROCEDURE — 6370000000 HC RX 637 (ALT 250 FOR IP): Performed by: HOSPITALIST

## 2022-07-20 PROCEDURE — 36430 TRANSFUSION BLD/BLD COMPNT: CPT

## 2022-07-20 PROCEDURE — 82803 BLOOD GASES ANY COMBINATION: CPT

## 2022-07-20 PROCEDURE — 82746 ASSAY OF FOLIC ACID SERUM: CPT

## 2022-07-20 PROCEDURE — 85018 HEMOGLOBIN: CPT

## 2022-07-20 PROCEDURE — 81003 URINALYSIS AUTO W/O SCOPE: CPT

## 2022-07-20 PROCEDURE — 83550 IRON BINDING TEST: CPT

## 2022-07-20 PROCEDURE — 93010 ELECTROCARDIOGRAM REPORT: CPT | Performed by: INTERNAL MEDICINE

## 2022-07-20 PROCEDURE — 83540 ASSAY OF IRON: CPT

## 2022-07-20 PROCEDURE — 36415 COLL VENOUS BLD VENIPUNCTURE: CPT

## 2022-07-20 PROCEDURE — 86923 COMPATIBILITY TEST ELECTRIC: CPT

## 2022-07-20 PROCEDURE — 86901 BLOOD TYPING SEROLOGIC RH(D): CPT

## 2022-07-20 PROCEDURE — 80053 COMPREHEN METABOLIC PANEL: CPT

## 2022-07-20 PROCEDURE — 99285 EMERGENCY DEPT VISIT HI MDM: CPT

## 2022-07-20 RX ORDER — LORAZEPAM 2 MG/ML
4 INJECTION INTRAMUSCULAR
Status: DISCONTINUED | OUTPATIENT
Start: 2022-07-20 | End: 2022-07-23

## 2022-07-20 RX ORDER — FLUTICASONE PROPIONATE 50 MCG
1 SPRAY, SUSPENSION (ML) NASAL 2 TIMES DAILY
Status: DISCONTINUED | OUTPATIENT
Start: 2022-07-20 | End: 2022-08-04 | Stop reason: HOSPADM

## 2022-07-20 RX ORDER — LORAZEPAM 2 MG/ML
3 INJECTION INTRAMUSCULAR
Status: DISCONTINUED | OUTPATIENT
Start: 2022-07-20 | End: 2022-07-23

## 2022-07-20 RX ORDER — SODIUM CHLORIDE 9 MG/ML
INJECTION, SOLUTION INTRAVENOUS PRN
Status: DISCONTINUED | OUTPATIENT
Start: 2022-07-20 | End: 2022-08-04 | Stop reason: HOSPADM

## 2022-07-20 RX ORDER — LORAZEPAM 2 MG/ML
1 INJECTION INTRAMUSCULAR
Status: DISCONTINUED | OUTPATIENT
Start: 2022-07-20 | End: 2022-07-23

## 2022-07-20 RX ORDER — SPIRONOLACTONE 25 MG/1
25 TABLET ORAL DAILY
Status: DISCONTINUED | OUTPATIENT
Start: 2022-07-20 | End: 2022-08-04 | Stop reason: HOSPADM

## 2022-07-20 RX ORDER — SODIUM CHLORIDE 0.9 % (FLUSH) 0.9 %
5-40 SYRINGE (ML) INJECTION PRN
Status: DISCONTINUED | OUTPATIENT
Start: 2022-07-20 | End: 2022-08-04 | Stop reason: HOSPADM

## 2022-07-20 RX ORDER — PANTOPRAZOLE SODIUM 40 MG/10ML
40 INJECTION, POWDER, LYOPHILIZED, FOR SOLUTION INTRAVENOUS ONCE
Status: COMPLETED | OUTPATIENT
Start: 2022-07-20 | End: 2022-07-20

## 2022-07-20 RX ORDER — ONDANSETRON 2 MG/ML
4 INJECTION INTRAMUSCULAR; INTRAVENOUS EVERY 6 HOURS PRN
Status: DISCONTINUED | OUTPATIENT
Start: 2022-07-20 | End: 2022-08-04 | Stop reason: HOSPADM

## 2022-07-20 RX ORDER — POTASSIUM CHLORIDE 7.45 MG/ML
10 INJECTION INTRAVENOUS PRN
Status: DISCONTINUED | OUTPATIENT
Start: 2022-07-20 | End: 2022-08-04 | Stop reason: HOSPADM

## 2022-07-20 RX ORDER — GAUZE BANDAGE 2" X 2"
100 BANDAGE TOPICAL DAILY
Status: DISCONTINUED | OUTPATIENT
Start: 2022-07-20 | End: 2022-08-04 | Stop reason: HOSPADM

## 2022-07-20 RX ORDER — 0.9 % SODIUM CHLORIDE 0.9 %
1000 INTRAVENOUS SOLUTION INTRAVENOUS ONCE
Status: DISCONTINUED | OUTPATIENT
Start: 2022-07-20 | End: 2022-08-04 | Stop reason: HOSPADM

## 2022-07-20 RX ORDER — ONDANSETRON 4 MG/1
4 TABLET, ORALLY DISINTEGRATING ORAL EVERY 8 HOURS PRN
Status: DISCONTINUED | OUTPATIENT
Start: 2022-07-20 | End: 2022-08-04 | Stop reason: HOSPADM

## 2022-07-20 RX ORDER — ROSUVASTATIN CALCIUM 5 MG/1
5 TABLET, COATED ORAL NIGHTLY
Status: DISCONTINUED | OUTPATIENT
Start: 2022-07-20 | End: 2022-07-20 | Stop reason: SDUPTHER

## 2022-07-20 RX ORDER — LORAZEPAM 1 MG/1
3 TABLET ORAL
Status: DISCONTINUED | OUTPATIENT
Start: 2022-07-20 | End: 2022-07-23

## 2022-07-20 RX ORDER — LORAZEPAM 1 MG/1
4 TABLET ORAL
Status: DISCONTINUED | OUTPATIENT
Start: 2022-07-20 | End: 2022-07-23

## 2022-07-20 RX ORDER — TRAZODONE HYDROCHLORIDE 50 MG/1
100 TABLET ORAL NIGHTLY
Status: DISCONTINUED | OUTPATIENT
Start: 2022-07-20 | End: 2022-07-22

## 2022-07-20 RX ORDER — FOLIC ACID 1 MG/1
1 TABLET ORAL DAILY
Status: DISCONTINUED | OUTPATIENT
Start: 2022-07-20 | End: 2022-08-04 | Stop reason: HOSPADM

## 2022-07-20 RX ORDER — MULTIVITAMIN WITH IRON
1 TABLET ORAL DAILY
Status: DISCONTINUED | OUTPATIENT
Start: 2022-07-20 | End: 2022-08-04 | Stop reason: HOSPADM

## 2022-07-20 RX ORDER — POTASSIUM CHLORIDE 20 MEQ/1
40 TABLET, EXTENDED RELEASE ORAL PRN
Status: DISCONTINUED | OUTPATIENT
Start: 2022-07-20 | End: 2022-08-04 | Stop reason: HOSPADM

## 2022-07-20 RX ORDER — SODIUM CHLORIDE, SODIUM LACTATE, POTASSIUM CHLORIDE, CALCIUM CHLORIDE 600; 310; 30; 20 MG/100ML; MG/100ML; MG/100ML; MG/100ML
INJECTION, SOLUTION INTRAVENOUS CONTINUOUS
Status: DISCONTINUED | OUTPATIENT
Start: 2022-07-20 | End: 2022-07-25

## 2022-07-20 RX ORDER — LORAZEPAM 2 MG/ML
1 INJECTION INTRAMUSCULAR EVERY 5 MIN PRN
Status: DISCONTINUED | OUTPATIENT
Start: 2022-07-20 | End: 2022-07-23

## 2022-07-20 RX ORDER — ACETAMINOPHEN 325 MG/1
650 TABLET ORAL EVERY 6 HOURS PRN
Status: DISCONTINUED | OUTPATIENT
Start: 2022-07-20 | End: 2022-08-04 | Stop reason: HOSPADM

## 2022-07-20 RX ORDER — PANTOPRAZOLE SODIUM 40 MG/1
40 TABLET, DELAYED RELEASE ORAL DAILY
Status: DISCONTINUED | OUTPATIENT
Start: 2022-07-20 | End: 2022-08-04 | Stop reason: HOSPADM

## 2022-07-20 RX ORDER — LORAZEPAM 2 MG/ML
2 INJECTION INTRAMUSCULAR
Status: DISCONTINUED | OUTPATIENT
Start: 2022-07-20 | End: 2022-07-23

## 2022-07-20 RX ORDER — GABAPENTIN 300 MG/1
600 CAPSULE ORAL NIGHTLY
Status: DISCONTINUED | OUTPATIENT
Start: 2022-07-20 | End: 2022-08-04 | Stop reason: HOSPADM

## 2022-07-20 RX ORDER — METOPROLOL SUCCINATE 50 MG/1
25 TABLET, EXTENDED RELEASE ORAL DAILY
Status: DISCONTINUED | OUTPATIENT
Start: 2022-07-20 | End: 2022-07-24

## 2022-07-20 RX ORDER — ACETAMINOPHEN 650 MG/1
650 SUPPOSITORY RECTAL EVERY 6 HOURS PRN
Status: DISCONTINUED | OUTPATIENT
Start: 2022-07-20 | End: 2022-08-04 | Stop reason: HOSPADM

## 2022-07-20 RX ORDER — LAMOTRIGINE 100 MG/1
200 TABLET ORAL DAILY
Status: DISCONTINUED | OUTPATIENT
Start: 2022-07-20 | End: 2022-08-04 | Stop reason: HOSPADM

## 2022-07-20 RX ORDER — MAGNESIUM SULFATE IN WATER 40 MG/ML
2000 INJECTION, SOLUTION INTRAVENOUS PRN
Status: DISCONTINUED | OUTPATIENT
Start: 2022-07-20 | End: 2022-08-04 | Stop reason: HOSPADM

## 2022-07-20 RX ORDER — OXYBUTYNIN CHLORIDE 5 MG/1
5 TABLET ORAL NIGHTLY
Status: DISCONTINUED | OUTPATIENT
Start: 2022-07-20 | End: 2022-08-04 | Stop reason: HOSPADM

## 2022-07-20 RX ORDER — SODIUM CHLORIDE 0.9 % (FLUSH) 0.9 %
5-40 SYRINGE (ML) INJECTION EVERY 12 HOURS SCHEDULED
Status: DISCONTINUED | OUTPATIENT
Start: 2022-07-20 | End: 2022-08-04 | Stop reason: HOSPADM

## 2022-07-20 RX ORDER — ROSUVASTATIN CALCIUM 10 MG/1
5 TABLET, COATED ORAL NIGHTLY
Status: DISCONTINUED | OUTPATIENT
Start: 2022-07-20 | End: 2022-08-04 | Stop reason: HOSPADM

## 2022-07-20 RX ORDER — LORAZEPAM 1 MG/1
1 TABLET ORAL
Status: DISCONTINUED | OUTPATIENT
Start: 2022-07-20 | End: 2022-07-23

## 2022-07-20 RX ORDER — LORAZEPAM 1 MG/1
2 TABLET ORAL
Status: DISCONTINUED | OUTPATIENT
Start: 2022-07-20 | End: 2022-07-23

## 2022-07-20 RX ADMIN — FLUTICASONE PROPIONATE 1 SPRAY: 50 SPRAY, METERED NASAL at 12:29

## 2022-07-20 RX ADMIN — SODIUM CHLORIDE, PRESERVATIVE FREE 10 ML: 5 INJECTION INTRAVENOUS at 20:54

## 2022-07-20 RX ADMIN — PANTOPRAZOLE SODIUM 40 MG: 40 TABLET, DELAYED RELEASE ORAL at 12:29

## 2022-07-20 RX ADMIN — GABAPENTIN 600 MG: 300 CAPSULE ORAL at 20:54

## 2022-07-20 RX ADMIN — THIAMINE HCL TAB 100 MG 100 MG: 100 TAB at 12:28

## 2022-07-20 RX ADMIN — METOPROLOL SUCCINATE 25 MG: 50 TABLET, EXTENDED RELEASE ORAL at 12:29

## 2022-07-20 RX ADMIN — THERA TABS 1 TABLET: TAB at 12:28

## 2022-07-20 RX ADMIN — SODIUM CHLORIDE, POTASSIUM CHLORIDE, SODIUM LACTATE AND CALCIUM CHLORIDE: 600; 310; 30; 20 INJECTION, SOLUTION INTRAVENOUS at 22:30

## 2022-07-20 RX ADMIN — TRAZODONE HYDROCHLORIDE 100 MG: 50 TABLET ORAL at 20:54

## 2022-07-20 RX ADMIN — SODIUM CHLORIDE, PRESERVATIVE FREE 10 ML: 5 INJECTION INTRAVENOUS at 12:47

## 2022-07-20 RX ADMIN — SPIRONOLACTONE 25 MG: 25 TABLET ORAL at 12:29

## 2022-07-20 RX ADMIN — PANTOPRAZOLE SODIUM 40 MG: 40 INJECTION, POWDER, FOR SOLUTION INTRAVENOUS at 05:53

## 2022-07-20 RX ADMIN — FOLIC ACID 1 MG: 1 TABLET ORAL at 12:28

## 2022-07-20 RX ADMIN — OXYBUTYNIN CHLORIDE 5 MG: 5 TABLET ORAL at 20:54

## 2022-07-20 RX ADMIN — FLUTICASONE PROPIONATE 1 SPRAY: 50 SPRAY, METERED NASAL at 20:55

## 2022-07-20 RX ADMIN — POTASSIUM CHLORIDE 40 MEQ: 1500 TABLET, EXTENDED RELEASE ORAL at 12:28

## 2022-07-20 RX ADMIN — ROSUVASTATIN CALCIUM 5 MG: 10 TABLET, FILM COATED ORAL at 20:54

## 2022-07-20 RX ADMIN — LAMOTRIGINE 200 MG: 100 TABLET ORAL at 12:36

## 2022-07-20 ASSESSMENT — ENCOUNTER SYMPTOMS
NAUSEA: 0
COUGH: 0
SORE THROAT: 0
BACK PAIN: 0
VOMITING: 0
ABDOMINAL PAIN: 0
RHINORRHEA: 0
BLOOD IN STOOL: 0
SHORTNESS OF BREATH: 0
ANAL BLEEDING: 0
DIARRHEA: 0

## 2022-07-20 NOTE — ED NOTES
First blood transfusion done. Second blood transfusion released.       Celsa Novak RN  07/20/22 2335

## 2022-07-20 NOTE — ED PROVIDER NOTES
[facility] EMERGENCY DEPT  eMERGENCY dEPARTMENT eNCOUnter      Pt Name: Zandra Portillo  MRN: [id number]  [place] 1965  Date of evaluation: 7/20/2022  Provider: Denzel Porter MD    CHIEF COMPLAINT       Chief Complaint   Patient presents with    Drug Overdose     Risperidone OD took unknown amount. When EMS arrived she was talking, lost pulse and started CPR per EMS less than one minute. Pt talking and alert upon arrival            HISTORY OF PRESENT ILLNESS   (Location/Symptom, Timing/Onset,Context/Setting, Quality, Duration, Modifying Factors, Severity)  Note limiting factors. Zandra Portillo is a 62 y.o. female who presents to the emergency department after an intentional drug overdose. The patient had called EMS and told him she was laying on the floor. When they got there she was laying on the floor on the phone and when they stood her up she collapsed. EMS reports that they believe that she lost pulses briefly and she was unresponsive for approximately 1 minute and they did CPR and establish an IO and placed I gel airway. No meds given and she had ROSC after that approx 1 min or less of cpr. Here she arrives moving her arms now and when we removed the I gel she states she tried to kill herself overdosing on risperdal due to the bed bugs. Unknown time or amount. Patient is covered in bed bugs. HPI    NursingNotes were reviewed. REVIEW OF SYSTEMS    (2-9 systems for level 4, 10 or more for level 5)     Review of Systems   Constitutional:  Positive for fatigue. Negative for chills and fever. HENT:  Negative for rhinorrhea and sore throat. Respiratory:  Negative for cough and shortness of breath. Cardiovascular:  Negative for chest pain. Gastrointestinal:  Negative for abdominal pain, blood in stool, diarrhea, nausea and vomiting. Genitourinary:  Negative for dysuria and frequency. Musculoskeletal:  Negative for back pain and neck pain. Neurological:  Negative for headaches. Psychiatric/Behavioral:  Positive for suicidal ideas. All other systems reviewed and are negative. PAST MEDICALHISTORY     Past Medical History:   Diagnosis Date    Abdominal pain     Alcohol abuse     Arthritis     Constipation     Decreased appetite     Elevated liver enzymes     Emesis - persistent     Fatty liver     Hypertension     Iron deficiency anemia     Jaundice     Melanoma ([address]     UTI (urinary tract infection)          SURGICAL HISTORY       Past Surgical History:   Procedure Laterality Date    CHOLECYSTECTOMY      COLONOSCOPY  2014    ENDOSCOPY, COLON, DIAGNOSTIC  2014    HERNIA REPAIR      x2    HERNIA REPAIR      lt inguinal area and rt side    SKIN BIOPSY  2015    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 Indications: Trouble Sleeping    FLUTICASONE (FLONASE) 50 MCG/ACT NASAL SPRAY    SPRAY 1 SPRAY INTO EACH NOSTRIL TWICE A DAY    GABAPENTIN (NEURONTIN) 300 MG CAPSULE    TAKE 2 CAPSULES BY MOUTH EVERY DAY AT NIGHT    ICOSAPENT ETHYL (VASCEPA) 1 G CAPS CAPSULE        LAMOTRIGINE (LAMICTAL) 200 MG TABLET    Take 1 tablet by mouth daily TAKE 1 TABLET BY MOUTH EVERY DAY    LORATADINE (CLARITIN) 10 MG TABLET    TAKE ONE TABLET BY MOUTH DAILY    MAGNESIUM OXIDE 250 MG TABS    Take by mouth daily    METOPROLOL SUCCINATE (TOPROL XL) 50 MG EXTENDED RELEASE TABLET    Take 50 mg by mouth daily 4/25/19 per direction of Dr. Lucho Portillo pt to take 1/2 tab daily.     OXYBUTYNIN (DITROPAN) 5 MG TABLET    TAKE 1 TABLET BY MOUTH EVERY DAY AT NIGHT    PANTOPRAZOLE (PROTONIX) 40 MG TABLET    Take 40 mg by mouth daily    RISPERIDONE (RISPERDAL) 1 MG TABLET    TAKE 1 TABLET BY MOUTH EVERY DAY AT NIGHT    ROSUVASTATIN (CRESTOR) 5 MG TABLET    TAKE 1 TABLET BY MOUTH EVERY DAY IN THE EVENING SPIRONOLACTONE (ALDACTONE) 25 MG TABLET        TRAZODONE (DESYREL) 100 MG TABLET    Take 1 tablet by mouth nightly       ALLERGIES     Seroquel [quetiapine fumarate]    FAMILY HISTORY       Family History   Problem Relation Age of Onset    Depression Mother     Suicide Mother     Bipolar Disorder Brother     Other Brother         MRSA    Alzheimer's Disease Maternal Grandmother     Cervical Cancer Maternal Grandmother           SOCIAL HISTORY       Social History     Socioeconomic History    Marital status: Legally      Spouse name: None    Number of children: 1    Years of education: 12th grade + some college    Highest education level: None   Tobacco Use    Smoking status: Every Day     Packs/day: 0.50     Types: Cigarettes    Smokeless tobacco: Never   Vaping Use    Vaping Use: Never used   Substance and Sexual Activity    Alcohol use: Not Currently     Comment: history of abuse, last drink was early December, 2018    Drug use: Not Currently     Types: Marijuana Jarrett Justin)     Comment: last use was summer, 2017    Sexual activity: Not Currently       SCREENINGS    Val Coma Scale  Eye Opening: Spontaneous  Best Verbal Response: Confused  Best Motor Response: Obeys commands  Val Coma Scale Score: 14        PHYSICAL EXAM    (up to 7 for level 4, 8 or more for level 5)     ED Triage Vitals   BP Temp Temp src Heart Rate Resp SpO2 Height Weight   07/20/22 0434 07/20/22 0434 -- 07/20/22 0434 07/20/22 0434 07/20/22 0438 -- --   (!) 92/56 98.1 °F (36.7 °C)  (!) 120 23 100 %         Physical Exam  Vitals and nursing note reviewed. Constitutional:       General: She is not in acute distress. Appearance: She is well-developed. She is ill-appearing. She is not diaphoretic. Comments: Unkempt covered in bed bugs crawling all over her   HENT:      Head: Normocephalic and atraumatic.       Right Ear: External ear normal.      Left Ear: External ear normal.      Nose: Nose normal.      Mouth/Throat: Mouth: Mucous membranes are moist.   Eyes:      Extraocular Movements: Extraocular movements intact. Conjunctiva/sclera: Conjunctivae normal.      Pupils: Pupils are equal, round, and reactive to light. Neck:      Trachea: No tracheal deviation. Cardiovascular:      Rate and Rhythm: Normal rate and regular rhythm. Heart sounds: Normal heart sounds. No murmur heard. Pulmonary:      Effort: No respiratory distress. Breath sounds: Normal breath sounds. No wheezing or rales. Abdominal:      Palpations: Abdomen is soft. Tenderness: There is no abdominal tenderness. Musculoskeletal:         General: Normal range of motion. Cervical back: Normal range of motion. Right lower leg: No edema. Left lower leg: No edema. Skin:     General: Skin is warm and dry. Neurological:      Mental Status: She is alert and oriented to person, place, and time. GCS: GCS eye subscore is 4. GCS verbal subscore is 5. GCS motor subscore is 6. Comments: Moving all extremities   Psychiatric:         Thought Content: Thought content is not paranoid or delusional. Thought content includes suicidal ideation. Thought content does not include homicidal ideation. Thought content includes suicidal plan.        DIAGNOSTIC RESULTS     EKG: All EKG's areinterpreted by the Emergency Department Physician who either signs or Co-signs this chart in the absence of a cardiologist.      114 sinus tachycardia  no obvious ST changes nondiagnostic EKG    RADIOLOGY:  Non-plain film images such as CT, Ultrasound and MRI are read by the radiologist. Plain radiographic images are visualized and preliminarily interpreted bythe emergency physician with the below findings:    XR CHEST PORTABLE    (Results Pending)           LABS:  Labs Reviewed   CBC WITH AUTO DIFFERENTIAL - Abnormal; Notable for the following components:       Result Value    RBC 1.29 (*)     Hemoglobin 3.9 (*)     Hematocrit 14.1 (*)     MCV 109.3 (*)     MCHC 27.7 (*)     Neutrophils % 82.7 (*)     Lymphocytes % 13.4 (*)     Lymphocytes Absolute 0.9 (*)     Macrocytes 2+ (*)     Hypochromia 2+ (*)     All other components within normal limits    Narrative:     CALL  Huynh  [employer] [facility]. ,  Hematology results called to and read back by [facility] ED,  07/20/2022 04:50, by [facility]   COMPREHENSIVE METABOLIC PANEL - Abnormal; Notable for the following components:    Sodium 130 (*)     Potassium 3.4 (*)     Chloride 90 (*)     CO2 17 (*)     Anion Gap 23 (*)     Glucose 164 (*)     CREATININE 1.1 (*)     GFR Non-African American 51 (*)     Calcium 7.7 (*)     Total Protein 6.1 (*)     Albumin 2.8 (*)     Alkaline Phosphatase 221 (*)     ALT 44 (*)     AST 94 (*)     All other components within normal limits   PROTIME-INR - Abnormal; Notable for the following components:    Protime 15.8 (*)     INR 1.26 (*)     All other components within normal limits   URINALYSIS WITH REFLEX TO CULTURE - Abnormal; Notable for the following components:    Bilirubin Urine MODERATE (*)     Protein, UA TRACE (*)     Urobilinogen, Urine 4.0 (*)     All other components within normal limits   VENOUS BLD GAS - Abnormal; Notable for the following components:    pCO2, Messi 29.0 (*)     HCO3, Venous 19 (*)     All other components within normal limits   HEMOGLOBIN AND HEMATOCRIT - Abnormal; Notable for the following components:    Hemoglobin 3.8 (*)     Hematocrit 13.0 (*)     All other components within normal limits    Narrative:     CALL  [facility]yn[facility]  [employer] tel. ,  Hematology results called to and read back by [address] [place] [address] ED, 07/20/2022  05:00, by [facility]   VITAMIN B12 & FOLATE - Abnormal; Notable for the following components:    Vitamin B-12 >2000 (*)     All other components within normal limits   IRON AND TIBC - Abnormal; Notable for the following components:    Iron 6 (*)     TIBC 219 (*)     Iron Saturation 3 (*)     All other components within normal limits   COVID-19, RAPID ACETAMINOPHEN LEVEL   APTT   DRUG SCRN, BUPRENORPHINE   ETHANOL   SALICYLATE LEVEL   FERRITIN   BLOOD OCCULT STOOL DIAGNOSTIC   PREPARE RBC (CROSSMATCH)   TYPE AND SCREEN CAPTURE THREE SCREEN CELL       All other labs were within normal range or not returned as of this dictation. EMERGENCY DEPARTMENT COURSE and DIFFERENTIAL DIAGNOSIS/MDM:   Vitals:    Vitals:    07/20/22 0434 07/20/22 0438   BP: (!) 92/56    Pulse: (!) 120    Resp: 23    Temp: 98.1 °F (36.7 °C)    SpO2:  100%       MDM     Amount and/or Complexity of Data Reviewed  Clinical lab tests: ordered and reviewed  Tests in the radiology section of CPT®: ordered and reviewed  Discuss the patient with other providers: yes  Independent visualization of images, tracings, or specimens: yes    Critical Care  Total time providing critical care: 30-74 minutes      Intentional OD due to her bed bugs, risperdal unknown amt possible 2000 yesterday, no ekg changes, aox3 gcs 15 currently    Hx of cpr by EMS for 1 min no meds-suspect given her volume status likely had syncopized and likely not arrested    Macrocytic Anemia,however iron deficient on labs as well, has had quite precipitous drop, hemoccult pending, transfusing prbc x2 for now to get started, protonix    Chronic etoh abuse etoh 260    Bed bug infestation-worst I have seen     Have d/w Dr. Kae Sandifer for admission    CRITICAL CARE TIME   Total Critical Care time was 35 minutes, excluding separately reportable procedures. There was a high probability of clinically significant/life threatening deterioration in the patient's condition which required my urgent intervention. CONSULTS:  IP CONSULT TO SOCIAL WORK  IP CONSULT TO PSYCHIATRY    PROCEDURES:  Unless otherwise noted below, none     Procedures    FINAL IMPRESSION      1. Intentional drug overdose, initial encounter ([facility]    2. Symptomatic anemia    3. Infestation by bed bug    4. Acute alcoholic intoxication without complication ([facility] [facility]    5. Syncope and collapse          DISPOSITION/PLAN   DISPOSITION Admitted 07/20/2022 05:35:38 AM      PATIENT REFERRED TO:  No follow-up provider specified.     DISCHARGE MEDICATIONS:  New Prescriptions    No medications on file          (Please note that portions of this note were completed with a voice recognition program.  Efforts were made to edit thedictations but occasionally words are mis-transcribed.)    Gennaro Najjar, MD (electronically signed)  Attending Emergency Physician        Mar Londono MD  07/20/22 7359

## 2022-07-20 NOTE — PROGRESS NOTES
Kaylan Jackson, [employer] employee, call if need anything     [phone]    Electronically signed by Mckinley García RN on 7/20/2022 at 4:25 PM

## 2022-07-20 NOTE — H&P
[facility] History and Physical    Patient Information:  Patient: Caesar Ziegler  MRN: [id number]   Acct: [de-identified]  YOB: 1965  Admit Date: 7/20/2022      Date of Service: 7/20/2022   Primary Care Physician: Darya Awad. [facility], DO, DO  Advance Directive: Full Code        SUBJECTIVE:    Chief Complaint   Patient presents with    Drug Overdose     Risperidone OD took unknown amount. When EMS arrived she was talking, lost pulse and started CPR per EMS less than one minute. Pt talking and alert upon arrival        EP Sign Out:  Risperdal Overdose  Severe blood loss anemia with Hb 3.9  Worst bed bug infestation seen so far    HPI:  Mrs. Caesar Ziegler is a 62year old prematurely geriatric  [place] lady. , She was brought in to the [facility] ED by EMS. She was seen by EMS in her home where she was laying on the floor swarming by bed bugs. They  her up to her feet and she promptly passed out. EMS provided about 1 minute CPR and placed an iGel airway. She then had come to prior to any medications having been administered and was brought in to [facility] ED. She was making noises and moving her arms at arrival so her EP pulled out the iGel and she began to speak. She had a ED screening which revealed an alcohol level of 260. She stated to EMS that she had attempted to kill herself with Risperdal, she has since confirmed this to both her EP and myself. When asked why she states that she just wants to kill herself as she can not stand the bed bugs anymore. Review of Systems:   Review of Systems   Constitutional:  Negative for chills, diaphoresis, fatigue and fever. Respiratory:  Negative for shortness of breath. Cardiovascular:  Negative for chest pain. Gastrointestinal:  Negative for abdominal pain, anal bleeding, blood in stool and nausea. Genitourinary:  Negative for hematuria. Neurological:  Positive for syncope and light-headedness.  Negative for seizures and weakness. Psychiatric/Behavioral:  Positive for suicidal ideas. Past Medical History:   Diagnosis Date    Abdominal pain     Alcohol abuse     Arthritis     Constipation     Decreased appetite     Elevated liver enzymes     Emesis - persistent     Fatty liver     Hypertension     Iron deficiency anemia     Jaundice     Melanoma ([address]     UTI (urinary tract infection)    As per chart review    Past Psychiatric History:  As per above    Past Surgical History:   Procedure Laterality Date    CHOLECYSTECTOMY      COLONOSCOPY  2014    ENDOSCOPY, COLON, DIAGNOSTIC  2014    HERNIA REPAIR      x2    HERNIA REPAIR      lt inguinal area and rt side    SKIN BIOPSY  2015    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   As per chart review     Social History       Tobacco History       Smoking Status  Every Day Smoking Frequency  0.50 packs/day Smoking Tobacco Type  Cigarettes      Smokeless Tobacco Use  Never              Alcohol History       Alcohol Use Status  Not Currently Comment  history of abuse, last drink was early December, 2018              Drug Use       Drug Use Status  Not Currently Types  Marijuana (Brice Hina) Comment  last use was summer, 2017             Family History   Problem Relation Age of Onset    Depression Mother     Suicide Mother     Bipolar Disorder Brother     Other Brother         MRSA    Alzheimer's Disease Maternal Grandmother     Cervical Cancer Maternal Grandmother    As per chart review    Allergies   Allergen Reactions    Seroquel [Quetiapine Fumarate]      Pt reports caused her B/P to drop, faint and increase her cholesterol. As per chart review    Home Medications:  Prior to Admission medications    Medication Sig Start Date End Date Taking?  Authorizing Provider   gabapentin (NEURONTIN) 300 MG capsule TAKE 2 CAPSULES BY MOUTH EVERY DAY AT NIGHT 7/7/22 8/6/22  CAPO Foster - CNP   risperiDONE (RISPERDAL) 1 MG tablet TAKE 1 TABLET BY MOUTH EVERY DAY AT NIGHT 7/6/22   Oliver CAPO Fernandez CNP   eszopiclone (ESZOPICLONE) 3 MG TABS Take 1 tablet by mouth nightly for 30 days. Indications: Trouble Sleeping 6/27/22 7/27/22  CAPO Gonzalez CNP   traZODone (DESYREL) 100 MG tablet Take 1 tablet by mouth nightly 5/23/22   CAPO Gonzalez CNP   lamoTRIgine (LAMICTAL) 200 MG tablet Take 1 tablet by mouth daily TAKE 1 TABLET BY MOUTH EVERY DAY 5/23/22   CAPO Gonzalez CNP   Icosapent Ethyl (VASCEPA) 1 g CAPS capsule     Historical Provider, MD   cloNIDine (CATAPRES) 0.1 MG tablet TAKE 1 TABLET BY MOUTH EVERY DAY AT NIGHT 4/25/22   CAPO Gonzalez CNP   pantoprazole (PROTONIX) 40 MG tablet Take 40 mg by mouth daily    Historical Provider, MD   fluticasone (FLONASE) 50 MCG/ACT nasal spray SPRAY 1 SPRAY INTO EACH NOSTRIL TWICE A DAY 7/22/21   Historical Provider, MD   loratadine (CLARITIN) 10 MG tablet TAKE ONE TABLET BY MOUTH DAILY 7/23/21   Historical Provider, MD   rosuvastatin (CRESTOR) 5 MG tablet TAKE 1 TABLET BY MOUTH EVERY DAY IN THE EVENING 8/2/21   Historical Provider, MD   oxybutynin (DITROPAN) 5 MG tablet TAKE 1 TABLET BY MOUTH EVERY DAY AT NIGHT 9/7/21   CAPO Crump CNP   spironolactone (ALDACTONE) 25 MG tablet  3/5/20   Historical Provider, MD   metoprolol succinate (TOPROL XL) 50 MG extended release tablet Take 50 mg by mouth daily 4/25/19 per direction of Dr. Del Pappas pt to take 1/2 tab daily. Historical Provider, MD   Magnesium Oxide 250 MG TABS Take by mouth daily    Historical Provider, MD         OBJECTIVE:    Vitals:    07/20/22 0438   BP:    Pulse:    Resp:    Temp:    SpO2: 100%   Breathing room air    BP (!) 92/56   Pulse (!) 120   Temp 98.1 °F (36.7 °C)   Resp 23   SpO2 100%     No intake or output data in the 24 hours ending 07/20/22 0552    Physical Exam  Vitals reviewed. Constitutional:       General: She is not in acute distress. Appearance: Normal appearance. She is normal weight.  She is not ill-appearing or toxic-appearing. HENT:      Head: Normocephalic and atraumatic. Nose: No congestion or rhinorrhea. Eyes:      General:         Right eye: No discharge. Left eye: No discharge. Neck:      Comments: Trachea appears midline  Cardiovascular:      Rate and Rhythm: Normal rate and regular rhythm. Heart sounds: No murmur heard. No friction rub. No gallop. Pulmonary:      Effort: Pulmonary effort is normal. No respiratory distress. Breath sounds: No stridor. No wheezing, rhonchi or rales. Abdominal:      General: Bowel sounds are normal.      Tenderness: There is no abdominal tenderness. There is no guarding or rebound. Musculoskeletal:      Cervical back: Neck supple. Comments: Thin, decreased muscle and fat stores   Skin:     General: Skin is warm. Comments: Nondiqphoretic, has tiny scattered scabs covering her entire body surfaces seen (shins, thigh, abdomen, breast, neck, upper arm, fore arm, etc.)   Neurological:      Mental Status: She is alert. Motor: No tremor or seizure activity. Psychiatric:         Mood and Affect: Affect is inappropriate. Speech: Speech normal.         Behavior: Behavior is cooperative. Judgment: Judgment is inappropriate. LABORATORY DATA:    CBC:   Recent Labs     07/20/22 0435 07/20/22 0455   WBC 6.8  --    HGB 3.9* 3.8*   HCT 14.1* 13.0*     --      BMP:   Recent Labs     07/20/22 0435   *   K 3.4*   CL 90*   CO2 17*   BUN 11   CREATININE 1.1*   CALCIUM 7.7*     Hepatic Profile:   Recent Labs     07/20/22 0435   AST 94*   ALT 44*   BILITOT 0.9   ALKPHOS 221*     Coag Panel:   Recent Labs     07/20/22 0435   INR 1.26*   PROTIME 15.8*   APTT 29.6     Cardiac Enzymes: No results for input(s): CKTOTAL, TROPONINI in the last 72 hours.     Urinalysis:   Lab Results   Component Value Date/Time    NITRU Negative 07/20/2022 04:45 AM    WBCUA 2-4 07/05/2022 08:08 PM    BACTERIA None Seen 07/05/2022 08:08 PM RBCUA 2-4 07/05/2022 08:08 PM    RBCUA 1 11/10/2015 07:27 PM    BLOODU Negative 07/20/2022 04:45 AM    SPECGRAV 1.025 07/20/2022 04:45 AM    GLUCOSEU NEGATIVE 07/20/2022 04:45 AM     EKG:   Sinus Rhythmn with normal QT and QRS and NOT ANY ST changes    IMAGING:  No results found. ASESSMENTS & PLANS:      *  Risperdal Oversose as Suicide Attempt   Infestation by bed bug    Alcohol abuse with physiological dependence (HCC)    Blood loss anemia           Wants to kill self because of her bed bug infestation  Admit to medical orozco with tele  Sitter 1:1  Safety tray, NO utensils  Bed alarm  Fall precautions  Psych consulted   Remove belongings from room  Ativan Protocol PRN  Multivitamin and Thiamine and Folic Acid PO  Will not plan to do scheduled ativan 3-day taper at this time, if withdrawal signs developing would reconsider  Getting two units PRBC and then for recheck  Ferritin and Iron Studies with TIBC have been sent  CBC in AM  BMP in AM  Type & Screen  Defer on psych meds to Psychiatry, other than Lamitrogine & Trazodone for now - they may d/c if they think best    Chronic Medical Problems:    lamoTRIgine  200 mg Oral Daily    metoprolol succinate  25 mg Oral Daily    pantoprazole  40 mg Oral Daily    oxybutynin  5 mg Oral Nightly    traZODone  100 mg Oral Nightly    fluticasone  1 spray Each Nostril BID    gabapentin  600 mg Oral Nightly    rosuvastatin  5 mg Oral Nightly    spironolactone  25 mg Oral Daily     Supoportive and Prophylactic Txx:  DVT Prophylaxis: SCDs  GI (PUD) Ppx: not indicated as such but covred bvy home PPI  PT consult for evalutation and Txx as indicated: defer  ADULT DIET; Regular; 4 carb choices (60 gm/meal); Low Fat/Low Chol/High Fiber/2 gm Na; Low Sodium (2 gm);  Safety Tray; Safety Tray (Disposables No Utensils)  sodium chloride, sodium chloride flush, sodium chloride, ondansetron **OR** ondansetron, acetaminophen **OR** acetaminophen, potassium chloride **OR** potassium alternative oral replacement **OR** potassium chloride, magnesium sulfate, LORazepam **OR** LORazepam **OR** LORazepam **OR** LORazepam **OR** LORazepam **OR** LORazepam **OR** LORazepam **OR** LORazepam, LORazepam    Care time of >33 minutes  Pt seen/examined and admitted to inpatient status. Inpatient status is used for patients with an expected LOS extending past two midnights due to medical therapy and or critical care needs, otherwise patients are placed to OBServation status. Signed:  Electronically signed by Nahomi Tolentino MD on 7/20/22 at 06:16 AM CDT.

## 2022-07-20 NOTE — PROGRESS NOTES
Patient arrived to 5, Patient awake and alert resting comfortably in bed    Electronically signed by Reynaldo Hough RN on 7/20/2022 at 9:45 AM

## 2022-07-20 NOTE — ED NOTES
Pt unsure of how many risperidone was taken. Pt stated she took them around 8pm trying to kill herself. Pt stated she was tired of the bugs. Pt called EMS herself and went unresponsive on scene. EMS performed CPR for approx one minute. Pt arrived to ED awake, and speaking.  Pt alert but confused on the events that occurred lambert Watson RN  07/20/22 3952

## 2022-07-20 NOTE — ED NOTES
Poison control notified. Recommended rapid transfusion protocol, Ct of the head and abdomen, antibiotics, and supportive care. Monitor for seizures and give benzos if needed. Monitor mental status and EKG for changes. Physician notified.      Shweta Man RN  07/20/22 2566 [place], RN  07/20/22 7265

## 2022-07-20 NOTE — ED NOTES
Pt had bed bug nest in hair. Pt requested we shave her head. Pt head shaved and all linens changed. Pt given bed bath. pts temp axillary 94.2. Bearhugger applied per  verbal order.       Ashly Spann RN  07/20/22 0681

## 2022-07-20 NOTE — ED NOTES
Blood reached vein at 0536. Pt observed for initial 15 min. V/S every 5minutes. No transfusion reaction noted. Pt awake, alert, and oriented.       Kathy Ramires RN  07/20/22 9524

## 2022-07-20 NOTE — CARE COORDINATION
SW placed call to APS Hotline to report concerns of pts living environment. Informed to call back later today and provide intake ID ([id number]) to see if this met criteria for investigation. ** Report did meet criteria for investigation.

## 2022-07-21 LAB
ALBUMIN SERPL-MCNC: 2.3 G/DL (ref 3.5–5.2)
ALP BLD-CCNC: 191 U/L (ref 35–104)
ALT SERPL-CCNC: 34 U/L (ref 5–33)
ANION GAP SERPL CALCULATED.3IONS-SCNC: 12 MMOL/L (ref 7–19)
AST SERPL-CCNC: 73 U/L (ref 5–32)
BASOPHILS ABSOLUTE: 0 K/UL (ref 0–0.2)
BASOPHILS RELATIVE PERCENT: 0.4 % (ref 0–1)
BILIRUB SERPL-MCNC: 1 MG/DL (ref 0.2–1.2)
BUN BLDV-MCNC: 7 MG/DL (ref 6–20)
CALCIUM SERPL-MCNC: 6.5 MG/DL (ref 8.6–10)
CHLORIDE BLD-SCNC: 97 MMOL/L (ref 98–111)
CO2: 16 MMOL/L (ref 22–29)
CREAT SERPL-MCNC: 0.7 MG/DL (ref 0.5–0.9)
EOSINOPHILS ABSOLUTE: 0 K/UL (ref 0–0.6)
EOSINOPHILS RELATIVE PERCENT: 0.4 % (ref 0–5)
ETHANOL: <10 MG/DL (ref 0–0.08)
GFR AFRICAN AMERICAN: >59
GFR NON-AFRICAN AMERICAN: >60
GLUCOSE BLD-MCNC: 83 MG/DL (ref 74–109)
HCT VFR BLD CALC: 21.9 % (ref 37–47)
HEMOGLOBIN: 7.2 G/DL (ref 12–16)
IMMATURE GRANULOCYTES #: 0.1 K/UL
LYMPHOCYTES ABSOLUTE: 1 K/UL (ref 1.1–4.5)
LYMPHOCYTES RELATIVE PERCENT: 9.4 % (ref 20–40)
MCH RBC QN AUTO: 30.5 PG (ref 27–31)
MCHC RBC AUTO-ENTMCNC: 32.9 G/DL (ref 33–37)
MCV RBC AUTO: 92.8 FL (ref 81–99)
MONOCYTES ABSOLUTE: 0.2 K/UL (ref 0–0.9)
MONOCYTES RELATIVE PERCENT: 2.2 % (ref 0–10)
NEUTROPHILS ABSOLUTE: 8.8 K/UL (ref 1.5–7.5)
NEUTROPHILS RELATIVE PERCENT: 86.7 % (ref 50–65)
PDW BLD-RTO: 15.3 % (ref 11.5–14.5)
PLATELET # BLD: 180 K/UL (ref 130–400)
PMV BLD AUTO: 9.6 FL (ref 9.4–12.3)
POTASSIUM REFLEX MAGNESIUM: 4 MMOL/L (ref 3.5–5)
RBC # BLD: 2.36 M/UL (ref 4.2–5.4)
SODIUM BLD-SCNC: 125 MMOL/L (ref 136–145)
TOTAL PROTEIN: 4.7 G/DL (ref 6.6–8.7)
WBC # BLD: 10.1 K/UL (ref 4.8–10.8)

## 2022-07-21 PROCEDURE — 1210000000 HC MED SURG R&B

## 2022-07-21 PROCEDURE — 2580000003 HC RX 258: Performed by: HOSPITALIST

## 2022-07-21 PROCEDURE — 6370000000 HC RX 637 (ALT 250 FOR IP): Performed by: HOSPITALIST

## 2022-07-21 PROCEDURE — 6360000002 HC RX W HCPCS: Performed by: HOSPITALIST

## 2022-07-21 PROCEDURE — 80053 COMPREHEN METABOLIC PANEL: CPT

## 2022-07-21 PROCEDURE — 85025 COMPLETE CBC W/AUTO DIFF WBC: CPT

## 2022-07-21 PROCEDURE — 2500000003 HC RX 250 WO HCPCS: Performed by: HOSPITALIST

## 2022-07-21 PROCEDURE — 36415 COLL VENOUS BLD VENIPUNCTURE: CPT

## 2022-07-21 PROCEDURE — 99231 SBSQ HOSP IP/OBS SF/LOW 25: CPT | Performed by: PSYCHIATRY & NEUROLOGY

## 2022-07-21 PROCEDURE — 82077 ASSAY SPEC XCP UR&BREATH IA: CPT

## 2022-07-21 RX ORDER — CALCIUM GLUCONATE 20 MG/ML
1000 INJECTION, SOLUTION INTRAVENOUS ONCE
Status: COMPLETED | OUTPATIENT
Start: 2022-07-21 | End: 2022-07-21

## 2022-07-21 RX ORDER — SODIUM BICARBONATE 650 MG/1
650 TABLET ORAL 4 TIMES DAILY
Status: DISCONTINUED | OUTPATIENT
Start: 2022-07-21 | End: 2022-08-04 | Stop reason: HOSPADM

## 2022-07-21 RX ADMIN — PANTOPRAZOLE SODIUM 40 MG: 40 TABLET, DELAYED RELEASE ORAL at 10:16

## 2022-07-21 RX ADMIN — IRON SUCROSE 500 MG: 20 INJECTION, SOLUTION INTRAVENOUS at 10:26

## 2022-07-21 RX ADMIN — SODIUM CHLORIDE, PRESERVATIVE FREE 10 ML: 5 INJECTION INTRAVENOUS at 10:17

## 2022-07-21 RX ADMIN — THIAMINE HCL TAB 100 MG 100 MG: 100 TAB at 10:16

## 2022-07-21 RX ADMIN — LAMOTRIGINE 200 MG: 100 TABLET ORAL at 10:16

## 2022-07-21 RX ADMIN — SODIUM CHLORIDE, PRESERVATIVE FREE 10 ML: 5 INJECTION INTRAVENOUS at 20:50

## 2022-07-21 RX ADMIN — FLUTICASONE PROPIONATE 1 SPRAY: 50 SPRAY, METERED NASAL at 10:41

## 2022-07-21 RX ADMIN — SODIUM CHLORIDE, POTASSIUM CHLORIDE, SODIUM LACTATE AND CALCIUM CHLORIDE: 600; 310; 30; 20 INJECTION, SOLUTION INTRAVENOUS at 10:17

## 2022-07-21 RX ADMIN — ACETAMINOPHEN 325MG 650 MG: 325 TABLET ORAL at 00:12

## 2022-07-21 RX ADMIN — FLUTICASONE PROPIONATE 1 SPRAY: 50 SPRAY, METERED NASAL at 22:32

## 2022-07-21 RX ADMIN — SODIUM BICARBONATE 650 MG: 650 TABLET ORAL at 12:05

## 2022-07-21 RX ADMIN — SODIUM BICARBONATE 650 MG: 650 TABLET ORAL at 17:50

## 2022-07-21 RX ADMIN — THERA TABS 1 TABLET: TAB at 10:16

## 2022-07-21 RX ADMIN — CALCIUM GLUCONATE 1000 MG: 20 INJECTION, SOLUTION INTRAVENOUS at 03:36

## 2022-07-21 RX ADMIN — TRAZODONE HYDROCHLORIDE 100 MG: 50 TABLET ORAL at 20:50

## 2022-07-21 RX ADMIN — OXYBUTYNIN CHLORIDE 5 MG: 5 TABLET ORAL at 20:50

## 2022-07-21 RX ADMIN — ROSUVASTATIN CALCIUM 5 MG: 10 TABLET, FILM COATED ORAL at 20:50

## 2022-07-21 RX ADMIN — ACETAMINOPHEN 325MG 650 MG: 325 TABLET ORAL at 17:50

## 2022-07-21 RX ADMIN — SPIRONOLACTONE 25 MG: 25 TABLET ORAL at 10:16

## 2022-07-21 RX ADMIN — SODIUM BICARBONATE 650 MG: 650 TABLET ORAL at 20:50

## 2022-07-21 RX ADMIN — FOLIC ACID 1 MG: 1 TABLET ORAL at 10:16

## 2022-07-21 RX ADMIN — ACETAMINOPHEN 325MG 650 MG: 325 TABLET ORAL at 12:05

## 2022-07-21 RX ADMIN — GABAPENTIN 600 MG: 300 CAPSULE ORAL at 20:50

## 2022-07-21 ASSESSMENT — PAIN SCALES - GENERAL
PAINLEVEL_OUTOF10: 8
PAINLEVEL_OUTOF10: 9
PAINLEVEL_OUTOF10: 0

## 2022-07-21 ASSESSMENT — PAIN DESCRIPTION - LOCATION: LOCATION: HIP;KNEE

## 2022-07-21 NOTE — CONSULTS
[facility]  Psychiatry Consult    Reason for Consult: overdose  63 yo white female with h/o Bipolar disorder and alcohol abuse, who overdosed on Risperdal. Came with alcohol intoxication. . Negative UDS. Came with severe blood loss anemia. Hyponatremic. Found to have bed bugs. Hair was shaved as she had bed bug nests in it. Reportedly lives in a trailer which has been infested. Followed at our clinic and last seen earlier this month. Patient seen resting in bed this am. Calm and cooperative. Denies SI. Admits to intentional OD while she was intoxicated. Depressed in the setting of bed bug infestation and financial problems. \"I live alone and it has been hard. \" She has been abusing alcohol. H/o childhood trauma. She watched her mom commit suicide. Reports nightmares and flashbacks. She is considering rehab treatment. Psychiatric History:    Diagnoses: Bipolar  Suicide attempts/gestures: OD 2015  Prior hospitalizations:  2015  Medication trials: Risperdal, Trazodone, Lamictal, Lunesta    Mental health contact: Lost to follow-up   Head trauma: Denies    Substance Use History:  Drinking pint of vodka daily. Denies illicits now. H/o cannabis and meth use. Smokes cigarettes daily. Rehab tx in 2015. Allergies:  Seroquel [quetiapine fumarate]    Medical History:  Past Medical History:   Diagnosis Date    Abdominal pain     Alcohol abuse     Arthritis     Constipation     Decreased appetite     Elevated liver enzymes     Emesis - persistent     Fatty liver     Hypertension     Iron deficiency anemia     Jaundice     Melanoma ([address]     UTI (urinary tract infection)        Family History:  Family History   Problem Relation Age of Onset    Depression Mother     Suicide Mother     Bipolar Disorder Brother     Other Brother         MRSA    Alzheimer's Disease Maternal Grandmother     Cervical Cancer Maternal Grandmother      Brother Bipolar. Mom committed suicide. Aunt attempted.

## 2022-07-21 NOTE — PROGRESS NOTES
Progress Note  Date:2022       Room:0736/736-01  Patient Name:Dorene Rojas     YOB: 1965     Age:57 y.o. Subjective    Subjective: 40-year-old female with a history of alcohol use disorder, hypertension, iron deficiency anemia, melanoma, who presented to the hospital 2022 with concerns of drug overdose. Patient took unknown amount of risperidone prior to calling EMS. On scene was noted to have a syncopal episode as well as infestation with bedbugs. Patient was brought to the emergency room in which work-up showed alcohol level of 260, voice attempts to kill herself, as well as hemoglobin of 3.8. Patient was transfused 2 units of packed red blood cell, with improvement in blood counts. Also noted to be severely deficient in iron requiring iron transfusion. Patient was also hydrated with fluids, treated for bedbug and admitted in the hospital for further evaluation and psychiatry evaluation. Patient has been seen by psychiatrist, who stated patient currently not suicidal and will require rehab treatment for alcohol. .    Seen in house this morning, says she is feeling a lot better than when she initially came in, denied any chest pain or short of breath, nausea vomit abdominal pain. Patient's head was shaved in the emergency room due to concerns of multiple bedbugs. Currently no evidence of bedbugs seen in room. Review of Systems: 12 point system reviewed and negative except as stated above. Objective         Vitals Last 24 Hours:  TEMPERATURE:  Temp  Av.9 °F (37.2 °C)  Min: 98 °F (36.7 °C)  Max: 100.9 °F (38.3 °C)  RESPIRATIONS RANGE: Resp  Av.3  Min: 17  Max: 34  PULSE OXIMETRY RANGE: SpO2  Av %  Min: 90 %  Max: 99 %  PULSE RANGE: Pulse  Av.2  Min: 70  Max: 114  BLOOD PRESSURE RANGE: Systolic (74RWT), YHX:331 , Min:86 , BSC:592   ; Diastolic (34EOJ), EMS:52, Min:41, Max:82    I/O (24Hr):     Intake/Output Summary (Last 24 hours) at 2022 [address] filed at 7/21/2022 1228  Gross per 24 hour   Intake 1282.97 ml   Output 1300 ml   Net -17.03 ml         Physical Examination:  General: Well-developed, no acute distress lying comfortably in bed. HEENT: Atraumatic normocephalic, range of motion normal, no JVD, no tracheal deviation noted. Cardiac: Normal S1-S2   Respiratory: clear To auscultation bilaterally, no rhonchi or rales, no wheezing. Abdomen: Soft, positive bowel sounds in all quadrants, no distention, nontender to palpation  Extremities: no tenderness, no edema, moves all extremities  Psych: Affect and mood depressed, behavioral normal.        Labs/Imaging/Diagnostics    Labs:  CBC:  Recent Labs     07/20/22  0435 07/20/22  0455 07/20/22  1130 07/21/22  0136   WBC 6.8  --   --  10.1   RBC 1.29*  --   --  2.36*   HGB 3.9* 3.8* 7.9* 7.2*   HCT 14.1* 13.0* 23.9* 21.9*   .3*  --   --  92.8   RDW 13.9  --   --  15.3*     --   --  180     CHEMISTRIES:  Recent Labs     07/20/22 0435 07/21/22  0136   * 125*   K 3.4* 4.0   CL 90* 97*   CO2 17* 16*   BUN 11 7   CREATININE 1.1* 0.7   GLUCOSE 164* 83     PT/INR:  Recent Labs     07/20/22 0435   PROTIME 15.8*   INR 1.26*     APTT:  Recent Labs     07/20/22 0435   APTT 29.6     LIVER PROFILE:  Recent Labs     07/20/22 0435 07/21/22  0136   AST 94* 73*   ALT 44* 34*   BILITOT 0.9 1.0   ALKPHOS 221* 191*       Imaging Last 24 Hours:  XR CHEST PORTABLE    Result Date: 7/20/2022  NO PRIOR REPORT AVAILABLE [facility] Clinical data:Pain. Technique:Single view of the chest. Prior studies: No prior studies submitted. Findings: The lungs are grossly clear; noevidence of acute infiltrate or pleural effusion. Cardiac silhouette is within normal limits. No acute osseous abnormality is detected. 1. Noevidence of acute infiltrate or pleural effusion. Recommendation: Follow up as clinically indicated.  Electronically Signed by Alessandra Chacon MD at 20-Jul-2022 07:23:20 AM Assessment//Plan           Hospital Problems             Last Modified POA    * (Principal) Infestation by bed bug 7/20/2022 Yes    Alcohol abuse with physiological dependence ([address] 7/20/2022 Yes    Blood loss anemia 7/20/2022 Yes     Assessment & Plan      Intentional Drug Overdose  Severe Anemia ([facility])  Bed Bug Infestation  Hyponatremia  Metabolic Acidosis  Chronic LFT's  Anxiety and Depression  ETOH Abuse      Plan:  S/p 3 units of PRBC  IV iron infusion 500mg x2 doses  Monitor for ETOH withdrawal  Transfuse for Hb < 7  Psychiatry following  SW consult  Sodium Bicarb tabs  Continue monitoring on telemetry  1:1 sitter        Electronically signed by   Lizeth Tomas MD   Internal Medicine Hospitalist  On 7/21/2022  At 4:22 PM    EMR Dragon/Transcription disclaimer:   Much of this encounter note is an electronic transcription/translation of spoken language to printed text.  The electronic translation of spoken language may permit erroneous, or at times, nonsensical words or phrases to be inadvertently transcribed; although attempts have made to review the note for such errors, some may still exist.

## 2022-07-21 NOTE — ED NOTES
When scanning blood transfusion bag this nurse went back to review pts chart. This nurse saw that the scan was never accepted into MAR. Blood transfusion was emergent situation. Blood product [id number], A positive, expiration 8/15/2022, product, pt MRN [id number], and ID band/name/bithdate verified with this nurse and Maria Teresa Bassett RN. Verbal order was received by Dr. Angie Palmer to run blood product at 999ml/hr in relation to emergency transfusion. This nurse and Lovetta Kawasaki, RN stay with pt for initial 15 minutes observing for transfusion reaction. V/S charted q5 minutes. Charge nurse Kalen Green RN notified of scanner malfunction.        Corin Ball RN  07/20/22 2060

## 2022-07-22 LAB
ALBUMIN SERPL-MCNC: 2 G/DL (ref 3.5–5.2)
ALP BLD-CCNC: 175 U/L (ref 35–104)
ALT SERPL-CCNC: 30 U/L (ref 5–33)
ANION GAP SERPL CALCULATED.3IONS-SCNC: 8 MMOL/L (ref 7–19)
AST SERPL-CCNC: 58 U/L (ref 5–32)
BASOPHILS ABSOLUTE: 0 K/UL (ref 0–0.2)
BASOPHILS RELATIVE PERCENT: 0.4 % (ref 0–1)
BILIRUB SERPL-MCNC: 0.8 MG/DL (ref 0.2–1.2)
BUN BLDV-MCNC: 4 MG/DL (ref 6–20)
CALCIUM SERPL-MCNC: 6.7 MG/DL (ref 8.6–10)
CHLORIDE BLD-SCNC: 99 MMOL/L (ref 98–111)
CO2: 19 MMOL/L (ref 22–29)
CREAT SERPL-MCNC: 0.7 MG/DL (ref 0.5–0.9)
EOSINOPHILS ABSOLUTE: 0.1 K/UL (ref 0–0.6)
EOSINOPHILS RELATIVE PERCENT: 1.1 % (ref 0–5)
GFR AFRICAN AMERICAN: >59
GFR NON-AFRICAN AMERICAN: >60
GLUCOSE BLD-MCNC: 93 MG/DL (ref 74–109)
HCT VFR BLD CALC: 24.8 % (ref 37–47)
HEMOGLOBIN: 8.1 G/DL (ref 12–16)
IMMATURE GRANULOCYTES #: 0.1 K/UL
LYMPHOCYTES ABSOLUTE: 1.3 K/UL (ref 1.1–4.5)
LYMPHOCYTES RELATIVE PERCENT: 12.5 % (ref 20–40)
MAGNESIUM: 1.2 MG/DL (ref 1.6–2.6)
MCH RBC QN AUTO: 30.9 PG (ref 27–31)
MCHC RBC AUTO-ENTMCNC: 32.7 G/DL (ref 33–37)
MCV RBC AUTO: 94.7 FL (ref 81–99)
MONOCYTES ABSOLUTE: 0.3 K/UL (ref 0–0.9)
MONOCYTES RELATIVE PERCENT: 2.7 % (ref 0–10)
NEUTROPHILS ABSOLUTE: 8.3 K/UL (ref 1.5–7.5)
NEUTROPHILS RELATIVE PERCENT: 82.2 % (ref 50–65)
PDW BLD-RTO: 15.5 % (ref 11.5–14.5)
PLATELET # BLD: 168 K/UL (ref 130–400)
PMV BLD AUTO: 9.5 FL (ref 9.4–12.3)
POTASSIUM REFLEX MAGNESIUM: 3.5 MMOL/L (ref 3.5–5)
RBC # BLD: 2.62 M/UL (ref 4.2–5.4)
SODIUM BLD-SCNC: 126 MMOL/L (ref 136–145)
TOTAL PROTEIN: 4.3 G/DL (ref 6.6–8.7)
WBC # BLD: 10 K/UL (ref 4.8–10.8)

## 2022-07-22 PROCEDURE — 2580000003 HC RX 258: Performed by: HOSPITALIST

## 2022-07-22 PROCEDURE — 85025 COMPLETE CBC W/AUTO DIFF WBC: CPT

## 2022-07-22 PROCEDURE — 80053 COMPREHEN METABOLIC PANEL: CPT

## 2022-07-22 PROCEDURE — 6370000000 HC RX 637 (ALT 250 FOR IP): Performed by: HOSPITALIST

## 2022-07-22 PROCEDURE — 6360000002 HC RX W HCPCS: Performed by: HOSPITALIST

## 2022-07-22 PROCEDURE — 2500000003 HC RX 250 WO HCPCS: Performed by: HOSPITALIST

## 2022-07-22 PROCEDURE — 36415 COLL VENOUS BLD VENIPUNCTURE: CPT

## 2022-07-22 PROCEDURE — 83735 ASSAY OF MAGNESIUM: CPT

## 2022-07-22 PROCEDURE — 99231 SBSQ HOSP IP/OBS SF/LOW 25: CPT | Performed by: PSYCHIATRY & NEUROLOGY

## 2022-07-22 PROCEDURE — 6370000000 HC RX 637 (ALT 250 FOR IP): Performed by: PSYCHIATRY & NEUROLOGY

## 2022-07-22 PROCEDURE — 1210000000 HC MED SURG R&B

## 2022-07-22 RX ORDER — MAGNESIUM SULFATE IN WATER 40 MG/ML
4000 INJECTION, SOLUTION INTRAVENOUS ONCE
Status: COMPLETED | OUTPATIENT
Start: 2022-07-22 | End: 2022-07-22

## 2022-07-22 RX ORDER — CALCIUM GLUCONATE 20 MG/ML
1000 INJECTION, SOLUTION INTRAVENOUS ONCE
Status: COMPLETED | OUTPATIENT
Start: 2022-07-22 | End: 2022-07-22

## 2022-07-22 RX ORDER — MECOBALAMIN 5000 MCG
5 TABLET,DISINTEGRATING ORAL NIGHTLY
Status: DISCONTINUED | OUTPATIENT
Start: 2022-07-22 | End: 2022-08-04 | Stop reason: HOSPADM

## 2022-07-22 RX ORDER — CLONIDINE HYDROCHLORIDE 0.1 MG/1
TABLET ORAL
Qty: 90 TABLET | Refills: 0 | OUTPATIENT
Start: 2022-07-22

## 2022-07-22 RX ADMIN — THIAMINE HCL TAB 100 MG 100 MG: 100 TAB at 07:35

## 2022-07-22 RX ADMIN — LAMOTRIGINE 200 MG: 100 TABLET ORAL at 07:35

## 2022-07-22 RX ADMIN — SODIUM BICARBONATE 650 MG: 650 TABLET ORAL at 20:48

## 2022-07-22 RX ADMIN — SODIUM BICARBONATE 650 MG: 650 TABLET ORAL at 07:35

## 2022-07-22 RX ADMIN — METOPROLOL SUCCINATE 25 MG: 50 TABLET, EXTENDED RELEASE ORAL at 07:35

## 2022-07-22 RX ADMIN — MAGNESIUM SULFATE HEPTAHYDRATE 4000 MG: 40 INJECTION, SOLUTION INTRAVENOUS at 08:37

## 2022-07-22 RX ADMIN — FLUTICASONE PROPIONATE 1 SPRAY: 50 SPRAY, METERED NASAL at 07:36

## 2022-07-22 RX ADMIN — FOLIC ACID 1 MG: 1 TABLET ORAL at 07:35

## 2022-07-22 RX ADMIN — SPIRONOLACTONE 25 MG: 25 TABLET ORAL at 07:36

## 2022-07-22 RX ADMIN — SODIUM CHLORIDE, POTASSIUM CHLORIDE, SODIUM LACTATE AND CALCIUM CHLORIDE: 600; 310; 30; 20 INJECTION, SOLUTION INTRAVENOUS at 05:29

## 2022-07-22 RX ADMIN — SODIUM BICARBONATE 650 MG: 650 TABLET ORAL at 17:02

## 2022-07-22 RX ADMIN — Medication 5 MG: at 20:48

## 2022-07-22 RX ADMIN — FLUTICASONE PROPIONATE 1 SPRAY: 50 SPRAY, METERED NASAL at 20:49

## 2022-07-22 RX ADMIN — ROSUVASTATIN CALCIUM 5 MG: 10 TABLET, FILM COATED ORAL at 20:46

## 2022-07-22 RX ADMIN — CALCIUM GLUCONATE 1000 MG: 20 INJECTION, SOLUTION INTRAVENOUS at 07:35

## 2022-07-22 RX ADMIN — SODIUM BICARBONATE 650 MG: 650 TABLET ORAL at 14:36

## 2022-07-22 RX ADMIN — MAGNESIUM SULFATE HEPTAHYDRATE 2000 MG: 40 INJECTION, SOLUTION INTRAVENOUS at 05:31

## 2022-07-22 RX ADMIN — THERA TABS 1 TABLET: TAB at 07:35

## 2022-07-22 RX ADMIN — ACETAMINOPHEN 325MG 650 MG: 325 TABLET ORAL at 01:26

## 2022-07-22 RX ADMIN — IRON SUCROSE 500 MG: 20 INJECTION, SOLUTION INTRAVENOUS at 11:10

## 2022-07-22 RX ADMIN — POTASSIUM CHLORIDE 40 MEQ: 1500 TABLET, EXTENDED RELEASE ORAL at 05:31

## 2022-07-22 RX ADMIN — PANTOPRAZOLE SODIUM 40 MG: 40 TABLET, DELAYED RELEASE ORAL at 07:35

## 2022-07-22 RX ADMIN — GABAPENTIN 600 MG: 300 CAPSULE ORAL at 20:48

## 2022-07-22 RX ADMIN — OXYBUTYNIN CHLORIDE 5 MG: 5 TABLET ORAL at 20:48

## 2022-07-22 ASSESSMENT — PAIN SCALES - GENERAL
PAINLEVEL_OUTOF10: 0
PAINLEVEL_OUTOF10: 1

## 2022-07-22 NOTE — CARE COORDINATION
Spoke with pt re: etoh rehab. Pt states that she is interested. Provided pt with list of local facilities, both inpt and outpt. Suggested reaching out to each for initial screening/bed availability, explained that this would have to be done by pt but SW will be available for any assistance needed from both pt or rehab facility. Pt voiced understanding. She requested APS worker, Wade Marcelo, phone number, provided for pt.    Electronically signed by [employer] on 7/22/2022 at 9:07 AM

## 2022-07-22 NOTE — TELEPHONE ENCOUNTER
Pharmacy sent med refill request below. PT CURRENTLY INPT AND THIS MED NOT ON HER CURRENT INPT MED LIST. Last office visit : 7/7/2022 with Ant SCANLON  Next office visit : 9/2/2022 with Ant Gleason CAPO    Requested Prescriptions     Pending Prescriptions Disp Refills    cloNIDine (CATAPRES) 0.1 MG tablet [Pharmacy Med Name: CLONIDINE HCL 0.1 MG TABLET] 90 tablet 0     Sig: TAKE 1 TABLET BY MOUTH EVERY DAY AT 1000 Juan Carlos Munroe RN         7/7/22                                           Progress Note     Tal Davies 1965                           Chief Complaint   Patient presents with    Medication Check    Follow-up            Subjective:    Patient is a 62 y.o. female diagnosed with bipolar and presents today for follow-up. Last seen in clinic on 5/23/22  and prior records were reviewed. Last visit: she reports her mood has not been doing very well. She feels like she is juggled around from doctor to doctor by PCP. She is hesitant to restart therapy at this time also because of low motivation. She states she has been more depressed and having more anxiety and panic attacks. She reports she has been more isolating and withdrawing however she has been sitting with her neighbors a few times over the past couple of weeks. We discussed increasing her lamotrigine to 200 mg daily as well as increasing her trazodone to 100 mg nightly. Patient was encouraged to follow-up with her therapist to discuss financial strains as well as coping strategies to help deal with depression and anxiety. Patient verbalized understanding will follow-up in 6 weeks        Today : doing better today than last visit. She is not having as much depression and anxiety. She is also sleeping better. She has not been outside as much because of the heat. She still has been calling her friends and has been interacting with them.     She states she has had some extra fatigue lately, we discussed sitting in her home for long periods of time with minimal exercise can contribute to fatigue, we discussed chair aerobics or chair exercises that she could do while at home patient verbalized understanding and agreement. Additionally we discussed diet and nutrition. She denies SI HI AVH she denies side effects of medications at this time she is calm and cooperative on the phone no medication changes at this time we will follow-up in 2 months        Absent  suicidal ideation. Reports compliance with medications as good . Sleep: She reports that she has been sleeping much better     Caffeine use: coffee most morning     Support:  neighbor     PREVIOUS MED TRIALS  Trazodone (having more suicidal dreams), at 100mg, not working for sleep  Seroquel (wt gain, 14 lb in 3 months, enuresis, indigestion, abnormal blood work, increased blood sugar, seizures)  Duloxetine (has not been effective and no noticeable change even with dose increases to 90mg)  Paxil, 20mg  Wellbutrin XL, (tried it recently to quit smoking)  Prozac (made her numb, added to her eating disorder)  Zoloft (thought she did well on this, took for a couple of years, she stopped it because when she returned to [place] the doctor put her back on Prozac)  Remeron (increased her dreams and panic attacks)  Caleb Cake (worked)  Librium (in 2018 for 15 days to stop drinking alcohol)  Risperdal- weight gain  Doxepin,  (not effective)  Prazosin, ineffective for dreams/nightmares  Latuda (1/27/21, reports that she switched it to am dosing because taking it at night made her RLS worse)     Current Substance Use:  Alcohol: Denies  illicit drug use: Denies  Marijuana: Denies  Tobacco use: 3/4 ppd  Vape: Denies        BP: There were no vitals taken for this visit.         Review of Systems - 14 point review:  Negative except for stated     Constitutional: (fevers, chills, night sweats, wt loss/gain, change in appetite, fatigue, somnolence)     HEENT: (ear pain or discharge, hearing loss, ear ringing, sinus pressure, nosebleed, nasal discharge, sore throat, oral sores, tooth pain, bleeding gums, hoarse voice, neck pain)      Cardiovascular: (HTN, chest pain, elevated cholesterol/lipids, palpitations, leg swelling, leg pain with walking)     Respiratory: (cough, wheezing, snoring, SOB with activity (dyspnea), SOB while lying flat (orthopnea), awakening with severe SOB (paroxysmal nocturnal dyspnea))     Gastrointestinal: (NVD, constipation, abdominal pain, bright red stools, black tarry stools, stool incontinence)     Genitourinary:  (pelvic pain, burning or frequency of urination, urinary urgency, blood in urine incomplete bladder emptying, urinary incontinence, STD; MEN: testicular pain or swelling, erectile dysfunction; WOMEN: LMP, heavy menstrual bleeding (menorrhagia), irregular periods, postmenopausal bleeding, menstrual pain (dymenorrhea, vaginal discharge)     Musculoskeletal: (bone pain/fracture, joint pain or swelling, musle pain)     Integumentary: (rashes, acne, non-healing sores, itching, breast lumps, breast pain, nipple discharge, hair loss)     Neurologic: (HA, muscle weakness, paresthesias (numbness, coldness, crawling or prickling), memory loss, seizure, dizziness)     Psychiatric:  (anxiety, sadness, irritability/anger, insomnia, suicidality)     Endocrine: (heat or cold intolerance, excessive thirst (polydipsia), excessive hunger (polyphagia))     Immune/Allergic: (hives, seasonal or environmental allergies, HIV exposure)     Hematologic/Lymphatic: (lymph node enlargement, easy bleeding or bruising)     History obtained via chart review and patient     PCP is Ryan Ambrosio. Robyn Mcdaniel DO, DO         Current Meds:     Home Medications           Prior to Admission medications   Medication Sig Start Date End Date Taking?  Authorizing Provider   gabapentin (NEURONTIN) 300 MG capsule TAKE 2 CAPSULES BY MOUTH EVERY DAY AT NIGHT 7/7/22 8/6/22 Yes CAPO Wilkinson - TIEN   risperiDONE (RISPERDAL) 1 MG tablet TAKE 1 TABLET BY MOUTH EVERY DAY AT NIGHT 7/6/22     CAPO James CNP   eszopiclone (ESZOPICLONE) 3 MG TABS Take 1 tablet by mouth nightly for 30 days. Indications: Trouble Sleeping 6/27/22 7/27/22   CAPO James CNP   traZODone (DESYREL) 100 MG tablet Take 1 tablet by mouth nightly 5/23/22     CAPO James CNP   lamoTRIgine (LAMICTAL) 200 MG tablet Take 1 tablet by mouth daily TAKE 1 TABLET BY MOUTH EVERY DAY 5/23/22     CAPO James CNP   Icosapent Ethyl (VASCEPA) 1 g CAPS capsule         Historical Provider, MD   cloNIDine (CATAPRES) 0.1 MG tablet TAKE 1 TABLET BY MOUTH EVERY DAY AT NIGHT 4/25/22     CAPO James CNP   pantoprazole (PROTONIX) 40 MG tablet Take 40 mg by mouth daily       Historical Provider, MD   fluticasone (FLONASE) 50 MCG/ACT nasal spray SPRAY 1 SPRAY INTO EACH NOSTRIL TWICE A DAY 7/22/21     Historical Provider, MD   loratadine (CLARITIN) 10 MG tablet TAKE ONE TABLET BY MOUTH DAILY 7/23/21     Historical Provider, MD   rosuvastatin (CRESTOR) 5 MG tablet TAKE 1 TABLET BY MOUTH EVERY DAY IN THE EVENING 8/2/21     Historical Provider, MD   oxybutynin (DITROPAN) 5 MG tablet TAKE 1 TABLET BY MOUTH EVERY DAY AT NIGHT 9/7/21     CAPO Carlos CNP   spironolactone (ALDACTONE) 25 MG tablet   3/5/20     Historical Provider, MD   metoprolol succinate (TOPROL XL) 50 MG extended release tablet Take 50 mg by mouth daily 4/25/19 per direction of Dr. Call Deal pt to take 1/2 tab daily.        Historical Provider, MD   Magnesium Oxide 250 MG TABS Take by mouth daily       Historical Provider, MD         Social History   Social History            Socioeconomic History    Marital status: Legally        Spouse name: Not on file    Number of children: 1    Years of education: 12th grade + some college    Highest education level: Not on file   Occupational History    Not on file   Tobacco Use    Smoking status: Current Every Day Smoker       Packs/day: 0.50       Types: Cigarettes    Smokeless tobacco: Never Used   Vaping Use    Vaping Use: Never used   Substance and Sexual Activity    Alcohol use: Not Currently       Comment: history of abuse, last drink was early December, 2018    Drug use: Not Currently       Types: Marijuana Lorena Naidu)       Comment: last use was summer, 2017    Sexual activity: Not Currently   Other Topics Concern    Not on file   Social History Narrative    Not on file      Social Determinants of Health          Financial Resource Strain:    Difficulty of Paying Living Expenses: Not on file   Food Insecurity:    Worried About [address] [employer] in the Last Year: Not on file    May of Food in the Last Year: Not on file   Transportation Needs:    Lack of Transportation (Medical): Not on file    Lack of Transportation (Non-Medical): Not on file   Physical Activity:    Days of Exercise per Week: Not on file    Minutes of Exercise per Session: Not on file   Stress:    Feeling of Stress : Not on file   Social Connections:    Frequency of Communication with Friends and Family: Not on file    Frequency of Social Gatherings with Friends and Family: Not on file    Attends [employer] Services: Not on file    Active Member of Clubs or Organizations: Not on file    Attends Club or Organization Meetings: Not on file    Marital Status: Not on file   Intimate Partner Violence:    Fear of Current or Ex-Partner: Not on file    Emotionally Abused: Not on file    Physically Abused: Not on file    Sexually Abused: Not on file   Housing Stability:    Unable to Pay for Housing in the Last Year: Not on file    Number of [place] in the Last Year: Not on file    Unstable Housing in the Last Year: Not on file            MSE:  Appearance and gait: UTO due to phone call  Behavior: Calm, cooperative, and socially appropriate. Raúl Rodriguez Speech: Normal in tone, volume, and quality.  No slurring, dysarthria or pressured speech noted.  Mood: \"pretty good\"   Affect: UTO due to phone call    Thought Process: Appears linear, logical and goal oriented. Causality appears intact. Thought Content: Denies active suicidal and homicidal ideations. No overt delusions or paranoia appreciated. Perceptions: Denies auditory or visual hallucinations at present time. Not responding to internal stimuli. Concentration: Intact. Orientation: to person, place, date, and situation. Language: Intact. Fund of information: Intact. Memory: Recent and remote appear intact. Impulsivity: Limited. Neurovegitative: Fair appetite and sleep. Insight: Fair. Judgment: Fair. Cognition: Can spell \"world\" backwards: Yes                    Can do serial 7's:  Yes           Lab Results   Component Value Date      (L) 07/05/2022     K 3.8 07/05/2022     CL 91 (L) 07/05/2022     CO2 25 07/05/2022     BUN 7 07/05/2022     CREATININE 0.9 07/05/2022     GLUCOSE 126 (H) 07/05/2022     CALCIUM 10.2 (H) 07/05/2022     PROT 7.1 07/05/2022     LABALBU 3.7 07/05/2022     BILITOT 0.8 07/05/2022     ALKPHOS 190 (H) 07/05/2022     AST 71 (H) 07/05/2022     ALT 51 (H) 07/05/2022     LABGLOM >60 07/05/2022     GFRAA >59 07/05/2022            Lab Results   Component Value Date/Time      07/05/2022 08:05 PM     K 3.8 07/05/2022 08:05 PM     CL 91 07/05/2022 08:05 PM     CO2 25 07/05/2022 08:05 PM     BUN 7 07/05/2022 08:05 PM     CREATININE 0.9 07/05/2022 08:05 PM     GLUCOSE 126 07/05/2022 08:05 PM     CALCIUM 10.2 07/05/2022 08:05 PM      No results found for: CHOL  No results found for: TRIG  No results found for: HDL  No results found for: LDLCHOLESTEROL, LDLCALC  No results found for: LABVLDL, VLDL  No results found for: CHOLHDLRATIO  No results found for: LABA1C  No results found for: EAG        Lab Results   Component Value Date     TSH 2.40 02/03/2015            Lab Results   Component Value Date     VITD25 27.6 (L) 02/03/2015      No results found for: GBEACDJW17 No results found for: FOLATE      Assessment:    1. Bipolar affective disorder, mixed, mild ([address]   2. Generalized anxiety disorder          No evidence of acute suicidality, homicidality or psychosis observed. Patient is psychiatrically stable     Plan:     1. Continue  Eszopiclone (Lunesta), 3mg, nightly for sleep   Gabapentin, 300mg, take 2 capsules nightly (restless legs)   Clonidine, 0.1mg, nightly (teeth grinding, nightmares),   Risperdal, 1mg, nightly  Lamotrigine, 200mg,  Daily  Trazodone, 100mg, nightly      Start     Discontinue        The risks, benefits, side effects, indications, contraindications, and adverse effects of the medications have been discussed. Yes.  2. The pt has verbalized understanding and has capacity to give informed consent. 3. The Cari Mckeon report has been reviewed according to [facility] regulations. 4. Supportive therapy offered. 5. Follow up:    Return in about 2 months (around 9/7/2022). 6. The patient has been advised to call with any problems. 7. Controlled substance Treatment Plan: this is a maintenance dose. .  8. The above listed medications have been continued, modifications in meds and other orders/labs as follows:                 Encounter Medications         Orders Placed This Encounter   Medications    gabapentin (NEURONTIN) 300 MG capsule       Sig: TAKE 2 CAPSULES BY MOUTH EVERY DAY AT NIGHT       Dispense:  60 capsule       Refill:  0       Not to exceed 5 additional fills before 04/18/2021 DX Code Needed  . No orders of the defined types were placed in this encounter. 9. Additional comments: Continue therapy, discussed sleep hygiene, discussed the use of coping skills and relaxation strategies to manage symptoms. 10.Over 50% of the total visit time of   22  minutes was spent on counseling and/or coordination of care of:                         1. Bipolar affective disorder, mixed, mild ([address]   2.  Generalized anxiety disorder Psychotherapy Topics: mood/medication effectiveness family and financial     Amira Lama, APRN - CNP

## 2022-07-22 NOTE — PROGRESS NOTES
Progress Note  Date:2022       Room:0430/430-01  Patient Name:Dorene Rojas     YOB: 1965     Age:57 y.o. Subjective    Subjective: 66-year-old female with a history of alcohol use disorder, hypertension, iron deficiency anemia, melanoma, who presented to the hospital 2022 with concerns of drug overdose. Patient took unknown amount of risperidone prior to calling EMS. On scene was noted to have a syncopal episode as well as infestation with bedbugs. Patient was brought to the emergency room in which work-up showed alcohol level of 260, voice attempts to kill herself, as well as hemoglobin of 3.8. Patient was transfused 2 units of packed red blood cell, with improvement in blood counts. Also noted to be severely deficient in iron requiring iron transfusion. Patient was also hydrated with fluids, treated for bedbug and admitted in the hospital for further evaluation and psychiatry evaluation. Patient has been seen by psychiatrist, who stated patient currently not suicidal and will require rehab treatment for alcohol. .    Seen in house this morning, denied any chest pain or short of breath, nausea vomit abdominal pain. Currently no evidence of bedbugs seen in room. Review of Systems: 12 point system reviewed and negative except as stated above. Objective         Vitals Last 24 Hours:  TEMPERATURE:  Temp  Av °F (36.1 °C)  Min: 96 °F (35.6 °C)  Max: 98 °F (36.7 °C)  RESPIRATIONS RANGE: Resp  Av.6  Min: 16  Max: 34  PULSE OXIMETRY RANGE: SpO2  Av.6 %  Min: 93 %  Max: 97 %  PULSE RANGE: Pulse  Av.3  Min: 76  Max: 114  BLOOD PRESSURE RANGE: Systolic (84NKC), VSR:205 , Min:88 , FERNANDA:790   ; Diastolic (57WGZ), AQM:64, Min:53, Max:76    I/O (24Hr):     Intake/Output Summary (Last 24 hours) at 2022 1147  Last data filed at 2022 1014  Gross per 24 hour   Intake 778.92 ml   Output --   Net 778.92 ml           Physical Examination:  General: Well-developed, [facility] program Progress Note    Please consider a prescription for glucagon- uncle has been instructed how to use. Patient reports BG level of 32 mg/dL over night- states he might have given himself 20 units of Humalog last night. Medication: Patient has numerous \"bad habits\" when it comes to insulin. When talking with him, he states that he likes the 1 ml vials better because they are easier to administer given his hands tend to cramp. Asked him to pull up 14 units of insulin, patient pulled up 18 units. Provided patient with a SpeakPhone magni-guide to help, and provided him with a 1/3 ml syringe. Given the smaller syringe size with the 1/3 mL, this was much easier for him to administer the correct dose. He has also been mixing his Levemir and Humalog insulins- states he asked his doctor when he first started on Levemir and Humalog (was on 70/30) and his PCP said it was okay to mix the two. Discussed this process is not recommended. Reiterated the proper treatment of hypoglycemia- suggested glucose tablets to be kept at his bedside. Discussed hypoglycemia with his uncle who lives with him. Uncle did not know what to do in case of emergency with [place].  Taught uncle how to draw up and dispense Glucagon and call 911 immediately. Also discussed putting table sugar in his inner cheek. Eyes- reports \"stabbing\" pain in his right eye, recommended he get a dilated eye exam ASAP- it has been 2 years since his last appointment. Diet- CHO intake varies significantly at each meal.  States he knows how to CHO count but just is not eating balanced meals. Patient also states that his diarrhea is his main concern. States he soils himself frequently, unable to sleep well at night, etc.  Celiac disease antibody testing came back negative per patient. Recommend patient try a FODMOP diet to see if this will help with his diarrhea and gas.   This may help reduce symptoms,which will limit foods high in fructose, lactose, fructans, galactans and polyols. Patient in agreement to try this diet until next visit on March 3rd. Weight today: 152.4#    Next appointment is scheduled for March 3rd 2017. Thank you.     Amirah Sorenson, [address] [place] [address], [address]  Office:  [phone] no acute distress lying comfortably in bed. HEENT: Atraumatic normocephalic, range of motion normal, no JVD, no tracheal deviation noted. Cardiac: Normal S1-S2   Respiratory: clear To auscultation bilaterally, no rhonchi or rales, no wheezing. Abdomen: Soft, positive bowel sounds in all quadrants, no distention, nontender to palpation  Extremities: no tenderness, no edema, moves all extremities  Psych: Affect and mood depressed, behavioral normal.        Labs/Imaging/Diagnostics    Labs:  CBC:  Recent Labs     07/20/22  0435 07/20/22  0455 07/20/22  1130 07/21/22  0136 07/22/22 0417   WBC 6.8  --   --  10.1 10.0   RBC 1.29*  --   --  2.36* 2.62*   HGB 3.9*   < > 7.9* 7.2* 8.1*   HCT 14.1*   < > 23.9* 21.9* 24.8*   .3*  --   --  92.8 94.7   RDW 13.9  --   --  15.3* 15.5*     --   --  180 168    < > = values in this interval not displayed. CHEMISTRIES:  Recent Labs     07/20/22  0435 07/21/22  0136 07/22/22 0417   * 125* 126*   K 3.4* 4.0 3.5   CL 90* 97* 99   CO2 17* 16* 19*   BUN 11 7 4*   CREATININE 1.1* 0.7 0.7   GLUCOSE 164* 83 93   MG  --   --  1.2*       PT/INR:  Recent Labs     07/20/22 0435   PROTIME 15.8*   INR 1.26*       APTT:  Recent Labs     07/20/22 0435   APTT 29.6       LIVER PROFILE:  Recent Labs     07/20/22  0435 07/21/22  0136 07/22/22  0417   AST 94* 73* 58*   ALT 44* 34* 30   BILITOT 0.9 1.0 0.8   ALKPHOS 221* 191* 175*         Imaging Last 24 Hours:  XR CHEST PORTABLE    Result Date: 7/20/2022  NO PRIOR REPORT AVAILABLE Exam: X-RAY OF THECHEST Clinical data:Pain. Technique:Single view of the chest. Prior studies: No prior studies submitted. Findings: The lungs are grossly clear; noevidence of acute infiltrate or pleural effusion. Cardiac silhouette is within normal limits. No acute osseous abnormality is detected. 1. Noevidence of acute infiltrate or pleural effusion. Recommendation: Follow up as clinically indicated.  Electronically Signed by Alessandra Chacon MD at 20-Jul-2022 07:23:20 AM             Assessment//Plan           Hospital Problems             Last Modified POA    * (Principal) Infestation by bed bug 7/20/2022 Yes    Alcohol abuse with physiological dependence ([facility] [address] 7/20/2022 Yes    Blood loss anemia 7/20/2022 Yes   Assessment & Plan      Intentional Drug Overdose  Severe Anemia ([facility])  Bed Bug Infestation  Hyponatremia  Metabolic Acidosis  Chronic LFT's  Anxiety and Depression  ETOH Abuse        Plan:  S/p 2 units of PRBC  IV iron infusion 500mg x2 doses  Monitor for ETOH withdrawal  Transfuse for Hb < 7  Psychiatry following  SW following and probided in and outpt rehab resources (pt to initiate call)  Sodium Bicarb tabs  Continue monitoring on telemetry  1:1 sitter      Disposition: Likely over the weekend. Electronically signed by   Carla Mendez MD   Internal Medicine Hospitalist  On 7/22/2022  At 11:47 AM    EMR Dragon/Transcription disclaimer:   Much of this encounter note is an electronic transcription/translation of spoken language to printed text.  The electronic translation of spoken language may permit erroneous, or at times, nonsensical words or phrases to be inadvertently transcribed; although attempts have made to review the note for such errors, some may still exist.

## 2022-07-22 NOTE — PROGRESS NOTES
Department of Psychiatry  Attending Progress Note     Chief complaint: \"I'm better\"    SUBJECTIVE:   Chart reviewed, discussed with the team. No major issues overnight. Patient is med-compliant. No SEs. No evidence of suicidality. Patient seen resting in bed. Brighter affect. Smiled. Denies SI/HI/AVH. Slept \"so-so\". Denies physical complaints. Considering rehab. \"I need to get better. \"    OBJECTIVE    Physical  Wt Readings from Last 3 Encounters:   07/20/22 131 lb (59.4 kg)   07/05/22 129 lb (58.5 kg)   05/18/22 129 lb 9.6 oz (58.8 kg)     Temp Readings from Last 3 Encounters:   07/22/22 97.3 °F (36.3 °C)   07/05/22 99.5 °F (37.5 °C) (Oral)   03/22/22 98.1 °F (36.7 °C) (Temporal)     BP Readings from Last 3 Encounters:   07/22/22 103/65   07/05/22 (!) 142/75   05/18/22 136/80     Pulse Readings from Last 3 Encounters:   07/22/22 85   07/05/22 100   05/18/22 120        Review of Systems: 14-point review of systems negative except as described above    Mental Status Examination:   Appearance:  Stated age. Gait not assessed. No abnormal movements or tremor. Behavior: Calm, cooperative  Speech: Normal in tone, volume, and quality. No slurring, dysarthria or pressured speech noted. Mood: \"Better\"   Affect: Mood congruent. Thought Process: Appears linear. Thought Content: Denies SI/HI . No overt delusions or paranoia appreciated. Perceptions: Denies auditory or visual hallucinations at present time. Not responding to internal stimuli. Concentration: Intact. Orientation: to person, place, date, and situation. Language: Intact. Fund of information: Intact. Memory: Recent and remote appear intact. Neurovegitative: Fair appetite and poor sleep. Insight: Improving. Judgment: Improving.     Data  Lab Results   Component Value Date    WBC 10.0 07/22/2022    HGB 8.1 (L) 07/22/2022    HCT 24.8 (L) 07/22/2022    MCV 94.7 07/22/2022     07/22/2022      Lab Results   Component Value Date     (L) 07/22/2022    K 3.5 07/22/2022    CL 99 07/22/2022    CO2 19 (L) 07/22/2022    BUN 4 (L) 07/22/2022    CREATININE 0.7 07/22/2022    GLUCOSE 93 07/22/2022    CALCIUM 6.7 (LL) 07/22/2022    PROT 4.3 (L) 07/22/2022    LABALBU 2.0 (L) 07/22/2022    BILITOT 0.8 07/22/2022    ALKPHOS 175 (H) 07/22/2022    AST 58 (H) 07/22/2022    ALT 30 07/22/2022    LABGLOM >60 07/22/2022    GFRAA >59 07/22/2022       Medications    Current Facility-Administered Medications:     sodium bicarbonate tablet 650 mg, 650 mg, Oral, 4x Daily, Cliff Calzada MD, 650 mg at 07/22/22 1436    0.9 % sodium chloride bolus, 1,000 mL, IntraVENous, Once, Lara Read MD    0.9 % sodium chloride infusion, , IntraVENous, PRN, Lara Read MD    sodium chloride flush 0.9 % injection 5-40 mL, 5-40 mL, IntraVENous, 2 times per day, Marvin Mendoza MD, 10 mL at 07/21/22 2050    sodium chloride flush 0.9 % injection 5-40 mL, 5-40 mL, IntraVENous, PRN, Marvin Mendoza MD    0.9 % sodium chloride infusion, , IntraVENous, PRN, Marvin Mendoza MD    thiamine mononitrate tablet 100 mg, 100 mg, Oral, Daily, Marvin Mendoza MD, 100 mg at 07/22/22 0735    ondansetron (ZOFRAN-ODT) disintegrating tablet 4 mg, 4 mg, Oral, Q8H PRN **OR** ondansetron (ZOFRAN) injection 4 mg, 4 mg, IntraVENous, Q6H PRN, Marvin Mendoza MD    acetaminophen (TYLENOL) tablet 650 mg, 650 mg, Oral, Q6H PRN, 650 mg at 07/22/22 0126 **OR** acetaminophen (TYLENOL) suppository 650 mg, 650 mg, Rectal, Q6H PRN, Marvin Mendoza MD    multivitamin 1 tablet, 1 tablet, Oral, Daily, Marvin Mendoza MD, 1 tablet at 36/11/26 7508    folic acid (FOLVITE) tablet 1 mg, 1 mg, Oral, Daily, Marvin Mendoza MD, 1 mg at 07/22/22 0735    potassium chloride (KLOR-CON M) extended release tablet 40 mEq, 40 mEq, Oral, PRN, 40 mEq at 07/22/22 0531 **OR** potassium bicarb-citric acid (EFFER-K) effervescent tablet 40 mEq, 40 mEq, Oral, PRN **OR** potassium chloride 10 mEq/100 mL IVPB (Peripheral Line), 10 mEq, IntraVENous, PRN, Silva Rubin MD    magnesium sulfate 2000 mg in 50 mL IVPB premix, 2,000 mg, IntraVENous, PRN, Silva Rubin MD, Stopped at 07/22/22 0736    LORazepam (ATIVAN) tablet 1 mg, 1 mg, Oral, Q1H PRN **OR** LORazepam (ATIVAN) injection 1 mg, 1 mg, IntraVENous, Q1H PRN **OR** LORazepam (ATIVAN) tablet 2 mg, 2 mg, Oral, Q1H PRN **OR** LORazepam (ATIVAN) injection 2 mg, 2 mg, IntraVENous, Q1H PRN **OR** LORazepam (ATIVAN) tablet 3 mg, 3 mg, Oral, Q1H PRN **OR** LORazepam (ATIVAN) injection 3 mg, 3 mg, IntraVENous, Q1H PRN **OR** LORazepam (ATIVAN) tablet 4 mg, 4 mg, Oral, Q1H PRN **OR** LORazepam (ATIVAN) injection 4 mg, 4 mg, IntraVENous, Q1H PRN, Silva Rubin MD    lamoTRIgine (LAMICTAL) tablet 200 mg, 200 mg, Oral, Daily, Silva Rubin MD, 200 mg at 07/22/22 0735    metoprolol succinate (TOPROL XL) extended release tablet 25 mg, 25 mg, Oral, Daily, Silva Rubin MD, 25 mg at 07/22/22 0735    pantoprazole (PROTONIX) tablet 40 mg, 40 mg, Oral, Daily, Silva Rubin MD, 40 mg at 07/22/22 0735    oxybutynin (DITROPAN) tablet 5 mg, 5 mg, Oral, Nightly, Silva Rubin MD, 5 mg at 07/21/22 2050    traZODone (DESYREL) tablet 100 mg, 100 mg, Oral, Nightly, Silva Rubin MD, 100 mg at 07/21/22 2050    fluticasone (FLONASE) 50 MCG/ACT nasal spray 1 spray, 1 spray, Each Nostril, BID, Silva Rubin MD, 1 spray at 07/22/22 0736    gabapentin (NEURONTIN) capsule 600 mg, 600 mg, Oral, Nightly, Silva Rubin MD, 600 mg at 07/21/22 2050    spironolactone (ALDACTONE) tablet 25 mg, 25 mg, Oral, Daily, Silva Rubin MD, 25 mg at 07/22/22 0736    LORazepam (ATIVAN) injection 1 mg, 1 mg, IntraVENous, Q5 Min PRN, Silva Rubin MD    rosuvastatin (CRESTOR) tablet 5 mg, 5 mg, Oral, Nightly, Silva Rubin MD, 5 mg at 07/21/22 2050    lactated ringers infusion, , IntraVENous, Continuous, Silva Rubin MD, Last Rate: 100 mL/hr at 07/22/22 0529, New Bag at 07/22/22 0529    ASSESSMENT AND PLAN  DSM 5 DIAGNOSIS  Impression  Bipolar depression  R/o substance-induced mood disorder  PTSD, chronic  Alcohol use disorder, severe  Tobacco use disorder  Bed bug infestation  Blood loss anemia  Hyponatremia     No evidence of acute suicidality, homicidality or psychosis observed today. Future-oriented. Not meeting the criteria for inpatient psych treatment. DC 1:1. Continue to address medical issues. Will benefit from rehab once medically stable. Follow up with our clinic.       Amount of time spent with patient:      15 minutes with greater than 50 % of the time spent in counseling and collaboration of care

## 2022-07-23 LAB
ALBUMIN SERPL-MCNC: 2 G/DL (ref 3.5–5.2)
ALP BLD-CCNC: 235 U/L (ref 35–104)
ALT SERPL-CCNC: 29 U/L (ref 5–33)
ANION GAP SERPL CALCULATED.3IONS-SCNC: 9 MMOL/L (ref 7–19)
AST SERPL-CCNC: 51 U/L (ref 5–32)
BASOPHILS ABSOLUTE: 0 K/UL (ref 0–0.2)
BASOPHILS RELATIVE PERCENT: 0.3 % (ref 0–1)
BILIRUB SERPL-MCNC: 0.8 MG/DL (ref 0.2–1.2)
BUN BLDV-MCNC: 2 MG/DL (ref 6–20)
CALCIUM SERPL-MCNC: 7.1 MG/DL (ref 8.6–10)
CHLORIDE BLD-SCNC: 101 MMOL/L (ref 98–111)
CO2: 19 MMOL/L (ref 22–29)
CREAT SERPL-MCNC: 0.5 MG/DL (ref 0.5–0.9)
EOSINOPHILS ABSOLUTE: 0.1 K/UL (ref 0–0.6)
EOSINOPHILS RELATIVE PERCENT: 0.5 % (ref 0–5)
GFR AFRICAN AMERICAN: >59
GFR NON-AFRICAN AMERICAN: >60
GLUCOSE BLD-MCNC: 105 MG/DL (ref 74–109)
HCT VFR BLD CALC: 25.6 % (ref 37–47)
HEMOGLOBIN: 8.1 G/DL (ref 12–16)
IMMATURE GRANULOCYTES #: 0.1 K/UL
LYMPHOCYTES ABSOLUTE: 1.1 K/UL (ref 1.1–4.5)
LYMPHOCYTES RELATIVE PERCENT: 10.6 % (ref 20–40)
MAGNESIUM: 1.5 MG/DL (ref 1.6–2.6)
MCH RBC QN AUTO: 30.5 PG (ref 27–31)
MCHC RBC AUTO-ENTMCNC: 31.6 G/DL (ref 33–37)
MCV RBC AUTO: 96.2 FL (ref 81–99)
MONOCYTES ABSOLUTE: 0.3 K/UL (ref 0–0.9)
MONOCYTES RELATIVE PERCENT: 2.9 % (ref 0–10)
NEUTROPHILS ABSOLUTE: 9.1 K/UL (ref 1.5–7.5)
NEUTROPHILS RELATIVE PERCENT: 84.4 % (ref 50–65)
PDW BLD-RTO: 16 % (ref 11.5–14.5)
PLATELET # BLD: 202 K/UL (ref 130–400)
PMV BLD AUTO: 9.8 FL (ref 9.4–12.3)
POTASSIUM REFLEX MAGNESIUM: 3.5 MMOL/L (ref 3.5–5)
RBC # BLD: 2.66 M/UL (ref 4.2–5.4)
SODIUM BLD-SCNC: 129 MMOL/L (ref 136–145)
TOTAL PROTEIN: 4.9 G/DL (ref 6.6–8.7)
WBC # BLD: 10.8 K/UL (ref 4.8–10.8)

## 2022-07-23 PROCEDURE — 83735 ASSAY OF MAGNESIUM: CPT

## 2022-07-23 PROCEDURE — 6370000000 HC RX 637 (ALT 250 FOR IP): Performed by: HOSPITALIST

## 2022-07-23 PROCEDURE — 80053 COMPREHEN METABOLIC PANEL: CPT

## 2022-07-23 PROCEDURE — 2580000003 HC RX 258: Performed by: HOSPITALIST

## 2022-07-23 PROCEDURE — 85025 COMPLETE CBC W/AUTO DIFF WBC: CPT

## 2022-07-23 PROCEDURE — 6370000000 HC RX 637 (ALT 250 FOR IP): Performed by: PSYCHIATRY & NEUROLOGY

## 2022-07-23 PROCEDURE — 6360000002 HC RX W HCPCS: Performed by: HOSPITALIST

## 2022-07-23 PROCEDURE — 1210000000 HC MED SURG R&B

## 2022-07-23 PROCEDURE — 36415 COLL VENOUS BLD VENIPUNCTURE: CPT

## 2022-07-23 RX ORDER — MAGNESIUM SULFATE IN WATER 40 MG/ML
4000 INJECTION, SOLUTION INTRAVENOUS ONCE
Status: COMPLETED | OUTPATIENT
Start: 2022-07-23 | End: 2022-07-23

## 2022-07-23 RX ADMIN — SODIUM CHLORIDE, PRESERVATIVE FREE 10 ML: 5 INJECTION INTRAVENOUS at 09:08

## 2022-07-23 RX ADMIN — THERA TABS 1 TABLET: TAB at 09:09

## 2022-07-23 RX ADMIN — SPIRONOLACTONE 25 MG: 25 TABLET ORAL at 09:09

## 2022-07-23 RX ADMIN — FLUTICASONE PROPIONATE 1 SPRAY: 50 SPRAY, METERED NASAL at 09:10

## 2022-07-23 RX ADMIN — SODIUM BICARBONATE 650 MG: 650 TABLET ORAL at 13:15

## 2022-07-23 RX ADMIN — SODIUM BICARBONATE 650 MG: 650 TABLET ORAL at 17:30

## 2022-07-23 RX ADMIN — Medication 5 MG: at 19:39

## 2022-07-23 RX ADMIN — THIAMINE HCL TAB 100 MG 100 MG: 100 TAB at 09:09

## 2022-07-23 RX ADMIN — SODIUM CHLORIDE, POTASSIUM CHLORIDE, SODIUM LACTATE AND CALCIUM CHLORIDE: 600; 310; 30; 20 INJECTION, SOLUTION INTRAVENOUS at 02:00

## 2022-07-23 RX ADMIN — MAGNESIUM SULFATE HEPTAHYDRATE 2000 MG: 40 INJECTION, SOLUTION INTRAVENOUS at 06:35

## 2022-07-23 RX ADMIN — LAMOTRIGINE 200 MG: 100 TABLET ORAL at 09:08

## 2022-07-23 RX ADMIN — SODIUM BICARBONATE 650 MG: 650 TABLET ORAL at 19:39

## 2022-07-23 RX ADMIN — MAGNESIUM SULFATE HEPTAHYDRATE 4000 MG: 4 INJECTION, SOLUTION INTRAVENOUS at 15:02

## 2022-07-23 RX ADMIN — SODIUM CHLORIDE, POTASSIUM CHLORIDE, SODIUM LACTATE AND CALCIUM CHLORIDE: 600; 310; 30; 20 INJECTION, SOLUTION INTRAVENOUS at 22:37

## 2022-07-23 RX ADMIN — GABAPENTIN 600 MG: 300 CAPSULE ORAL at 19:39

## 2022-07-23 RX ADMIN — METOPROLOL SUCCINATE 25 MG: 50 TABLET, EXTENDED RELEASE ORAL at 09:08

## 2022-07-23 RX ADMIN — ROSUVASTATIN CALCIUM 5 MG: 10 TABLET, FILM COATED ORAL at 19:38

## 2022-07-23 RX ADMIN — FLUTICASONE PROPIONATE 1 SPRAY: 50 SPRAY, METERED NASAL at 19:40

## 2022-07-23 RX ADMIN — FOLIC ACID 1 MG: 1 TABLET ORAL at 09:09

## 2022-07-23 RX ADMIN — OXYBUTYNIN CHLORIDE 5 MG: 5 TABLET ORAL at 19:39

## 2022-07-23 RX ADMIN — ACETAMINOPHEN 325MG 650 MG: 325 TABLET ORAL at 17:30

## 2022-07-23 RX ADMIN — PANTOPRAZOLE SODIUM 40 MG: 40 TABLET, DELAYED RELEASE ORAL at 09:09

## 2022-07-23 RX ADMIN — SODIUM BICARBONATE 650 MG: 650 TABLET ORAL at 09:09

## 2022-07-23 ASSESSMENT — PAIN SCALES - GENERAL: PAINLEVEL_OUTOF10: 0

## 2022-07-23 NOTE — PLAN OF CARE
Problem: Self Harm/Suicidality  Goal: Will have no self-injury during hospital stay  Description: INTERVENTIONS:  1. Q 30 MINUTES: Routine safety checks  2. Q SHIFT & PRN: Assess risk to determine if routine checks are adequate to maintain patient safety  Outcome: Progressing     Problem: Discharge Planning  Goal: Discharge to home or other facility with appropriate resources  Outcome: Progressing     Problem: Safety - Adult  Goal: Free from fall injury  Outcome: Progressing     Problem: ABCDS Injury Assessment  Goal: Absence of physical injury  Outcome: Progressing     Problem: Pain  Goal: Verbalizes/displays adequate comfort level or baseline comfort level  Outcome: Progressing

## 2022-07-23 NOTE — PROGRESS NOTES
Well-developed, no acute distress lying comfortably in bed. HEENT: Atraumatic normocephalic, range of motion normal, no JVD, no tracheal deviation noted. Cardiac: Normal S1-S2   Respiratory: clear To auscultation bilaterally, no rhonchi or rales, no wheezing. Abdomen: Soft, positive bowel sounds in all quadrants, no distention, nontender to palpation  Extremities: no tenderness, no edema, moves all extremities  Psych: Affect and mood depressed, behavioral normal.        Labs/Imaging/Diagnostics    Labs:  CBC:  Recent Labs     07/21/22 0136 07/22/22 0417 07/23/22 0414   WBC 10.1 10.0 10.8   RBC 2.36* 2.62* 2.66*   HGB 7.2* 8.1* 8.1*   HCT 21.9* 24.8* 25.6*   MCV 92.8 94.7 96.2   RDW 15.3* 15.5* 16.0*    168 202       CHEMISTRIES:  Recent Labs     07/21/22 0136 07/22/22 0417 07/23/22 0414   * 126* 129*   K 4.0 3.5 3.5   CL 97* 99 101   CO2 16* 19* 19*   BUN 7 4* 2*   CREATININE 0.7 0.7 0.5   GLUCOSE 83 93 105   MG  --  1.2* 1.5*       PT/INR:  No results for input(s): PROTIME, INR in the last 72 hours. APTT:  No results for input(s): APTT in the last 72 hours. LIVER PROFILE:  Recent Labs     07/21/22 0136 07/22/22 0417 07/23/22 0414   AST 73* 58* 51*   ALT 34* 30 29   BILITOT 1.0 0.8 0.8   ALKPHOS 191* 175* 235*         Imaging Last 24 Hours:  XR CHEST PORTABLE    Result Date: 7/20/2022  NO PRIOR REPORT AVAILABLE Exam: X-RAY OF THECHEST Clinical data:Pain. Technique:Single view of the chest. Prior studies: No prior studies submitted. Findings: The lungs are grossly clear; noevidence of acute infiltrate or pleural effusion. Cardiac silhouette is within normal limits. No acute osseous abnormality is detected. 1. Noevidence of acute infiltrate or pleural effusion. Recommendation: Follow up as clinically indicated.  Electronically Signed by Rishabh Barclay MD at 20-Jul-2022 07:23:20 AM             Assessment//Plan           Hospital Problems             Last Modified POA    * (Principal) Infestation by bed bug 7/20/2022 Yes    Alcohol abuse with physiological dependence ([address] 7/20/2022 Yes    Blood loss anemia 7/20/2022 Yes   Assessment & Plan      Intentional Drug Overdose  Severe Anemia ([facility])  Bed Bug Infestation  Hyponatremia  Metabolic Acidosis  Chronic LFT's  Anxiety and Depression  ETOH Abuse  Hypomagnesemia      Plan:  S/p 2 units of PRBC  IV iron infusion 500mg x2 doses  No evidence of ETOH withdrawal noted  Transfuse for Hb < 7  Psychiatry following  SW following and provided in and outpt rehab resources (pt to initiate call)  Sodium Bicarb tabs  Continue monitoring on telemetry  1:1 sitter      Disposition: Home tomorrow      Electronically signed by   Sonia Mullen MD   Internal Medicine Hospitalist  On 7/23/2022  At 1:08 PM    EMR Dragon/Transcription disclaimer:   Much of this encounter note is an electronic transcription/translation of spoken language to printed text.  The electronic translation of spoken language may permit erroneous, or at times, nonsensical words or phrases to be inadvertently transcribed; although attempts have made to review the note for such errors, some may still exist.

## 2022-07-24 LAB
ANION GAP SERPL CALCULATED.3IONS-SCNC: 7 MMOL/L (ref 7–19)
BUN BLDV-MCNC: 4 MG/DL (ref 6–20)
CALCIUM SERPL-MCNC: 6.9 MG/DL (ref 8.6–10)
CHLORIDE BLD-SCNC: 100 MMOL/L (ref 98–111)
CO2: 20 MMOL/L (ref 22–29)
CREAT SERPL-MCNC: 0.5 MG/DL (ref 0.5–0.9)
GFR AFRICAN AMERICAN: >59
GFR NON-AFRICAN AMERICAN: >60
GLUCOSE BLD-MCNC: 107 MG/DL (ref 74–109)
MAGNESIUM: 2.2 MG/DL (ref 1.6–2.6)
POTASSIUM REFLEX MAGNESIUM: 3.5 MMOL/L (ref 3.5–5)
SODIUM BLD-SCNC: 127 MMOL/L (ref 136–145)

## 2022-07-24 PROCEDURE — 36415 COLL VENOUS BLD VENIPUNCTURE: CPT

## 2022-07-24 PROCEDURE — 6370000000 HC RX 637 (ALT 250 FOR IP): Performed by: HOSPITALIST

## 2022-07-24 PROCEDURE — 2580000003 HC RX 258: Performed by: HOSPITALIST

## 2022-07-24 PROCEDURE — 6370000000 HC RX 637 (ALT 250 FOR IP): Performed by: PSYCHIATRY & NEUROLOGY

## 2022-07-24 PROCEDURE — 80048 BASIC METABOLIC PNL TOTAL CA: CPT

## 2022-07-24 PROCEDURE — 1210000000 HC MED SURG R&B

## 2022-07-24 PROCEDURE — 83735 ASSAY OF MAGNESIUM: CPT

## 2022-07-24 RX ORDER — METOPROLOL SUCCINATE 50 MG/1
50 TABLET, EXTENDED RELEASE ORAL DAILY
Status: DISCONTINUED | OUTPATIENT
Start: 2022-07-25 | End: 2022-07-26

## 2022-07-24 RX ADMIN — ROSUVASTATIN CALCIUM 5 MG: 10 TABLET, FILM COATED ORAL at 20:15

## 2022-07-24 RX ADMIN — LAMOTRIGINE 200 MG: 100 TABLET ORAL at 08:15

## 2022-07-24 RX ADMIN — SODIUM BICARBONATE 650 MG: 650 TABLET ORAL at 17:03

## 2022-07-24 RX ADMIN — SODIUM CHLORIDE, PRESERVATIVE FREE 10 ML: 5 INJECTION INTRAVENOUS at 20:18

## 2022-07-24 RX ADMIN — SPIRONOLACTONE 25 MG: 25 TABLET ORAL at 08:15

## 2022-07-24 RX ADMIN — THIAMINE HCL TAB 100 MG 100 MG: 100 TAB at 08:15

## 2022-07-24 RX ADMIN — Medication 5 MG: at 20:15

## 2022-07-24 RX ADMIN — METOPROLOL SUCCINATE 25 MG: 50 TABLET, EXTENDED RELEASE ORAL at 08:15

## 2022-07-24 RX ADMIN — FLUTICASONE PROPIONATE 1 SPRAY: 50 SPRAY, METERED NASAL at 20:14

## 2022-07-24 RX ADMIN — THERA TABS 1 TABLET: TAB at 08:15

## 2022-07-24 RX ADMIN — FLUTICASONE PROPIONATE 1 SPRAY: 50 SPRAY, METERED NASAL at 08:18

## 2022-07-24 RX ADMIN — SODIUM BICARBONATE 650 MG: 650 TABLET ORAL at 08:15

## 2022-07-24 RX ADMIN — GABAPENTIN 600 MG: 300 CAPSULE ORAL at 20:14

## 2022-07-24 RX ADMIN — SODIUM BICARBONATE 650 MG: 650 TABLET ORAL at 20:15

## 2022-07-24 RX ADMIN — OXYBUTYNIN CHLORIDE 5 MG: 5 TABLET ORAL at 20:14

## 2022-07-24 RX ADMIN — PANTOPRAZOLE SODIUM 40 MG: 40 TABLET, DELAYED RELEASE ORAL at 08:15

## 2022-07-24 RX ADMIN — FOLIC ACID 1 MG: 1 TABLET ORAL at 08:15

## 2022-07-24 RX ADMIN — SODIUM BICARBONATE 650 MG: 650 TABLET ORAL at 13:12

## 2022-07-24 NOTE — PROGRESS NOTES
Progress Note  Date:2022       Room:0430/430-01  Patient Name:Dorene Rojas     YOB: 1965     Age:57 y.o. Subjective    Subjective: 70-year-old female with a history of alcohol use disorder, hypertension, iron deficiency anemia, melanoma, who presented to the hospital 2022 with concerns of drug overdose. Patient took unknown amount of risperidone prior to calling EMS. On scene was noted to have a syncopal episode as well as infestation with bedbugs. Patient was brought to the emergency room in which work-up showed alcohol level of 260, voice attempts to kill herself, as well as hemoglobin of 3.8. Patient was transfused 2 units of packed red blood cell, with improvement in blood counts. Also noted to be severely deficient in iron requiring iron transfusion. Patient was also hydrated with fluids, treated for bedbug and admitted in the hospital for further evaluation and psychiatry evaluation. Patient has been seen by psychiatrist, who stated patient currently not suicidal and will require rehab treatment for alcohol. .    Seen in house this morning, denied any chest pain or short of breath, nausea vomit abdominal pain. Encouraged to get out of bed yesterday and ambulate but patient did not do that. Patient has not called outpt rehab for eval, also requesting to stay another day in hospital and discharge tomorrow. Concerns patient might be seeking excuses to delay discharge. SW to eval as she stated she does not have food at home or means of transportation. Also PT to eval mobility. Review of Systems: 12 point system reviewed and negative except as stated above.     Objective         Vitals Last 24 Hours:  TEMPERATURE:  Temp  Av.6 °F (36.4 °C)  Min: 96.5 °F (35.8 °C)  Max: 99.1 °F (37.3 °C)  RESPIRATIONS RANGE: Resp  Av  Min: 18  Max: 18  PULSE OXIMETRY RANGE: SpO2  Av.3 %  Min: 90 %  Max: 93 %  PULSE RANGE: Pulse  Av  Min: 99  Max: 112  BLOOD PRESSURE RANGE: Systolic (66XJF), FFA:227 , Min:124 , OZO:315   ; Diastolic (40MIQ), LMQ:12, Min:62, Max:73    I/O (24Hr): Intake/Output Summary (Last 24 hours) at 7/24/2022 1528  Last data filed at 7/24/2022 9839  Gross per 24 hour   Intake 120 ml   Output --   Net 120 ml           Physical Examination:  General: Well-developed, no acute distress lying comfortably in bed. HEENT: Atraumatic normocephalic, range of motion normal, no JVD, no tracheal deviation noted. Cardiac: Normal S1-S2   Respiratory: clear To auscultation bilaterally, no rhonchi or rales, no wheezing. Abdomen: Soft, positive bowel sounds in all quadrants, no distention, nontender to palpation  Extremities: no tenderness, no edema, moves all extremities  Psych: Affect and mood depressed, behavioral normal.        Labs/Imaging/Diagnostics    Labs:  CBC:  Recent Labs     07/22/22 0417 07/23/22 0414   WBC 10.0 10.8   RBC 2.62* 2.66*   HGB 8.1* 8.1*   HCT 24.8* 25.6*   MCV 94.7 96.2   RDW 15.5* 16.0*    202       CHEMISTRIES:  Recent Labs     07/22/22 0417 07/23/22 0414 07/24/22 0410   * 129* 127*   K 3.5 3.5 3.5   CL 99 101 100   CO2 19* 19* 20*   BUN 4* 2* 4*   CREATININE 0.7 0.5 0.5   GLUCOSE 93 105 107   MG 1.2* 1.5* 2.2       PT/INR:  No results for input(s): PROTIME, INR in the last 72 hours. APTT:  No results for input(s): APTT in the last 72 hours. LIVER PROFILE:  Recent Labs     07/22/22 0417 07/23/22 0414   AST 58* 51*   ALT 30 29   BILITOT 0.8 0.8   ALKPHOS 175* 235*         Imaging Last 24 Hours:  XR CHEST PORTABLE    Result Date: 7/20/2022  NO PRIOR REPORT AVAILABLE Exam: X-RAY OF THECHEST Clinical data:Pain. Technique:Single view of the chest. Prior studies: No prior studies submitted. Findings: The lungs are grossly clear; noevidence of acute infiltrate or pleural effusion. Cardiac silhouette is within normal limits. No acute osseous abnormality is detected. 1. Noevidence of acute infiltrate or pleural effusion. Recommendation: Follow up as clinically indicated. Electronically Signed by Bowen De Santiago MD at 20-Jul-2022 07:23:20 AM             Assessment//Plan           Hospital Problems             Last Modified POA    * (Principal) Infestation by bed bug 7/20/2022 Yes    Alcohol abuse with physiological dependence ([facility] [address] 7/20/2022 Yes    Blood loss anemia 7/20/2022 Yes         Assessment & Plan      Intentional Drug Overdose  Severe Anemia ([facility])  Bed Bug Infestation  Hyponatremia  Metabolic Acidosis  Chronic LFT's  Anxiety and Depression  ETOH Abuse  Hypomagnesemia      Plan:  S/p 2 units of PRBC  IV iron infusion 500mg x2 doses  No evidence of ETOH withdrawal noted  Transfuse for Hb < 7  Psychiatry following  SW following and provided in and outpt rehab resources (pt to initiate call)  Sodium Bicarb tabs  Continue monitoring on telemetry  1:1 sitter  Awaiting therapy eval.      Disposition: Home tomorrow      Electronically signed by   Jaja Lane MD   Internal Medicine Hospitalist  On 7/24/2022  At 3:28 PM    EMR Dragon/Transcription disclaimer:   Much of this encounter note is an electronic transcription/translation of spoken language to printed text.  The electronic translation of spoken language may permit erroneous, or at times, nonsensical words or phrases to be inadvertently transcribed; although attempts have made to review the note for such errors, some may still exist.

## 2022-07-24 NOTE — CARE COORDINATION
SW met with Pt regarding concerns with her not having groceries at home. Pt stated she does not have groceries at home nor is she able to go get groceries. SW discussed qualifying for medicaid for transport assistance. Pt then stated she makes too much to qualify for medicaid. SW then asked if Pt makes too much to qualify for medicaid, then she should be able to pay for a ride to the groceries and paying for said groceries. Pt agreed that yes she could afford getting a ride and paying for the groceries. Pt biggest concern is her mobility. Pt feels like she is a fall risk. SW discussed inpatient short term rehab placement. Pt stated she would like to work with therapy before making a decision on where to go.    Electronically signed by Jalen Sanders on 7/24/2022 at 3:46 PM

## 2022-07-25 PROCEDURE — 97161 PT EVAL LOW COMPLEX 20 MIN: CPT

## 2022-07-25 PROCEDURE — 6370000000 HC RX 637 (ALT 250 FOR IP): Performed by: HOSPITALIST

## 2022-07-25 PROCEDURE — 6370000000 HC RX 637 (ALT 250 FOR IP): Performed by: PSYCHIATRY & NEUROLOGY

## 2022-07-25 PROCEDURE — 2580000003 HC RX 258: Performed by: HOSPITALIST

## 2022-07-25 PROCEDURE — 1210000000 HC MED SURG R&B

## 2022-07-25 PROCEDURE — 2700000000 HC OXYGEN THERAPY PER DAY

## 2022-07-25 PROCEDURE — 97530 THERAPEUTIC ACTIVITIES: CPT

## 2022-07-25 RX ADMIN — THERA TABS 1 TABLET: TAB at 11:12

## 2022-07-25 RX ADMIN — SODIUM BICARBONATE 650 MG: 650 TABLET ORAL at 21:19

## 2022-07-25 RX ADMIN — GABAPENTIN 600 MG: 300 CAPSULE ORAL at 21:18

## 2022-07-25 RX ADMIN — THIAMINE HCL TAB 100 MG 100 MG: 100 TAB at 11:13

## 2022-07-25 RX ADMIN — FLUTICASONE PROPIONATE 1 SPRAY: 50 SPRAY, METERED NASAL at 11:11

## 2022-07-25 RX ADMIN — METOPROLOL SUCCINATE 50 MG: 50 TABLET, EXTENDED RELEASE ORAL at 11:11

## 2022-07-25 RX ADMIN — SODIUM CHLORIDE, PRESERVATIVE FREE 10 ML: 5 INJECTION INTRAVENOUS at 11:13

## 2022-07-25 RX ADMIN — SODIUM BICARBONATE 650 MG: 650 TABLET ORAL at 11:12

## 2022-07-25 RX ADMIN — SPIRONOLACTONE 25 MG: 25 TABLET ORAL at 11:13

## 2022-07-25 RX ADMIN — FLUTICASONE PROPIONATE 1 SPRAY: 50 SPRAY, METERED NASAL at 21:21

## 2022-07-25 RX ADMIN — SODIUM BICARBONATE 650 MG: 650 TABLET ORAL at 17:28

## 2022-07-25 RX ADMIN — Medication 5 MG: at 21:18

## 2022-07-25 RX ADMIN — FOLIC ACID 1 MG: 1 TABLET ORAL at 11:11

## 2022-07-25 RX ADMIN — OXYBUTYNIN CHLORIDE 5 MG: 5 TABLET ORAL at 21:18

## 2022-07-25 RX ADMIN — PANTOPRAZOLE SODIUM 40 MG: 40 TABLET, DELAYED RELEASE ORAL at 10:28

## 2022-07-25 RX ADMIN — SODIUM CHLORIDE, PRESERVATIVE FREE 10 ML: 5 INJECTION INTRAVENOUS at 21:19

## 2022-07-25 RX ADMIN — ROSUVASTATIN CALCIUM 5 MG: 10 TABLET, FILM COATED ORAL at 21:18

## 2022-07-25 RX ADMIN — LAMOTRIGINE 200 MG: 100 TABLET ORAL at 11:11

## 2022-07-25 NOTE — PROGRESS NOTES
Progress Note  Date:2022       Room:0430/430-01  Patient Name:Dorene Rojas     YOB: 1965     Age:57 y.o. Subjective    Subjective: 75-year-old female with a history of alcohol use disorder, hypertension, iron deficiency anemia, melanoma, who presented to the hospital 2022 with concerns of drug overdose. Patient took unknown amount of risperidone prior to calling EMS. On scene was noted to have a syncopal episode as well as infestation with bedbugs. Patient was brought to the emergency room in which work-up showed alcohol level of 260, voice attempts to kill herself, as well as hemoglobin of 3.8. Patient was transfused 2 units of packed red blood cell, with improvement in blood counts. Also noted to be severely deficient in iron requiring iron transfusion. Patient was hydrated with fluids, treated for bedbug and admitted in the hospital for further evaluation and psychiatry evaluation. Patient has been seen by psychiatrist, who stated patient currently not suicidal and will require rehab treatment for alcohol. .    Met with SW as she stated she does not have food at home or means of transportation. However patient stated she makes too much to qualify her for medicaid. PT evaluated and awaiting SNF placement. Review of Systems: 12 point system reviewed and negative except as stated above. Objective         Vitals Last 24 Hours:  TEMPERATURE:  Temp  Av.5 °F (36.4 °C)  Min: 97 °F (36.1 °C)  Max: 98.4 °F (36.9 °C)  RESPIRATIONS RANGE: Resp  Av  Min: 24  Max: 28  PULSE OXIMETRY RANGE: SpO2  Av.5 %  Min: 86 %  Max: 95 %  PULSE RANGE: Pulse  Av.3  Min: 99  Max: 113  BLOOD PRESSURE RANGE: Systolic (59QJQ), KS , Min:111 , FKQ:590   ; Diastolic (23LNE), OQQ:69, Min:68, Max:70    I/O (24Hr):     Intake/Output Summary (Last 24 hours) at 2022 1246  Last data filed at 2022 0923  Gross per 24 hour   Intake 420 ml   Output 400 ml   Net 20 ml Physical Examination:  General: Well-developed, no acute distress lying comfortably in bed. HEENT: Atraumatic normocephalic, range of motion normal, no JVD, no tracheal deviation noted. Cardiac: Normal S1-S2   Respiratory: clear To auscultation bilaterally, no rhonchi or rales, no wheezing. Abdomen: Soft, positive bowel sounds in all quadrants, no distention, nontender to palpation  Extremities: no tenderness, no edema, moves all extremities  Psych: Affect and mood depressed, behavioral normal.        Labs/Imaging/Diagnostics    Labs:  CBC:  Recent Labs     07/23/22 0414   WBC 10.8   RBC 2.66*   HGB 8.1*   HCT 25.6*   MCV 96.2   RDW 16.0*          CHEMISTRIES:  Recent Labs     07/23/22 0414 07/24/22 0410   * 127*   K 3.5 3.5    100   CO2 19* 20*   BUN 2* 4*   CREATININE 0.5 0.5   GLUCOSE 105 107   MG 1.5* 2.2       PT/INR:  No results for input(s): PROTIME, INR in the last 72 hours. APTT:  No results for input(s): APTT in the last 72 hours. LIVER PROFILE:  Recent Labs     07/23/22 0414   AST 51*   ALT 29   BILITOT 0.8   ALKPHOS 235*         Imaging Last 24 Hours:  XR CHEST PORTABLE    Result Date: 7/20/2022  NO PRIOR REPORT AVAILABLE Exam: X-RAY OF [facility] Clinical data:Pain. Technique:Single view of the chest. Prior studies: No prior studies submitted. Findings: The lungs are grossly clear; noevidence of acute infiltrate or pleural effusion. Cardiac silhouette is within normal limits. No acute osseous abnormality is detected. 1. Noevidence of acute infiltrate or pleural effusion. Recommendation: Follow up as clinically indicated.  Electronically Signed by Buffy Kellogg MD at 20-Jul-2022 07:23:20 AM             Assessment//Plan           Hospital Problems             Last Modified POA    * (Principal) Infestation by bed bug 7/20/2022 Yes    Alcohol abuse with physiological dependence ([facility] [address] 7/20/2022 Yes    Blood loss anemia 7/20/2022 Yes       Assessment & Plan      Intentional Drug Overdose  Severe Anemia ([facility])  Bed Bug Infestation  Hyponatremia  Metabolic Acidosis  Chronic LFT's  Anxiety and Depression  ETOH Abuse  Hypomagnesemia      Plan:  S/p 2 units of PRBC  IV iron infusion 500mg x2 doses  No evidence of ETOH withdrawal noted  Transfuse for Hb < 7  Psychiatry following  SW following and provided in and outpt rehab resources (pt to initiate call)  Sodium Bicarb tabs  Continue monitoring on telemetry  1:1 sitter discontinued by psych  Seen by therapy in house and will need SNF placement      Disposition: Awaiting SNF placement      Electronically signed by   Homero Sandoval MD   Internal Medicine Hospitalist  On 7/25/2022  At 12:47 PM    EMR Dragon/Transcription disclaimer:   Much of this encounter note is an electronic transcription/translation of spoken language to printed text.  The electronic translation of spoken language may permit erroneous, or at times, nonsensical words or phrases to be inadvertently transcribed; although attempts have made to review the note for such errors, some may still exist.

## 2022-07-25 NOTE — PROGRESS NOTES
Physical Therapy  Facility/Department: [facility] 4 ONCOLOGY UNIT  Physical Therapy Initial Assessment    Name: Liys Duron  : 1965  MRN: [id number]  Date of Service: 2022    Discharge Recommendations:  Continue to assess pending progress, Patient would benefit from continued therapy after discharge (WILL CONTINUE TO [facility]. AT CURRENT LEVEL OF FUNCTIONS, WOULD BENEFIT FROM SHORT TERM REHAB)   PT Equipment Recommendations  Other: MAY NEED A RW      Patient Diagnosis(es): The primary encounter diagnosis was Intentional drug overdose, initial encounter ([facility] [address] Diagnoses of Symptomatic anemia, Infestation by bed bug, Acute alcoholic intoxication without complication ([facility] [address] and Syncope and collapse were also pertinent to this visit. Past Medical History:  has a past medical history of Abdominal pain, Alcohol abuse, Arthritis, Constipation, Decreased appetite, Elevated liver enzymes, Emesis - persistent, Fatty liver, Hypertension, Iron deficiency anemia, Jaundice, Melanoma ([facility] [address] and UTI (urinary tract infection). Past Surgical History:  has a past surgical history that includes Hernia repair; Cholecystectomy; hernia repair; Colonoscopy (); Endoscopy, colon, diagnostic (); and skin biopsy (). Assessment   Body Structures, Functions, Activity Limitations Requiring Skilled Therapeutic Intervention: Decreased functional mobility ; Decreased strength;Decreased endurance;Decreased balance;Decreased safe awareness  Assessment: pt WOULD BENEFIT FROM SKILLED PT IN THIS SETTING TO PROGRESS HER OVER ALL STRENGTH AND MOBILITY  Decision Making: Medium Complexity  Requires PT Follow-Up: Yes  Activity Tolerance  Activity Tolerance: Patient tolerated treatment well     Plan   Plan  Plan: 5-7 times per week  Plan weeks: 2  Current Treatment Recommendations: Strengthening, Functional mobility training, Balance training, Gait training, Pain management, Safety education & training, Positioning, Patient/Caregiver education & training  Plan Comment: 02 AS INDICATED  Safety Devices  Type of Devices: Gait belt, Call light within reach, Nurse notified, Chair alarm in place, Left in chair     Restrictions        Subjective   General  Diagnosis: intentional overdose, bed bug infestation, anemia  Follows Commands: Within Functional Limits  General Comment  Comments: noted pt to be on 3L 02. sp02 at rest 91%. with activity 93%. RN notified. repirations observed to be rapid and shallow. Subjective  Subjective: Pt states she is very weak and has been falling at home. states she has no DME         Social/Functional History  Social/Functional History  Lives With: Alone  Type of Home: Mobile home  ADL Assistance: Independent  Ambulation Assistance: Independent  Transfer Assistance: Independent  Additional Comments: chart notes bed bug infestation  Vision/Hearing       Cognition   Cognition  Overall Cognitive Status: Exceptions  Arousal/Alertness: Delayed responses to stimuli  Following Commands: Follows one step commands with increased time  Problem Solving: Assistance required to generate solutions;Assistance required to implement solutions  Insights: Decreased awareness of deficits  Initiation: Requires cues for some  Sequencing: Requires cues for some     Objective   Heart Rate: (!) 113  Heart Rate Source: Monitor  BP: (!) 144/68  BP Location: Right Arm  MAP (Calculated): 93.33  Resp: 24  SpO2: 90 %  O2 Device: Nasal cannula     Observation/Palpation  Observation: 3l o2. did not wear at home        AROM RLE (degrees)  RLE AROM: WFL  AROM LLE (degrees)  LLE AROM : WFL  Strength Other  Other: le strength grossly -3 to 3/5           Bed mobility  Rolling to Left: Minimal assistance  Supine to Sit: Minimal assistance; Moderate assistance  Transfers  Sit to Stand: Contact guard assistance;Minimal Assistance  Stand to sit: Contact guard assistance;Minimal Assistance  Bed to Chair: Contact guard assistance;Minimal

## 2022-07-25 NOTE — CARE COORDINATION
Spoke with pt re: dc planning. Pt feels as though she did poorly and can not return back to her home environment alone safely. She is agreeable to SNF referrals to the 4 facilities in [place]. Will require pre-cert.    [address] [place] [address] (formerly [address] [place] [address])   [phone] P  [phone] F  [phone] Referral fax number for [address] [facility]  [phone] Fax for admissions  Electronically signed by Arnaud Stack on 7/25/2022 at 11:01 AM

## 2022-07-26 PROCEDURE — 97165 OT EVAL LOW COMPLEX 30 MIN: CPT

## 2022-07-26 PROCEDURE — 97116 GAIT TRAINING THERAPY: CPT

## 2022-07-26 PROCEDURE — 6370000000 HC RX 637 (ALT 250 FOR IP): Performed by: PSYCHIATRY & NEUROLOGY

## 2022-07-26 PROCEDURE — 97530 THERAPEUTIC ACTIVITIES: CPT

## 2022-07-26 PROCEDURE — 2580000003 HC RX 258: Performed by: HOSPITALIST

## 2022-07-26 PROCEDURE — 6370000000 HC RX 637 (ALT 250 FOR IP): Performed by: HOSPITALIST

## 2022-07-26 PROCEDURE — 2700000000 HC OXYGEN THERAPY PER DAY

## 2022-07-26 PROCEDURE — 1210000000 HC MED SURG R&B

## 2022-07-26 RX ORDER — METOPROLOL SUCCINATE 50 MG/1
50 TABLET, EXTENDED RELEASE ORAL 2 TIMES DAILY
Status: DISCONTINUED | OUTPATIENT
Start: 2022-07-26 | End: 2022-08-04 | Stop reason: HOSPADM

## 2022-07-26 RX ADMIN — METOPROLOL SUCCINATE 50 MG: 50 TABLET, EXTENDED RELEASE ORAL at 09:17

## 2022-07-26 RX ADMIN — ROSUVASTATIN CALCIUM 5 MG: 10 TABLET, FILM COATED ORAL at 20:34

## 2022-07-26 RX ADMIN — PANTOPRAZOLE SODIUM 40 MG: 40 TABLET, DELAYED RELEASE ORAL at 09:17

## 2022-07-26 RX ADMIN — SODIUM CHLORIDE, PRESERVATIVE FREE 10 ML: 5 INJECTION INTRAVENOUS at 20:37

## 2022-07-26 RX ADMIN — FOLIC ACID 1 MG: 1 TABLET ORAL at 09:17

## 2022-07-26 RX ADMIN — SODIUM BICARBONATE 650 MG: 650 TABLET ORAL at 20:33

## 2022-07-26 RX ADMIN — THIAMINE HCL TAB 100 MG 100 MG: 100 TAB at 09:17

## 2022-07-26 RX ADMIN — Medication 5 MG: at 20:33

## 2022-07-26 RX ADMIN — OXYBUTYNIN CHLORIDE 5 MG: 5 TABLET ORAL at 20:34

## 2022-07-26 RX ADMIN — FLUTICASONE PROPIONATE 1 SPRAY: 50 SPRAY, METERED NASAL at 20:32

## 2022-07-26 RX ADMIN — LAMOTRIGINE 200 MG: 100 TABLET ORAL at 09:17

## 2022-07-26 RX ADMIN — SODIUM BICARBONATE 650 MG: 650 TABLET ORAL at 09:17

## 2022-07-26 RX ADMIN — FLUTICASONE PROPIONATE 1 SPRAY: 50 SPRAY, METERED NASAL at 09:18

## 2022-07-26 RX ADMIN — METOPROLOL SUCCINATE 50 MG: 50 TABLET, EXTENDED RELEASE ORAL at 20:33

## 2022-07-26 RX ADMIN — SPIRONOLACTONE 25 MG: 25 TABLET ORAL at 09:17

## 2022-07-26 RX ADMIN — GABAPENTIN 600 MG: 300 CAPSULE ORAL at 20:33

## 2022-07-26 RX ADMIN — SODIUM CHLORIDE, PRESERVATIVE FREE 10 ML: 5 INJECTION INTRAVENOUS at 09:18

## 2022-07-26 RX ADMIN — THERA TABS 1 TABLET: TAB at 09:17

## 2022-07-26 NOTE — PROGRESS NOTES
Physical Therapy  Name: Liam Spence  MRN:  [id number]  Date of service:  7/26/2022 07/26/22 1115   Subjective   Subjective Pt requires encouragement to participate. Pain Assessment   Pain Assessment None - Denies Pain   Oxygen Therapy   O2 Device Nasal cannula   Bed Mobility   Supine to Sit Minimal assistance   Sit to Supine Minimal assistance   Transfers   Sit to Stand Contact guard assistance;Minimal Assistance   Stand to sit Contact guard assistance;Minimal Assistance   Ambulation   Surface level tile   Device Rolling Walker   Assistance Contact guard assistance   Quality of Gait slow shuffling steps. le's \"shaky\" and weak appearing   Gait Deviations Decreased step length;Decreased step height   Distance 3' bed<>chair   Short Term Goals   Time Frame for Short term goals 2 wks   Short term goal 1 SUP<>SIT, SBA   Short term goal 2  FT WITH RW, CGA   Conditions Requiring Skilled Therapeutic Intervention   Body Structures, Functions, Activity Limitations Requiring Skilled Therapeutic Intervention Decreased functional mobility ; Decreased strength;Decreased endurance;Decreased balance;Decreased safe awareness   Assessment Pt somewhat self limiting but showing slight improvement from previous tx. Assisted pt up to the chair but pt requested to return to bed soon after. Will cont to progress as tolerated.    Activity Tolerance   Activity Tolerance Patient tolerated treatment well;Patient limited by endurance   PT Plan of Care   Tuesday X   Safety Devices   Type of Devices Bed alarm in place;Call light within reach;Gait belt;Left in bed         Electronically signed by Chaz Bean PTA on 7/26/2022 at 11:17 AM

## 2022-07-26 NOTE — PROGRESS NOTES
Progress Note  Date:2022       Room:0430/430-01  Patient Name:Dorene Rojas     YOB: 1965     Age:57 y.o. Subjective    Subjective: 59-year-old female with a history of alcohol use disorder, hypertension, iron deficiency anemia, melanoma, who presented to the hospital 2022 with concerns of drug overdose. Patient took unknown amount of risperidone prior to calling EMS. On scene was noted to have a syncopal episode as well as infestation with bedbugs. Patient was brought to the emergency room in which work-up showed alcohol level of 260, voice attempts to kill herself, as well as hemoglobin of 3.8. Patient was transfused 2 units of packed red blood cell, with improvement in blood counts. Also noted to be severely deficient in iron requiring iron transfusion. Patient was hydrated with fluids, treated for bedbug and admitted in the hospital for further evaluation and psychiatry evaluation. Patient has been seen by psychiatrist, who stated patient currently not suicidal and will require rehab treatment for alcohol. .    Met with SW as she stated she does not have food at home or means of transportation. However patient stated she makes too much to qualify her for medicaid. PT evaluated and awaiting SNF placement. Review of Systems: 12 point system reviewed and negative except as stated above. Objective         Vitals Last 24 Hours:  TEMPERATURE:  Temp  Av °F (36.1 °C)  Min: 96.4 °F (35.8 °C)  Max: 97.5 °F (36.4 °C)  RESPIRATIONS RANGE: Resp  Av  Min: 18  Max: 24  PULSE OXIMETRY RANGE: SpO2  Av.3 %  Min: 86 %  Max: 97 %  PULSE RANGE: Pulse  Av.8  Min: 104  Max: 114  BLOOD PRESSURE RANGE: Systolic (37SJV), HRK:842 , Min:131 , JPN:468   ; Diastolic (95AUF), MYS:02, Min:58, Max:71    I/O (24Hr):     Intake/Output Summary (Last 24 hours) at 2022 1327  Last data filed at 2022  Gross per 24 hour   Intake --   Output 500 ml   Net -500 ml Physical Examination:  General: Well-developed, no acute distress lying comfortably in bed. HEENT: Atraumatic normocephalic, range of motion normal, no JVD, no tracheal deviation noted. Cardiac: Normal S1-S2   Respiratory: clear To auscultation bilaterally, no rhonchi or rales, no wheezing. Abdomen: Soft, positive bowel sounds in all quadrants, no distention, nontender to palpation  Extremities: no tenderness, no edema, moves all extremities  Psych: Affect and mood depressed, behavioral normal.        Labs/Imaging/Diagnostics    Labs:  CBC:  No results for input(s): WBC, RBC, HGB, HCT, MCV, RDW, PLT in the last 72 hours. CHEMISTRIES:  Recent Labs     07/24/22  0410   *   K 3.5      CO2 20*   BUN 4*   CREATININE 0.5   GLUCOSE 107   MG 2.2       PT/INR:  No results for input(s): PROTIME, INR in the last 72 hours. APTT:  No results for input(s): APTT in the last 72 hours. LIVER PROFILE:  No results for input(s): AST, ALT, BILIDIR, BILITOT, ALKPHOS in the last 72 hours. Imaging Last 24 Hours:  XR CHEST PORTABLE    Result Date: 7/20/2022  NO PRIOR REPORT AVAILABLE Exam: X-RAY OF THECHEST Clinical data:Pain. Technique:Single view of the chest. Prior studies: No prior studies submitted. Findings: The lungs are grossly clear; noevidence of acute infiltrate or pleural effusion. Cardiac silhouette is within normal limits. No acute osseous abnormality is detected. 1. Noevidence of acute infiltrate or pleural effusion. Recommendation: Follow up as clinically indicated.  Electronically Signed by Julissa Rosa MD at 20-Jul-2022 07:23:20 AM             Assessment//Plan           Hospital Problems             Last Modified POA    * (Principal) Infestation by bed bug 7/20/2022 Yes    Alcohol abuse with physiological dependence ([facility] [address] 7/20/2022 Yes    Blood loss anemia 7/20/2022 Yes     Assessment & Plan      Intentional Drug Overdose  Severe Anemia ([facility])  Bed Bug Infestation  Hyponatremia  Metabolic Acidosis  Chronic LFT's  Anxiety and Depression  ETOH Abuse  Hypomagnesemia      Plan:  S/p 2 units of PRBC  IV iron infusion 500mg x2 doses  No evidence of ETOH withdrawal noted  Transfuse for Hb < 7  Psychiatry following  SW following and provided in and outpt rehab resources (pt to initiate call)  Sodium Bicarb tabs  Continue monitoring on telemetry  1:1 sitter discontinued by psych  Seen by therapy in house and will need SNF placement      Disposition: Awaiting SNF placement (has been denied at 2 facilities and 3rd doesn't take insurance. Awaiting on response from [facility])      Electronically signed by   Morgan Phalen, MD   Internal Medicine Hospitalist  On 7/26/2022  At 1:27 PM    EMR Dragon/Transcription disclaimer:   Much of this encounter note is an electronic transcription/translation of spoken language to printed text.  The electronic translation of spoken language may permit erroneous, or at times, nonsensical words or phrases to be inadvertently transcribed; although attempts have made to review the note for such errors, some may still exist.

## 2022-07-26 NOTE — CARE COORDINATION
Denied by [facility] Pt. Insurance out of network for [employer]. Awaiting accept/denial from [facility]. Electronically signed by Katie Yoder on 7/26/2022 at 7:56 AM    Denied by [facility]. Electronically signed by Katie Yoder on 7/26/2022 at 8:04 AM    Denied by [facility]ined[facility]. All [place] facilities reviewing. Sent referral to [facility], [address] [place] [address],[address], [address], Jeanine Wu. [address]  [facility]  Phone: [phone]  Fax: [phone] E-fax for admissions  [facility] ([facility])  Phone: [phone]  Fax: [facility]  [address] [facility]  [phone] E-fax for admissions  Electronically signed by Katie Yoder on 7/26/2022 at 2:57 PM    Denied by [facility]. Electronically signed by Katie Yoder on 7/27/2022 at 9:08 AM    Denied by [facility] and [address] [place] [address]. Electronically signed by Katie Yoder on 7/27/2022 at 11:06 AM    Denied by [address] [place] [address],[address]. [facility] reviewing.    Electronically signed by Katie Yoder on 7/28/2022 at 11:10 AM

## 2022-07-26 NOTE — PROGRESS NOTES
Occupational Therapy  Facility/Department: [facility] 4 ONCOLOGY UNIT  Occupational Therapy Initial Assessment    Name: Rajesh Ulrcih  : 1965  MRN: [id number]  Date of Service: 2022    Discharge Recommendations:             Patient Diagnosis(es): The primary encounter diagnosis was Intentional drug overdose, initial encounter [facility]). Diagnoses of Symptomatic anemia, Infestation by bed bug, Acute alcoholic intoxication without complication ([facility] [address] and Syncope and collapse were also pertinent to this visit. Past Medical History:  has a past medical history of Abdominal pain, Alcohol abuse, Arthritis, Constipation, Decreased appetite, Elevated liver enzymes, Emesis - persistent, Fatty liver, Hypertension, Iron deficiency anemia, Jaundice, Melanoma ([facility] [address] and UTI (urinary tract infection). Past Surgical History:  has a past surgical history that includes Hernia repair; Cholecystectomy; hernia repair; Colonoscopy (); Endoscopy, colon, diagnostic (); and skin biopsy (). Treatment Diagnosis: Anemia, falls      Assessment   Performance deficits / Impairments: Decreased functional mobility ; Decreased endurance;Decreased ADL status; Decreased balance;Decreased strength  Assessment: Will progress as tolerated  Treatment Diagnosis: Anemia, falls  Prognosis: Good  Decision Making: Low Complexity  REQUIRES OT FOLLOW-UP: Yes  Activity Tolerance  Activity Tolerance: Patient Tolerated treatment well        Plan   Plan  Times per Week: 3-5x/week  Times per Day: Daily     Restrictions       Subjective   General  Chart Reviewed: Yes  Patient assessed for rehabilitation services?: Yes  Diagnosis: Anemia, Falls     Social/Functional History  Social/Functional History  Lives With: Alone  Type of Home: Mobile home  ADL Assistance: Independent  Ambulation Assistance: Independent  Transfer Assistance: Independent  Additional Comments: chart notes bed bug infestation, now treated       Objective   Heart Rate: (!) 108  Heart Rate Source: Monitor  BP: 139/70  BP Location: Right Arm  MAP (Calculated): 93  Resp: 18  SpO2: 97 %  O2 Device: Nasal cannula             Safety Devices  Type of Devices: Bed alarm in place;Call light within reach;Gait belt;Left in bed        AROM: Within functional limits  Strength: Generally decreased, functional  ADL  Feeding: Supervision  Grooming: Supervision  UE Bathing: Supervision  LE Bathing: Moderate assistance  UE Dressing: Supervision  LE Dressing: Moderate assistance  Toileting: Moderate assistance     Activity Tolerance  Activity Tolerance: Patient tolerated treatment well;Patient limited by endurance  Bed mobility  Supine to Sit: Minimal assistance  Sit to Supine: Minimal assistance  Transfers  Stand Step Transfers: Contact guard assistance;Minimal assistance  Sit to stand: Contact guard assistance;Minimal assistance  Vision  Vision: Within Functional Limits  Hearing  Hearing: Within functional limits  Cognition  Overall Cognitive Status: Exceptions  Arousal/Alertness: Delayed responses to stimuli  Following Commands:  Follows one step commands with increased time  Problem Solving: Assistance required to generate solutions;Assistance required to implement solutions  Orientation  Overall Orientation Status: Within Functional Limits                                           G-Code     OutComes Score                                                  AM-PAC Score             Tinneti Score       Goals  Short Term Goals  Time Frame for Short term goals: 1 week  Short Term Goal 1: Supervision with seated LB dsg  Short Term Goal 2: Supervision with toilet tfers  Short Term Goal 3: Supervision with clothing mgmt  Long Term Goals  Long Term Goal 1: Return to PLOF       Therapy Time   Individual Concurrent Group Co-treatment   Time In           Time Out           Minutes                   Lelo Zaragoza OT

## 2022-07-27 PROCEDURE — 94640 AIRWAY INHALATION TREATMENT: CPT

## 2022-07-27 PROCEDURE — 2700000000 HC OXYGEN THERAPY PER DAY

## 2022-07-27 PROCEDURE — 97161 PT EVAL LOW COMPLEX 20 MIN: CPT

## 2022-07-27 PROCEDURE — 97116 GAIT TRAINING THERAPY: CPT

## 2022-07-27 PROCEDURE — 6370000000 HC RX 637 (ALT 250 FOR IP): Performed by: HOSPITALIST

## 2022-07-27 PROCEDURE — 6370000000 HC RX 637 (ALT 250 FOR IP): Performed by: PSYCHIATRY & NEUROLOGY

## 2022-07-27 PROCEDURE — 97530 THERAPEUTIC ACTIVITIES: CPT

## 2022-07-27 PROCEDURE — 1210000000 HC MED SURG R&B

## 2022-07-27 PROCEDURE — 2580000003 HC RX 258: Performed by: HOSPITALIST

## 2022-07-27 RX ORDER — IPRATROPIUM BROMIDE AND ALBUTEROL SULFATE 2.5; .5 MG/3ML; MG/3ML
1 SOLUTION RESPIRATORY (INHALATION)
Status: DISCONTINUED | OUTPATIENT
Start: 2022-07-27 | End: 2022-08-04 | Stop reason: HOSPADM

## 2022-07-27 RX ADMIN — SODIUM CHLORIDE, PRESERVATIVE FREE 10 ML: 5 INJECTION INTRAVENOUS at 09:17

## 2022-07-27 RX ADMIN — ROSUVASTATIN CALCIUM 5 MG: 10 TABLET, FILM COATED ORAL at 20:07

## 2022-07-27 RX ADMIN — THERA TABS 1 TABLET: TAB at 09:16

## 2022-07-27 RX ADMIN — THIAMINE HCL TAB 100 MG 100 MG: 100 TAB at 09:16

## 2022-07-27 RX ADMIN — IPRATROPIUM BROMIDE AND ALBUTEROL SULFATE 1 AMPULE: 2.5; .5 SOLUTION RESPIRATORY (INHALATION) at 14:11

## 2022-07-27 RX ADMIN — FLUTICASONE PROPIONATE 1 SPRAY: 50 SPRAY, METERED NASAL at 20:06

## 2022-07-27 RX ADMIN — GABAPENTIN 600 MG: 300 CAPSULE ORAL at 20:08

## 2022-07-27 RX ADMIN — SPIRONOLACTONE 25 MG: 25 TABLET ORAL at 09:16

## 2022-07-27 RX ADMIN — FOLIC ACID 1 MG: 1 TABLET ORAL at 09:16

## 2022-07-27 RX ADMIN — LAMOTRIGINE 200 MG: 100 TABLET ORAL at 09:16

## 2022-07-27 RX ADMIN — IPRATROPIUM BROMIDE AND ALBUTEROL SULFATE 1 AMPULE: 2.5; .5 SOLUTION RESPIRATORY (INHALATION) at 18:21

## 2022-07-27 RX ADMIN — OXYBUTYNIN CHLORIDE 5 MG: 5 TABLET ORAL at 20:08

## 2022-07-27 RX ADMIN — SODIUM BICARBONATE 650 MG: 650 TABLET ORAL at 20:06

## 2022-07-27 RX ADMIN — METOPROLOL SUCCINATE 50 MG: 50 TABLET, EXTENDED RELEASE ORAL at 09:16

## 2022-07-27 RX ADMIN — SODIUM BICARBONATE 650 MG: 650 TABLET ORAL at 17:37

## 2022-07-27 RX ADMIN — PANTOPRAZOLE SODIUM 40 MG: 40 TABLET, DELAYED RELEASE ORAL at 09:16

## 2022-07-27 RX ADMIN — SODIUM BICARBONATE 650 MG: 650 TABLET ORAL at 09:16

## 2022-07-27 RX ADMIN — SODIUM BICARBONATE 650 MG: 650 TABLET ORAL at 12:33

## 2022-07-27 RX ADMIN — Medication 5 MG: at 20:06

## 2022-07-27 RX ADMIN — METOPROLOL SUCCINATE 50 MG: 50 TABLET, EXTENDED RELEASE ORAL at 20:05

## 2022-07-27 RX ADMIN — SODIUM CHLORIDE, PRESERVATIVE FREE 10 ML: 5 INJECTION INTRAVENOUS at 20:10

## 2022-07-27 NOTE — PROGRESS NOTES
Comprehensive Nutrition Assessment    Type and Reason for Visit:  Initial, RD Nutrition Re-Screen/LOS    Nutrition Recommendations/Plan:   ONS BID     Malnutrition Assessment:  Malnutrition Status: At risk for malnutrition (Comment) (07/27/22 1250)        Nutrition Assessment:    Following for LOS 7 days. PO intake is <50%. Pt reports appetite is about the same as usual. Will order ONS and follow. Nutrition Related Findings:      Wound Type: None       Current Nutrition Intake & Therapies:    Average Meal Intake: 26-50%  Average Supplements Intake: None Ordered  ADULT DIET; Regular  ADULT ORAL NUTRITION SUPPLEMENT; Lunch, Dinner; Standard High Calorie/High Protein Oral Supplement    Anthropometric Measures:  Height: 5' 6\" (167.6 cm)  Ideal Body Weight (IBW): 130 lbs (59 kg)    Admission Body Weight: 131 lb (59.4 kg)  Current Body Weight: 143 lb (64.9 kg), 110 % IBW. Current BMI (kg/m2): 23.1  Usual Body Weight: 130 lb (59 kg)  % Weight Change (Calculated): 10                    BMI Categories: Normal Weight (BMI 18.5-24. 9)    Estimated Daily Nutrient Needs:  Energy Requirements Based On: Kcal/kg     Energy (kcal/day): 1229-5265  Weight Used for Protein Requirements: Admission  Protein (g/day): 52-78     Fluid (ml/day): 2381-2365    Nutrition Diagnosis:   Inadequate oral intake related to early satiety, psychological cause or life stress as evidenced by intake 26-50%    Nutrition Interventions:   Food and/or Nutrient Delivery: Continue Current Diet, Start Oral Nutrition Supplement  Nutrition Education/Counseling: No recommendation at this time  Coordination of Nutrition Care: Continue to monitor while inpatient       Goals:     Goals: PO intake 50% or greater       Nutrition Monitoring and Evaluation:   Behavioral-Environmental Outcomes: None Identified  Food/Nutrient Intake Outcomes: Food and Nutrient Intake, Supplement Intake  Physical Signs/Symptoms Outcomes: Biochemical Data, Skin, Weight    Discharge Planning:    No discharge needs at this time     Sean Suazo MS, RD, LD  Contact: 6310

## 2022-07-27 NOTE — PROGRESS NOTES
07/27/22 1500   Subjective   Subjective I have already been to the chair  I guess I can take a short walk I want to go BTB. Pain Assessment   Pain Assessment None - Denies Pain   Vitals   O2 Device Nasal cannula   Bed Mobility Training   Bed Mobility Training Yes   Overall Level of Assistance Stand-by assistance   Supine to Sit Stand-by assistance   Sit to Supine Stand-by assistance   Balance   Standing Intact   Transfer Training   Transfer Training Yes   Sit to Stand Contact-guard assistance   Stand to Sit Contact-guard assistance   Gait Training   Gait Training Yes   Gait   Overall Level of Assistance Minimum assistance   Speed/Krys Shuffled; Slow   Step Length Left shortened;Right shortened   Gait Abnormalities Shuffling gait   Distance (ft) 50 Feet  (Slightly unsteady)   Assistive Device Walker, rolling   Patient Education   Education Given To Patient   Education Provided Role of Therapy;Plan of Care;Transfer Training; Fall Prevention Strategies   Education Method Verbal   Assessment   Assessment Patient needs encoragement to participate in therapy   Activity Tolerance Patient tolerated treatment well   PT Equipment Recommendations   Other Patient BTB all needs in reach alarm set   PT Plan of Care   Wednesday X               Electronically signed by Felicia Mcbride PTA on 7/27/2022 at 3:09 PM

## 2022-07-27 NOTE — PROGRESS NOTES
Occupational Therapy     07/27/22 1600   Subjective   Subjective Pt in bed upon arrival for therapy. pt required some encouragement to participate. Pain Assessment   Pain Assessment None - Denies Pain   Cognition   Overall Cognitive Status Exceptions   Arousal/Alertness Delayed responses to stimuli   Following Commands Follows one step commands with increased time   Orientation   Overall Orientation Status WFL   Bed Mobility Training   Bed Mobility Training Yes   Overall Level of Assistance Contact-guard assistance;Stand-by assistance   Interventions Verbal cues; Tactile cues   Supine to Sit Stand-by assistance;Contact-guard assistance   Scooting Contact-guard assistance;Stand-by assistance   Transfer Training   Transfer Training Yes   Overall Level of Assistance Minimum assistance;Contact-guard assistance   Interventions Verbal cues; Tactile cues   Sit to Stand Minimum assistance;Contact-guard assistance   Stand to Sit Contact-guard assistance   Bed to Chair Minimum assistance;Contact-guard assistance   Balance   Sitting Intact   Standing With support  (RW)   ADL   Feeding Supervision   Grooming Stand by assistance   UE Bathing Stand by assistance   LE Bathing Minimal assistance   UE Dressing Stand by assistance   LE Dressing Minimal assistance   Toileting Minimal assistance; Moderate assistance  (for clothing management and thorough clean up)   Additional Comments Pt requires cues to initiate tasks. Assessment   Assessment Tx focused on sitting EOB to perform LBD task; pt instructed on transfer training from bed to recliner at RW level. Pt requires cues to initiate all tasks.    Activity Tolerance Patient tolerated treatment well;Patient limited by endurance;Treatment limited secondary to decreased cognition   Discharge Recommendations Patient would benefit from continued therapy after discharge   Plan   Times per Week 3-5x/week   Times per Day Daily   OT Plan of Care   Wednesday X   Electronically signed by PILO Phelps on 7/27/2022 at 4:17 PM

## 2022-07-28 LAB
ANION GAP SERPL CALCULATED.3IONS-SCNC: 10 MMOL/L (ref 7–19)
BUN BLDV-MCNC: 3 MG/DL (ref 6–20)
CALCIUM SERPL-MCNC: 7.8 MG/DL (ref 8.6–10)
CHLORIDE BLD-SCNC: 102 MMOL/L (ref 98–111)
CO2: 20 MMOL/L (ref 22–29)
CREAT SERPL-MCNC: 0.5 MG/DL (ref 0.5–0.9)
GFR AFRICAN AMERICAN: >59
GFR NON-AFRICAN AMERICAN: >60
GLUCOSE BLD-MCNC: 98 MG/DL (ref 74–109)
HCT VFR BLD CALC: 25.5 % (ref 37–47)
HEMOGLOBIN: 8 G/DL (ref 12–16)
MCH RBC QN AUTO: 29.9 PG (ref 27–31)
MCHC RBC AUTO-ENTMCNC: 31.4 G/DL (ref 33–37)
MCV RBC AUTO: 95.1 FL (ref 81–99)
PDW BLD-RTO: 16.3 % (ref 11.5–14.5)
PLATELET # BLD: 304 K/UL (ref 130–400)
PMV BLD AUTO: 9.4 FL (ref 9.4–12.3)
POTASSIUM SERPL-SCNC: 3.6 MMOL/L (ref 3.5–5)
RBC # BLD: 2.68 M/UL (ref 4.2–5.4)
SODIUM BLD-SCNC: 132 MMOL/L (ref 136–145)
WBC # BLD: 7.4 K/UL (ref 4.8–10.8)

## 2022-07-28 PROCEDURE — 94640 AIRWAY INHALATION TREATMENT: CPT

## 2022-07-28 PROCEDURE — 2580000003 HC RX 258: Performed by: HOSPITALIST

## 2022-07-28 PROCEDURE — 97116 GAIT TRAINING THERAPY: CPT

## 2022-07-28 PROCEDURE — 97535 SELF CARE MNGMENT TRAINING: CPT

## 2022-07-28 PROCEDURE — 1210000000 HC MED SURG R&B

## 2022-07-28 PROCEDURE — 6370000000 HC RX 637 (ALT 250 FOR IP): Performed by: HOSPITALIST

## 2022-07-28 PROCEDURE — 2700000000 HC OXYGEN THERAPY PER DAY

## 2022-07-28 PROCEDURE — 97530 THERAPEUTIC ACTIVITIES: CPT

## 2022-07-28 PROCEDURE — 80048 BASIC METABOLIC PNL TOTAL CA: CPT

## 2022-07-28 PROCEDURE — 6370000000 HC RX 637 (ALT 250 FOR IP): Performed by: PSYCHIATRY & NEUROLOGY

## 2022-07-28 PROCEDURE — 85027 COMPLETE CBC AUTOMATED: CPT

## 2022-07-28 PROCEDURE — 36415 COLL VENOUS BLD VENIPUNCTURE: CPT

## 2022-07-28 RX ADMIN — GABAPENTIN 600 MG: 300 CAPSULE ORAL at 20:28

## 2022-07-28 RX ADMIN — SODIUM CHLORIDE, PRESERVATIVE FREE 10 ML: 5 INJECTION INTRAVENOUS at 09:49

## 2022-07-28 RX ADMIN — OXYBUTYNIN CHLORIDE 5 MG: 5 TABLET ORAL at 20:28

## 2022-07-28 RX ADMIN — IPRATROPIUM BROMIDE AND ALBUTEROL SULFATE 1 AMPULE: 2.5; .5 SOLUTION RESPIRATORY (INHALATION) at 18:51

## 2022-07-28 RX ADMIN — METOPROLOL SUCCINATE 50 MG: 50 TABLET, EXTENDED RELEASE ORAL at 09:49

## 2022-07-28 RX ADMIN — ROSUVASTATIN CALCIUM 5 MG: 10 TABLET, FILM COATED ORAL at 20:28

## 2022-07-28 RX ADMIN — SODIUM BICARBONATE 650 MG: 650 TABLET ORAL at 18:04

## 2022-07-28 RX ADMIN — LAMOTRIGINE 200 MG: 100 TABLET ORAL at 09:49

## 2022-07-28 RX ADMIN — SODIUM CHLORIDE, PRESERVATIVE FREE 10 ML: 5 INJECTION INTRAVENOUS at 20:29

## 2022-07-28 RX ADMIN — IPRATROPIUM BROMIDE AND ALBUTEROL SULFATE 1 AMPULE: 2.5; .5 SOLUTION RESPIRATORY (INHALATION) at 10:02

## 2022-07-28 RX ADMIN — THIAMINE HCL TAB 100 MG 100 MG: 100 TAB at 09:49

## 2022-07-28 RX ADMIN — SPIRONOLACTONE 25 MG: 25 TABLET ORAL at 09:49

## 2022-07-28 RX ADMIN — IPRATROPIUM BROMIDE AND ALBUTEROL SULFATE 1 AMPULE: 2.5; .5 SOLUTION RESPIRATORY (INHALATION) at 06:50

## 2022-07-28 RX ADMIN — PANTOPRAZOLE SODIUM 40 MG: 40 TABLET, DELAYED RELEASE ORAL at 09:49

## 2022-07-28 RX ADMIN — SODIUM BICARBONATE 650 MG: 650 TABLET ORAL at 09:49

## 2022-07-28 RX ADMIN — IPRATROPIUM BROMIDE AND ALBUTEROL SULFATE 1 AMPULE: 2.5; .5 SOLUTION RESPIRATORY (INHALATION) at 14:36

## 2022-07-28 RX ADMIN — FOLIC ACID 1 MG: 1 TABLET ORAL at 09:49

## 2022-07-28 RX ADMIN — THERA TABS 1 TABLET: TAB at 09:49

## 2022-07-28 RX ADMIN — Medication 5 MG: at 20:28

## 2022-07-28 RX ADMIN — FLUTICASONE PROPIONATE 1 SPRAY: 50 SPRAY, METERED NASAL at 20:30

## 2022-07-28 RX ADMIN — METOPROLOL SUCCINATE 50 MG: 50 TABLET, EXTENDED RELEASE ORAL at 20:28

## 2022-07-28 RX ADMIN — SODIUM BICARBONATE 650 MG: 650 TABLET ORAL at 20:27

## 2022-07-28 ASSESSMENT — PAIN SCALES - GENERAL: PAINLEVEL_OUTOF10: 0

## 2022-07-28 NOTE — CARE COORDINATION
Spoke with pt, explained that there has been difficulty getting a facility to accept her, and our next plan would be dc home with home health as pt is showing improvement daily with PTOT. 2 more facilities reviewing pt. Pt voiced understanding.    Electronically signed by Naa Callahan on 7/28/2022 at 1:11 PM

## 2022-07-28 NOTE — PROGRESS NOTES
Length of Stay:   LOS: 8 days      Chief complaint:  Chief Complaint   Patient presents with    Drug Overdose     Risperidone OD took unknown amount. When EMS arrived she was talking, lost pulse and started CPR per EMS less than one minute. Pt talking and alert upon arrival            Subjective:      Physical Examination:  /64   Pulse 80   Temp (!) 96.6 °F (35.9 °C) (Temporal)   Resp 16   Ht 5' 6\" (1.676 m)   Wt 143 lb 3 oz (64.9 kg)   SpO2 100%   BMI 23.11 kg/m²   Constitutional - Appropriately groomed, good nutritional status  Psychiatric - AOX3, baseline mood  Eyes - EOMI, conjunctivae and lids intact  ENMT - lips, teeth, gums intact; hearing intact  Respiratory - CTAB, no accessory muscle use  Cardiovascular - Clear S1, S2 no gross m/r/g; pedal pulses intact  Abd - Soft, non-tender; No gross hernia  MSK - UE and LE joints intact, no gross clubbing  Skin - No rashes or wounds; no gross capillary refill defects  Neurologic - CN 2-12 grossly intact, sensation intact    Medications:    ipratropium-albuterol, 1 ampule, Inhalation, Q4H WA    metoprolol succinate, 50 mg, Oral, BID    melatonin, 5 mg, Oral, Nightly    sodium bicarbonate, 650 mg, Oral, 4x Daily    sodium chloride, 1,000 mL, IntraVENous, Once    sodium chloride flush, 5-40 mL, IntraVENous, 2 times per day    thiamine, 100 mg, Oral, Daily    multivitamin, 1 tablet, Oral, Daily    folic acid, 1 mg, Oral, Daily    lamoTRIgine, 200 mg, Oral, Daily    pantoprazole, 40 mg, Oral, Daily    oxybutynin, 5 mg, Oral, Nightly    fluticasone, 1 spray, Each Nostril, BID    gabapentin, 600 mg, Oral, Nightly    spironolactone, 25 mg, Oral, Daily    rosuvastatin, 5 mg, Oral, Nightly      Problem list:  Principal Problem:    Infestation by bed bug  Active Problems:    Alcohol abuse with physiological dependence (HCC)    Blood loss anemia  Resolved Problems:    * No resolved hospital problems.  *        A/P:  []Overdose  -Seen by psych, no indication for psych admission  Denies SI/HI at this time    []Alcohol abuse and withdrawal  -Now much improved, no acute signs of withdrawal    []Severe anemia  No signs of acute bleeding  Elevated MCV  Follow GI in outpatient    Awaiting SNF placement        Kaitlin Lay M.D.,  7/28/2022

## 2022-07-28 NOTE — PROGRESS NOTES
07/28/22 1500   Subjective   Subjective I won't sit in this chair I'll put myself BTB  (Post gait patient removed personal alarm and IND. TR BTB.)   Pain Assessment   Pain Assessment None - Denies Pain  (Simultaneous filing. User may not have seen previous data.)   Orientation   Overall Orientation Status WFL   Vitals   O2 Device None (Room air)  (SPO2 in 90's pre and post gait. On RA)   Bed Mobility Training   Bed Mobility Training Yes  (Simultaneous filing. User may not have seen previous data.)   Overall Level of Assistance Contact-guard assistance  (Simultaneous filing. User may not have seen previous data.)   Interventions Verbal cues; Tactile cues;Manual cues   Supine to Sit Contact-guard assistance  (Simultaneous filing. User may not have seen previous data.)   Sit to Supine Independent   Scooting Stand-by assistance  (To EOB  Simultaneous filing. User may not have seen previous data.)   Balance   Sitting Intact  (Simultaneous filing. User may not have seen previous data.)   Standing With support  (RW  Simultaneous filing. User may not have seen previous data.)   Transfer Training   Transfer Training Yes  (Simultaneous filing. User may not have seen previous data.)   Overall Level of Assistance Stand-by assistance   Interventions Verbal cues; Tactile cues   Sit to Stand Stand-by assistance  (Simultaneous filing. User may not have seen previous data.)   Stand to Sit Stand-by assistance  (Simultaneous filing. User may not have seen previous data.)   Bed to Chair Stand-by assistance;Contact-guard assistance  (To BSC. Patient told therapist she needed abrief because she had not TR. to [facility]. Simultaneous filing.  User may not have seen previous data.)   Gait Training   Gait Training Yes   Gait   Overall Level of Assistance Stand-by assistance;Contact-guard assistance   Speed/Krys Slow   Step Length Left shortened;Right shortened   Distance (ft) 100 Feet  (Patient informed therapist she lived in mobile home

## 2022-07-28 NOTE — PROGRESS NOTES
Whiteboard Notes   Therapy Whiteboard BSC brought to room. Will continue to train.    Electronically signed by PILO Pulido on 7/28/2022 at 4:06 PM

## 2022-07-28 NOTE — PROGRESS NOTES
Progress Note  Date:2022       Room:0430/430-01  Patient Name:Dorene Rojas     YOB: 1965     Age:57 y.o. Subjective    Subjective: 70-year-old female with a history of alcohol use disorder, hypertension, iron deficiency anemia, melanoma, who presented to the hospital 2022 with concerns of drug overdose. Patient took unknown amount of risperidone prior to calling EMS. On scene was noted to have a syncopal episode as well as infestation with bedbugs. Patient was brought to the emergency room in which work-up showed alcohol level of 260, voice attempts to kill herself, as well as hemoglobin of 3.8. Patient was transfused 2 units of packed red blood cell, with improvement in blood counts. Also noted to be severely deficient in iron requiring iron transfusion. Patient was hydrated with fluids, treated for bedbug and admitted in the hospital for further evaluation and psychiatry evaluation. Patient has been seen by psychiatrist, who stated patient currently not suicidal and will require rehab treatment for alcohol. Patient is deconditioned. Encourage participation with PT OT and breathing treatment orders as needed. Patient expressed to  she does not have food at home or means of transportation. However patient stated she makes too much to qualify her for medicaid. PT evaluated and awaiting SNF placement. Patient seen and evaluated at bedside. During episode of shortness of breath later on was participating in PT which responded to breathing treatment. Review of Systems: 12 point system reviewed and negative except as stated above.       Objective         Vitals Last 24 Hours:  TEMPERATURE:  Temp  Av.4 °F (36.3 °C)  Min: 97 °F (36.1 °C)  Max: 97.9 °F (36.6 °C)  RESPIRATIONS RANGE: Resp  Av.2  Min: 16  Max: 18  PULSE OXIMETRY RANGE: SpO2  Av.2 %  Min: 90 %  Max: 96 %  PULSE RANGE: Pulse  Av.6  Min: 95  Max: 120  BLOOD PRESSURE RANGE: Infestation by bed bug 7/20/2022 Yes    Alcohol abuse with physiological dependence ([address] 7/20/2022 Yes    Blood loss anemia 7/20/2022 Yes   Assessment & Plan      Intentional Drug Overdose  Severe Anemia ([facility])  Bed Bug Infestation  Hyponatremia  Metabolic Acidosis  Chronic LFT's  Anxiety and Depression  ETOH Abuse  Hypomagnesemia      Plan:  S/p 2 units of PRBC  IV iron infusion 500mg x2 doses  No evidence of ETOH withdrawal noted  Transfuse for Hb < 7  Psychiatry following  SW following and provided in and outpt rehab resources (pt to initiate call)  Sodium Bicarb tabs  Continue monitoring on telemetry  1:1 sitter discontinued by psych  Seen by therapy in house and will need SNF placement  Encourage patient to participate in PT/OT  Patient given breathing treatments for shortness of breath  If he has repetition of shortness of breath, will consider further work-up starting with chest x-ray PA and lateral      Disposition: Awaiting SNF placement (has been denied at 2 facilities and 3rd doesn't take insurance. Awaiting on response from [facility])    Repeat labs in a.m. Electrolyte replacement as per protocol. Patient will be monitored very closely on the floor. Further recommendations as per the hospital course. The patient's management will be taken over by our covering Hospitalist Physician, Dr. Lee Patel, in a.m . .. I am signing off! Electronically signed by   Viki Hatchet, MD     On 7/27/2022  At 7:42 PM    EMR Dragon/Transcription disclaimer:   Much of this encounter note is an electronic transcription/translation of spoken language to printed text.  The electronic translation of spoken language may permit erroneous, or at times, nonsensical words or phrases to be inadvertently transcribed; although attempts have made to review the note for such errors, some may still exist.

## 2022-07-28 NOTE — PLAN OF CARE
Problem: Self Harm/Suicidality  Goal: Will have no self-injury during hospital stay  Description: INTERVENTIONS:  1. Q 30 MINUTES: Routine safety checks  2. Q SHIFT & PRN: Assess risk to determine if routine checks are adequate to maintain patient safety  Outcome: Progressing     Problem: Discharge Planning  Goal: Discharge to home or other facility with appropriate resources  Outcome: Progressing     Problem: Safety - Adult  Goal: Free from fall injury  Outcome: Progressing     Problem: ABCDS Injury Assessment  Goal: Absence of physical injury  Outcome: Progressing     Problem: Pain  Goal: Verbalizes/displays adequate comfort level or baseline comfort level  Outcome: Progressing     Problem: Skin/Tissue Integrity  Goal: Absence of new skin breakdown  Description: 1. Monitor for areas of redness and/or skin breakdown  2. Assess vascular access sites hourly  3. Every 4-6 hours minimum:  Change oxygen saturation probe site  4. Every 4-6 hours:  If on nasal continuous positive airway pressure, respiratory therapy assess nares and determine need for appliance change or resting period. Outcome: Progressing     Problem: Neurosensory - Adult  Goal: Achieves stable or improved neurological status  Outcome: Progressing  Goal: Absence of seizures  Outcome: Progressing  Goal: Remains free of injury related to seizures activity  Outcome: Progressing  Goal: Achieves maximal functionality and self care  Outcome: Progressing     Problem: Spiritual Care  Goal: Pt/Family able to move forward in process of forgiving self, others, and/or higher power  Description: INTERVENTIONS:  1. Assist patient/family to overcome blocks to healing by use of spiritual practices (prayer, meditation, guided imagery, reiki, breath work, etc). 2. De-myth guilt and help patient/family identify possible irrational spiritual/cultural beliefs and values.   3. Explore possibilities of making amends & reconciliation with self, others, and/or a greater power.  4. Guide patient/family in identifying painful feelings of guilt. 5. Help patient/famiy explore and identify spiritual beliefs, cultural understandings or values that may help or hinder letting go of issue. 6. Help patient/family explore feelings of anger, bitterness, resentment. 7. Help patient/family identify and examine the situation in which these feelings are experienced. 8. Help patient/family identify destructive displacement of feelings onto other individuals. 9. Invite use of sacraments/rituals/ceremonies as appropriate (e.g. - confession, anointing, smudging). 10. Refer patient/family to formal counseling and/or to lilly community for further support work.   Outcome: Progressing     Problem: Nutrition Deficit:  Goal: Optimize nutritional status  Outcome: Progressing

## 2022-07-29 LAB
ALBUMIN SERPL-MCNC: 2.5 G/DL (ref 3.5–5.2)
ALP BLD-CCNC: 337 U/L (ref 35–104)
ALT SERPL-CCNC: 35 U/L (ref 5–33)
ANION GAP SERPL CALCULATED.3IONS-SCNC: 10 MMOL/L (ref 7–19)
AST SERPL-CCNC: 64 U/L (ref 5–32)
BASOPHILS ABSOLUTE: 0.1 K/UL (ref 0–0.2)
BASOPHILS RELATIVE PERCENT: 0.7 % (ref 0–1)
BILIRUB SERPL-MCNC: 0.6 MG/DL (ref 0.2–1.2)
BUN BLDV-MCNC: 3 MG/DL (ref 6–20)
CALCIUM SERPL-MCNC: 7.9 MG/DL (ref 8.6–10)
CHLORIDE BLD-SCNC: 105 MMOL/L (ref 98–111)
CO2: 20 MMOL/L (ref 22–29)
CREAT SERPL-MCNC: 0.5 MG/DL (ref 0.5–0.9)
EOSINOPHILS ABSOLUTE: 0.1 K/UL (ref 0–0.6)
EOSINOPHILS RELATIVE PERCENT: 0.9 % (ref 0–5)
GFR AFRICAN AMERICAN: >59
GFR NON-AFRICAN AMERICAN: >60
GLUCOSE BLD-MCNC: 97 MG/DL (ref 74–109)
HCT VFR BLD CALC: 25.1 % (ref 37–47)
HEMOGLOBIN: 8.2 G/DL (ref 12–16)
IMMATURE GRANULOCYTES #: 0.1 K/UL
LYMPHOCYTES ABSOLUTE: 1.3 K/UL (ref 1.1–4.5)
LYMPHOCYTES RELATIVE PERCENT: 16.4 % (ref 20–40)
MCH RBC QN AUTO: 31.1 PG (ref 27–31)
MCHC RBC AUTO-ENTMCNC: 32.7 G/DL (ref 33–37)
MCV RBC AUTO: 95.1 FL (ref 81–99)
MONOCYTES ABSOLUTE: 0.6 K/UL (ref 0–0.9)
MONOCYTES RELATIVE PERCENT: 7.2 % (ref 0–10)
NEUTROPHILS ABSOLUTE: 6.1 K/UL (ref 1.5–7.5)
NEUTROPHILS RELATIVE PERCENT: 74.2 % (ref 50–65)
PDW BLD-RTO: 16.5 % (ref 11.5–14.5)
PLATELET # BLD: 306 K/UL (ref 130–400)
PMV BLD AUTO: 9.2 FL (ref 9.4–12.3)
POTASSIUM SERPL-SCNC: 3.8 MMOL/L (ref 3.5–5)
RBC # BLD: 2.64 M/UL (ref 4.2–5.4)
SODIUM BLD-SCNC: 135 MMOL/L (ref 136–145)
TOTAL PROTEIN: 5.9 G/DL (ref 6.6–8.7)
WBC # BLD: 8.2 K/UL (ref 4.8–10.8)

## 2022-07-29 PROCEDURE — 2500000003 HC RX 250 WO HCPCS: Performed by: INTERNAL MEDICINE

## 2022-07-29 PROCEDURE — 85025 COMPLETE CBC W/AUTO DIFF WBC: CPT

## 2022-07-29 PROCEDURE — 1210000000 HC MED SURG R&B

## 2022-07-29 PROCEDURE — 6370000000 HC RX 637 (ALT 250 FOR IP): Performed by: PSYCHIATRY & NEUROLOGY

## 2022-07-29 PROCEDURE — 97530 THERAPEUTIC ACTIVITIES: CPT

## 2022-07-29 PROCEDURE — 36415 COLL VENOUS BLD VENIPUNCTURE: CPT

## 2022-07-29 PROCEDURE — 6370000000 HC RX 637 (ALT 250 FOR IP): Performed by: INTERNAL MEDICINE

## 2022-07-29 PROCEDURE — 97116 GAIT TRAINING THERAPY: CPT

## 2022-07-29 PROCEDURE — 2580000003 HC RX 258: Performed by: HOSPITALIST

## 2022-07-29 PROCEDURE — 6370000000 HC RX 637 (ALT 250 FOR IP): Performed by: HOSPITALIST

## 2022-07-29 PROCEDURE — 80053 COMPREHEN METABOLIC PANEL: CPT

## 2022-07-29 PROCEDURE — 94640 AIRWAY INHALATION TREATMENT: CPT

## 2022-07-29 PROCEDURE — 94761 N-INVAS EAR/PLS OXIMETRY MLT: CPT

## 2022-07-29 RX ORDER — LORAZEPAM 0.5 MG/1
0.5 TABLET ORAL ONCE
Status: COMPLETED | OUTPATIENT
Start: 2022-07-29 | End: 2022-07-29

## 2022-07-29 RX ORDER — CALCIUM GLUCONATE 20 MG/ML
1000 INJECTION, SOLUTION INTRAVENOUS ONCE
Status: COMPLETED | OUTPATIENT
Start: 2022-07-29 | End: 2022-07-29

## 2022-07-29 RX ADMIN — SPIRONOLACTONE 25 MG: 25 TABLET ORAL at 09:15

## 2022-07-29 RX ADMIN — Medication 5 MG: at 20:13

## 2022-07-29 RX ADMIN — ROSUVASTATIN CALCIUM 5 MG: 10 TABLET, FILM COATED ORAL at 20:14

## 2022-07-29 RX ADMIN — SODIUM CHLORIDE, PRESERVATIVE FREE 10 ML: 5 INJECTION INTRAVENOUS at 09:14

## 2022-07-29 RX ADMIN — IPRATROPIUM BROMIDE AND ALBUTEROL SULFATE 1 AMPULE: 2.5; .5 SOLUTION RESPIRATORY (INHALATION) at 07:25

## 2022-07-29 RX ADMIN — METOPROLOL SUCCINATE 50 MG: 50 TABLET, EXTENDED RELEASE ORAL at 20:15

## 2022-07-29 RX ADMIN — PANTOPRAZOLE SODIUM 40 MG: 40 TABLET, DELAYED RELEASE ORAL at 09:14

## 2022-07-29 RX ADMIN — SODIUM CHLORIDE, PRESERVATIVE FREE 10 ML: 5 INJECTION INTRAVENOUS at 20:13

## 2022-07-29 RX ADMIN — LORAZEPAM 0.5 MG: 0.5 TABLET ORAL at 09:49

## 2022-07-29 RX ADMIN — SODIUM BICARBONATE 650 MG: 650 TABLET ORAL at 15:53

## 2022-07-29 RX ADMIN — CALCIUM GLUCONATE 1000 MG: 20 INJECTION, SOLUTION INTRAVENOUS at 15:58

## 2022-07-29 RX ADMIN — IPRATROPIUM BROMIDE AND ALBUTEROL SULFATE 1 AMPULE: 2.5; .5 SOLUTION RESPIRATORY (INHALATION) at 18:03

## 2022-07-29 RX ADMIN — METOPROLOL SUCCINATE 50 MG: 50 TABLET, EXTENDED RELEASE ORAL at 09:15

## 2022-07-29 RX ADMIN — LAMOTRIGINE 200 MG: 100 TABLET ORAL at 09:15

## 2022-07-29 RX ADMIN — FOLIC ACID 1 MG: 1 TABLET ORAL at 09:14

## 2022-07-29 RX ADMIN — SODIUM BICARBONATE 650 MG: 650 TABLET ORAL at 09:15

## 2022-07-29 RX ADMIN — FLUTICASONE PROPIONATE 1 SPRAY: 50 SPRAY, METERED NASAL at 20:17

## 2022-07-29 RX ADMIN — SODIUM BICARBONATE 650 MG: 650 TABLET ORAL at 17:16

## 2022-07-29 RX ADMIN — THERA TABS 1 TABLET: TAB at 09:15

## 2022-07-29 RX ADMIN — IPRATROPIUM BROMIDE AND ALBUTEROL SULFATE 1 AMPULE: 2.5; .5 SOLUTION RESPIRATORY (INHALATION) at 14:36

## 2022-07-29 RX ADMIN — GABAPENTIN 600 MG: 300 CAPSULE ORAL at 20:15

## 2022-07-29 RX ADMIN — THIAMINE HCL TAB 100 MG 100 MG: 100 TAB at 09:15

## 2022-07-29 RX ADMIN — OXYBUTYNIN CHLORIDE 5 MG: 5 TABLET ORAL at 20:15

## 2022-07-29 RX ADMIN — SODIUM BICARBONATE 650 MG: 650 TABLET ORAL at 20:15

## 2022-07-29 ASSESSMENT — PAIN SCALES - GENERAL: PAINLEVEL_OUTOF10: 0

## 2022-07-29 NOTE — CARE COORDINATION
Denied by [facility] in [place]. Spoke with pt, explained that many referrals have been sent with no possibility of getting her a bed offer. Pt states she feels like she is doing better and will be able to ambulate at home if a walker is ordered for her. She is not interested in a [facility] or any other DME. She does not have a preference of provider for walker. Offered HH, pt states that she is not interested due to \"situations at home\". Spoke with Zuly Bowman, APS worker assigned to pt's case. Gave him an update of the situation, explained that pt would be [facility] home. He will continue to follow with pt at [facility].    Electronically signed by Arnaud Stack on 7/29/2022 at 11:37 AM

## 2022-07-29 NOTE — PLAN OF CARE
Problem: Self Harm/Suicidality  Goal: Will have no self-injury during hospital stay  Description: INTERVENTIONS:  1. Q 30 MINUTES: Routine safety checks  2. Q SHIFT & PRN: Assess risk to determine if routine checks are adequate to maintain patient safety  Outcome: Progressing     Problem: Discharge Planning  Goal: Discharge to home or other facility with appropriate resources  Outcome: Progressing     Problem: Safety - Adult  Goal: Free from fall injury  Outcome: Progressing     Problem: ABCDS Injury Assessment  Goal: Absence of physical injury  Outcome: Progressing     Problem: Pain  Goal: Verbalizes/displays adequate comfort level or baseline comfort level  Outcome: Progressing     Problem: Skin/Tissue Integrity  Goal: Absence of new skin breakdown  Description: 1. Monitor for areas of redness and/or skin breakdown  2. Assess vascular access sites hourly  3. Every 4-6 hours minimum:  Change oxygen saturation probe site  4. Every 4-6 hours:  If on nasal continuous positive airway pressure, respiratory therapy assess nares and determine need for appliance change or resting period. Outcome: Progressing     Problem: Neurosensory - Adult  Goal: Achieves stable or improved neurological status  Outcome: Progressing  Goal: Absence of seizures  Outcome: Progressing  Goal: Remains free of injury related to seizures activity  Outcome: Progressing  Goal: Achieves maximal functionality and self care  Outcome: Progressing     Problem: Genitourinary - Adult  Goal: Urinary catheter remains patent  Outcome: Progressing     Problem: Spiritual Care  Goal: Pt/Family able to move forward in process of forgiving self, others, and/or higher power  Description: INTERVENTIONS:  1. Assist patient/family to overcome blocks to healing by use of spiritual practices (prayer, meditation, guided imagery, reiki, breath work, etc).   2. De-myth guilt and help patient/family identify possible irrational spiritual/cultural beliefs and values. 3. Explore possibilities of making amends & reconciliation with self, others, and/or a greater power. 4. Guide patient/family in identifying painful feelings of guilt. 5. Help patient/famiy explore and identify spiritual beliefs, cultural understandings or values that may help or hinder letting go of issue. 6. Help patient/family explore feelings of anger, bitterness, resentment. 7. Help patient/family identify and examine the situation in which these feelings are experienced. 8. Help patient/family identify destructive displacement of feelings onto other individuals. 9. Invite use of sacraments/rituals/ceremonies as appropriate (e.g. - confession, anointing, smudging). 10. Refer patient/family to formal counseling and/or to lilly community for further support work.   Outcome: Progressing     Problem: Nutrition Deficit:  Goal: Optimize nutritional status  Outcome: Progressing

## 2022-07-29 NOTE — PROGRESS NOTES
Home 02 eval:   Pt spo2 at rest while on RA was 98%.  While ambulating on RA pt spo2 was between 97-95%

## 2022-07-29 NOTE — PROGRESS NOTES
Physical Therapy  Name: Kaela Reid  MRN:  [id number]  Date of service:  7/29/2022 07/29/22 1524   Subjective   Subjective Patient is laying in bed and agrees to therapy   Pain Assessment   Pain Assessment None - Denies Pain   Bed Mobility   Supine to Sit Stand by assistance;Contact guard assistance   Sit to Supine Stand by assistance;Contact guard assistance   Transfers   Sit to Stand Stand by assistance;Contact guard assistance   Stand to sit Stand by assistance;Contact guard assistance   Ambulation   Surface level tile   Device Rollator   Assistance Stand by assistance;Contact guard assistance   Quality of Gait slow shuffling steps. le's \"shaky\" and weak appearing   Gait Deviations Decreased step length;Decreased step height   Distance 125'   Short Term Goals   Time Frame for Short term goals 2 wks   Short term goal 1 SUP<>SIT, SBA   Short term goal 2  FT WITH RW, CGA   Conditions Requiring Skilled Therapeutic Intervention   Body Structures, Functions, Activity Limitations Requiring Skilled Therapeutic Intervention Decreased functional mobility ; Decreased strength;Decreased endurance;Decreased balance;Decreased safe awareness   Assessment Patient able to ambulate with rollator with education given on applying breaks and usage. She tolerates well having no LOB but continues to be some shaky in standing. She was positioned sitting EOB with respiratory present about to give breathing treatment. Will continue to follow. Activity Tolerance   Activity Tolerance Patient tolerated treatment well   PT Plan of Care   Friday X   Safety Devices   Type of Devices Call light within reach; Left in bed       Electronically signed by Kath Mccormack PTA on 7/29/2022 at 3:30 PM

## 2022-07-29 NOTE — PROGRESS NOTES
Occupational Therapy     07/29/22 1500   Subjective   Subjective Pt in bed upon arrival for therapy. Pt had just been delivered a new rolator walker for her return home. Pt states \"how do I use that thing\". Pain Assessment   Pain Assessment None - Denies Pain   Orientation   Overall Orientation Status WFL   Bed Mobility Training   Bed Mobility Training Yes   Overall Level of Assistance Supervision   Transfer Training   Transfer Training Yes   Overall Level of Assistance Contact-guard assistance;Stand-by assistance   Interventions Verbal cues; Tactile cues   Balance   Sitting Intact   Standing With support   ADL   Feeding Setup;Supervision   Grooming Contact guard assistance;Stand by assistance  (standing at sink)   UE Bathing Stand by assistance   UE Bathing Skilled Clinical Factors seated   LE Bathing Contact guard assistance;Stand by assistance   LE Bathing Skilled Clinical Factors seated   UE Dressing Stand by assistance   LE Dressing Contact guard assistance;Stand by assistance   Toileting Contact guard assistance   Additional Comments Pt requires cues to initiate tasks. Assessment   Assessment Tx focused on functional mobility at rolator walker, safety tranining with transfers and proper use of rolator walker for preparation to return home.    Activity Tolerance Patient tolerated treatment well   Discharge Recommendations Patient would benefit from continued therapy after discharge   Plan   Times per Week 3-5x/week   Times per Day Daily   OT Plan of Care   Friday X   Electronically signed by PILO Dunbar on 7/29/2022 at 3:27 PM

## 2022-07-29 NOTE — CARE COORDINATION
Mikey order sent to [employer].. Requested that they deliver to pt's room.    [employer]   P  [phone] [id number]  Electronically signed by Lexis Multani on 7/29/2022 at 12:17 PM

## 2022-07-29 NOTE — PROGRESS NOTES
Length of Stay:   LOS: 9 days      Chief complaint:  Chief Complaint   Patient presents with    Drug Overdose     Risperidone OD took unknown amount. When EMS arrived she was talking, lost pulse and started CPR per EMS less than one minute. Pt talking and alert upon arrival            Subjective:  Resting in bed, feeling better    Physical Examination:  /72   Pulse 86   Temp 97.4 °F (36.3 °C) (Temporal)   Resp 18   Ht 5' 6\" (1.676 m)   Wt 134 lb 2 oz (60.8 kg)   SpO2 96%   BMI 21.65 kg/m²   Constitutional - Appropriately groomed, good nutritional status  Psychiatric - AOX3, baseline mood  Eyes - EOMI, conjunctivae and lids intact  ENMT - lips, teeth, gums intact; hearing intact  Respiratory - CTAB, no accessory muscle use  Cardiovascular - Clear S1, S2 no gross m/r/g; pedal pulses intact  Abd - Soft, non-tender; No gross hernia  MSK - UE and LE joints intact, no gross clubbing  Skin - No rashes or wounds; no gross capillary refill defects  Neurologic - CN 2-12 grossly intact, sensation intact    Medications:    ipratropium-albuterol, 1 ampule, Inhalation, Q4H WA    metoprolol succinate, 50 mg, Oral, BID    melatonin, 5 mg, Oral, Nightly    sodium bicarbonate, 650 mg, Oral, 4x Daily    sodium chloride, 1,000 mL, IntraVENous, Once    sodium chloride flush, 5-40 mL, IntraVENous, 2 times per day    thiamine, 100 mg, Oral, Daily    multivitamin, 1 tablet, Oral, Daily    folic acid, 1 mg, Oral, Daily    lamoTRIgine, 200 mg, Oral, Daily    pantoprazole, 40 mg, Oral, Daily    oxybutynin, 5 mg, Oral, Nightly    fluticasone, 1 spray, Each Nostril, BID    gabapentin, 600 mg, Oral, Nightly    spironolactone, 25 mg, Oral, Daily    rosuvastatin, 5 mg, Oral, Nightly      Problem list:  Principal Problem:    Infestation by bed bug  Active Problems:    Alcohol abuse with physiological dependence (HCC)    Blood loss anemia  Resolved Problems:    * No resolved hospital problems.  *        A/P:  []Overdose  -Seen by psych, no indication for psych admission  Denies SI/HI at this time    []Alcohol abuse and withdrawal  -Now much improved, no acute signs of withdrawal    []Severe anemia  No signs of acute bleeding  Elevated MCV  Follow GI in outpatient    Awaiting SNF placement        Kaitlin Lay M.D.,  7/29/2022

## 2022-07-30 LAB
ALBUMIN SERPL-MCNC: 2.2 G/DL (ref 3.5–5.2)
ALP BLD-CCNC: 302 U/L (ref 35–104)
ALT SERPL-CCNC: 38 U/L (ref 5–33)
ANION GAP SERPL CALCULATED.3IONS-SCNC: 8 MMOL/L (ref 7–19)
AST SERPL-CCNC: 68 U/L (ref 5–32)
BASOPHILS ABSOLUTE: 0.1 K/UL (ref 0–0.2)
BASOPHILS RELATIVE PERCENT: 0.9 % (ref 0–1)
BILIRUB SERPL-MCNC: 0.5 MG/DL (ref 0.2–1.2)
BUN BLDV-MCNC: 3 MG/DL (ref 6–20)
CALCIUM SERPL-MCNC: 8.4 MG/DL (ref 8.6–10)
CHLORIDE BLD-SCNC: 105 MMOL/L (ref 98–111)
CO2: 21 MMOL/L (ref 22–29)
CREAT SERPL-MCNC: 0.5 MG/DL (ref 0.5–0.9)
EOSINOPHILS ABSOLUTE: 0.1 K/UL (ref 0–0.6)
EOSINOPHILS RELATIVE PERCENT: 1.3 % (ref 0–5)
GFR AFRICAN AMERICAN: >59
GFR NON-AFRICAN AMERICAN: >60
GLUCOSE BLD-MCNC: 97 MG/DL (ref 74–109)
HCT VFR BLD CALC: 24.1 % (ref 37–47)
HEMOGLOBIN: 7.6 G/DL (ref 12–16)
IMMATURE GRANULOCYTES #: 0.1 K/UL
LYMPHOCYTES ABSOLUTE: 1.2 K/UL (ref 1.1–4.5)
LYMPHOCYTES RELATIVE PERCENT: 16.7 % (ref 20–40)
MCH RBC QN AUTO: 30.8 PG (ref 27–31)
MCHC RBC AUTO-ENTMCNC: 31.5 G/DL (ref 33–37)
MCV RBC AUTO: 97.6 FL (ref 81–99)
MONOCYTES ABSOLUTE: 0.5 K/UL (ref 0–0.9)
MONOCYTES RELATIVE PERCENT: 7.2 % (ref 0–10)
NEUTROPHILS ABSOLUTE: 5.4 K/UL (ref 1.5–7.5)
NEUTROPHILS RELATIVE PERCENT: 73.1 % (ref 50–65)
PDW BLD-RTO: 16.6 % (ref 11.5–14.5)
PLATELET # BLD: 302 K/UL (ref 130–400)
PMV BLD AUTO: 9.5 FL (ref 9.4–12.3)
POTASSIUM SERPL-SCNC: 3.9 MMOL/L (ref 3.5–5)
RBC # BLD: 2.47 M/UL (ref 4.2–5.4)
SODIUM BLD-SCNC: 134 MMOL/L (ref 136–145)
TOTAL PROTEIN: 6.1 G/DL (ref 6.6–8.7)
WBC # BLD: 7.4 K/UL (ref 4.8–10.8)

## 2022-07-30 PROCEDURE — 6370000000 HC RX 637 (ALT 250 FOR IP): Performed by: PSYCHIATRY & NEUROLOGY

## 2022-07-30 PROCEDURE — 6370000000 HC RX 637 (ALT 250 FOR IP): Performed by: HOSPITALIST

## 2022-07-30 PROCEDURE — 80053 COMPREHEN METABOLIC PANEL: CPT

## 2022-07-30 PROCEDURE — 85025 COMPLETE CBC W/AUTO DIFF WBC: CPT

## 2022-07-30 PROCEDURE — 36415 COLL VENOUS BLD VENIPUNCTURE: CPT

## 2022-07-30 PROCEDURE — 1210000000 HC MED SURG R&B

## 2022-07-30 PROCEDURE — 94640 AIRWAY INHALATION TREATMENT: CPT

## 2022-07-30 PROCEDURE — 2580000003 HC RX 258: Performed by: HOSPITALIST

## 2022-07-30 RX ADMIN — THERA TABS 1 TABLET: TAB at 08:40

## 2022-07-30 RX ADMIN — IPRATROPIUM BROMIDE AND ALBUTEROL SULFATE 1 AMPULE: 2.5; .5 SOLUTION RESPIRATORY (INHALATION) at 18:27

## 2022-07-30 RX ADMIN — METOPROLOL SUCCINATE 50 MG: 50 TABLET, EXTENDED RELEASE ORAL at 08:40

## 2022-07-30 RX ADMIN — SODIUM BICARBONATE 650 MG: 650 TABLET ORAL at 17:28

## 2022-07-30 RX ADMIN — SODIUM BICARBONATE 650 MG: 650 TABLET ORAL at 20:30

## 2022-07-30 RX ADMIN — SODIUM BICARBONATE 650 MG: 650 TABLET ORAL at 08:40

## 2022-07-30 RX ADMIN — FLUTICASONE PROPIONATE 1 SPRAY: 50 SPRAY, METERED NASAL at 08:43

## 2022-07-30 RX ADMIN — METOPROLOL SUCCINATE 50 MG: 50 TABLET, EXTENDED RELEASE ORAL at 20:30

## 2022-07-30 RX ADMIN — Medication 5 MG: at 20:30

## 2022-07-30 RX ADMIN — THIAMINE HCL TAB 100 MG 100 MG: 100 TAB at 08:40

## 2022-07-30 RX ADMIN — SPIRONOLACTONE 25 MG: 25 TABLET ORAL at 08:41

## 2022-07-30 RX ADMIN — FOLIC ACID 1 MG: 1 TABLET ORAL at 08:40

## 2022-07-30 RX ADMIN — SODIUM CHLORIDE, PRESERVATIVE FREE 10 ML: 5 INJECTION INTRAVENOUS at 20:32

## 2022-07-30 RX ADMIN — IPRATROPIUM BROMIDE AND ALBUTEROL SULFATE 1 AMPULE: 2.5; .5 SOLUTION RESPIRATORY (INHALATION) at 10:49

## 2022-07-30 RX ADMIN — GABAPENTIN 600 MG: 300 CAPSULE ORAL at 20:30

## 2022-07-30 RX ADMIN — FLUTICASONE PROPIONATE 1 SPRAY: 50 SPRAY, METERED NASAL at 20:32

## 2022-07-30 RX ADMIN — IPRATROPIUM BROMIDE AND ALBUTEROL SULFATE 1 AMPULE: 2.5; .5 SOLUTION RESPIRATORY (INHALATION) at 15:44

## 2022-07-30 RX ADMIN — OXYBUTYNIN CHLORIDE 5 MG: 5 TABLET ORAL at 20:30

## 2022-07-30 RX ADMIN — SODIUM BICARBONATE 650 MG: 650 TABLET ORAL at 13:07

## 2022-07-30 RX ADMIN — PANTOPRAZOLE SODIUM 40 MG: 40 TABLET, DELAYED RELEASE ORAL at 08:40

## 2022-07-30 RX ADMIN — IPRATROPIUM BROMIDE AND ALBUTEROL SULFATE 1 AMPULE: 2.5; .5 SOLUTION RESPIRATORY (INHALATION) at 07:09

## 2022-07-30 RX ADMIN — SODIUM CHLORIDE, PRESERVATIVE FREE 10 ML: 5 INJECTION INTRAVENOUS at 08:41

## 2022-07-30 RX ADMIN — ROSUVASTATIN CALCIUM 5 MG: 10 TABLET, FILM COATED ORAL at 20:31

## 2022-07-30 RX ADMIN — LAMOTRIGINE 200 MG: 100 TABLET ORAL at 08:40

## 2022-07-30 NOTE — PROGRESS NOTES
Length of Stay:   LOS: 10 days      Chief complaint:  Chief Complaint   Patient presents with    Drug Overdose     Risperidone OD took unknown amount. When EMS arrived she was talking, lost pulse and started CPR per EMS less than one minute. Pt talking and alert upon arrival            Subjective:  Resting in bed, feeling better    Physical Examination:  /75   Pulse 86   Temp 97 °F (36.1 °C) (Temporal)   Resp 18   Ht 5' 6\" (1.676 m)   Wt 129 lb (58.5 kg)   SpO2 96%   BMI 20.82 kg/m²   Constitutional - Appropriately groomed, good nutritional status  Psychiatric - AOX3, baseline mood  Eyes - EOMI, conjunctivae and lids intact  ENMT - lips, teeth, gums intact; hearing intact  Respiratory - CTAB, no accessory muscle use  Cardiovascular - Clear S1, S2 no gross m/r/g; pedal pulses intact  Abd - Soft, non-tender; No gross hernia  MSK - UE and LE joints intact, no gross clubbing  Skin - No rashes or wounds; no gross capillary refill defects  Neurologic - CN 2-12 grossly intact, sensation intact    Medications:    ipratropium-albuterol, 1 ampule, Inhalation, Q4H WA    metoprolol succinate, 50 mg, Oral, BID    melatonin, 5 mg, Oral, Nightly    sodium bicarbonate, 650 mg, Oral, 4x Daily    sodium chloride, 1,000 mL, IntraVENous, Once    sodium chloride flush, 5-40 mL, IntraVENous, 2 times per day    thiamine, 100 mg, Oral, Daily    multivitamin, 1 tablet, Oral, Daily    folic acid, 1 mg, Oral, Daily    lamoTRIgine, 200 mg, Oral, Daily    pantoprazole, 40 mg, Oral, Daily    oxybutynin, 5 mg, Oral, Nightly    fluticasone, 1 spray, Each Nostril, BID    gabapentin, 600 mg, Oral, Nightly    spironolactone, 25 mg, Oral, Daily    rosuvastatin, 5 mg, Oral, Nightly      Problem list:  Principal Problem:    Infestation by bed bug  Active Problems:    Alcohol abuse with physiological dependence (HCC)    Blood loss anemia  Resolved Problems:    * No resolved hospital problems.  *        A/P:  []Overdose  -Seen by psych, no indication for psych admission  Denies SI/HI at this time    []Alcohol abuse and withdrawal  -Now much improved, no acute signs of withdrawal    []Severe anemia  No signs of acute bleeding  Elevated MCV  Follow GI in outpatient      Awaiting SNF placement  -Pt states does not feel comfortable to go home      Fortunato Mcclain M.D.,  7/30/2022

## 2022-07-30 NOTE — PLAN OF CARE
Problem: Self Harm/Suicidality  Goal: Will have no self-injury during hospital stay  Description: INTERVENTIONS:  1. Q 30 MINUTES: Routine safety checks  2. Q SHIFT & PRN: Assess risk to determine if routine checks are adequate to maintain patient safety  Outcome: Progressing     Problem: Discharge Planning  Goal: Discharge to home or other facility with appropriate resources  Outcome: Progressing     Problem: Safety - Adult  Goal: Free from fall injury  Outcome: Progressing     Problem: ABCDS Injury Assessment  Goal: Absence of physical injury  Outcome: Progressing     Problem: Pain  Goal: Verbalizes/displays adequate comfort level or baseline comfort level  Outcome: Progressing     Problem: Neurosensory - Adult  Goal: Achieves stable or improved neurological status  Outcome: Progressing  Goal: Absence of seizures  Outcome: Progressing  Goal: Remains free of injury related to seizures activity  Outcome: Progressing  Goal: Achieves maximal functionality and self care  Outcome: Progressing       Problem: Nutrition Deficit:  Goal: Optimize nutritional status  Outcome: Progressing

## 2022-07-31 LAB
ALBUMIN SERPL-MCNC: 2.2 G/DL (ref 3.5–5.2)
ALP BLD-CCNC: 301 U/L (ref 35–104)
ALT SERPL-CCNC: 45 U/L (ref 5–33)
ANION GAP SERPL CALCULATED.3IONS-SCNC: 10 MMOL/L (ref 7–19)
AST SERPL-CCNC: 80 U/L (ref 5–32)
BASOPHILS ABSOLUTE: 0.1 K/UL (ref 0–0.2)
BASOPHILS RELATIVE PERCENT: 0.7 % (ref 0–1)
BILIRUB SERPL-MCNC: 0.5 MG/DL (ref 0.2–1.2)
BUN BLDV-MCNC: 3 MG/DL (ref 6–20)
CALCIUM SERPL-MCNC: 8.3 MG/DL (ref 8.6–10)
CHLORIDE BLD-SCNC: 103 MMOL/L (ref 98–111)
CO2: 22 MMOL/L (ref 22–29)
CREAT SERPL-MCNC: 0.5 MG/DL (ref 0.5–0.9)
EOSINOPHILS ABSOLUTE: 0.2 K/UL (ref 0–0.6)
EOSINOPHILS RELATIVE PERCENT: 1.9 % (ref 0–5)
GFR AFRICAN AMERICAN: >59
GFR NON-AFRICAN AMERICAN: >60
GLUCOSE BLD-MCNC: 104 MG/DL (ref 74–109)
HCT VFR BLD CALC: 26.1 % (ref 37–47)
HEMOGLOBIN: 8 G/DL (ref 12–16)
IMMATURE GRANULOCYTES #: 0.1 K/UL
LYMPHOCYTES ABSOLUTE: 1.7 K/UL (ref 1.1–4.5)
LYMPHOCYTES RELATIVE PERCENT: 19.3 % (ref 20–40)
MCH RBC QN AUTO: 29.9 PG (ref 27–31)
MCHC RBC AUTO-ENTMCNC: 30.7 G/DL (ref 33–37)
MCV RBC AUTO: 97.4 FL (ref 81–99)
MONOCYTES ABSOLUTE: 0.6 K/UL (ref 0–0.9)
MONOCYTES RELATIVE PERCENT: 6.5 % (ref 0–10)
NEUTROPHILS ABSOLUTE: 6.1 K/UL (ref 1.5–7.5)
NEUTROPHILS RELATIVE PERCENT: 71 % (ref 50–65)
PDW BLD-RTO: 16.4 % (ref 11.5–14.5)
PLATELET # BLD: 343 K/UL (ref 130–400)
PMV BLD AUTO: 9.3 FL (ref 9.4–12.3)
POTASSIUM SERPL-SCNC: 3.8 MMOL/L (ref 3.5–5)
RBC # BLD: 2.68 M/UL (ref 4.2–5.4)
SODIUM BLD-SCNC: 135 MMOL/L (ref 136–145)
TOTAL PROTEIN: 6.3 G/DL (ref 6.6–8.7)
WBC # BLD: 8.6 K/UL (ref 4.8–10.8)

## 2022-07-31 PROCEDURE — 80053 COMPREHEN METABOLIC PANEL: CPT

## 2022-07-31 PROCEDURE — 2580000003 HC RX 258: Performed by: HOSPITALIST

## 2022-07-31 PROCEDURE — 1210000000 HC MED SURG R&B

## 2022-07-31 PROCEDURE — 6370000000 HC RX 637 (ALT 250 FOR IP): Performed by: PSYCHIATRY & NEUROLOGY

## 2022-07-31 PROCEDURE — 6370000000 HC RX 637 (ALT 250 FOR IP): Performed by: HOSPITALIST

## 2022-07-31 PROCEDURE — 36415 COLL VENOUS BLD VENIPUNCTURE: CPT

## 2022-07-31 PROCEDURE — 85025 COMPLETE CBC W/AUTO DIFF WBC: CPT

## 2022-07-31 PROCEDURE — 94640 AIRWAY INHALATION TREATMENT: CPT

## 2022-07-31 RX ADMIN — FLUTICASONE PROPIONATE 1 SPRAY: 50 SPRAY, METERED NASAL at 20:29

## 2022-07-31 RX ADMIN — IPRATROPIUM BROMIDE AND ALBUTEROL SULFATE 1 AMPULE: 2.5; .5 SOLUTION RESPIRATORY (INHALATION) at 14:31

## 2022-07-31 RX ADMIN — METOPROLOL SUCCINATE 50 MG: 50 TABLET, EXTENDED RELEASE ORAL at 08:40

## 2022-07-31 RX ADMIN — SODIUM BICARBONATE 650 MG: 650 TABLET ORAL at 16:51

## 2022-07-31 RX ADMIN — FOLIC ACID 1 MG: 1 TABLET ORAL at 08:40

## 2022-07-31 RX ADMIN — IPRATROPIUM BROMIDE AND ALBUTEROL SULFATE 1 AMPULE: 2.5; .5 SOLUTION RESPIRATORY (INHALATION) at 07:13

## 2022-07-31 RX ADMIN — GABAPENTIN 600 MG: 300 CAPSULE ORAL at 20:24

## 2022-07-31 RX ADMIN — SODIUM BICARBONATE 650 MG: 650 TABLET ORAL at 20:24

## 2022-07-31 RX ADMIN — THERA TABS 1 TABLET: TAB at 08:40

## 2022-07-31 RX ADMIN — IPRATROPIUM BROMIDE AND ALBUTEROL SULFATE 1 AMPULE: 2.5; .5 SOLUTION RESPIRATORY (INHALATION) at 19:52

## 2022-07-31 RX ADMIN — IPRATROPIUM BROMIDE AND ALBUTEROL SULFATE 1 AMPULE: 2.5; .5 SOLUTION RESPIRATORY (INHALATION) at 10:27

## 2022-07-31 RX ADMIN — SODIUM CHLORIDE, PRESERVATIVE FREE 5 ML: 5 INJECTION INTRAVENOUS at 08:48

## 2022-07-31 RX ADMIN — SODIUM BICARBONATE 650 MG: 650 TABLET ORAL at 08:40

## 2022-07-31 RX ADMIN — FLUTICASONE PROPIONATE 1 SPRAY: 50 SPRAY, METERED NASAL at 08:48

## 2022-07-31 RX ADMIN — OXYBUTYNIN CHLORIDE 5 MG: 5 TABLET ORAL at 20:24

## 2022-07-31 RX ADMIN — SODIUM BICARBONATE 650 MG: 650 TABLET ORAL at 13:47

## 2022-07-31 RX ADMIN — Medication 5 MG: at 20:24

## 2022-07-31 RX ADMIN — LAMOTRIGINE 200 MG: 100 TABLET ORAL at 08:40

## 2022-07-31 RX ADMIN — PANTOPRAZOLE SODIUM 40 MG: 40 TABLET, DELAYED RELEASE ORAL at 08:40

## 2022-07-31 RX ADMIN — THIAMINE HCL TAB 100 MG 100 MG: 100 TAB at 08:40

## 2022-07-31 RX ADMIN — METOPROLOL SUCCINATE 50 MG: 50 TABLET, EXTENDED RELEASE ORAL at 20:24

## 2022-07-31 RX ADMIN — SPIRONOLACTONE 25 MG: 25 TABLET ORAL at 08:40

## 2022-07-31 RX ADMIN — ROSUVASTATIN CALCIUM 5 MG: 10 TABLET, FILM COATED ORAL at 20:24

## 2022-07-31 RX ADMIN — SODIUM CHLORIDE, PRESERVATIVE FREE 10 ML: 5 INJECTION INTRAVENOUS at 20:25

## 2022-07-31 ASSESSMENT — PAIN SCALES - GENERAL: PAINLEVEL_OUTOF10: 0

## 2022-07-31 NOTE — PROGRESS NOTES
Length of Stay:   LOS: 11 days      Chief complaint:  Chief Complaint   Patient presents with    Drug Overdose     Risperidone OD took unknown amount. When EMS arrived she was talking, lost pulse and started CPR per EMS less than one minute. Pt talking and alert upon arrival            Subjective:  Resting in bed, feeling better    Physical Examination:  /60   Pulse 76   Temp 96.8 °F (36 °C) (Temporal)   Resp 18   Ht 5' 6\" (1.676 m)   Wt 129 lb 1 oz (58.5 kg)   SpO2 95%   BMI 20.83 kg/m²   Constitutional - Appropriately groomed, good nutritional status  Psychiatric - AOX3, baseline mood  Eyes - EOMI, conjunctivae and lids intact  ENMT - lips, teeth, gums intact; hearing intact  Respiratory - CTAB, no accessory muscle use  Cardiovascular - Clear S1, S2 no gross m/r/g; pedal pulses intact  Abd - Soft, non-tender; No gross hernia  MSK - UE and LE joints intact, no gross clubbing  Skin - No rashes or wounds; no gross capillary refill defects  Neurologic - CN 2-12 grossly intact, sensation intact    Medications:    ipratropium-albuterol, 1 ampule, Inhalation, Q4H WA    metoprolol succinate, 50 mg, Oral, BID    melatonin, 5 mg, Oral, Nightly    sodium bicarbonate, 650 mg, Oral, 4x Daily    sodium chloride, 1,000 mL, IntraVENous, Once    sodium chloride flush, 5-40 mL, IntraVENous, 2 times per day    thiamine, 100 mg, Oral, Daily    multivitamin, 1 tablet, Oral, Daily    folic acid, 1 mg, Oral, Daily    lamoTRIgine, 200 mg, Oral, Daily    pantoprazole, 40 mg, Oral, Daily    oxybutynin, 5 mg, Oral, Nightly    fluticasone, 1 spray, Each Nostril, BID    gabapentin, 600 mg, Oral, Nightly    spironolactone, 25 mg, Oral, Daily    rosuvastatin, 5 mg, Oral, Nightly      Problem list:  Principal Problem:    Infestation by bed bug  Active Problems:    Alcohol abuse with physiological dependence (HCC)    Blood loss anemia  Resolved Problems:    * No resolved hospital problems.  *        A/P:  []Overdose  -Seen by psych, no indication for psych admission  Denies SI/HI at this time    []Alcohol abuse and withdrawal  -Now much improved, no acute signs of withdrawal    []Severe anemia  No signs of acute bleeding  Elevated MCV  Follow GI in outpatient      Awaiting SNF placement  -Pt states does not feel comfortable to go home      Shayy Alaniz M.D.,  7/31/2022

## 2022-08-01 LAB
ANION GAP SERPL CALCULATED.3IONS-SCNC: 8 MMOL/L (ref 7–19)
BUN BLDV-MCNC: 5 MG/DL (ref 6–20)
CALCIUM SERPL-MCNC: 8.8 MG/DL (ref 8.6–10)
CHLORIDE BLD-SCNC: 101 MMOL/L (ref 98–111)
CO2: 25 MMOL/L (ref 22–29)
CREAT SERPL-MCNC: 0.5 MG/DL (ref 0.5–0.9)
GFR AFRICAN AMERICAN: >59
GFR NON-AFRICAN AMERICAN: >60
GLUCOSE BLD-MCNC: 109 MG/DL (ref 74–109)
HCT VFR BLD CALC: 24.9 % (ref 37–47)
HEMOGLOBIN: 7.9 G/DL (ref 12–16)
MCH RBC QN AUTO: 30.9 PG (ref 27–31)
MCHC RBC AUTO-ENTMCNC: 31.7 G/DL (ref 33–37)
MCV RBC AUTO: 97.3 FL (ref 81–99)
PDW BLD-RTO: 16.2 % (ref 11.5–14.5)
PLATELET # BLD: 310 K/UL (ref 130–400)
PMV BLD AUTO: 9.6 FL (ref 9.4–12.3)
POTASSIUM SERPL-SCNC: 3.9 MMOL/L (ref 3.5–5)
RBC # BLD: 2.56 M/UL (ref 4.2–5.4)
SODIUM BLD-SCNC: 134 MMOL/L (ref 136–145)
WBC # BLD: 7.3 K/UL (ref 4.8–10.8)

## 2022-08-01 PROCEDURE — 6370000000 HC RX 637 (ALT 250 FOR IP): Performed by: HOSPITALIST

## 2022-08-01 PROCEDURE — 6370000000 HC RX 637 (ALT 250 FOR IP): Performed by: PSYCHIATRY & NEUROLOGY

## 2022-08-01 PROCEDURE — 1210000000 HC MED SURG R&B

## 2022-08-01 PROCEDURE — 97110 THERAPEUTIC EXERCISES: CPT

## 2022-08-01 PROCEDURE — 97116 GAIT TRAINING THERAPY: CPT

## 2022-08-01 PROCEDURE — 94640 AIRWAY INHALATION TREATMENT: CPT

## 2022-08-01 PROCEDURE — 36415 COLL VENOUS BLD VENIPUNCTURE: CPT

## 2022-08-01 PROCEDURE — 2580000003 HC RX 258: Performed by: HOSPITALIST

## 2022-08-01 PROCEDURE — 85027 COMPLETE CBC AUTOMATED: CPT

## 2022-08-01 PROCEDURE — 80048 BASIC METABOLIC PNL TOTAL CA: CPT

## 2022-08-01 RX ADMIN — THERA TABS 1 TABLET: TAB at 08:29

## 2022-08-01 RX ADMIN — SPIRONOLACTONE 25 MG: 25 TABLET ORAL at 08:29

## 2022-08-01 RX ADMIN — SODIUM CHLORIDE, PRESERVATIVE FREE 10 ML: 5 INJECTION INTRAVENOUS at 21:17

## 2022-08-01 RX ADMIN — IPRATROPIUM BROMIDE AND ALBUTEROL SULFATE 1 AMPULE: 2.5; .5 SOLUTION RESPIRATORY (INHALATION) at 20:03

## 2022-08-01 RX ADMIN — FLUTICASONE PROPIONATE 1 SPRAY: 50 SPRAY, METERED NASAL at 21:17

## 2022-08-01 RX ADMIN — THIAMINE HCL TAB 100 MG 100 MG: 100 TAB at 08:29

## 2022-08-01 RX ADMIN — METOPROLOL SUCCINATE 50 MG: 50 TABLET, EXTENDED RELEASE ORAL at 08:29

## 2022-08-01 RX ADMIN — SODIUM BICARBONATE 650 MG: 650 TABLET ORAL at 08:29

## 2022-08-01 RX ADMIN — PANTOPRAZOLE SODIUM 40 MG: 40 TABLET, DELAYED RELEASE ORAL at 08:29

## 2022-08-01 RX ADMIN — IPRATROPIUM BROMIDE AND ALBUTEROL SULFATE 1 AMPULE: 2.5; .5 SOLUTION RESPIRATORY (INHALATION) at 10:07

## 2022-08-01 RX ADMIN — IPRATROPIUM BROMIDE AND ALBUTEROL SULFATE 1 AMPULE: 2.5; .5 SOLUTION RESPIRATORY (INHALATION) at 14:20

## 2022-08-01 RX ADMIN — SODIUM CHLORIDE, PRESERVATIVE FREE 5 ML: 5 INJECTION INTRAVENOUS at 08:30

## 2022-08-01 RX ADMIN — METOPROLOL SUCCINATE 50 MG: 50 TABLET, EXTENDED RELEASE ORAL at 21:16

## 2022-08-01 RX ADMIN — ROSUVASTATIN CALCIUM 5 MG: 10 TABLET, FILM COATED ORAL at 21:16

## 2022-08-01 RX ADMIN — SODIUM BICARBONATE 650 MG: 650 TABLET ORAL at 21:16

## 2022-08-01 RX ADMIN — LAMOTRIGINE 200 MG: 100 TABLET ORAL at 08:29

## 2022-08-01 RX ADMIN — GABAPENTIN 600 MG: 300 CAPSULE ORAL at 21:16

## 2022-08-01 RX ADMIN — FOLIC ACID 1 MG: 1 TABLET ORAL at 08:29

## 2022-08-01 RX ADMIN — SODIUM BICARBONATE 650 MG: 650 TABLET ORAL at 12:34

## 2022-08-01 RX ADMIN — SODIUM BICARBONATE 650 MG: 650 TABLET ORAL at 16:35

## 2022-08-01 RX ADMIN — OXYBUTYNIN CHLORIDE 5 MG: 5 TABLET ORAL at 21:16

## 2022-08-01 RX ADMIN — FLUTICASONE PROPIONATE 1 SPRAY: 50 SPRAY, METERED NASAL at 08:29

## 2022-08-01 RX ADMIN — IPRATROPIUM BROMIDE AND ALBUTEROL SULFATE 1 AMPULE: 2.5; .5 SOLUTION RESPIRATORY (INHALATION) at 07:31

## 2022-08-01 RX ADMIN — Medication 5 MG: at 21:17

## 2022-08-01 ASSESSMENT — PAIN SCALES - GENERAL: PAINLEVEL_OUTOF10: 0

## 2022-08-01 NOTE — PROGRESS NOTES
Length of Stay:   LOS: 12 days      Chief complaint:  Chief Complaint   Patient presents with    Drug Overdose     Risperidone OD took unknown amount. When EMS arrived she was talking, lost pulse and started CPR per EMS less than one minute. Pt talking and alert upon arrival            Subjective:  Resting in bed, feeling better    Physical Examination:  /60   Pulse 70   Temp 97.3 °F (36.3 °C) (Temporal)   Resp 18   Ht 5' 6\" (1.676 m)   Wt 129 lb 1 oz (58.5 kg)   SpO2 97%   BMI 20.83 kg/m²   Constitutional - Appropriately groomed, good nutritional status  Psychiatric - AOX3, baseline mood  Eyes - EOMI, conjunctivae and lids intact  ENMT - lips, teeth, gums intact; hearing intact  Respiratory - CTAB, no accessory muscle use  Cardiovascular - Clear S1, S2 no gross m/r/g; pedal pulses intact  Abd - Soft, non-tender; No gross hernia  MSK - UE and LE joints intact, no gross clubbing  Skin - No rashes or wounds; no gross capillary refill defects  Neurologic - CN 2-12 grossly intact, sensation intact    Medications:    ipratropium-albuterol, 1 ampule, Inhalation, Q4H WA    metoprolol succinate, 50 mg, Oral, BID    melatonin, 5 mg, Oral, Nightly    sodium bicarbonate, 650 mg, Oral, 4x Daily    sodium chloride, 1,000 mL, IntraVENous, Once    sodium chloride flush, 5-40 mL, IntraVENous, 2 times per day    thiamine, 100 mg, Oral, Daily    multivitamin, 1 tablet, Oral, Daily    folic acid, 1 mg, Oral, Daily    lamoTRIgine, 200 mg, Oral, Daily    pantoprazole, 40 mg, Oral, Daily    oxybutynin, 5 mg, Oral, Nightly    fluticasone, 1 spray, Each Nostril, BID    gabapentin, 600 mg, Oral, Nightly    spironolactone, 25 mg, Oral, Daily    rosuvastatin, 5 mg, Oral, Nightly      Problem list:  Principal Problem:    Infestation by bed bug  Active Problems:    Alcohol abuse with physiological dependence (HCC)    Blood loss anemia  Resolved Problems:    * No resolved hospital problems.  *        A/P:  []Overdose  -Seen by psych, no indication for psych admission  Denies SI/HI at this time but does say she may hurt herself if she went home, vague statements without a clear plan      []Alcohol abuse and withdrawal  -Now much improved, no acute signs of withdrawal    []Severe anemia  No signs of acute bleeding  Elevated MCV  Follow GI in outpatient      Awaiting SNF placement  -Pt states does not feel comfortable to go home      Kaitlin Lay M.D.,  8/1/2022

## 2022-08-01 NOTE — CARE COORDINATION
Another referral sent to [facility]. Spoke with MD, he states that he would be happy to speak with administration about pt's dc plan. [facility]  [phone] C  [phone] F  [phone] Fax for admissions  Electronically signed by Fausto Chaney on 8/1/2022 at 2:04 PM    Farshad Green walker with APS here to see pt. States that there may be pau money they can get to treat pt's home. SW following. Electronically signed by Fausto Chaney on 8/1/2022 at 2:45 PM    Pt gave APS permission to apply for pau money to treat her home. Reported to Orin Reynaga I can go home and it be safe and no bed bugs, I won't hurt myself\". Farshad Green unsure at this time how long the application process will take (up to 2 weeks). Will continue to pursue SNF placement in the mean time. Electronically signed by Fausto Chaney on 8/1/2022 at 2:52 PM    [facility] not in network with [facility].    Electronically signed by Fausto Chaney on 8/1/2022 at 2:57 PM

## 2022-08-01 NOTE — BH NOTE
Psychiatry consult was placed for suicidal ideations. Patient was already consulted by psychiatry 10 days ago and no acute suicidal or homicidal ideations has been reported by patient  Patient reported to primary treatment team that she feels uncomfortable to go back home (bed bugs infestation at home). Patient's last notes did not document suicidal ideations. Consult will be discontinued  Recommended to involve  with patient's placement.

## 2022-08-01 NOTE — CARE COORDINATION
Per DELMIS Meléndez, CM reached out to [facility] in [place], [address] call back.    Electronically signed by Dread James RN on 8/1/2022 at 4:00 PM

## 2022-08-01 NOTE — PROGRESS NOTES
08/01/22 1800   Subjective   Subjective Patient in bed agrees to therapy  Tal Iverson RN present for [place].)   Pain Assessment   Pain Assessment None - Denies Pain   Cognition   Cognition Comment Patient able to follow all commands   Vitals   O2 Device None (Room air)   Bed Mobility Training   Bed Mobility Training Yes   Overall Level of Assistance Modified independent   Supine to Sit Modified independent   Balance   Sitting Intact  (EOB)   Standing With support  (RW)   Transfer Training   Transfer Training Yes   Overall Level of Assistance Supervision   Sit to Stand Supervision   Stand to Sit Supervision   Gait Training   Gait Training Yes   Gait   Overall Level of Assistance Stand-by assistance   Distance (ft) 200 Feet  (STEADY NO LOB)   Assistive Device Walker, rolling   PT Exercises   A/AROM Exercises BL LE EX in sitting x 20 Reps AROM   Patient Education   Education Given To Patient   Education Provided Role of Therapy;Plan of Care;Equipment   Education Method Verbal   Education Outcome Verbalized understanding   PT Equipment Recommendations   Other Patient in chair with call light; personal alarm attached   PT Plan of Care   Monday X   P          Electronically signed by Lynda Vega PTA on 8/1/2022 at 6:47 PM

## 2022-08-02 PROCEDURE — 6370000000 HC RX 637 (ALT 250 FOR IP): Performed by: HOSPITALIST

## 2022-08-02 PROCEDURE — 1210000000 HC MED SURG R&B

## 2022-08-02 PROCEDURE — 2580000003 HC RX 258: Performed by: HOSPITALIST

## 2022-08-02 PROCEDURE — 6370000000 HC RX 637 (ALT 250 FOR IP): Performed by: PSYCHIATRY & NEUROLOGY

## 2022-08-02 PROCEDURE — 94640 AIRWAY INHALATION TREATMENT: CPT

## 2022-08-02 PROCEDURE — 97110 THERAPEUTIC EXERCISES: CPT

## 2022-08-02 PROCEDURE — 97116 GAIT TRAINING THERAPY: CPT

## 2022-08-02 RX ADMIN — OXYBUTYNIN CHLORIDE 5 MG: 5 TABLET ORAL at 20:07

## 2022-08-02 RX ADMIN — IPRATROPIUM BROMIDE AND ALBUTEROL SULFATE 1 AMPULE: 2.5; .5 SOLUTION RESPIRATORY (INHALATION) at 10:03

## 2022-08-02 RX ADMIN — SODIUM CHLORIDE, PRESERVATIVE FREE 10 ML: 5 INJECTION INTRAVENOUS at 20:07

## 2022-08-02 RX ADMIN — GABAPENTIN 600 MG: 300 CAPSULE ORAL at 20:07

## 2022-08-02 RX ADMIN — FLUTICASONE PROPIONATE 1 SPRAY: 50 SPRAY, METERED NASAL at 20:08

## 2022-08-02 RX ADMIN — PANTOPRAZOLE SODIUM 40 MG: 40 TABLET, DELAYED RELEASE ORAL at 08:32

## 2022-08-02 RX ADMIN — SODIUM BICARBONATE 650 MG: 650 TABLET ORAL at 20:07

## 2022-08-02 RX ADMIN — LAMOTRIGINE 200 MG: 100 TABLET ORAL at 08:32

## 2022-08-02 RX ADMIN — IPRATROPIUM BROMIDE AND ALBUTEROL SULFATE 1 AMPULE: 2.5; .5 SOLUTION RESPIRATORY (INHALATION) at 19:01

## 2022-08-02 RX ADMIN — FOLIC ACID 1 MG: 1 TABLET ORAL at 08:32

## 2022-08-02 RX ADMIN — SODIUM BICARBONATE 650 MG: 650 TABLET ORAL at 08:32

## 2022-08-02 RX ADMIN — THERA TABS 1 TABLET: TAB at 08:32

## 2022-08-02 RX ADMIN — METOPROLOL SUCCINATE 50 MG: 50 TABLET, EXTENDED RELEASE ORAL at 20:07

## 2022-08-02 RX ADMIN — FLUTICASONE PROPIONATE 1 SPRAY: 50 SPRAY, METERED NASAL at 08:31

## 2022-08-02 RX ADMIN — Medication 5 MG: at 20:07

## 2022-08-02 RX ADMIN — THIAMINE HCL TAB 100 MG 100 MG: 100 TAB at 08:32

## 2022-08-02 RX ADMIN — IPRATROPIUM BROMIDE AND ALBUTEROL SULFATE 1 AMPULE: 2.5; .5 SOLUTION RESPIRATORY (INHALATION) at 06:12

## 2022-08-02 RX ADMIN — SPIRONOLACTONE 25 MG: 25 TABLET ORAL at 08:32

## 2022-08-02 RX ADMIN — ROSUVASTATIN CALCIUM 5 MG: 10 TABLET, FILM COATED ORAL at 20:07

## 2022-08-02 RX ADMIN — SODIUM BICARBONATE 650 MG: 650 TABLET ORAL at 14:07

## 2022-08-02 RX ADMIN — IPRATROPIUM BROMIDE AND ALBUTEROL SULFATE 1 AMPULE: 2.5; .5 SOLUTION RESPIRATORY (INHALATION) at 14:40

## 2022-08-02 RX ADMIN — SODIUM CHLORIDE, PRESERVATIVE FREE 10 ML: 5 INJECTION INTRAVENOUS at 08:33

## 2022-08-02 RX ADMIN — METOPROLOL SUCCINATE 50 MG: 50 TABLET, EXTENDED RELEASE ORAL at 08:32

## 2022-08-02 RX ADMIN — SODIUM BICARBONATE 650 MG: 650 TABLET ORAL at 17:54

## 2022-08-02 NOTE — CARE COORDINATION
CM talked with Pb Ontiveros in [place], [address] recommeded sending referral on patient to [place] central intake and will be viewed by all 11 facilities.   Electronically signed by Anny Rogel RN on 8/2/2022 at 9:55 AM

## 2022-08-02 NOTE — PLAN OF CARE
Problem: Self Harm/Suicidality  Goal: Will have no self-injury during hospital stay  Description: INTERVENTIONS:  1. Q 30 MINUTES: Routine safety checks  2. Q SHIFT & PRN: Assess risk to determine if routine checks are adequate to maintain patient safety  Outcome: Progressing     Problem: Safety - Adult  Goal: Free from fall injury  Outcome: Progressing     Problem: ABCDS Injury Assessment  Goal: Absence of physical injury  Outcome: Progressing     Problem: Pain  Goal: Verbalizes/displays adequate comfort level or baseline comfort level  Outcome: Progressing     Problem: Skin/Tissue Integrity  Goal: Absence of new skin breakdown  Description: 1. Monitor for areas of redness and/or skin breakdown  2. Assess vascular access sites hourly  3. Every 4-6 hours minimum:  Change oxygen saturation probe site  4. Every 4-6 hours:  If on nasal continuous positive airway pressure, respiratory therapy assess nares and determine need for appliance change or resting period. Outcome: Progressing     Problem: Neurosensory - Adult  Goal: Achieves stable or improved neurological status  Outcome: Progressing  Goal: Absence of seizures  Outcome: Progressing  Goal: Remains free of injury related to seizures activity  Outcome: Progressing  Goal: Achieves maximal functionality and self care  Outcome: Progressing     Problem: Genitourinary - Adult  Goal: Urinary catheter remains patent  Outcome: Progressing     Problem: Nutrition Deficit:  Goal: Optimize nutritional status  Outcome: Progressing     Problem: Spiritual Care  Goal: Pt/Family able to move forward in process of forgiving self, others, and/or higher power  Description: INTERVENTIONS:  1. Assist patient/family to overcome blocks to healing by use of spiritual practices (prayer, meditation, guided imagery, reiki, breath work, etc). 2. De-myth guilt and help patient/family identify possible irrational spiritual/cultural beliefs and values.   3. Explore possibilities of making amends & reconciliation with self, others, and/or a greater power. 4. Guide patient/family in identifying painful feelings of guilt. 5. Help patient/famiy explore and identify spiritual beliefs, cultural understandings or values that may help or hinder letting go of issue. 6. Help patient/family explore feelings of anger, bitterness, resentment. 7. Help patient/family identify and examine the situation in which these feelings are experienced. 8. Help patient/family identify destructive displacement of feelings onto other individuals. 9. Invite use of sacraments/rituals/ceremonies as appropriate (e.g. - confession, anointing, smudging). 10. Refer patient/family to formal counseling and/or to [facility] for further support work.   Outcome: Progressing

## 2022-08-02 NOTE — BH NOTE
3 rd psychiatry consult order who placed for suicidal ideations. [de-identified] note stated patient denies suicidal and homicidal ideations. Patient feels uncomfortable to return back home with bed bugs infestations. Its primary treatment team responsibility to provide patient with safe disposition plan. Psychiatric consult will be discontinued.

## 2022-08-02 NOTE — CARE COORDINATION
8/1 CM presented patient with copy of IMM letter. CM reviewed contents of IMM letter, and answered all questions/concerns. CM placed signed copy of IMM in patient chart.

## 2022-08-02 NOTE — CARE COORDINATION
Received cb from DELMIS Myles. He is not able to give a timeline now regarding when patient's home can be treated for bed bug infestation. He is the process of securing a [employer], he will need to get into patient mobile home w/company to determine cost of remediation. Then Ronda Lindsay will need to submit for approval for work. APS will f/u with CM.    Electronically signed by Kristy Luo RN on 8/2/2022 at 3:36 PM

## 2022-08-02 NOTE — PROGRESS NOTES
Length of Stay:   LOS: 13 days      Chief complaint:  Chief Complaint   Patient presents with    Drug Overdose     Risperidone OD took unknown amount. When EMS arrived she was talking, lost pulse and started CPR per EMS less than one minute. Pt talking and alert upon arrival            Subjective:  Resting in bed, feeling better    Physical Examination:  /67   Pulse 83   Temp 96.8 °F (36 °C) (Temporal)   Resp 16   Ht 5' 6\" (1.676 m)   Wt 129 lb 1 oz (58.5 kg)   SpO2 96%   BMI 20.83 kg/m²   Constitutional - Appropriately groomed, good nutritional status  Psychiatric - AOX3, baseline mood  Eyes - EOMI, conjunctivae and lids intact  ENMT - lips, teeth, gums intact; hearing intact  Respiratory - CTAB, no accessory muscle use  Cardiovascular - Clear S1, S2 no gross m/r/g; pedal pulses intact  Abd - Soft, non-tender; No gross hernia  MSK - UE and LE joints intact, no gross clubbing  Skin - No rashes or wounds; no gross capillary refill defects  Neurologic - CN 2-12 grossly intact, sensation intact    Medications:    ipratropium-albuterol, 1 ampule, Inhalation, Q4H WA    metoprolol succinate, 50 mg, Oral, BID    melatonin, 5 mg, Oral, Nightly    sodium bicarbonate, 650 mg, Oral, 4x Daily    sodium chloride, 1,000 mL, IntraVENous, Once    sodium chloride flush, 5-40 mL, IntraVENous, 2 times per day    thiamine, 100 mg, Oral, Daily    multivitamin, 1 tablet, Oral, Daily    folic acid, 1 mg, Oral, Daily    lamoTRIgine, 200 mg, Oral, Daily    pantoprazole, 40 mg, Oral, Daily    oxybutynin, 5 mg, Oral, Nightly    fluticasone, 1 spray, Each Nostril, BID    gabapentin, 600 mg, Oral, Nightly    spironolactone, 25 mg, Oral, Daily    rosuvastatin, 5 mg, Oral, Nightly      Problem list:  Principal Problem:    Infestation by bed bug  Active Problems:    Alcohol abuse with physiological dependence (HCC)    Blood loss anemia  Resolved Problems:    * No resolved hospital problems.  *        A/P:  []Overdose  -Seen by psych, no indication for psych admission  Denies SI/HI at this time but does say she may hurt herself if she went home, vague statements without a clear plan. Does state she does not feel comfortable taking care of her self at home.   Psych following, defer to them      []Alcohol abuse and withdrawal  -Now much improved, no acute signs of withdrawal    []Severe anemia  No signs of acute bleeding  Elevated MCV  Follow GI in outpatient      Awaiting SNF placement  -Pt states does not feel comfortable to go home      Aida Valenzuela M.D.,  8/2/2022

## 2022-08-02 NOTE — PROGRESS NOTES
Comprehensive Nutrition Assessment    Type and Reason for Visit:  Reassess    Nutrition Recommendations/Plan:   Continue current nutritional interventions     Malnutrition Assessment:  Malnutrition Status:  No malnutrition (08/02/22 1025)    Context:  Acute Illness     Findings of the 6 clinical characteristics of malnutrition:  Energy Intake:  No significant decrease in energy intake  Weight Loss:  No significant weight loss     Body Fat Loss:  Mild body fat loss Buccal region, Orbital   Muscle Mass Loss:  No significant muscle mass loss    Fluid Accumulation:  No significant fluid accumulation Extremities   Strength:  Not Performed    Nutrition Assessment:    PO intake has improved with intake now ranging %. Current weight 129.1# .  129# is UBW for pt. Nutrition Related Findings:    bed bug bites Wound Type: None       Current Nutrition Intake & Therapies:    Average Meal Intake: %  Average Supplements Intake: 51-75%, %  ADULT ORAL NUTRITION SUPPLEMENT; Lunch, Dinner; Standard High Calorie/High Protein Oral Supplement  ADULT DIET; Regular    Anthropometric Measures:  Height: 5' 6\" (167.6 cm)  Ideal Body Weight (IBW): 130 lbs (59 kg)    Admission Body Weight: 131 lb (59.4 kg)  Current Body Weight: 129 lb 1 oz (58.5 kg), 99.3 % IBW. Weight Source: Bed Scale  Current BMI (kg/m2): 20.8  Usual Body Weight: 129 lb (58.5 kg)  % Weight Change (Calculated): 0    BMI Categories: Normal Weight (BMI 18.5-24. 9)    Estimated Daily Nutrient Needs:  Energy Requirements Based On: Kcal/kg     Energy (kcal/day): 8134-5143  Weight Used for Protein Requirements: Admission  Protein (g/day): 52-78     Fluid (ml/day): 3352-2292    Nutrition Diagnosis:   Inadequate protein-energy intake related to psychological cause or life stress as evidenced by mild loss of subcutaneous fat    Nutrition Interventions:   Food and/or Nutrient Delivery: Continue Current Diet, Continue Oral Nutrition Supplement  Nutrition Education/Counseling: No recommendation at this time  Coordination of Nutrition Care: Continue to monitor while inpatient       Goals:  Previous Goal Met: Progressing toward Goal(s)  Goals: Meet at least 75% of estimated needs, PO intake 50% or greater       Nutrition Monitoring and Evaluation:   Behavioral-Environmental Outcomes: None Identified  Food/Nutrient Intake Outcomes: Food and Nutrient Intake, Supplement Intake  Physical Signs/Symptoms Outcomes: Biochemical Data, Weight, Skin, Fluid Status or Edema    Discharge Planning:    No discharge needs at this time     Stefany Tate, MS, RD, LD  Contact: [phone]

## 2022-08-02 NOTE — CARE COORDINATION
MIRTHA met with Amber Angeles from [facility] and [facility] care today in patient's room. Amber Angeles is going to review chart and revisit referral with her facilities.    Electronically signed by Olga Lutz RN on 8/2/2022 at 3:34 PM

## 2022-08-02 NOTE — PROGRESS NOTES
08/02/22 1100   Subjective   Subjective Patient in bed agrees to therapy. Pain Assessment   Pain Assessment None - Denies Pain   Cognition   Cognition Comment Patient able to follow all commands   Vitals   O2 Device None (Room air)   Bed Mobility Training   Bed Mobility Training Yes   Overall Level of Assistance Modified independent   Supine to Sit Modified independent   Balance   Sitting Without support  (EOB)   Standing With support  (RW)   Transfer Training   Transfer Training Yes   Overall Level of Assistance Independent   Sit to Stand Independent   Stand to Sit Independent   Bed to Chair Supervision   Gait Training   Gait Training Yes   Gait   Overall Level of Assistance Supervision   Distance (ft) 250 Feet  (STEADY NO LOB)   Assistive Device Walker, rolling   PT Exercises   A/AROM Exercises BL LE EX AROM in sitting x 20 reps.   36 Reps  AP,s   Assessment   Activity Tolerance Patient tolerated treatment well   PT Equipment Recommendations   Equipment Needed Yes   Mobility Devices   (RW not a rollator patient has C/O of knee weakness difficult to take stress off kne with rollator)   Plan   Plan Comment Patient in chair call light and personal alarm attached   PT Plan of Care   Tuesday X           Electronically signed by Leanne Casiano PTA on 8/2/2022 at 11:19 AM

## 2022-08-03 PROCEDURE — 6370000000 HC RX 637 (ALT 250 FOR IP): Performed by: HOSPITALIST

## 2022-08-03 PROCEDURE — 1210000000 HC MED SURG R&B

## 2022-08-03 PROCEDURE — 6370000000 HC RX 637 (ALT 250 FOR IP): Performed by: PSYCHIATRY & NEUROLOGY

## 2022-08-03 PROCEDURE — 94640 AIRWAY INHALATION TREATMENT: CPT

## 2022-08-03 PROCEDURE — 2580000003 HC RX 258: Performed by: HOSPITALIST

## 2022-08-03 RX ADMIN — GABAPENTIN 600 MG: 300 CAPSULE ORAL at 20:56

## 2022-08-03 RX ADMIN — THIAMINE HCL TAB 100 MG 100 MG: 100 TAB at 08:46

## 2022-08-03 RX ADMIN — IPRATROPIUM BROMIDE AND ALBUTEROL SULFATE 1 AMPULE: 2.5; .5 SOLUTION RESPIRATORY (INHALATION) at 18:38

## 2022-08-03 RX ADMIN — SODIUM CHLORIDE, PRESERVATIVE FREE 10 ML: 5 INJECTION INTRAVENOUS at 08:48

## 2022-08-03 RX ADMIN — FLUTICASONE PROPIONATE 1 SPRAY: 50 SPRAY, METERED NASAL at 08:49

## 2022-08-03 RX ADMIN — ROSUVASTATIN CALCIUM 5 MG: 10 TABLET, FILM COATED ORAL at 20:56

## 2022-08-03 RX ADMIN — IPRATROPIUM BROMIDE AND ALBUTEROL SULFATE 1 AMPULE: 2.5; .5 SOLUTION RESPIRATORY (INHALATION) at 06:20

## 2022-08-03 RX ADMIN — IPRATROPIUM BROMIDE AND ALBUTEROL SULFATE 1 AMPULE: 2.5; .5 SOLUTION RESPIRATORY (INHALATION) at 14:21

## 2022-08-03 RX ADMIN — IPRATROPIUM BROMIDE AND ALBUTEROL SULFATE 1 AMPULE: 2.5; .5 SOLUTION RESPIRATORY (INHALATION) at 10:35

## 2022-08-03 RX ADMIN — FLUTICASONE PROPIONATE 1 SPRAY: 50 SPRAY, METERED NASAL at 20:57

## 2022-08-03 RX ADMIN — METOPROLOL SUCCINATE 50 MG: 50 TABLET, EXTENDED RELEASE ORAL at 20:57

## 2022-08-03 RX ADMIN — METOPROLOL SUCCINATE 50 MG: 50 TABLET, EXTENDED RELEASE ORAL at 08:46

## 2022-08-03 RX ADMIN — SODIUM CHLORIDE, PRESERVATIVE FREE 10 ML: 5 INJECTION INTRAVENOUS at 20:57

## 2022-08-03 RX ADMIN — SODIUM BICARBONATE 650 MG: 650 TABLET ORAL at 20:57

## 2022-08-03 RX ADMIN — OXYBUTYNIN CHLORIDE 5 MG: 5 TABLET ORAL at 20:57

## 2022-08-03 RX ADMIN — SODIUM BICARBONATE 650 MG: 650 TABLET ORAL at 08:46

## 2022-08-03 RX ADMIN — PANTOPRAZOLE SODIUM 40 MG: 40 TABLET, DELAYED RELEASE ORAL at 08:46

## 2022-08-03 RX ADMIN — LAMOTRIGINE 200 MG: 100 TABLET ORAL at 08:47

## 2022-08-03 RX ADMIN — Medication 5 MG: at 20:56

## 2022-08-03 RX ADMIN — THERA TABS 1 TABLET: TAB at 08:47

## 2022-08-03 RX ADMIN — FOLIC ACID 1 MG: 1 TABLET ORAL at 08:46

## 2022-08-03 RX ADMIN — SPIRONOLACTONE 25 MG: 25 TABLET ORAL at 08:46

## 2022-08-03 NOTE — CARE COORDINATION
El Patel, patient's friend, delivered her purse and phone to patient today. Tanner has bombed patient's trailer and plans to continue de-infesting her home. Patient granted permission for Tanner to be added as a contact. El Patel - p [phone]  Electronically signed by Benoit Blackburn RN on 8/3/2022 at 11:35 AM    Observed within D/C Summary that patient informed MD that she planned to discharge home. Discussed Discharge plan with patient again. She states that she still has hope that [facility] will accept patient. She is okay with discharging home if not accepted at [facility]. This CM called Gavin Braden, with [facility], earlier today. She is presenting patient's case to administration today. Awaiting response. Electronically signed by Benoit Blackburn RN on 8/3/2022 at [address], with [facility], to learn status of referral.  She states that Administration has not reached a decision and she is hopeful to receive an answer tomorrow, 8/4/2022. MD informed. Electronically signed by Benoit Blackburn RN on 8/3/2022 at 3:00 PM    Re-faxed referral to [address] [place] [address], per instructions by Vianney Hidalgo at [facility] in [place], [address] [place] [address]   Rosio - vinnie [phone]  [facility] -  [phone]  Electronically signed by Benoit Blackburn RN on 8/3/2022 at 4:12 PM

## 2022-08-03 NOTE — PLAN OF CARE
Problem: Self Harm/Suicidality  Goal: Will have no self-injury during hospital stay  Description: INTERVENTIONS:  1. Q 30 MINUTES: Routine safety checks  2. Q SHIFT & PRN: Assess risk to determine if routine checks are adequate to maintain patient safety  Outcome: Progressing     Problem: Discharge Planning  Goal: Discharge to home or other facility with appropriate resources  Outcome: Progressing  Flowsheets (Taken 8/2/2022 2007)  Discharge to home or other facility with appropriate resources: Identify barriers to discharge with patient and caregiver     Problem: Safety - Adult  Goal: Free from fall injury  Outcome: Progressing

## 2022-08-03 NOTE — PROGRESS NOTES
08/03/22 1400   Subjective   Subjective Patient stated she may go home today. ( DC summary in chart ).    Declined therapy           Electronically signed by Gordon Tate PTA on 8/3/2022 at 2:07 PM

## 2022-08-03 NOTE — DISCHARGE SUMMARY
Admission Diagnosis:  Chief Complaint   Patient presents with    Drug Overdose     Risperidone OD took unknown amount. When EMS arrived she was talking, lost pulse and started CPR per EMS less than one minute. Pt talking and alert upon arrival          Comorbid conditions:  Principal Problem:    Infestation by bed bug  Active Problems:    Alcohol abuse with physiological dependence (HCC)    Blood loss anemia  Resolved Problems:    * No resolved hospital problems. *      Procedures Performed:  None    Hospital Course:  Mrs. Sneha London is a 62year old prematurely geriatric  [place] lady. , She was brought in to the [facility] ED by EMS. She was seen by EMS in her home where she was laying on the floor swarming by bed bugs. They  her up to her feet and she promptly passed out. EMS provided about 1 minute CPR and placed an iGel airway. She then had come to prior to any medications having been administered and was brought in to [facility] ED. She was making noises and moving her arms at arrival so her EP pulled out the iGel and she began to speak. She had a ED screening which revealed an alcohol level of 260. She stated to EMS that she had attempted to kill herself with Risperdal, she has since confirmed this to both her EP and myself. When asked why she states that she just wants to kill herself as she can not stand the bed bugs anymore. Pt seen by psych cleared for DC home, denies SI/HI. Stay was extended as patient was saying she did not feel comfortable going home. Today states she is happy to go home, has done well with PT/OT. Pt denies any SI/HI. States will be fine at home and will come back if needs to. Will DC home now per patient's wishes. Pt AOx3.     DC Medications:  See Home medication reconciliation    Condition on Discharge:  Stable    Disposition:  DC home    Recommendations/Follow-up:  -F/u PCP within 3 days      Clarence Tolliver M.D.,  8/3/2022      ADDENDUM  DC date 8/4  Denied SNF admission, pt to be DC home. Pt is happy with plan. Denies SI/HI, feels safe going home. DC home per patient's wishes.     Ivelisse Briseno MD

## 2022-08-04 VITALS
TEMPERATURE: 96.6 F | SYSTOLIC BLOOD PRESSURE: 115 MMHG | HEIGHT: 66 IN | DIASTOLIC BLOOD PRESSURE: 67 MMHG | RESPIRATION RATE: 18 BRPM | BODY MASS INDEX: 21.07 KG/M2 | WEIGHT: 131.1 LBS | HEART RATE: 74 BPM | OXYGEN SATURATION: 97 %

## 2022-08-04 LAB
ANION GAP SERPL CALCULATED.3IONS-SCNC: 12 MMOL/L (ref 7–19)
BUN BLDV-MCNC: 7 MG/DL (ref 6–20)
CALCIUM SERPL-MCNC: 9.4 MG/DL (ref 8.6–10)
CHLORIDE BLD-SCNC: 102 MMOL/L (ref 98–111)
CO2: 22 MMOL/L (ref 22–29)
CREAT SERPL-MCNC: 0.7 MG/DL (ref 0.5–0.9)
GFR AFRICAN AMERICAN: >59
GFR NON-AFRICAN AMERICAN: >60
GLUCOSE BLD-MCNC: 104 MG/DL (ref 74–109)
HCT VFR BLD CALC: 27.9 % (ref 37–47)
HEMOGLOBIN: 8.8 G/DL (ref 12–16)
MCH RBC QN AUTO: 30.8 PG (ref 27–31)
MCHC RBC AUTO-ENTMCNC: 31.5 G/DL (ref 33–37)
MCV RBC AUTO: 97.6 FL (ref 81–99)
PDW BLD-RTO: 16 % (ref 11.5–14.5)
PLATELET # BLD: 438 K/UL (ref 130–400)
PMV BLD AUTO: 9.3 FL (ref 9.4–12.3)
POTASSIUM SERPL-SCNC: 4.2 MMOL/L (ref 3.5–5)
RBC # BLD: 2.86 M/UL (ref 4.2–5.4)
SODIUM BLD-SCNC: 136 MMOL/L (ref 136–145)
WBC # BLD: 8.2 K/UL (ref 4.8–10.8)

## 2022-08-04 PROCEDURE — 80048 BASIC METABOLIC PNL TOTAL CA: CPT

## 2022-08-04 PROCEDURE — 97116 GAIT TRAINING THERAPY: CPT

## 2022-08-04 PROCEDURE — 6370000000 HC RX 637 (ALT 250 FOR IP): Performed by: HOSPITALIST

## 2022-08-04 PROCEDURE — 97535 SELF CARE MNGMENT TRAINING: CPT

## 2022-08-04 PROCEDURE — 94640 AIRWAY INHALATION TREATMENT: CPT

## 2022-08-04 PROCEDURE — 36415 COLL VENOUS BLD VENIPUNCTURE: CPT

## 2022-08-04 PROCEDURE — 85027 COMPLETE CBC AUTOMATED: CPT

## 2022-08-04 PROCEDURE — 2580000003 HC RX 258: Performed by: HOSPITALIST

## 2022-08-04 RX ADMIN — PANTOPRAZOLE SODIUM 40 MG: 40 TABLET, DELAYED RELEASE ORAL at 08:48

## 2022-08-04 RX ADMIN — SPIRONOLACTONE 25 MG: 25 TABLET ORAL at 08:47

## 2022-08-04 RX ADMIN — LAMOTRIGINE 200 MG: 100 TABLET ORAL at 08:48

## 2022-08-04 RX ADMIN — IPRATROPIUM BROMIDE AND ALBUTEROL SULFATE 1 AMPULE: 2.5; .5 SOLUTION RESPIRATORY (INHALATION) at 06:46

## 2022-08-04 RX ADMIN — FOLIC ACID 1 MG: 1 TABLET ORAL at 08:47

## 2022-08-04 RX ADMIN — SODIUM CHLORIDE, PRESERVATIVE FREE 10 ML: 5 INJECTION INTRAVENOUS at 08:48

## 2022-08-04 RX ADMIN — FLUTICASONE PROPIONATE 1 SPRAY: 50 SPRAY, METERED NASAL at 08:51

## 2022-08-04 RX ADMIN — SODIUM BICARBONATE 650 MG: 650 TABLET ORAL at 08:48

## 2022-08-04 RX ADMIN — IPRATROPIUM BROMIDE AND ALBUTEROL SULFATE 1 AMPULE: 2.5; .5 SOLUTION RESPIRATORY (INHALATION) at 14:53

## 2022-08-04 RX ADMIN — IPRATROPIUM BROMIDE AND ALBUTEROL SULFATE 1 AMPULE: 2.5; .5 SOLUTION RESPIRATORY (INHALATION) at 10:23

## 2022-08-04 RX ADMIN — THERA TABS 1 TABLET: TAB at 08:48

## 2022-08-04 RX ADMIN — THIAMINE HCL TAB 100 MG 100 MG: 100 TAB at 08:47

## 2022-08-04 RX ADMIN — METOPROLOL SUCCINATE 50 MG: 50 TABLET, EXTENDED RELEASE ORAL at 08:47

## 2022-08-04 ASSESSMENT — PAIN SCALES - GENERAL: PAINLEVEL_OUTOF10: 0

## 2022-08-04 NOTE — CARE COORDINATION
Called Rosio, with [facility], to confirm receipt of referral.  No answer. Left VM. Awaiting return call. Electronically signed by Rolf Callejas RN on 8/4/2022 at 8:53 AM    Informed patient that [facility] is unable to accept patient. Explained that APS is attempting to apply for a pau to de-infect her home, but it may take at least 2 weeks before process is complete. Informed patient that CM/SW can place patient in a homeless shelter until her home is completely de-infected. Patient adamantly declined to discharge to a homeless shelter. Inquired as to whether patient can afford a motel until her home is de-infected. Patient states she would \"be rosalio to afford two days at a motel\". Inquired as to whether patient's friend, Coralshefali Parra, had a spare bedroom she could use until her home is de-infected. She said, \"no, his daughter is living with him right now and lives in the [facility]\". Informed patient that she could receive Home Health at discharge. Patient adamantly refused Home Health. Patient states she rather go home than choose any of the above options. Patient is agreeable to discharge home at this time. Electronically signed by Rolf Callejas RN on 8/4/2022 at 10:50 AM    Called patient's friend, Tanner, to learn if he will be able to assist patient home. Tanner states he can arrive around 3:30 pm to transport patient home. Provided patient with clothes from [employer].   Electronically signed by Rolf Callejas RN on 8/4/2022 at 2:00 PM

## 2022-08-04 NOTE — PROGRESS NOTES
Occupational Therapy     08/04/22 1500   Subjective   Subjective Pt in bed upon arrival for therapy. Pt agreeable to participate. Pain Assessment   Pain Assessment None - Denies Pain   Orientation   Overall Orientation Status WFL   Bed Mobility Training   Bed Mobility Training Yes   Overall Level of Assistance Modified independent   Transfer Training   Transfer Training Yes   Overall Level of Assistance Supervision   Balance   Sitting Intact   Standing With support  (rolator walker)   ADL   Feeding Setup; Independent   Grooming Setup;Supervision   UE Bathing Setup; Independent   LE Bathing Setup; Independent   UE Dressing Setup; Independent   LE Dressing Setup; Independent   Toileting Setup;Supervision   Assessment   Assessment Tx focused on dressing techniques and toileting in prepartion for returning home. Pt states she is a little shakey still but has no loss of balance during tasks.    Activity Tolerance Patient tolerated treatment well   Discharge Recommendations Patient would benefit from continued therapy after discharge   Plan   Times per Week 3-5x/week   Times per Day Daily   OT Plan of Care   Thursday X   Electronically signed by PILO Marrufo on 8/4/2022 at 4:17 PM

## 2022-08-09 ENCOUNTER — SCHEDULED TELEPHONE ENCOUNTER (OUTPATIENT)
Dept: PSYCHIATRY | Age: 57
End: 2022-08-09
Payer: MEDICARE

## 2022-08-09 ENCOUNTER — TELEPHONE (OUTPATIENT)
Dept: PSYCHIATRY | Age: 57
End: 2022-08-09

## 2022-08-09 DIAGNOSIS — F41.1 GENERALIZED ANXIETY DISORDER: ICD-10-CM

## 2022-08-09 DIAGNOSIS — F43.10 PTSD (POST-TRAUMATIC STRESS DISORDER): ICD-10-CM

## 2022-08-09 DIAGNOSIS — F31.61 BIPOLAR AFFECTIVE DISORDER, MIXED, MILD (HCC): Primary | ICD-10-CM

## 2022-08-09 PROCEDURE — 99443 PR PHYS/QHP TELEPHONE EVALUATION 21-30 MIN: CPT | Performed by: NURSE PRACTITIONER

## 2022-08-09 RX ORDER — TRAZODONE HYDROCHLORIDE 100 MG/1
200 TABLET ORAL NIGHTLY
Qty: 60 TABLET | Refills: 1 | Status: SHIPPED | OUTPATIENT
Start: 2022-08-09 | End: 2022-08-30

## 2022-08-09 RX ORDER — SERTRALINE HYDROCHLORIDE 25 MG/1
25 TABLET, FILM COATED ORAL DAILY
Qty: 30 TABLET | Refills: 1 | Status: SHIPPED | OUTPATIENT
Start: 2022-08-09

## 2022-08-09 RX ORDER — CLONIDINE HYDROCHLORIDE 0.1 MG/1
TABLET ORAL
Qty: 30 TABLET | Refills: 2 | Status: SHIPPED | OUTPATIENT
Start: 2022-08-09

## 2022-08-09 ASSESSMENT — PATIENT HEALTH QUESTIONNAIRE - PHQ9
3. TROUBLE FALLING OR STAYING ASLEEP: 3
6. FEELING BAD ABOUT YOURSELF - OR THAT YOU ARE A FAILURE OR HAVE LET YOURSELF OR YOUR FAMILY DOWN: 2
10. IF YOU CHECKED OFF ANY PROBLEMS, HOW DIFFICULT HAVE THESE PROBLEMS MADE IT FOR YOU TO DO YOUR WORK, TAKE CARE OF THINGS AT HOME, OR GET ALONG WITH OTHER PEOPLE: 1
9. THOUGHTS THAT YOU WOULD BE BETTER OFF DEAD, OR OF HURTING YOURSELF: 0
SUM OF ALL RESPONSES TO PHQ QUESTIONS 1-9: 13
7. TROUBLE CONCENTRATING ON THINGS, SUCH AS READING THE NEWSPAPER OR WATCHING TELEVISION: 1
SUM OF ALL RESPONSES TO PHQ QUESTIONS 1-9: 13
5. POOR APPETITE OR OVEREATING: 1
8. MOVING OR SPEAKING SO SLOWLY THAT OTHER PEOPLE COULD HAVE NOTICED. OR THE OPPOSITE, BEING SO FIGETY OR RESTLESS THAT YOU HAVE BEEN MOVING AROUND A LOT MORE THAN USUAL: 1
SUM OF ALL RESPONSES TO PHQ9 QUESTIONS 1 & 2: 2
2. FEELING DOWN, DEPRESSED OR HOPELESS: 1
4. FEELING TIRED OR HAVING LITTLE ENERGY: 3
SUM OF ALL RESPONSES TO PHQ QUESTIONS 1-9: 13
SUM OF ALL RESPONSES TO PHQ QUESTIONS 1-9: 13
1. LITTLE INTEREST OR PLEASURE IN DOING THINGS: 1

## 2022-08-09 NOTE — PROGRESS NOTES
as much because of the heat. She still has been calling her friends and has been interacting with them. She states she has had some extra fatigue lately, we discussed sitting in her home for long periods of time with minimal exercise can contribute to fatigue, we discussed chair aerobics or chair exercises that she could do while at home patient verbalized understanding and agreement. Additionally we discussed diet and nutrition. She denies SI HI AVH she denies side effects of medications at this time she is calm and cooperative on the phone no medication changes at this time we will follow-up in 2 months    In between visit: Patient was admitted to the hospital with an overdose attempt. Patient apparently overdosed on unknown amount of Risperdal.  She was brought in to the [facility] ED by EMS. She was seen by EMS in her home where she was laying on the floor swarming by bed bugs. They  her up to her feet and she promptly passed out. EMS provided about 1 minute CPR and placed an iGel airway. She then had come to prior to any medications having been administered and was brought in to [facility] ED. She was making noises and moving her arms at arrival so her EP pulled out the iGel and she began to speak. She had a ED screening which revealed an alcohol level of 260. She stated to EMS that she had attempted to [facility] herself with Risperdal\"    Today : Patient reports she is doing okay today. She sounded calm and cooperative on the phone. She did sound down and depressed when discussing the condition of her trailer she reports that she has been having extra difficulty getting in and out of her trailer due to her physical limitations specifically her knee pain we discussed if there was an option to get a ramp placed in front of her home so that she could use her chair patient stated that she is \"looking into it. \"  She states that she has been having difficulty getting to the store because she cannot find people to take her to the store. Additionally we discussed the living condition of her trailer due to her bedbug infestation patient reports that they are still everywhere and that this has impacted her social life she states nobody will come over and no one will let her into their home. She states its been so hot that she just stays in her trailer majority of the time. We discussed barriers to suicide as well as resources available such as the suicide hotline and this clinic. Patient was instructed to stop drinking alcohol and take medications as directed patient verbalized understanding she currently denies SI HI AVH we will follow-up in 2 months      Absent  suicidal ideation. Reports compliance with medications as good .      Sleep: She reports that she has been sleeping much better    Caffeine use: coffee most morning     Support:  neighbor    PREVIOUS MED TRIALS  Trazodone (having more suicidal dreams), at 100mg, not working for sleep  Seroquel (wt gain, 14 lb in 3 months, enuresis, indigestion, abnormal blood work, increased blood sugar, seizures)  Duloxetine (has not been effective and no noticeable change even with dose increases to 90mg)  Paxil, 20mg  Wellbutrin XL, (tried it recently to quit smoking)  Prozac (made her numb, added to her eating disorder)  Zoloft (thought she did well on this, took for a couple of years, she stopped it because when she returned to [place] the doctor put her back on Prozac)  Remeron (increased her dreams and panic attacks)  Aly Tatum (worked)  Librium (in 2018 for 15 days to stop drinking alcohol)  Risperdal- weight gain  Doxepin,  (not effective)  Prazosin, ineffective for dreams/nightmares  Neeru (1/27/21, reports that she switched it to am dosing because taking it at night made her RLS worse)    Current Substance Use:   Alcohol: Denies   illicit drug use: Denies   Marijuana: Denies   Tobacco use: 3/4 ppd  Vape: Denies      BP: There were no vitals taken for this visit. Review of Systems - 14 point review:  Negative except for stated    Constitutional: (fevers, chills, night sweats, wt loss/gain, change in appetite, fatigue, somnolence)    HEENT: (ear pain or discharge, hearing loss, ear ringing, sinus pressure, nosebleed, nasal discharge, sore throat, oral sores, tooth pain, bleeding gums, hoarse voice, neck pain)      Cardiovascular: (HTN, chest pain, elevated cholesterol/lipids, palpitations, leg swelling, leg pain with walking)    Respiratory: (cough, wheezing, snoring, SOB with activity (dyspnea), SOB while lying flat (orthopnea), awakening with severe SOB (paroxysmal nocturnal dyspnea))    Gastrointestinal: (NVD, constipation, abdominal pain, bright red stools, black tarry stools, stool incontinence)     Genitourinary:  (pelvic pain, burning or frequency of urination, urinary urgency, blood in urine incomplete bladder emptying, urinary incontinence, STD; MEN: testicular pain or swelling, erectile dysfunction; WOMEN: LMP, heavy menstrual bleeding (menorrhagia), irregular periods, postmenopausal bleeding, menstrual pain (dymenorrhea, vaginal discharge)    Musculoskeletal: (bone pain/fracture, joint pain or swelling, musle pain)    Integumentary: (rashes, acne, non-healing sores, itching, breast lumps, breast pain, nipple discharge, hair loss)    Neurologic: (HA, muscle weakness, paresthesias (numbness, coldness, crawling or prickling), memory loss, seizure, dizziness)    Psychiatric:  (anxiety, sadness, irritability/anger, insomnia, suicidality)    Endocrine: (heat or cold intolerance, excessive thirst (polydipsia), excessive hunger (polyphagia))    Immune/Allergic: (hives, seasonal or environmental allergies, HIV exposure)    Hematologic/Lymphatic: (lymph node enlargement, easy bleeding or bruising)    History obtained via chart review and patient    PCP is Yokasta Andre.  Lelo Liang DO, DO       Current Meds:    Prior to Admission medications    Medication Sig Start Date Packs/day: 0.50     Types: Cigarettes    Smokeless tobacco: Never   Vaping Use    Vaping Use: Never used   Substance and Sexual Activity    Alcohol use: Not Currently     Comment: history of abuse, last drink was early December, 2018    Drug use: Not Currently     Types: Marijuana Darcy Adria)     Comment: last use was summer, 2017    Sexual activity: Not Currently   Social History Narrative         SAFETY PLAN        A suicide Safety Plan is a document that supports someone when they are having thoughts of suicide. Warning Signs that indicate a suicidal crisis may be developing: What (situations, thoughts, feelings, body sensations, behaviors, etc.) do you experience that lets you know you are beginning to think about suicide? 1. Bed bug infestation    2. Feeling like she can not take care of herself    3. Feeling lonely        Internal Coping Strategies:  What things can I do (relaxation techniques, hobbies, physical activities, etc.) to take my mind off my problems without contacting another person? 1. Watching television    2. Puzzles     3. Mild house cleaning        People and social settings that provide distraction: Who can I call or where can I go to distract me? 1. Name: Loy He   Phone: doesn't have a phone- goes to his trailer    2. Name: Marylen Harms   Phone:  [phone]    3. Place: the store             4. Place: outside in the trailer park when the weather is nice         People whom I can ask for help: Who can I call when I need help - for example, friends, family, clergy, someone else? 1. Name: Loy He             Phone:     2. Name: Marylen Harms  Phone: [phone]    3. Name:   Phone:         Professionals or [address] [place] [address] I can contact during a crisis: Who can I call for help - for example, my doctor, my psychiatrist, my psychologist, a mental health provider, a suicide hotline? 1.  If you feel overwhelmed, unable to cope, extremely anxious, or have thoughts of wanting to harm yourself or someone else, go to the nearest Emergency Room. [facility]:  [phone]    Crisis line:  [phone]    [address] [place] [address] (inpatient psych):  [phone] option 1    Call 911- they will send help and get you to the local ER        2. Clinician Name: [facility]   Phone: [phone] option 3               3. Suicide Prevention Lifeline: 8-644-178-TALK (9224)            Making the environment safe: How can I make my environment (house/apartment/living space) safer? For example, can I remove guns, medications, and other items? 1. Working on getting bed bugs eliminated     2. Removing alcohol from the house        MSE:  Appearance and gait: UTO due to phone call   Behavior: Calm, cooperative, and socially appropriate. [place] Copping Speech: Normal in tone, volume, and quality. No slurring, dysarthria or pressured speech noted. Mood: \"pretty good\"   Affect: UTO due to phone call    Thought Process: Appears linear, logical and goal oriented. Causality appears intact. Thought Content: Denies active suicidal and homicidal ideations. No overt delusions or paranoia appreciated. Perceptions: Denies auditory or visual hallucinations at present time. Not responding to internal stimuli. Concentration: Intact. Orientation: to person, place, date, and situation. Language: Intact. Fund of information: Intact. Memory: Recent and remote appear intact. Impulsivity: Limited. Neurovegitative: Fair appetite and sleep. Insight: Fair. Judgment: Fair. Cognition: Can spell \"world\" backwards: Yes                    Can do serial 7's:  Yes    Lab Results   Component Value Date     08/04/2022    K 4.2 08/04/2022     08/04/2022    CO2 22 08/04/2022    BUN 7 08/04/2022    CREATININE 0.7 08/04/2022    GLUCOSE 104 08/04/2022    CALCIUM 9.4 08/04/2022    PROT 6.3 (L) 07/31/2022    LABALBU 2.2 (L) 07/31/2022    BILITOT 0.5 07/31/2022 patient has been advised to call with any problems. 7. Controlled substance Treatment Plan: this is a maintenance dose. .  8. The above listed medications have been continued, modifications in meds and other orders/labs as follows:      Orders Placed This Encounter   Medications    traZODone (DESYREL) 100 MG tablet     Sig: Take 2 tablets by mouth nightly     Dispense:  60 tablet     Refill:  1    sertraline (ZOLOFT) 25 MG tablet     Sig: Take 1 tablet by mouth in the morning. Dispense:  30 tablet     Refill:  1    cloNIDine (CATAPRES) 0.1 MG tablet     Sig: Nightly     Dispense:  30 tablet     Refill:  2        No orders of the defined types were placed in this encounter. 9. Additional comments: Continue therapy, discussed sleep hygiene, discussed the use of coping skills and relaxation strategies to manage symptoms. 10.Over 50% of the total visit time of   22  minutes was spent on counseling and/or coordination of care of:                        1. Bipolar affective disorder, mixed, mild ([facility] [address]    2. PTSD (post-traumatic stress disorder)    3. Generalized anxiety disorder                        Psychotherapy Topics: mood/medication effectiveness family and financial    Frederick Shelter, APRN - CNP    This dictation was generated by voice recognition computer software. Although all attempts are made to edit the dictation for accuracy, there may be errors in the transcription that are not intended. SAFETY PLAN    A suicide Safety Plan is a document that supports someone when they are having thoughts of suicide. Warning Signs that indicate a suicidal crisis may be developing: What (situations, thoughts, feelings, body sensations, behaviors, etc.) do you experience that lets you know you are beginning to think about suicide? 1. Bed bug infestation  2. Feeling like she can not take care of herself  3.  Feeling lonely    Internal Coping Strategies:  What things can I do (relaxation techniques, hobbies, physical activities, etc.) to take my mind off my problems without contacting another person? 1. Watching television  2. Puzzles   3. Mild house cleaning    People and social settings that provide distraction: Who can I call or where can I go to distract me? 1. Name: Porfirio Uribe   Phone: doesn't have a phone- goes to his trailer  2. Name: Jewels Pedro   Phone:  [phone]  3. Place: the store           4. Place: outside in the trailer park when the weather is nice     People whom I can ask for help: Who can I call when I need help - for example, friends, family, clergy, someone else? 1. Name: Porfirio Uribe             Phone:   2. Name: Jewels Pedro  Phone: [phone]  3. Name:   Phone:     Professionals or [address] [place] [address] Blvd I can contact during a crisis: Who can I call for help - for example, my doctor, my psychiatrist, my psychologist, a mental health provider, a suicide hotline? 1. If you feel overwhelmed, unable to cope, extremely anxious, or have thoughts of wanting to harm yourself or someone else, go to the nearest Emergency Room. [facility] Emergency Room:  [phone]  Crisis line:  [phone]  [address] [place] [place] (inpatient psych):  [phone] option 1  Call 911- they will send help and get you to the local ER    2. Clinician Name: [facility]   Phone: [phone] option 3         3. Suicide Prevention Lifeline: 1-164-777-TALK (3549)      Making the environment safe: How can I make my environment (house/apartment/living space) safer? For example, can I remove guns, medications, and other items? 1. Working on getting bed bugs eliminated   2.  Removing alcohol from the house dog

## 2022-08-09 NOTE — TELEPHONE ENCOUNTER
Pt was called for virtual appointment check in.       Electronically signed by Chikis Villatoro MA on 8/9/2022 at 12:38 PM

## 2022-08-24 ENCOUNTER — TELEPHONE (OUTPATIENT)
Dept: PSYCHIATRY | Age: 57
End: 2022-08-24

## 2022-08-24 DIAGNOSIS — F41.1 GENERALIZED ANXIETY DISORDER: ICD-10-CM

## 2022-08-24 RX ORDER — GABAPENTIN 300 MG/1
CAPSULE ORAL
Qty: 60 CAPSULE | Refills: 0 | Status: SHIPPED | OUTPATIENT
Start: 2022-08-24 | End: 2022-09-23

## 2022-08-24 NOTE — TELEPHONE ENCOUNTER
Rickey Mtz called to request a refill on her medication. Last office visit : 8/9/2022 Shea SCANLON  Next office visit : 10/11/2022 Shea SCANLON    Requested Prescriptions     Pending Prescriptions Disp Refills    gabapentin (NEURONTIN) 300 MG capsule 60 capsule 0     Sig: TAKE 2 CAPSULES BY MOUTH EVERY DAY AT [facility]        8/9/2022  [address] [place] - CNP  Nurse Practitioner Psychiatric/Mental Health Bipolar affective disorder, mixed, mild ([address] +2 more  Dx Medication Check  Follow-up  Reason for Visit     Progress Notes  CAPO Jeong CNP (Nurse Practitioner)   Nurse Practitioner [address] All Collapse All  Rickey Mtz is a 62 y.o. female evaluated via telephone on 8/9/2022. Consent:  She and/or health care decision maker is aware that that she may receive a bill for this telephone service, depending on her insurance coverage, and has provided verbal consent to proceed: Yes        Documentation:  I communicated with the patient and/or health care decision maker about see below. Details of this discussion including any medical advice provided: see below        I affirm this is a Patient Initiated Episode with a Patient who has not had a related appointment within my department in the past 7 days or scheduled within the next 24 hours. The patient  (guardian) is aware that this is a billable service, which includes applicable co-pays. This virtual visit was conducted with patient's (and or legal guardian's) consent. This visit was conducted pursuant to the emergency declaration under the [place] act and the [address] waiver authority in the coronavirus preparedness and response supplemental appropriation's ACT. Patient identification was verified and a caregiver was present when appropriate.   The patient was located in a state where the provider was licensed to provide care. Patient identification was verified at the start of the visit: Yes     Total Time: minutes: 21-30 minutes     Note: not billable if this call serves to triage the patient into an appointment for the relevant concern        Luis Boston CAPO Brennan CNP          7/7/22                                           Progress Note     Maynor Contreras 1965                           Chief Complaint   Patient presents with    Medication Check    Follow-up               Subjective:    Patient is a 62 y.o. female diagnosed with bipolar and presents today for follow-up. Last seen in clinic on 5/23/22  and prior records were reviewed. Last visit: doing better today than last visit. She is not having as much depression and anxiety. She is also sleeping better. She has not been outside as much because of the heat. She still has been calling her friends and has been interacting with them. She states she has had some extra fatigue lately, we discussed sitting in her home for long periods of time with minimal exercise can contribute to fatigue, we discussed chair aerobics or chair exercises that she could do while at home patient verbalized understanding and agreement. Additionally we discussed diet and nutrition. She denies SI HI AVH she denies side effects of medications at this time she is calm and cooperative on the phone no medication changes at this time we will follow-up in 2 months     In between visit: Patient was admitted to the hospital with an overdose attempt. Patient apparently overdosed on unknown amount of Risperdal.  She was brought in to the [facility] ED by EMS. She was seen by EMS in her home where she was laying on the floor swarming by bed bugs. They  her up to her feet and she promptly passed out. EMS provided about 1 minute CPR and placed an iGel airway. She then had come to prior to any medications having been administered and was brought in to [facility] ED.  She was making noises and moving her arms at arrival so her EP pulled out the iGel and she began to speak. She had a ED screening which revealed an alcohol level of 260. She stated to EMS that she had attempted to [facility] herself with Risperdal\"     Today : Patient reports she is doing okay today. She sounded calm and cooperative on the phone. She did sound down and depressed when discussing the condition of her trailer she reports that she has been having extra difficulty getting in and out of her trailer due to her physical limitations specifically her knee pain we discussed if there was an option to get a ramp placed in front of her home so that she could use her chair patient stated that she is \"looking into it. \"  She states that she has been having difficulty getting to the store because she cannot find people to take her to the store. Additionally we discussed the living condition of her trailer due to her bedbug infestation patient reports that they are still everywhere and that this has impacted her social life she states nobody will come over and no one will let her into their home. She states its been so hot that she just stays in her trailer majority of the time. We discussed barriers to suicide as well as resources available such as the suicide hotline and this clinic. Patient was instructed to stop drinking alcohol and take medications as directed patient verbalized understanding she currently denies SI HI AVH we will follow-up in 2 months        Absent  suicidal ideation. Reports compliance with medications as good .       Sleep: She reports that she has been sleeping much better     Caffeine use: coffee most morning     Support:  neighbor     PREVIOUS MED TRIALS  Trazodone (having more suicidal dreams), at 100mg, not working for sleep  Seroquel (wt gain, 14 lb in 3 months, enuresis, indigestion, abnormal blood work, increased blood sugar, seizures)  Duloxetine (has not been effective and no noticeable Musculoskeletal: (bone pain/fracture, joint pain or swelling, musle pain)     Integumentary: (rashes, acne, non-healing sores, itching, breast lumps, breast pain, nipple discharge, hair loss)     Neurologic: (HA, muscle weakness, paresthesias (numbness, coldness, crawling or prickling), memory loss, seizure, dizziness)     Psychiatric:  (anxiety, sadness, irritability/anger, insomnia, suicidality)     Endocrine: (heat or cold intolerance, excessive thirst (polydipsia), excessive hunger (polyphagia))     Immune/Allergic: (hives, seasonal or environmental allergies, HIV exposure)     Hematologic/Lymphatic: (lymph node enlargement, easy bleeding or bruising)     History obtained via chart review and patient     PCP is Stefanie Zayas. Neyda Charleston, DO, DO         Current Meds:     Home Medications           Prior to Admission medications    Medication Sig Start Date End Date Taking? Authorizing Provider   traZODone (DESYREL) 100 MG tablet Take 2 tablets by mouth nightly 8/9/22   Yes CAPO Alonzo CNP   sertraline (ZOLOFT) 25 MG tablet Take 1 tablet by mouth in the morning.  8/9/22   Yes CAPO Alonzo CNP   cloNIDine (CATAPRES) 0.1 MG tablet Nightly 8/9/22   Yes ACPO Alonzo CNP   gabapentin (NEURONTIN) 300 MG capsule TAKE 2 CAPSULES BY MOUTH EVERY DAY AT NIGHT 7/7/22 8/6/22   CAPO Alonzo CNP   risperiDONE (RISPERDAL) 1 MG tablet TAKE 1 TABLET BY MOUTH EVERY DAY AT NIGHT 7/6/22 8/3/22   CAPO Alonzo CNP   lamoTRIgine (LAMICTAL) 200 MG tablet Take 1 tablet by mouth daily TAKE 1 TABLET BY MOUTH EVERY DAY 5/23/22     CAPO Alonzo CNP   Icosapent Ethyl (VASCEPA) 1 g CAPS capsule 2 times daily at 0800 and 1400       Historical Provider, MD   pantoprazole (PROTONIX) 40 MG tablet Take 40 mg by mouth daily       Historical Provider, MD   fluticasone (FLONASE) 50 MCG/ACT nasal spray SPRAY 1 SPRAY INTO EACH NOSTRIL TWICE A DAY 7/22/21     Historical Provider, MD loratadine (CLARITIN) 10 MG tablet TAKE ONE TABLET BY MOUTH DAILY 7/23/21     Historical Provider, MD   rosuvastatin (CRESTOR) 5 MG tablet TAKE 1 TABLET BY MOUTH EVERY DAY IN THE EVENING 8/2/21     Historical Provider, MD   oxybutynin (DITROPAN) 5 MG tablet TAKE 1 TABLET BY MOUTH EVERY DAY AT NIGHT 9/7/21     Ethan Rigoberto weatt, APRN - CNP   spironolactone (ALDACTONE) 25 MG tablet daily 3/5/20     Historical Provider, MD   metoprolol succinate (TOPROL XL) 50 MG extended release tablet Take 25 mg by mouth in the morning. 4/25/19 per direction of Dr. Roberto Santillan pt to take 1/2 tab daily. Raúl Rodriguez Historical Provider, MD   Magnesium Oxide 250 MG TABS Take by mouth daily       Historical Provider, MD         Social History   Social History            Socioeconomic History    Marital status: Legally     Number of children: 1    Years of education: 12th grade + some college   Tobacco Use    Smoking status: Every Day       Packs/day: 0.50       Types: Cigarettes    Smokeless tobacco: Never   Vaping Use    Vaping Use: Never used   Substance and Sexual Activity    Alcohol use: Not Currently       Comment: history of abuse, last drink was early December, 2018    Drug use: Not Currently       Types: Marijuana Lorena Naidu)       Comment: last use was summer, 2017    Sexual activity: Not Currently   Social History Narrative            SAFETY PLAN           A suicide Safety Plan is a document that supports someone when they are having thoughts of suicide. Warning Signs that indicate a suicidal crisis may be developing: What (situations, thoughts, feelings, body sensations, behaviors, etc.) do you experience that lets you know you are beginning to think about suicide? 1. Bed bug infestation     2. Feeling like she can not take care of herself     3.  Feeling lonely           Internal Coping Strategies:  What things can I do (relaxation techniques, hobbies, physical activities, etc.) to take my mind off my problems without contacting another person? 1. Watching television     2. Puzzles      3. Mild house cleaning           People and social settings that provide distraction: Who can I call or where can I go to distract me? 1. Name: Fam Bone                           Phone: doesn't have a phone- goes to his trailer     2. Name: Holli Gottron                            Phone:  [phone]     3. Place: the store              4. Place: outside in the trailer park when the weather is nice            People whom I can ask for help: Who can I call when I need help - for example, friends, family, clergy, someone else? 1. Name: Fam Bone             Phone:      2. Name: Holli Gottron                Phone: [phone]     3. Name:                        Phone:            Professionals or [address] [place] [address] I can contact during a crisis: Who can I call for help - for example, my doctor, my psychiatrist, my psychologist, a mental health provider, a suicide hotline? 1. If you feel overwhelmed, unable to cope, extremely anxious, or have thoughts of wanting to harm yourself or someone else, go to the nearest Emergency Room. [facility] Emergency Room:  [phone]     Crisis line:  [phone]     [address] [place] [address] (inpatient psych):  [phone] option 1     Call 911- they will send help and get you to the local ER           2. Clinician Name: [facility]   Phone: [phone] option 3                   3. Suicide Prevention Lifeline: 5-584-981-TALK (3375)                 Making the environment safe: How can I make my environment (house/apartment/living space) safer? For example, can I remove guns, medications, and other items? 1. Working on getting bed bugs eliminated      2. Removing alcohol from the house             MSE:  Appearance and gait: UTO due to phone call   Behavior: Calm, cooperative, and socially appropriate. [employer] Speech: Normal in tone, volume, and quality.  No slurring, dysarthria or pressured speech noted. Mood: \"pretty good\"   Affect: UTO due to phone call    Thought Process: Appears linear, logical and goal oriented. Causality appears intact. Thought Content: Denies active suicidal and homicidal ideations. No overt delusions or paranoia appreciated. Perceptions: Denies auditory or visual hallucinations at present time. Not responding to internal stimuli. Concentration: Intact. Orientation: to person, place, date, and situation. Language: Intact. Fund of information: Intact. Memory: Recent and remote appear intact. Impulsivity: Limited. Neurovegitative: Fair appetite and sleep. Insight: Fair. Judgment: Fair. Cognition: Can spell \"world\" backwards: Yes                    Can do serial 7's:  Yes           Lab Results   Component Value Date      08/04/2022     K 4.2 08/04/2022      08/04/2022     CO2 22 08/04/2022     BUN 7 08/04/2022     CREATININE 0.7 08/04/2022     GLUCOSE 104 08/04/2022     CALCIUM 9.4 08/04/2022     PROT 6.3 (L) 07/31/2022     LABALBU 2.2 (L) 07/31/2022     BILITOT 0.5 07/31/2022     ALKPHOS 301 (H) 07/31/2022     AST 80 (H) 07/31/2022     ALT 45 (H) 07/31/2022     LABGLOM >60 08/04/2022     GFRAA >59 08/04/2022            Lab Results   Component Value Date/Time      08/04/2022 03:42 AM     K 4.2 08/04/2022 03:42 AM     K 3.5 07/24/2022 04:10 AM      08/04/2022 03:42 AM     CO2 22 08/04/2022 03:42 AM     BUN 7 08/04/2022 03:42 AM     CREATININE 0.7 08/04/2022 03:42 AM     GLUCOSE 104 08/04/2022 03:42 AM     CALCIUM 9.4 08/04/2022 03:42 AM      No results found for: CHOL  No results found for: TRIG  No results found for: HDL  No results found for: LDLCHOLESTEROL, LDLCALC  No results found for: LABVLDL, VLDL  No results found for: CHOLHDLRATIO  No results found for: LABA1C  No results found for: EAG        Lab Results   Component Value Date     TSH 2.40 02/03/2015            Lab Results   Component Value Date VITD25 27.6 (L) 02/03/2015            Lab Results   Component Value Date     LRDONLZZ54 >2000 (H) 07/20/2022            Lab Results   Component Value Date     FOLATE 8.6 07/20/2022         Assessment:    1. Bipolar affective disorder, mixed, mild ([facility] [address]    2. PTSD (post-traumatic stress disorder)    3. Generalized anxiety disorder             No evidence of acute suicidality, homicidality or psychosis observed. Patient is psychiatrically stable     Plan:     1. Continue       Gabapentin, 300mg, take 2 capsules nightly (restless legs)   Clonidine, 0.1mg, nightly (teeth grinding, nightmares),     Lamotrigine, 200mg,  Daily  Trazodone, 100mg, nightly      Start      Discontinue  Risperdal, 1mg, nightly  Eszopiclone (Lunesta), 3mg, nightly for sleep      The risks, benefits, side effects, indications, contraindications, and adverse effects of the medications have been discussed. Yes.  2. The pt has verbalized understanding and has capacity to give informed consent. 3. The [facility]ames report has been reviewed according to [facility] regulations. 4. Supportive therapy offered. 5. Follow up:    Return in about 2 months (around 10/9/2022). 6. The patient has been advised to call with any problems. 7. Controlled substance Treatment Plan: this is a maintenance dose. .  8. The above listed medications have been continued, modifications in meds and other orders/labs as follows:                 Encounter Medications         Orders Placed This Encounter   Medications    traZODone (DESYREL) 100 MG tablet       Sig: Take 2 tablets by mouth nightly       Dispense:  60 tablet       Refill:  1    sertraline (ZOLOFT) 25 MG tablet       Sig: Take 1 tablet by mouth in the morning. Dispense:  30 tablet       Refill:  1    cloNIDine (CATAPRES) 0.1 MG tablet       Sig: Nightly       Dispense:  30 tablet       Refill:  2                        No orders of the defined types were placed in this encounter.         9. Additional comments: Continue therapy, discussed sleep hygiene, discussed the use of coping skills and relaxation strategies to manage symptoms. 10.Over 50% of the total visit time of   22  minutes was spent on counseling and/or coordination of care of:                         1. B[facility] disorder, mixed, mild ([facility]    2. PTSD (post-traumatic stress disorder)    3.  Generalized anxiety disorder                           Psychotherapy Topics: mood/medication effectiveness family and financial     Ahmed Dakins, APRN - CNP

## 2022-08-24 NOTE — TELEPHONE ENCOUNTER
Called and let pt know that her script for gabapentin was sent to her pharmacy     Electronically signed by Yuniel Carreno on 8/24/2022 at 2:26 PM

## 2022-08-30 ENCOUNTER — TELEPHONE (OUTPATIENT)
Dept: PSYCHIATRY | Age: 57
End: 2022-08-30

## 2022-08-30 DIAGNOSIS — N39.44 NOCTURNAL ENURESIS: ICD-10-CM

## 2022-08-30 RX ORDER — TRAZODONE HYDROCHLORIDE 100 MG/1
TABLET ORAL
Qty: 30 TABLET | Refills: 2 | Status: SHIPPED | OUTPATIENT
Start: 2022-08-30

## 2022-08-30 RX ORDER — OXYBUTYNIN CHLORIDE 5 MG/1
TABLET ORAL
Qty: 90 TABLET | Refills: 3 | Status: SHIPPED | OUTPATIENT
Start: 2022-08-30

## 2022-08-30 NOTE — TELEPHONE ENCOUNTER
Pharmacy sent a request to refill pt's medication       Last office visit : 8/9/2022 Jacy SCANLON  Next office visit : 10/11/2022 Jacy SCANLON    Requested Prescriptions     Pending Prescriptions Disp Refills    traZODone (DESYREL) 100 MG tablet [Pharmacy Med Name: TRAZODONE 100 MG TABLET] 30 tablet 2     Sig: TAKE 1 TABLET BY MOUTH NIGHTLY            Shameca Benjamins           8/9/2022  [address] [facility] CAPO Velazquez CNP  Nurse Practitioner Psychiatric/Mental Health Bipolar affective disorder, mixed, mild ([facility] [address] +2 more  Dx Medication Check  Follow-up  Reason for Visit     Progress Notes  CAPO Brower CNP (Nurse Practitioner)   Nurse Practitioner [address] [place]  Jay Eugene is a 62 y.o. female evaluated via telephone on 8/9/2022. Consent:  She and/or health care decision maker is aware that that she may receive a bill for this telephone service, depending on her insurance coverage, and has provided verbal consent to proceed: Yes        Documentation:  I communicated with the patient and/or health care decision maker about see below. Details of this discussion including any medical advice provided: see below        I affirm this is a Patient Initiated Episode with a Patient who has not had a related appointment within my department in the past 7 days or scheduled within the next 24 hours. The patient  (guardian) is aware that this is a billable service, which includes applicable co-pays. This virtual visit was conducted with patient's (and or legal guardian's) consent. This visit was conducted pursuant to the emergency declaration under the [place] act and the [facility], [address] [place] [address] waiver authority in the coronavirus preparedness and response supplemental appropriation's ACT. Patient identification was verified and a caregiver was present when appropriate.   The patient was located in a state where the provider was licensed to provide care. Patient identification was verified at the start of the visit: Yes     Total Time: minutes: 21-30 minutes     Note: not billable if this call serves to triage the patient into an appointment for the relevant concern        CAPO Valenzuela CNP          7/7/22                                           Progress Note     Sanaz Mejia 1965                           Chief Complaint   Patient presents with    Medication Check    Follow-up               Subjective:    Patient is a 62 y.o. female diagnosed with bipolar and presents today for follow-up. Last seen in clinic on 5/23/22  and prior records were reviewed. Last visit: doing better today than last visit. She is not having as much depression and anxiety. She is also sleeping better. She has not been outside as much because of the heat. She still has been calling her friends and has been interacting with them. She states she has had some extra fatigue lately, we discussed sitting in her home for long periods of time with minimal exercise can contribute to fatigue, we discussed chair aerobics or chair exercises that she could do while at home patient verbalized understanding and agreement. Additionally we discussed diet and nutrition. She denies SI HI AVH she denies side effects of medications at this time she is calm and cooperative on the phone no medication changes at this time we will follow-up in 2 months     In between visit: Patient was admitted to the hospital with an overdose attempt. Patient apparently overdosed on unknown amount of Risperdal.  She was brought in to the [facility] ED by EMS. She was seen by EMS in her home where she was laying on the floor swarming by bed bugs. They  her up to her feet and she promptly passed out. EMS provided about 1 minute CPR and placed an iGel airway.  She then had come to prior to any medications having been administered and was brought in to [facility] ED. She was making noises and moving her arms at arrival so her EP pulled out the iGel and she began to speak. She had a ED screening which revealed an alcohol level of 260. She stated to EMS that she had attempted to [facility] herself with Risperdal\"     Today : Patient reports she is doing okay today. She sounded calm and cooperative on the phone. She did sound down and depressed when discussing the condition of her trailer she reports that she has been having extra difficulty getting in and out of her trailer due to her physical limitations specifically her knee pain we discussed if there was an option to get a ramp placed in front of her home so that she could use her chair patient stated that she is \"looking into it. \"  She states that she has been having difficulty getting to the store because she cannot find people to take her to the store. Additionally we discussed the living condition of her trailer due to her bedbug infestation patient reports that they are still everywhere and that this has impacted her social life she states nobody will come over and no one will let her into their home. She states its been so hot that she just stays in her trailer majority of the time. We discussed barriers to suicide as well as resources available such as the suicide hotline and this clinic. Patient was instructed to stop drinking alcohol and take medications as directed patient verbalized understanding she currently denies SI HI AVH we will follow-up in 2 months        Absent  suicidal ideation. Reports compliance with medications as good .       Sleep: She reports that she has been sleeping much better     Caffeine use: coffee most morning     Support:  neighbor     PREVIOUS MED TRIALS  Trazodone (having more suicidal dreams), at 100mg, not working for sleep  Seroquel (wt gain, 14 lb in 3 months, enuresis, indigestion, abnormal blood work, increased blood sugar, seizures)  Duloxetine (has not been effective and no noticeable change even with dose increases to 90mg)  Paxil, 20mg  Wellbutrin XL, (tried it recently to quit smoking)  Prozac (made her numb, added to her eating disorder)  Zoloft (thought she did well on this, took for a couple of years, she stopped it because when she returned to [place] the doctor put her back on Prozac)  Remeron (increased her dreams and panic attacks)  Lizeth Border (worked)  Librium (in 2018 for 15 days to stop drinking alcohol)  Risperdal- weight gain  Doxepin,  (not effective)  Prazosin, ineffective for dreams/nightmares  Latuda (1/27/21, reports that she switched it to am dosing because taking it at night made her RLS worse)     Current Substance Use:   Alcohol: Denies   illicit drug use: Denies   Marijuana: Denies   Tobacco use: 3/4 ppd  Vape: Denies        BP: There were no vitals taken for this visit.         Review of Systems - 14 point review:  Negative except for stated     Constitutional: (fevers, chills, night sweats, wt loss/gain, change in appetite, fatigue, somnolence)     HEENT: (ear pain or discharge, hearing loss, ear ringing, sinus pressure, nosebleed, nasal discharge, sore throat, oral sores, tooth pain, bleeding gums, hoarse voice, neck pain)      Cardiovascular: (HTN, chest pain, elevated cholesterol/lipids, palpitations, leg swelling, leg pain with walking)     Respiratory: (cough, wheezing, snoring, SOB with activity (dyspnea), SOB while lying flat (orthopnea), awakening with severe SOB (paroxysmal nocturnal dyspnea))     Gastrointestinal: (NVD, constipation, abdominal pain, bright red stools, black tarry stools, stool incontinence)     Genitourinary:  (pelvic pain, burning or frequency of urination, urinary urgency, blood in urine incomplete bladder emptying, urinary incontinence, STD; MEN: testicular pain or swelling, erectile dysfunction; WOMEN: LMP, heavy menstrual bleeding (menorrhagia), irregular periods, postmenopausal bleeding, menstrual pain (dymenorrhea, vaginal discharge)     Musculoskeletal: (bone pain/fracture, joint pain or swelling, musle pain)     Integumentary: (rashes, acne, non-healing sores, itching, breast lumps, breast pain, nipple discharge, hair loss)     Neurologic: (HA, muscle weakness, paresthesias (numbness, coldness, crawling or prickling), memory loss, seizure, dizziness)     Psychiatric:  (anxiety, sadness, irritability/anger, insomnia, suicidality)     Endocrine: (heat or cold intolerance, excessive thirst (polydipsia), excessive hunger (polyphagia))     Immune/Allergic: (hives, seasonal or environmental allergies, HIV exposure)     Hematologic/Lymphatic: (lymph node enlargement, easy bleeding or bruising)     History obtained via chart review and patient     PCP is Narinder Newman Falling, DO, DO         Current Meds:     Home Medications           Prior to Admission medications    Medication Sig Start Date End Date Taking? Authorizing Provider   traZODone (DESYREL) 100 MG tablet Take 2 tablets by mouth nightly 8/9/22   Yes Joshua Said, APRN - CNP   sertraline (ZOLOFT) 25 MG tablet Take 1 tablet by mouth in the morning.  8/9/22   Yes Joshua Said, APRN - CNP   cloNIDine (CATAPRES) 0.1 MG tablet Nightly 8/9/22   Yes Joshua Said, APRN - CNP   gabapentin (NEURONTIN) 300 MG capsule TAKE 2 CAPSULES BY MOUTH EVERY DAY AT NIGHT 7/7/22 8/6/22   Joshua Said, APRN - CNP   risperiDONE (RISPERDAL) 1 MG tablet TAKE 1 TABLET BY MOUTH EVERY DAY AT NIGHT 7/6/22 8/3/22   Joshua Said, APRN - CNP   lamoTRIgine (LAMICTAL) 200 MG tablet Take 1 tablet by mouth daily TAKE 1 TABLET BY MOUTH EVERY DAY 5/23/22     Joshua Said, APRN - CNP   Icosapent Ethyl (VASCEPA) 1 g CAPS capsule 2 times daily at 0800 and 1400       Historical Provider, MD   pantoprazole (PROTONIX) 40 MG tablet Take 40 mg by mouth daily       Historical Provider, MD   fluticasone (FLONASE) 50 MCG/ACT nasal spray SPRAY 1 SPRAY INTO EACH NOSTRIL TWICE A DAY 7/22/21     Historical Provider, MD   loratadine (CLARITIN) 10 MG tablet TAKE ONE TABLET BY MOUTH DAILY 7/23/21     Historical Provider, MD   rosuvastatin (CRESTOR) 5 MG tablet TAKE 1 TABLET BY MOUTH EVERY DAY IN THE EVENING 8/2/21     Historical Provider, MD   oxybutynin (DITROPAN) 5 MG tablet TAKE 1 TABLET BY MOUTH EVERY DAY AT NIGHT 9/7/21     [facility], APRN - CNP   spironolactone (ALDACTONE) 25 MG tablet daily 3/5/20     Historical Provider, MD   metoprolol succinate (TOPROL XL) 50 MG extended release tablet Take 25 mg by mouth in the morning. 4/25/19 per direction of Dr. Jenaro Isaac pt to take 1/2 tab daily. [facility] Historical Provider, MD   Magnesium Oxide 250 MG TABS Take by mouth daily       Historical Provider, MD         Social History   Social History            Socioeconomic History    Marital status: Legally     Number of children: 1    Years of education: 12th grade + some college   Tobacco Use    Smoking status: Every Day       Packs/day: 0.50       Types: Cigarettes    Smokeless tobacco: Never   Vaping Use    Vaping Use: Never used   Substance and Sexual Activity    Alcohol use: Not Currently       Comment: history of abuse, last drink was early December, 2018    Drug use: Not Currently       Types: Marijuana Gianni Rehman)       Comment: last use was summer, 2017    Sexual activity: Not Currently   Social History Narrative            SAFETY PLAN           A suicide Safety Plan is a document that supports someone when they are having thoughts of suicide. Warning Signs that indicate a suicidal crisis may be developing: What (situations, thoughts, feelings, body sensations, behaviors, etc.) do you experience that lets you know you are beginning to think about suicide? 1. Bed bug infestation     2. Feeling like she can not take care of herself     3.  Feeling lonely           Internal Coping Strategies:  What things can I do (relaxation techniques, hobbies, physical activities, etc.) to take my mind off my problems without contacting another person? 1. Watching television     2. Puzzles      3. Mild house cleaning           People and social settings that provide distraction: Who can I call or where can I go to distract me? 1. Name: Memo Nguyen                           Phone: doesn't have a phone- goes to his trailer     2. Name: Coral Parra                            Phone:  [phone]     3. Place: the store              4. Place: outside in the trailer park when the weather is nice            People whom I can ask for help: Who can I call when I need help - for example, friends, family, clergy, someone else? 1. Name: Memo Nguyen             Phone:      2. Name: Coral Parra                Phone: [phone]     3. Name:                        Phone:            Professionals or [address] [place] [address] I can contact during a crisis: Who can I call for help - for example, my doctor, my psychiatrist, my psychologist, a mental health provider, a suicide hotline? 1. If you feel overwhelmed, unable to cope, extremely anxious, or have thoughts of wanting to harm yourself or someone else, go to the nearest Emergency Room. [facility] Emergency Room:  [phone]     Crisis line:  [phone]     [address] [place] [address] (inpatient psych):  [phone] option 1     Call 911- they will send help and get you to the local ER           2. Clinician Name: [facility]   Phone: [phone] option 3                   3. Suicide Prevention Lifeline: 3-508-812-TALK (3148)                 Making the environment safe: How can I make my environment (house/apartment/living space) safer? For example, can I remove guns, medications, and other items? 1. Working on getting bed bugs eliminated      2. Removing alcohol from the house             MSE:  Appearance and gait: UTO due to phone call   Behavior: Calm, cooperative, and socially appropriate. Isabelle Bloodgood Speech: Normal in tone, volume, and quality.  No Value Date     VITD25 27.6 (L) 02/03/2015            Lab Results   Component Value Date     LXYTYSVL10 >2000 (H) 07/20/2022            Lab Results   Component Value Date     FOLATE 8.6 07/20/2022         Assessment:    1. Bipolar affective disorder, mixed, mild ([facility] [address]    2. PTSD (post-traumatic stress disorder)    3. Generalized anxiety disorder             No evidence of acute suicidality, homicidality or psychosis observed. Patient is psychiatrically stable     Plan:     1. Continue       Gabapentin, 300mg, take 2 capsules nightly (restless legs)   Clonidine, 0.1mg, nightly (teeth grinding, nightmares),     Lamotrigine, 200mg,  Daily  Trazodone, 100mg, nightly      Start      Discontinue  Risperdal, 1mg, nightly  Eszopiclone (Lunesta), 3mg, nightly for sleep      The risks, benefits, side effects, indications, contraindications, and adverse effects of the medications have been discussed. Yes.  2. The pt has verbalized understanding and has capacity to give informed consent. 3. The Jaqueline Villaseñor report has been reviewed according to [facility] regulations. 4. Supportive therapy offered. 5. Follow up:    Return in about 2 months (around 10/9/2022). 6. The patient has been advised to call with any problems. 7. Controlled substance Treatment Plan: this is a maintenance dose. .  8. The above listed medications have been continued, modifications in meds and other orders/labs as follows:                 Encounter Medications         Orders Placed This Encounter   Medications    traZODone (DESYREL) 100 MG tablet       Sig: Take 2 tablets by mouth nightly       Dispense:  60 tablet       Refill:  1    sertraline (ZOLOFT) 25 MG tablet       Sig: Take 1 tablet by mouth in the morning. Dispense:  30 tablet       Refill:  1    cloNIDine (CATAPRES) 0.1 MG tablet       Sig: Nightly       Dispense:  30 tablet       Refill:  2                        No orders of the defined types were placed in this encounter.         9. Additional comments: Continue therapy, discussed sleep hygiene, discussed the use of coping skills and relaxation strategies to manage symptoms. 10.Over 50% of the total visit time of   22  minutes was spent on counseling and/or coordination of care of:                         1. [facility]    2. PTSD (post-traumatic stress disorder)    3.  Generalized anxiety disorder                           Psychotherapy Topics: mood/medication effectiveness family and financial     Lidia Guzman, CAPO - CNP

## 2022-08-30 NOTE — TELEPHONE ENCOUNTER
Called and lvm for pt to return phone call    When pt calls back I will let her know that her script for trazodone was sent to her pharmacy     Electronically signed by Iván Stanley on 8/30/2022 at 10:51 AM

## 2022-08-30 NOTE — TELEPHONE ENCOUNTER
Left vm with pt that refill was sent in and scheduled her a yearly fu and to call office back if any conflicts with scheduled appt

## 2022-09-19 ENCOUNTER — TELEPHONE (OUTPATIENT)
Dept: PSYCHIATRY | Age: 57
End: 2022-09-19

## 2022-09-19 RX ORDER — LAMOTRIGINE 200 MG/1
TABLET ORAL
Qty: 30 TABLET | Refills: 1 | Status: SHIPPED | OUTPATIENT
Start: 2022-09-19 | End: 2022-10-03 | Stop reason: SDUPTHER

## 2022-09-19 NOTE — TELEPHONE ENCOUNTER
Called and lvm for pt to return phone call    When pt calls back I will let her know that her script for lamotrigine was sent to her pharmacy       Electronically signed by Dragan Olivares on 9/19/2022 at 10:43 AM

## 2022-09-19 NOTE — TELEPHONE ENCOUNTER
Pharmacy sent a request to refill pt's medication       Last office visit : 8/9/2022 Kate SCANLON  Next office visit : 10/11/2022 Kate SCANLON    Requested Prescriptions     Pending Prescriptions Disp Refills    lamoTRIgine (LAMICTAL) 200 MG tablet [Pharmacy Med Name: LAMOTRIGINE 200 MG TABLET] 30 tablet 1     Sig: TAKE 1 TABLET BY MOUTH EVERY DAY            Rosa Thakur           8/9/2022  [address] [facility]  CAPO Ba - CNP  Nurse Practitioner Psychiatric/Mental Health Bipolar affective disorder, mixed, mild ([address] +2 more  Dx Medication Check  Follow-up  Reason for Visit     Progress Notes  CAPO Ba CNP (Nurse Practitioner)   Nurse Practitioner [address] [facility] All  Ki Spann is a 62 y.o. female evaluated via telephone on 8/9/2022. Consent:  She and/or health care decision maker is aware that that she may receive a bill for this telephone service, depending on her insurance coverage, and has provided verbal consent to proceed: Yes        Documentation:  I communicated with the patient and/or health care decision maker about see below. Details of this discussion including any medical advice provided: see below        I affirm this is a Patient Initiated Episode with a Patient who has not had a related appointment within my department in the past 7 days or scheduled within the next 24 hours. The patient  (guardian) is aware that this is a billable service, which includes applicable co-pays. This virtual visit was conducted with patient's (and or legal guardian's) consent. This visit was conducted pursuant to the emergency declaration under the [place] act and the [facility], [address] [place] [address] waiver authority in the coronavirus preparedness and response supplemental appropriation's ACT. Patient identification was verified and a caregiver was present when appropriate.   The patient was located in a state where the provider was licensed to provide care. Patient identification was verified at the start of the visit: Yes     Total Time: minutes: 21-30 minutes     Note: not billable if this call serves to triage the patient into an appointment for the relevant concern        CAPO Pineda CNP          7/7/22                                           Progress Note     Liam Spence 1965                           Chief Complaint   Patient presents with    Medication Check    Follow-up               Subjective:    Patient is a 62 y.o. female diagnosed with bipolar and presents today for follow-up. Last seen in clinic on 5/23/22  and prior records were reviewed. Last visit: doing better today than last visit. She is not having as much depression and anxiety. She is also sleeping better. She has not been outside as much because of the heat. She still has been calling her friends and has been interacting with them. She states she has had some extra fatigue lately, we discussed sitting in her home for long periods of time with minimal exercise can contribute to fatigue, we discussed chair aerobics or chair exercises that she could do while at home patient verbalized understanding and agreement. Additionally we discussed diet and nutrition. She denies SI HI AVH she denies side effects of medications at this time she is calm and cooperative on the phone no medication changes at this time we will follow-up in 2 months     In between visit: Patient was admitted to the hospital with an overdose attempt. Patient apparently overdosed on unknown amount of Risperdal.  She was brought in to the [facility] ED by EMS. She was seen by EMS in her home where she was laying on the floor swarming by bed bugs. They  her up to her feet and she promptly passed out. EMS provided about 1 minute CPR and placed an iGel airway.  She then had come to prior to any medications having been administered and was brought in to [facility] ED. She was making noises and moving her arms at arrival so her EP pulled out the iGel and she began to speak. She had a ED screening which revealed an alcohol level of 260. She stated to EMS that she had attempted to [facility] herself with Risperdal\"     Today : Patient reports she is doing okay today. She sounded calm and cooperative on the phone. She did sound down and depressed when discussing the condition of her trailer she reports that she has been having extra difficulty getting in and out of her trailer due to her physical limitations specifically her knee pain we discussed if there was an option to get a ramp placed in front of her home so that she could use her chair patient stated that she is \"looking into it. \"  She states that she has been having difficulty getting to the store because she cannot find people to take her to the store. Additionally we discussed the living condition of her trailer due to her bedbug infestation patient reports that they are still everywhere and that this has impacted her social life she states nobody will come over and no one will let her into their home. She states its been so hot that she just stays in her trailer majority of the time. We discussed barriers to suicide as well as resources available such as the suicide hotline and this clinic. Patient was instructed to stop drinking alcohol and take medications as directed patient verbalized understanding she currently denies SI HI AVH we will follow-up in 2 months        Absent  suicidal ideation. Reports compliance with medications as good .       Sleep: She reports that she has been sleeping much better     Caffeine use: coffee most morning     Support:  neighbor     PREVIOUS MED TRIALS  Trazodone (having more suicidal dreams), at 100mg, not working for sleep  Seroquel (wt gain, 14 lb in 3 months, enuresis, indigestion, abnormal blood work, increased blood sugar, seizures)  Duloxetine (has not been effective and no noticeable change even with dose increases to 90mg)  Paxil, 20mg  Wellbutrin XL, (tried it recently to quit smoking)  Prozac (made her numb, added to her eating disorder)  Zoloft (thought she did well on this, took for a couple of years, she stopped it because when she returned to [place] the doctor put her back on Prozac)  Remeron (increased her dreams and panic attacks)  Renee Fields (worked)  Librium (in 2018 for 15 days to stop drinking alcohol)  Risperdal- weight gain  Doxepin,  (not effective)  Prazosin, ineffective for dreams/nightmares  Latuda (1/27/21, reports that she switched it to am dosing because taking it at night made her RLS worse)     Current Substance Use:   Alcohol: Denies   illicit drug use: Denies   Marijuana: Denies   Tobacco use: 3/4 ppd  Vape: Denies        BP: There were no vitals taken for this visit.         Review of Systems - 14 point review:  Negative except for stated     Constitutional: (fevers, chills, night sweats, wt loss/gain, change in appetite, fatigue, somnolence)     HEENT: (ear pain or discharge, hearing loss, ear ringing, sinus pressure, nosebleed, nasal discharge, sore throat, oral sores, tooth pain, bleeding gums, hoarse voice, neck pain)      Cardiovascular: (HTN, chest pain, elevated cholesterol/lipids, palpitations, leg swelling, leg pain with walking)     Respiratory: (cough, wheezing, snoring, SOB with activity (dyspnea), SOB while lying flat (orthopnea), awakening with severe SOB (paroxysmal nocturnal dyspnea))     Gastrointestinal: (NVD, constipation, abdominal pain, bright red stools, black tarry stools, stool incontinence)     Genitourinary:  (pelvic pain, burning or frequency of urination, urinary urgency, blood in urine incomplete bladder emptying, urinary incontinence, STD; MEN: testicular pain or swelling, erectile dysfunction; WOMEN: LMP, heavy menstrual bleeding (menorrhagia), irregular periods, postmenopausal bleeding, menstrual pain (dymenorrhea, vaginal discharge)     Musculoskeletal: (bone pain/fracture, joint pain or swelling, musle pain)     Integumentary: (rashes, acne, non-healing sores, itching, breast lumps, breast pain, nipple discharge, hair loss)     Neurologic: (HA, muscle weakness, paresthesias (numbness, coldness, crawling or prickling), memory loss, seizure, dizziness)     Psychiatric:  (anxiety, sadness, irritability/anger, insomnia, suicidality)     Endocrine: (heat or cold intolerance, excessive thirst (polydipsia), excessive hunger (polyphagia))     Immune/Allergic: (hives, seasonal or environmental allergies, HIV exposure)     Hematologic/Lymphatic: (lymph node enlargement, easy bleeding or bruising)     History obtained via chart review and patient     PCP is Yunior Tejada. Sheree Figueredo DO, DO         Current Meds:     Home Medications           Prior to Admission medications    Medication Sig Start Date End Date Taking? Authorizing Provider   traZODone (DESYREL) 100 MG tablet Take 2 tablets by mouth nightly 8/9/22   Yes CAPO Madsen CNP   sertraline (ZOLOFT) 25 MG tablet Take 1 tablet by mouth in the morning.  8/9/22   Yes CAPO Madsen CNP   cloNIDine (CATAPRES) 0.1 MG tablet Nightly 8/9/22   Yes CAPO Madsen CNP   gabapentin (NEURONTIN) 300 MG capsule TAKE 2 CAPSULES BY MOUTH EVERY DAY AT NIGHT 7/7/22 8/6/22   CAPO Madsen CNP   risperiDONE (RISPERDAL) 1 MG tablet TAKE 1 TABLET BY MOUTH EVERY DAY AT NIGHT 7/6/22 8/3/22   CAPO Madsen CNP   lamoTRIgine (LAMICTAL) 200 MG tablet Take 1 tablet by mouth daily TAKE 1 TABLET BY MOUTH EVERY DAY 5/23/22     CAPO Madsen CNP   Icosapent Ethyl (VASCEPA) 1 g CAPS capsule 2 times daily at 0800 and 1400       Historical Provider, MD   pantoprazole (PROTONIX) 40 MG tablet Take 40 mg by mouth daily       Historical Provider, MD   fluticasone (FLONASE) 50 MCG/ACT nasal spray SPRAY 1 SPRAY INTO EACH NOSTRIL TWICE A DAY 7/22/21     Historical Provider, MD   loratadine (CLARITIN) 10 MG tablet TAKE ONE TABLET BY MOUTH DAILY 7/23/21     Historical Provider, MD   rosuvastatin (CRESTOR) 5 MG tablet TAKE 1 TABLET BY MOUTH EVERY DAY IN THE EVENING 8/2/21     Historical Provider, MD   oxybutynin (DITROPAN) 5 MG tablet TAKE 1 TABLET BY MOUTH EVERY DAY AT NIGHT 9/7/21     Garcia Waller, APRN - CNP   spironolactone (ALDACTONE) 25 MG tablet daily 3/5/20     Historical Provider, MD   metoprolol succinate (TOPROL XL) 50 MG extended release tablet Take 25 mg by mouth in the morning. 4/25/19 per direction of Dr. Niesha Keyes pt to take 1/2 tab daily. Jammie Montano Historical Provider, MD   Magnesium Oxide 250 MG TABS Take by mouth daily       Historical Provider, MD         Social History   Social History            Socioeconomic History    Marital status: Legally     Number of children: 1    Years of education: 12th grade + some college   Tobacco Use    Smoking status: Every Day       Packs/day: 0.50       Types: Cigarettes    Smokeless tobacco: Never   Vaping Use    Vaping Use: Never used   Substance and Sexual Activity    Alcohol use: Not Currently       Comment: history of abuse, last drink was early December, 2018    Drug use: Not Currently       Types: Marijuana Puneet Blow)       Comment: last use was summer, 2017    Sexual activity: Not Currently   Social History Narrative            SAFETY PLAN           A suicide Safety Plan is a document that supports someone when they are having thoughts of suicide. Warning Signs that indicate a suicidal crisis may be developing: What (situations, thoughts, feelings, body sensations, behaviors, etc.) do you experience that lets you know you are beginning to think about suicide? 1. Bed bug infestation     2. Feeling like she can not take care of herself     3.  Feeling lonely           Internal Coping Strategies:  What things can I do (relaxation techniques, hobbies, physical activities, etc.) to take my mind off my problems without contacting another person? 1. Watching television     2. Puzzles      3. Mild house cleaning           People and social settings that provide distraction: Who can I call or where can I go to distract me? 1. Name: Callie Alcocer                           Phone: doesn't have a phone- goes to his trailer     2. Name: Emperatriz Jiang                            Phone:  [phone]     3. Place: the store              4. Place: outside in the trailer park when the weather is nice            People whom I can ask for help: Who can I call when I need help - for example, friends, family, clergy, someone else? 1. Name: Callie Alcocer             Phone:      2. Name: Emperatriz Jiang                Phone: [phone]     3. Name:                        Phone:            Professionals or [address] I can contact during a crisis: Who can I call for help - for example, my doctor, my psychiatrist, my psychologist, a mental health provider, a suicide hotline? 1. If you feel overwhelmed, unable to cope, extremely anxious, or have thoughts of wanting to harm yourself or someone else, go to the nearest Emergency Room. [facility] Emergency Room:  [phone]     Crisis line:  [phone]     [address] [place] [address] (inpatient psych):  [phone] option 1     Call 911- they will send help and get you to the local ER           2. Clinician Name: [facility]   Phone: [phone] option 3                   3. Suicide Prevention Lifeline: 6-078-729-TALK (2559)                 Making the environment safe: How can I make my environment (house/apartment/living space) safer? For example, can I remove guns, medications, and other items? 1. Working on getting bed bugs eliminated      2. Removing alcohol from the house             MSE:  Appearance and gait: UTO due to phone call   Behavior: Calm, cooperative, and socially appropriate. Maria Josue Speech: Normal in tone, volume, and quality.  No slurring, dysarthria or pressured speech noted. Mood: \"pretty good\"   Affect: UTO due to phone call    Thought Process: Appears linear, logical and goal oriented. Causality appears intact. Thought Content: Denies active suicidal and homicidal ideations. No overt delusions or paranoia appreciated. Perceptions: Denies auditory or visual hallucinations at present time. Not responding to internal stimuli. Concentration: Intact. Orientation: to person, place, date, and situation. Language: Intact. Fund of information: Intact. Memory: Recent and remote appear intact. Impulsivity: Limited. Neurovegitative: Fair appetite and sleep. Insight: Fair. Judgment: Fair. Cognition: Can spell \"world\" backwards: Yes                    Can do serial 7's:  Yes           Lab Results   Component Value Date      08/04/2022     K 4.2 08/04/2022      08/04/2022     CO2 22 08/04/2022     BUN 7 08/04/2022     CREATININE 0.7 08/04/2022     GLUCOSE 104 08/04/2022     CALCIUM 9.4 08/04/2022     PROT 6.3 (L) 07/31/2022     LABALBU 2.2 (L) 07/31/2022     BILITOT 0.5 07/31/2022     ALKPHOS 301 (H) 07/31/2022     AST 80 (H) 07/31/2022     ALT 45 (H) 07/31/2022     LABGLOM >60 08/04/2022     GFRAA >59 08/04/2022            Lab Results   Component Value Date/Time      08/04/2022 03:42 AM     K 4.2 08/04/2022 03:42 AM     K 3.5 07/24/2022 04:10 AM      08/04/2022 03:42 AM     CO2 22 08/04/2022 03:42 AM     BUN 7 08/04/2022 03:42 AM     CREATININE 0.7 08/04/2022 03:42 AM     GLUCOSE 104 08/04/2022 03:42 AM     CALCIUM 9.4 08/04/2022 03:42 AM      No results found for: CHOL  No results found for: TRIG  No results found for: HDL  No results found for: LDLCHOLESTEROL, LDLCALC  No results found for: LABVLDL, VLDL  No results found for: CHOLHDLRATIO  No results found for: LABA1C  No results found for: EAG        Lab Results   Component Value Date     TSH 2.40 02/03/2015            Lab Results   Component Value Date     VITD25 27.6 (L) 02/03/2015            Lab Results   Component Value Date     HAJCXSBS87 >2000 (H) 07/20/2022            Lab Results   Component Value Date     FOLATE 8.6 07/20/2022         Assessment:    1. Bipolar affective disorder, mixed, mild ([facility] [address]    2. PTSD (post-traumatic stress disorder)    3. Generalized anxiety disorder             No evidence of acute suicidality, homicidality or psychosis observed. Patient is psychiatrically stable     Plan:     1. Continue       Gabapentin, 300mg, take 2 capsules nightly (restless legs)   Clonidine, 0.1mg, nightly (teeth grinding, nightmares),     Lamotrigine, 200mg,  Daily  Trazodone, 100mg, nightly      Start      Discontinue  Risperdal, 1mg, nightly  Eszopiclone (Lunesta), 3mg, nightly for sleep      The risks, benefits, side effects, indications, contraindications, and adverse effects of the medications have been discussed. Yes.  2. The pt has verbalized understanding and has capacity to give informed consent. 3. The Yessica Peralta report has been reviewed according to [facility] regulations. 4. Supportive therapy offered. 5. Follow up:    Return in about 2 months (around 10/9/2022). 6. The patient has been advised to call with any problems. 7. Controlled substance Treatment Plan: this is a maintenance dose. .  8. The above listed medications have been continued, modifications in meds and other orders/labs as follows:                 Encounter Medications         Orders Placed This Encounter   Medications    traZODone (DESYREL) 100 MG tablet       Sig: Take 2 tablets by mouth nightly       Dispense:  60 tablet       Refill:  1    sertraline (ZOLOFT) 25 MG tablet       Sig: Take 1 tablet by mouth in the morning. Dispense:  30 tablet       Refill:  1    cloNIDine (CATAPRES) 0.1 MG tablet       Sig: Nightly       Dispense:  30 tablet       Refill:  2                        No orders of the defined types were placed in this encounter.         9. Additional comments: Continue therapy, discussed sleep hygiene, discussed the use of coping skills and relaxation strategies to manage symptoms. 10.Over 50% of the total visit time of   22  minutes was spent on counseling and/or coordination of care of:                         1. Bipolar affective disorder, mixed, mild ([facility]    2. PTSD (post-traumatic stress disorder)    3.  Generalized anxiety disorder                           Psychotherapy Topics: mood/medication effectiveness family and financial     CAPO Foster - CNP

## 2022-09-21 ENCOUNTER — TELEPHONE (OUTPATIENT)
Dept: HEMATOLOGY | Age: 57
End: 2022-09-21

## 2022-10-03 ENCOUNTER — TELEPHONE (OUTPATIENT)
Dept: PSYCHIATRY | Age: 57
End: 2022-10-03

## 2022-10-03 RX ORDER — LAMOTRIGINE 200 MG/1
200 TABLET ORAL DAILY
Qty: 30 TABLET | Refills: 1 | Status: SHIPPED | OUTPATIENT
Start: 2022-10-03

## 2022-10-03 NOTE — TELEPHONE ENCOUNTER
Called to let pt know that her script for lamotrigine was sent to her pharmacy     Was unable to lvm     Electronically signed by Roslyn Thomas on 10/3/2022 at 2:46 PM

## 2022-10-03 NOTE — TELEPHONE ENCOUNTER
Pharmacy sent a request to refill pt's medication       Last office visit : 8/9/2022 Thor SCANLON  Next office visit : 10/11/2022 Thor SCANLON    Requested Prescriptions     Pending Prescriptions Disp Refills    lamoTRIgine (LAMICTAL) 200 MG tablet 30 tablet 1            Rosa Thakur       8/9/2022  [address] [facility], CAPO - CNP  Nurse Practitioner Psychiatric/Mental Health Bipolar affective disorder, mixed, mild ([address] +2 more  Dx Medication Check  Follow-up  Reason for Visit     Progress Notes  Marky Luna, CAPO - CNP (Nurse Practitioner)   Nurse Practitioner [address] All Collapse All  Mary Irving is a 62 y.o. female evaluated via telephone on 8/9/2022. Consent:  She and/or health care decision maker is aware that that she may receive a bill for this telephone service, depending on her insurance coverage, and has provided verbal consent to proceed: Yes        Documentation:  I communicated with the patient and/or health care decision maker about see below. Details of this discussion including any medical advice provided: see below        I affirm this is a Patient Initiated Episode with a Patient who has not had a related appointment within my department in the past 7 days or scheduled within the next 24 hours. The patient  (guardian) is aware that this is a billable service, which includes applicable co-pays. This virtual visit was conducted with patient's (and or legal guardian's) consent. This visit was conducted pursuant to the emergency declaration under the [place] act and the [address] waiver authority in the coronavirus preparedness and response supplemental appropriation's ACT. Patient identification was verified and a caregiver was present when appropriate. The patient was located in a state where the provider was licensed to provide care.      Patient identification was verified at the start of the visit: Yes     Total Time: minutes: 21-30 minutes     Note: not billable if this call serves to triage the patient into an appointment for the relevant concern        CAPO Hannon CNP          7/7/22                                           Progress [place] 1965                           Chief Complaint   Patient presents with    Medication Check    Follow-up               Subjective:    Patient is a 62 y.o. female diagnosed with bipolar and presents today for follow-up. Last seen in clinic on 5/23/22  and prior records were reviewed. Last visit: doing better today than last visit. She is not having as much depression and anxiety. She is also sleeping better. She has not been outside as much because of the heat. She still has been calling her friends and has been interacting with them. She states she has had some extra fatigue lately, we discussed sitting in her home for long periods of time with minimal exercise can contribute to fatigue, we discussed chair aerobics or chair exercises that she could do while at home patient verbalized understanding and agreement. Additionally we discussed diet and nutrition. She denies SI HI AVH she denies side effects of medications at this time she is calm and cooperative on the phone no medication changes at this time we will follow-up in 2 months     In between visit: Patient was admitted to the hospital with an overdose attempt. Patient apparently overdosed on unknown amount of Risperdal.  She was brought in to the [facility] ED by EMS. She was seen by EMS in her home where she was laying on the floor swarming by bed bugs. They  her up to her feet and she promptly passed out. EMS provided about 1 minute CPR and placed an iGel airway. She then had come to prior to any medications having been administered and was brought in to [facility] ED.  She was making noises and moving her arms at arrival so her EP pulled out the iGel and she began to speak. She had a ED screening which revealed an alcohol level of 260. She stated to EMS that she had attempted to [facility] herself with Risperdal\"     Today : Patient reports she is doing okay today. She sounded calm and cooperative on the phone. She did sound down and depressed when discussing the condition of her trailer she reports that she has been having extra difficulty getting in and out of her trailer due to her physical limitations specifically her knee pain we discussed if there was an option to get a ramp placed in front of her home so that she could use her chair patient stated that she is \"looking into it. \"  She states that she has been having difficulty getting to the store because she cannot find people to take her to the store. Additionally we discussed the living condition of her trailer due to her bedbug infestation patient reports that they are still everywhere and that this has impacted her social life she states nobody will come over and no one will let her into their home. She states its been so hot that she just stays in her trailer majority of the time. We discussed barriers to suicide as well as resources available such as the suicide hotline and this clinic. Patient was instructed to stop drinking alcohol and take medications as directed patient verbalized understanding she currently denies SI HI AVH we will follow-up in 2 months        Absent  suicidal ideation. Reports compliance with medications as good .       Sleep: She reports that she has been sleeping much better     Caffeine use: coffee most morning     Support:  neighbor     PREVIOUS MED TRIALS  Trazodone (having more suicidal dreams), at 100mg, not working for sleep  Seroquel (wt gain, 14 lb in 3 months, enuresis, indigestion, abnormal blood work, increased blood sugar, seizures)  Duloxetine (has not been effective and no noticeable change even with dose increases to 90mg)  Paxil, 20mg  Wellbutrin XL, (tried it recently to quit smoking)  Prozac (made her numb, added to her eating disorder)  Zoloft (thought she did well on this, took for a couple of years, she stopped it because when she returned to [place] the doctor put her back on Prozac)  Remeron (increased her dreams and panic attacks)  Maria Fernanda Brantley (worked)  Librium (in 2018 for 15 days to stop drinking alcohol)  Risperdal- weight gain  Doxepin,  (not effective)  Prazosin, ineffective for dreams/nightmares  Latuda (1/27/21, reports that she switched it to am dosing because taking it at night made her RLS worse)     Current Substance Use:   Alcohol: Denies   illicit drug use: Denies   Marijuana: Denies   Tobacco use: 3/4 ppd  Vape: Denies        BP: There were no vitals taken for this visit.         Review of Systems - 14 point review:  Negative except for stated     Constitutional: (fevers, chills, night sweats, wt loss/gain, change in appetite, fatigue, somnolence)     HEENT: (ear pain or discharge, hearing loss, ear ringing, sinus pressure, nosebleed, nasal discharge, sore throat, oral sores, tooth pain, bleeding gums, hoarse voice, neck pain)      Cardiovascular: (HTN, chest pain, elevated cholesterol/lipids, palpitations, leg swelling, leg pain with walking)     Respiratory: (cough, wheezing, snoring, SOB with activity (dyspnea), SOB while lying flat (orthopnea), awakening with severe SOB (paroxysmal nocturnal dyspnea))     Gastrointestinal: (NVD, constipation, abdominal pain, bright red stools, black tarry stools, stool incontinence)     Genitourinary:  (pelvic pain, burning or frequency of urination, urinary urgency, blood in urine incomplete bladder emptying, urinary incontinence, STD; MEN: testicular pain or swelling, erectile dysfunction; WOMEN: LMP, heavy menstrual bleeding (menorrhagia), irregular periods, postmenopausal bleeding, menstrual pain (dymenorrhea, vaginal discharge)     Musculoskeletal: (bone pain/fracture, joint pain or swelling, musle pain) Integumentary: (rashes, acne, non-healing sores, itching, breast lumps, breast pain, nipple discharge, hair loss)     Neurologic: (HA, muscle weakness, paresthesias (numbness, coldness, crawling or prickling), memory loss, seizure, dizziness)     Psychiatric:  (anxiety, sadness, irritability/anger, insomnia, suicidality)     Endocrine: (heat or cold intolerance, excessive thirst (polydipsia), excessive hunger (polyphagia))     Immune/Allergic: (hives, seasonal or environmental allergies, HIV exposure)     Hematologic/Lymphatic: (lymph node enlargement, easy bleeding or bruising)     History obtained via chart review and patient     PCP is Benny Puente. Luis Antonio Dai, DO, DO         Current Meds:     Home Medications           Prior to Admission medications    Medication Sig Start Date End Date Taking? Authorizing Provider   traZODone (DESYREL) 100 MG tablet Take 2 tablets by mouth nightly 8/9/22   Yes CAPO Sommer CNP   sertraline (ZOLOFT) 25 MG tablet Take 1 tablet by mouth in the morning.  8/9/22   Yes CAPO Sommer CNP   cloNIDine (CATAPRES) 0.1 MG tablet Nightly 8/9/22   Yes CAPO Sommer CNP   gabapentin (NEURONTIN) 300 MG capsule TAKE 2 CAPSULES BY MOUTH EVERY DAY AT NIGHT 7/7/22 8/6/22   CAPO Sommer CNP   risperiDONE (RISPERDAL) 1 MG tablet TAKE 1 TABLET BY MOUTH EVERY DAY AT NIGHT 7/6/22 8/3/22   CAPO Sommer CNP   lamoTRIgine (LAMICTAL) 200 MG tablet Take 1 tablet by mouth daily TAKE 1 TABLET BY MOUTH EVERY DAY 5/23/22     CAPO Sommer CNP   Icosapent Ethyl (VASCEPA) 1 g CAPS capsule 2 times daily at 0800 and 1400       Historical Provider, MD   pantoprazole (PROTONIX) 40 MG tablet Take 40 mg by mouth daily       Historical Provider, MD   fluticasone (FLONASE) 50 MCG/ACT nasal spray SPRAY 1 SPRAY INTO EACH NOSTRIL TWICE A DAY 7/22/21     Historical Provider, MD   loratadine (CLARITIN) 10 MG tablet TAKE ONE TABLET BY MOUTH DAILY 7/23/21 Historical Provider, MD   rosuvastatin (CRESTOR) 5 MG tablet TAKE 1 TABLET BY MOUTH EVERY DAY IN THE EVENING 8/2/21     Historical Provider, MD   oxybutynin (DITROPAN) 5 MG tablet TAKE 1 TABLET BY MOUTH EVERY DAY AT NIGHT 9/7/21     Dominick Waller, APRN - CNP   spironolactone (ALDACTONE) 25 MG tablet daily 3/5/20     Historical Provider, MD   metoprolol succinate (TOPROL XL) 50 MG extended release tablet Take 25 mg by mouth in the morning. 4/25/19 per direction of Dr. Ronaldo Butcher pt to take 1/2 tab daily. Venecia Mccoy Historical Provider, MD   Magnesium Oxide 250 MG TABS Take by mouth daily       Historical Provider, MD         Social History   Social History            Socioeconomic History    Marital status: Legally     Number of children: 1    Years of education: 12th grade + some college   Tobacco Use    Smoking status: Every Day       Packs/day: 0.50       Types: Cigarettes    Smokeless tobacco: Never   Vaping Use    Vaping Use: Never used   Substance and Sexual Activity    Alcohol use: Not Currently       Comment: history of abuse, last drink was early December, 2018    Drug use: Not Currently       Types: Marijuana [place] Fogo)       Comment: last use was summer, 2017    Sexual activity: Not Currently   Social History Narrative            SAFETY PLAN           A suicide Safety Plan is a document that supports someone when they are having thoughts of suicide. Warning Signs that indicate a suicidal crisis may be developing: What (situations, thoughts, feelings, body sensations, behaviors, etc.) do you experience that lets you know you are beginning to think about suicide? 1. Bed bug infestation     2. Feeling like she can not take care of herself     3. Feeling lonely           Internal Coping Strategies:  What things can I do (relaxation techniques, hobbies, physical activities, etc.) to take my mind off my problems without contacting another person? 1. Watching television     2.  Puzzles 3. Mild house cleaning           People and social settings that provide distraction: Who can I call or where can I go to distract me? 1. Name: Hawk Garcia                           Phone: doesn't have a phone- goes to his trailer     2. Name: Ruth Tillman                            Phone:  [phone]     3. Place: the store              4. Place: outside in the trailer park when the weather is nice            People whom I can ask for help: Who can I call when I need help - for example, friends, family, clergy, someone else? 1. Name: Hawk Garcia             Phone:      2. Name: Ruth Tillman                Phone: [phone]     3. Name:                        Phone:            Professionals or [address] [place] [address] Blvd I can contact during a crisis: Who can I call for help - for example, my doctor, my psychiatrist, my psychologist, a mental health provider, a suicide hotline? 1. If you feel overwhelmed, unable to cope, extremely anxious, or have thoughts of wanting to harm yourself or someone else, go to the nearest Emergency Room. [facility] Emergency Room:  [phone]     Crisis line:  [phone]     [facility] [place] (inpatient psych):  [phone] option 1     Call 911- they will send help and get you to the local ER           2. Clinician Name: [facility]   Phone: [phone] option 3                   3. Suicide Prevention Lifeline: 7-693-858-TALK (3244)                 Making the environment safe: How can I make my environment (house/apartment/living space) safer? For example, can I remove guns, medications, and other items? 1. Working on getting bed bugs eliminated      2. Removing alcohol from the house             MSE:  Appearance and gait: UTO due to phone call   Behavior: Calm, cooperative, and socially appropriate. Jacky Patella Speech: Normal in tone, volume, and quality. No slurring, dysarthria or pressured speech noted.    Mood: \"pretty good\"   Affect: UTO due to phone call    Thought Process: Appears linear, logical and goal oriented. Causality appears intact. Thought Content: Denies active suicidal and homicidal ideations. No overt delusions or paranoia appreciated. Perceptions: Denies auditory or visual hallucinations at present time. Not responding to internal stimuli. Concentration: Intact. Orientation: to person, place, date, and situation. Language: Intact. Fund of information: Intact. Memory: Recent and remote appear intact. Impulsivity: Limited. Neurovegitative: Fair appetite and sleep. Insight: Fair. Judgment: Fair. Cognition: Can spell \"world\" backwards: Yes                    Can do serial 7's:  Yes           Lab Results   Component Value Date      08/04/2022     K 4.2 08/04/2022      08/04/2022     CO2 22 08/04/2022     BUN 7 08/04/2022     CREATININE 0.7 08/04/2022     GLUCOSE 104 08/04/2022     CALCIUM 9.4 08/04/2022     PROT 6.3 (L) 07/31/2022     LABALBU 2.2 (L) 07/31/2022     BILITOT 0.5 07/31/2022     ALKPHOS 301 (H) 07/31/2022     AST 80 (H) 07/31/2022     ALT 45 (H) 07/31/2022     LABGLOM >60 08/04/2022     GFRAA >59 08/04/2022            Lab Results   Component Value Date/Time      08/04/2022 03:42 AM     K 4.2 08/04/2022 03:42 AM     K 3.5 07/24/2022 04:10 AM      08/04/2022 03:42 AM     CO2 22 08/04/2022 03:42 AM     BUN 7 08/04/2022 03:42 AM     CREATININE 0.7 08/04/2022 03:42 AM     GLUCOSE 104 08/04/2022 03:42 AM     CALCIUM 9.4 08/04/2022 03:42 AM      No results found for: CHOL  No results found for: TRIG  No results found for: HDL  No results found for: LDLCHOLESTEROL, LDLCALC  No results found for: LABVLDL, VLDL  No results found for: CHOLHDLRATIO  No results found for: LABA1C  No results found for: EAG        Lab Results   Component Value Date     TSH 2.40 02/03/2015            Lab Results   Component Value Date     VITD25 27.6 (L) 02/03/2015            Lab Results   Component Value Date HWVVZQTE39 >2000 (H) 07/20/2022            Lab Results   Component Value Date     FOLATE 8.6 07/20/2022         Assessment:    1. Bipolar affective disorder, mixed, mild ([address]    2. PTSD (post-traumatic stress disorder)    3. Generalized anxiety disorder             No evidence of acute suicidality, homicidality or psychosis observed. Patient is psychiatrically stable     Plan:     1. Continue       Gabapentin, 300mg, take 2 capsules nightly (restless legs)   Clonidine, 0.1mg, nightly (teeth grinding, nightmares),     Lamotrigine, 200mg,  Daily  Trazodone, 100mg, nightly      Start      Discontinue  Risperdal, 1mg, nightly  Eszopiclone (Lunesta), 3mg, nightly for sleep      The risks, benefits, side effects, indications, contraindications, and adverse effects of the medications have been discussed. Yes.  2. The pt has verbalized understanding and has capacity to give informed consent. 3. The Carlitosfrantz Feliz report has been reviewed according to [facility] regulations. 4. Supportive therapy offered. 5. Follow up:    Return in about 2 months (around 10/9/2022). 6. The patient has been advised to call with any problems. 7. Controlled substance Treatment Plan: this is a maintenance dose. .  8. The above listed medications have been continued, modifications in meds and other orders/labs as follows:                 Encounter Medications         Orders Placed This Encounter   Medications    traZODone (DESYREL) 100 MG tablet       Sig: Take 2 tablets by mouth nightly       Dispense:  60 tablet       Refill:  1    sertraline (ZOLOFT) 25 MG tablet       Sig: Take 1 tablet by mouth in the morning. Dispense:  30 tablet       Refill:  1    cloNIDine (CATAPRES) 0.1 MG tablet       Sig: Nightly       Dispense:  30 tablet       Refill:  2                        No orders of the defined types were placed in this encounter.         9. Additional comments: Continue therapy, discussed sleep hygiene, discussed the use of coping skills and relaxation strategies to manage symptoms. 10.Over 50% of the total visit time of   22  minutes was spent on counseling and/or coordination of care of:                         1. Bipolar affective disorder, mixed, mild ([facility] [address]    2. PTSD (post-traumatic stress disorder)    3.  Generalized anxiety disorder                           Psychotherapy Topics: mood/medication effectiveness family and financial     Dorene López, APRN - CNP

## 2022-10-10 ENCOUNTER — TELEPHONE (OUTPATIENT)
Dept: PSYCHIATRY | Age: 57
End: 2022-10-10

## 2022-10-10 NOTE — TELEPHONE ENCOUNTER
Called pt for appointment reminder.     -unable to leave Vm  Busy tone the whole time.     Electronically signed by Anna Garcia MA on 10/10/2022 at 10:56 AM

## 2022-10-11 ENCOUNTER — TELEPHONE (OUTPATIENT)
Dept: PSYCHIATRY | Age: 57
End: 2022-10-11

## 2022-10-11 NOTE — TELEPHONE ENCOUNTER
Attempted to contact pt as she was unable to be reached for her appt with William SCANLON today-continue to have a fast busy signal only.

## 2022-10-11 NOTE — TELEPHONE ENCOUNTER
Pt was called for virtual appointment check in.  -Pt was called three times, no answer unable to leave voicemail, Busy tone    Electronically signed by Maria Luisa Hooks MA on 10/11/2022 at 2:57 PM     Called pt was a no show.  Called to check on them and    -unable to leave Vm  Busy Tone      Electronically signed by Maria Luisa Hooks MA on 10/11/2022 at 2:57 PM

## 2022-10-12 ENCOUNTER — TELEPHONE (OUTPATIENT)
Dept: PSYCHIATRY | Age: 57
End: 2022-10-12

## 2022-10-12 NOTE — TELEPHONE ENCOUNTER
Called pt to check on her per  since she missed her phone appt  yesterday 10/11 with Cristina Patel and since she has recently been hospitalized. No answer and it was a fast busy signal .    Notified  and provider as well.     Electronically signed by Francis Green MA on 10/12/2022 at 10:19 AM

## 2022-10-12 NOTE — TELEPHONE ENCOUNTER
MA attempted to do a wellness check on this pt because we couldn't make contact with her. Called [employer] and an officer called us back and stated that the pt had passed away on 9/1/22.     Electronically signed by Angi Garcia MA on 10/12/2022 at 11:13 AM

## 2022-11-05 RX ORDER — CLONIDINE HYDROCHLORIDE 0.1 MG/1
TABLET ORAL
Qty: 90 TABLET | OUTPATIENT
Start: 2022-11-05